# Patient Record
Sex: FEMALE | Race: WHITE | Employment: OTHER | URBAN - METROPOLITAN AREA
[De-identification: names, ages, dates, MRNs, and addresses within clinical notes are randomized per-mention and may not be internally consistent; named-entity substitution may affect disease eponyms.]

---

## 2017-05-29 PROBLEM — L97.509 DIABETIC ULCER OF TOE (HCC): Status: ACTIVE | Noted: 2017-05-29

## 2017-05-29 PROBLEM — L03.116 CELLULITIS OF LEFT LOWER EXTREMITY: Status: ACTIVE | Noted: 2017-05-29

## 2017-05-29 PROBLEM — L03.032 CELLULITIS OF TOE OF LEFT FOOT: Status: ACTIVE | Noted: 2017-05-29

## 2017-05-29 PROBLEM — E11.621 TYPE 2 DIABETES MELLITUS WITH FOOT ULCER, WITHOUT LONG-TERM CURRENT USE OF INSULIN (HCC): Status: ACTIVE | Noted: 2017-05-29

## 2017-05-29 PROBLEM — E11.621 DIABETIC ULCER OF TOE (HCC): Status: ACTIVE | Noted: 2017-05-29

## 2017-05-29 PROBLEM — L97.509 TYPE 2 DIABETES MELLITUS WITH FOOT ULCER, WITHOUT LONG-TERM CURRENT USE OF INSULIN (HCC): Status: ACTIVE | Noted: 2017-05-29

## 2017-05-29 PROBLEM — N17.9 AKI (ACUTE KIDNEY INJURY) (HCC): Status: ACTIVE | Noted: 2017-05-29

## 2017-06-05 PROBLEM — D62 ACUTE BLOOD LOSS ANEMIA: Status: ACTIVE | Noted: 2017-06-05

## 2017-06-14 ENCOUNTER — HOSPITAL ENCOUNTER (OUTPATIENT)
Dept: OTHER | Age: 82
Discharge: HOME | End: 2017-06-14
Attending: FAMILY MEDICINE | Admitting: FAMILY MEDICINE

## 2017-06-14 LAB
A/G RATIO: 1.5 (ref 1.1–2.2)
ALBUMIN SERPL-MCNC: 3.7 G/DL (ref 3.4–5)
ALP BLD-CCNC: 44 U/L (ref 40–129)
ALT SERPL-CCNC: 12 U/L (ref 10–40)
ANION GAP SERPL CALCULATED.3IONS-SCNC: 12 MMOL/L (ref 3–16)
AST SERPL-CCNC: 18 U/L (ref 15–37)
BASOPHILS ABSOLUTE: 0 K/UL (ref 0–0.2)
BASOPHILS RELATIVE PERCENT: 0.6 %
BILIRUB SERPL-MCNC: 0.4 MG/DL (ref 0–1)
BUN BLDV-MCNC: 18 MG/DL (ref 7–20)
CALCIUM SERPL-MCNC: 9.2 MG/DL (ref 8.3–10.6)
CHLORIDE BLD-SCNC: 102 MMOL/L (ref 99–110)
CO2: 27 MMOL/L (ref 21–32)
CREAT SERPL-MCNC: 1.3 MG/DL (ref 0.6–1.2)
EOSINOPHILS ABSOLUTE: 0.1 K/UL (ref 0–0.6)
EOSINOPHILS RELATIVE PERCENT: 2.2 %
GFR AFRICAN AMERICAN: 47
GFR NON-AFRICAN AMERICAN: 39
GLOBULIN: 2.4 G/DL
GLUCOSE BLD-MCNC: 128 MG/DL (ref 70–99)
HCT VFR BLD CALC: 27.3 % (ref 36–48)
HEMOGLOBIN: 8.8 G/DL (ref 12–16)
IRON: 49 UG/DL (ref 37–145)
LYMPHOCYTES ABSOLUTE: 0.9 K/UL (ref 1–5.1)
LYMPHOCYTES RELATIVE PERCENT: 13.6 %
MCH RBC QN AUTO: 30.3 PG (ref 26–34)
MCHC RBC AUTO-ENTMCNC: 32.2 G/DL (ref 31–36)
MCV RBC AUTO: 94 FL (ref 80–100)
MONOCYTES ABSOLUTE: 0.5 K/UL (ref 0–1.3)
MONOCYTES RELATIVE PERCENT: 8.6 %
NEUTROPHILS ABSOLUTE: 4.8 K/UL (ref 1.7–7.7)
NEUTROPHILS RELATIVE PERCENT: 75 %
PDW BLD-RTO: 16.6 % (ref 12.4–15.4)
PLATELET # BLD: 261 K/UL (ref 135–450)
PMV BLD AUTO: 9.6 FL (ref 5–10.5)
POTASSIUM SERPL-SCNC: 4.8 MMOL/L (ref 3.5–5.1)
RBC # BLD: 2.9 M/UL (ref 4–5.2)
SODIUM BLD-SCNC: 141 MMOL/L (ref 136–145)
TOTAL PROTEIN: 6.1 G/DL (ref 6.4–8.2)
WBC # BLD: 6.4 K/UL (ref 4–11)

## 2017-06-15 LAB
ESTIMATED AVERAGE GLUCOSE: 128.4 MG/DL
HBA1C MFR BLD: 6.1 %

## 2017-09-19 ENCOUNTER — HOSPITAL ENCOUNTER (OUTPATIENT)
Dept: MAMMOGRAPHY | Age: 82
Discharge: HOME | End: 2017-09-19
Attending: FAMILY MEDICINE | Admitting: FAMILY MEDICINE

## 2017-09-19 DIAGNOSIS — Z12.31 ENCOUNTER FOR SCREENING MAMMOGRAM FOR BREAST CANCER: ICD-10-CM

## 2017-09-19 LAB
A/G RATIO: 1.4 (ref 1.1–2.2)
ALBUMIN SERPL-MCNC: 4.2 G/DL (ref 3.4–5)
ALP BLD-CCNC: 66 U/L (ref 40–129)
ALT SERPL-CCNC: 11 U/L (ref 10–40)
ANION GAP SERPL CALCULATED.3IONS-SCNC: 18 MMOL/L (ref 3–16)
AST SERPL-CCNC: 19 U/L (ref 15–37)
BASOPHILS ABSOLUTE: 0 K/UL (ref 0–0.2)
BASOPHILS RELATIVE PERCENT: 0.4 %
BILIRUB SERPL-MCNC: 0.4 MG/DL (ref 0–1)
BUN BLDV-MCNC: 27 MG/DL (ref 7–20)
CALCIUM SERPL-MCNC: 10.1 MG/DL (ref 8.3–10.6)
CHLORIDE BLD-SCNC: 93 MMOL/L (ref 99–110)
CO2: 27 MMOL/L (ref 21–32)
CREAT SERPL-MCNC: 1.3 MG/DL (ref 0.6–1.2)
EOSINOPHILS ABSOLUTE: 0.2 K/UL (ref 0–0.6)
EOSINOPHILS RELATIVE PERCENT: 1.8 %
GFR AFRICAN AMERICAN: 47
GFR NON-AFRICAN AMERICAN: 39
GLOBULIN: 3.1 G/DL
GLUCOSE BLD-MCNC: 131 MG/DL (ref 70–99)
HCT VFR BLD CALC: 32.6 % (ref 36–48)
HEMOGLOBIN: 10.9 G/DL (ref 12–16)
IRON SATURATION: 21 % (ref 15–50)
IRON: 61 UG/DL (ref 37–145)
LYMPHOCYTES ABSOLUTE: 1.3 K/UL (ref 1–5.1)
LYMPHOCYTES RELATIVE PERCENT: 13.8 %
MCH RBC QN AUTO: 31.2 PG (ref 26–34)
MCHC RBC AUTO-ENTMCNC: 33.4 G/DL (ref 31–36)
MCV RBC AUTO: 93.2 FL (ref 80–100)
MONOCYTES ABSOLUTE: 0.6 K/UL (ref 0–1.3)
MONOCYTES RELATIVE PERCENT: 6.4 %
NEUTROPHILS ABSOLUTE: 7.4 K/UL (ref 1.7–7.7)
NEUTROPHILS RELATIVE PERCENT: 77.6 %
PDW BLD-RTO: 14.1 % (ref 12.4–15.4)
PLATELET # BLD: 182 K/UL (ref 135–450)
PMV BLD AUTO: 10.2 FL (ref 5–10.5)
POTASSIUM SERPL-SCNC: 4.6 MMOL/L (ref 3.5–5.1)
RBC # BLD: 3.5 M/UL (ref 4–5.2)
SODIUM BLD-SCNC: 138 MMOL/L (ref 136–145)
TOTAL IRON BINDING CAPACITY: 292 UG/DL (ref 260–445)
TOTAL PROTEIN: 7.3 G/DL (ref 6.4–8.2)
WBC # BLD: 9.5 K/UL (ref 4–11)

## 2017-09-20 LAB
ESTIMATED AVERAGE GLUCOSE: 159.9 MG/DL
HBA1C MFR BLD: 7.2 %

## 2017-10-10 ENCOUNTER — HOSPITAL ENCOUNTER (OUTPATIENT)
Dept: OTHER | Age: 82
Discharge: HOME | End: 2017-10-10
Attending: PODIATRIST | Admitting: PODIATRIST

## 2017-10-10 DIAGNOSIS — E11.621 DIABETIC FOOT ULCER WITH OSTEOMYELITIS (HCC): ICD-10-CM

## 2017-10-10 DIAGNOSIS — E11.69 DIABETIC FOOT ULCER WITH OSTEOMYELITIS (HCC): ICD-10-CM

## 2017-10-10 DIAGNOSIS — M86.9 DIABETIC FOOT ULCER WITH OSTEOMYELITIS (HCC): ICD-10-CM

## 2017-10-10 DIAGNOSIS — L97.509 DIABETIC FOOT ULCER WITH OSTEOMYELITIS (HCC): ICD-10-CM

## 2017-10-10 DIAGNOSIS — L97.522 ULCER OF LEFT FOOT, WITH FAT LAYER EXPOSED (HCC): ICD-10-CM

## 2017-12-05 ENCOUNTER — HOSPITAL ENCOUNTER (OUTPATIENT)
Dept: OTHER | Age: 82
Discharge: HOME | End: 2017-12-05
Attending: PODIATRIST | Admitting: PODIATRIST

## 2017-12-05 DIAGNOSIS — L97.509 DIABETIC FOOT ULCER ASSOCIATED WITH OTHER SPECIFIED DIABETES MELLITUS, UNSPECIFIED LATERALITY, UNSPECIFIED PART OF FOOT, UNSPECIFIED ULCER STAGE (HCC): ICD-10-CM

## 2017-12-05 DIAGNOSIS — E13.621 DIABETIC FOOT ULCER ASSOCIATED WITH OTHER SPECIFIED DIABETES MELLITUS, UNSPECIFIED LATERALITY, UNSPECIFIED PART OF FOOT, UNSPECIFIED ULCER STAGE (HCC): ICD-10-CM

## 2017-12-06 PROBLEM — A41.9 SEPSIS (HCC): Status: ACTIVE | Noted: 2017-12-06

## 2017-12-06 PROBLEM — L03.90 CELLULITIS: Status: ACTIVE | Noted: 2017-12-06

## 2017-12-07 PROBLEM — L03.115 CELLULITIS OF RIGHT FOOT: Status: ACTIVE | Noted: 2017-12-07

## 2017-12-20 PROBLEM — E44.0 MODERATE PROTEIN-CALORIE MALNUTRITION (HCC): Status: ACTIVE | Noted: 2017-12-20

## 2017-12-26 PROBLEM — A41.9 SEPSIS (HCC): Status: RESOLVED | Noted: 2017-12-06 | Resolved: 2017-12-26

## 2017-12-26 PROBLEM — L03.115 CELLULITIS OF RIGHT FOOT: Status: RESOLVED | Noted: 2017-12-07 | Resolved: 2017-12-26

## 2017-12-26 PROBLEM — L03.90 CELLULITIS: Status: RESOLVED | Noted: 2017-12-06 | Resolved: 2017-12-26

## 2018-02-12 PROBLEM — L03.115 BILATERAL LOWER LEG CELLULITIS: Status: ACTIVE | Noted: 2018-02-12

## 2018-02-12 PROBLEM — L03.116 BILATERAL LOWER LEG CELLULITIS: Status: ACTIVE | Noted: 2018-02-12

## 2018-02-13 PROBLEM — E43 SEVERE PROTEIN-CALORIE MALNUTRITION (HCC): Chronic | Status: ACTIVE | Noted: 2017-12-20

## 2018-03-11 PROBLEM — G93.40 ENCEPHALOPATHY: Status: ACTIVE | Noted: 2018-03-11

## 2018-08-07 ENCOUNTER — HOSPITAL ENCOUNTER (OUTPATIENT)
Age: 83
Discharge: HOME | End: 2018-08-07
Payer: MEDICARE

## 2018-08-07 LAB
A/G RATIO: 1.4 (ref 1.1–2.2)
ALBUMIN SERPL-MCNC: 4 G/DL (ref 3.4–5)
ALP BLD-CCNC: 85 U/L (ref 40–129)
ALT SERPL-CCNC: 12 U/L (ref 10–40)
ANION GAP SERPL CALCULATED.3IONS-SCNC: 9 MMOL/L (ref 3–16)
AST SERPL-CCNC: 18 U/L (ref 15–37)
BASOPHILS ABSOLUTE: 0 K/UL (ref 0–0.2)
BASOPHILS RELATIVE PERCENT: 0.2 %
BILIRUB SERPL-MCNC: 0.4 MG/DL (ref 0–1)
BUN BLDV-MCNC: 24 MG/DL (ref 7–20)
CALCIUM SERPL-MCNC: 9.7 MG/DL (ref 8.3–10.6)
CHLORIDE BLD-SCNC: 106 MMOL/L (ref 99–110)
CO2: 27 MMOL/L (ref 21–32)
CREAT SERPL-MCNC: 1.1 MG/DL (ref 0.6–1.2)
EOSINOPHILS ABSOLUTE: 0.3 K/UL (ref 0–0.6)
EOSINOPHILS RELATIVE PERCENT: 3.9 %
GFR AFRICAN AMERICAN: 57
GFR NON-AFRICAN AMERICAN: 47
GLOBULIN: 2.9 G/DL
GLUCOSE BLD-MCNC: 159 MG/DL (ref 70–99)
HCT VFR BLD CALC: 29.8 % (ref 36–48)
HEMOGLOBIN: 9.8 G/DL (ref 12–16)
IRON: 56 UG/DL (ref 37–145)
LYMPHOCYTES ABSOLUTE: 1 K/UL (ref 1–5.1)
LYMPHOCYTES RELATIVE PERCENT: 12.1 %
MCH RBC QN AUTO: 32.1 PG (ref 26–34)
MCHC RBC AUTO-ENTMCNC: 33 G/DL (ref 31–36)
MCV RBC AUTO: 97 FL (ref 80–100)
MONOCYTES ABSOLUTE: 0.7 K/UL (ref 0–1.3)
MONOCYTES RELATIVE PERCENT: 7.8 %
NEUTROPHILS ABSOLUTE: 6.4 K/UL (ref 1.7–7.7)
NEUTROPHILS RELATIVE PERCENT: 76 %
PDW BLD-RTO: 14.3 % (ref 12.4–15.4)
PLATELET # BLD: 208 K/UL (ref 135–450)
PMV BLD AUTO: 9.2 FL (ref 5–10.5)
POTASSIUM SERPL-SCNC: 4.4 MMOL/L (ref 3.5–5.1)
RBC # BLD: 3.07 M/UL (ref 4–5.2)
SODIUM BLD-SCNC: 142 MMOL/L (ref 136–145)
TOTAL PROTEIN: 6.9 G/DL (ref 6.4–8.2)
VITAMIN D 25-HYDROXY: 44.4 NG/ML
WBC # BLD: 8.4 K/UL (ref 4–11)

## 2018-08-07 PROCEDURE — 36415 COLL VENOUS BLD VENIPUNCTURE: CPT

## 2018-08-07 PROCEDURE — 82306 VITAMIN D 25 HYDROXY: CPT

## 2018-08-07 PROCEDURE — 83036 HEMOGLOBIN GLYCOSYLATED A1C: CPT

## 2018-08-07 PROCEDURE — 85025 COMPLETE CBC W/AUTO DIFF WBC: CPT

## 2018-08-07 PROCEDURE — 80053 COMPREHEN METABOLIC PANEL: CPT

## 2018-08-07 PROCEDURE — 83540 ASSAY OF IRON: CPT

## 2018-08-08 LAB
ESTIMATED AVERAGE GLUCOSE: 162.8 MG/DL
HBA1C MFR BLD: 7.3 %

## 2018-10-29 ENCOUNTER — HOSPITAL ENCOUNTER (EMERGENCY)
Age: 83
Discharge: HOME | End: 2018-10-29
Payer: MEDICARE

## 2018-10-29 VITALS
RESPIRATION RATE: 14 BRPM | BODY MASS INDEX: 19.9 KG/M2 | WEIGHT: 139 LBS | HEART RATE: 70 BPM | OXYGEN SATURATION: 98 % | SYSTOLIC BLOOD PRESSURE: 127 MMHG | TEMPERATURE: 98.1 F | HEIGHT: 70 IN | DIASTOLIC BLOOD PRESSURE: 65 MMHG

## 2018-10-29 DIAGNOSIS — B02.8 HERPES ZOSTER DERMATITIS: Primary | ICD-10-CM

## 2018-10-29 DIAGNOSIS — N30.00 ACUTE CYSTITIS WITHOUT HEMATURIA: ICD-10-CM

## 2018-10-29 DIAGNOSIS — L30.8 HERPES ZOSTER DERMATITIS: Primary | ICD-10-CM

## 2018-10-29 LAB
BACTERIA: ABNORMAL /HPF
BILIRUBIN URINE: NEGATIVE
BLOOD, URINE: NEGATIVE
CLARITY: CLEAR
COLOR: YELLOW
EPITHELIAL CELLS, UA: ABNORMAL /HPF
GLUCOSE URINE: NEGATIVE MG/DL
KETONES, URINE: NEGATIVE MG/DL
LEUKOCYTE ESTERASE, URINE: ABNORMAL
MICROSCOPIC EXAMINATION: YES
NITRITE, URINE: NEGATIVE
PH UA: 5.5
PROTEIN UA: NEGATIVE MG/DL
RBC UA: ABNORMAL /HPF (ref 0–2)
SPECIFIC GRAVITY UA: 1.02
URINE REFLEX TO CULTURE: YES
URINE TYPE: ABNORMAL
UROBILINOGEN, URINE: 0.2 E.U./DL
WBC UA: ABNORMAL /HPF (ref 0–5)

## 2018-10-29 PROCEDURE — 99283 EMERGENCY DEPT VISIT LOW MDM: CPT

## 2018-10-29 PROCEDURE — 6370000000 HC RX 637 (ALT 250 FOR IP): Performed by: PHYSICIAN ASSISTANT

## 2018-10-29 PROCEDURE — 87086 URINE CULTURE/COLONY COUNT: CPT

## 2018-10-29 PROCEDURE — 81001 URINALYSIS AUTO W/SCOPE: CPT

## 2018-10-29 RX ORDER — HYDROCODONE BITARTRATE AND ACETAMINOPHEN 5; 325 MG/1; MG/1
1 TABLET ORAL ONCE
Status: COMPLETED | OUTPATIENT
Start: 2018-10-29 | End: 2018-10-29

## 2018-10-29 RX ORDER — CEPHALEXIN 250 MG/1
250 CAPSULE ORAL 2 TIMES DAILY
Qty: 14 CAPSULE | Refills: 0 | Status: SHIPPED | OUTPATIENT
Start: 2018-10-29 | End: 2018-11-05

## 2018-10-29 RX ORDER — VALACYCLOVIR HYDROCHLORIDE 1 G/1
1000 TABLET, FILM COATED ORAL 2 TIMES DAILY
Qty: 14 TABLET | Refills: 0 | Status: SHIPPED | OUTPATIENT
Start: 2018-10-29 | End: 2018-11-05

## 2018-10-29 RX ORDER — HYDROCODONE BITARTRATE AND ACETAMINOPHEN 5; 325 MG/1; MG/1
1 TABLET ORAL EVERY 6 HOURS PRN
Qty: 10 TABLET | Refills: 0 | Status: SHIPPED | OUTPATIENT
Start: 2018-10-29 | End: 2018-11-01

## 2018-10-29 RX ORDER — ACYCLOVIR 200 MG/1
800 CAPSULE ORAL ONCE
Status: COMPLETED | OUTPATIENT
Start: 2018-10-29 | End: 2018-10-29

## 2018-10-29 RX ORDER — CEPHALEXIN 500 MG/1
500 CAPSULE ORAL ONCE
Status: COMPLETED | OUTPATIENT
Start: 2018-10-29 | End: 2018-10-29

## 2018-10-29 RX ADMIN — CEPHALEXIN 500 MG: 500 CAPSULE ORAL at 20:49

## 2018-10-29 RX ADMIN — ACYCLOVIR 800 MG: 200 CAPSULE ORAL at 20:49

## 2018-10-29 RX ADMIN — HYDROCODONE BITARTRATE AND ACETAMINOPHEN 1 TABLET: 5; 325 TABLET ORAL at 20:49

## 2018-10-29 ASSESSMENT — PAIN DESCRIPTION - LOCATION: LOCATION: LEG

## 2018-10-29 ASSESSMENT — PAIN DESCRIPTION - ONSET: ONSET: GRADUAL

## 2018-10-29 ASSESSMENT — PAIN DESCRIPTION - ORIENTATION: ORIENTATION: LEFT;UPPER

## 2018-10-29 ASSESSMENT — ENCOUNTER SYMPTOMS
ABDOMINAL PAIN: 0
NAUSEA: 1
SHORTNESS OF BREATH: 0
VOMITING: 0

## 2018-10-29 ASSESSMENT — PAIN DESCRIPTION - PAIN TYPE: TYPE: ACUTE PAIN

## 2018-10-29 ASSESSMENT — PAIN DESCRIPTION - PROGRESSION: CLINICAL_PROGRESSION: GRADUALLY WORSENING

## 2018-10-29 ASSESSMENT — PAIN SCALES - GENERAL
PAINLEVEL_OUTOF10: 5
PAINLEVEL_OUTOF10: 5

## 2018-10-29 ASSESSMENT — PAIN DESCRIPTION - DESCRIPTORS: DESCRIPTORS: DULL

## 2018-10-30 NOTE — ED PROVIDER NOTES
appearance. She does not have a sickly appearance. She does not appear ill. HENT:   Head: Normocephalic and atraumatic. Mouth/Throat: Oropharynx is clear and moist.   Eyes: Conjunctivae are normal.   Cardiovascular: Normal rate, regular rhythm, normal heart sounds and intact distal pulses. Pulses:       Radial pulses are 2+ on the right side, and 2+ on the left side. Posterior tibial pulses are 2+ on the right side, and 2+ on the left side. Pulmonary/Chest: Effort normal and breath sounds normal. No respiratory distress. She has no wheezes. She has no rales. Abdominal: Soft. Bowel sounds are normal. She exhibits no distension. There is no tenderness. There is no rebound and no guarding. Musculoskeletal:        Left hip: She exhibits no tenderness, no crepitus and no deformity. Neurological: She is alert and oriented to person, place, and time. No sensory deficit. She exhibits normal muscle tone. Skin: Skin is warm and dry. Rash noted. No petechiae and no purpura noted. Rash is vesicular. No signs of bacterial skin infection at this time, no spreading redness, no red streaks, no drainage. Psychiatric: She has a normal mood and affect. Her behavior is normal.   Vitals reviewed.       Procedures    MDM  Number of Diagnoses or Management Options  Acute cystitis without hematuria:   Herpes zoster dermatitis:      Amount and/or Complexity of Data Reviewed  Clinical lab tests: ordered and reviewed  Review and summarize past medical records: yes    Patient Progress  Patient progress: stable          Results for orders placed or performed during the hospital encounter of 10/29/18   Urinalysis Reflex to Culture   Result Value Ref Range    Color, UA Yellow Straw/Yellow    Clarity, UA Clear Clear    Glucose, Ur Negative Negative mg/dL    Bilirubin Urine Negative Negative    Ketones, Urine Negative Negative mg/dL    Specific Gravity, UA 1.025 1.005 - 1.030    Blood, Urine Negative Negative    pH, UA 5.5 5.0 - 8.0    Protein, UA Negative Negative mg/dL    Urobilinogen, Urine 0.2 <2.0 E.U./dL    Nitrite, Urine Negative Negative    Leukocyte Esterase, Urine MODERATE (A) Negative    Microscopic Examination YES     Urine Reflex to Culture Yes     Urine Type Voided    Microscopic Urinalysis   Result Value Ref Range    WBC, UA 10-20 (A) 0 - 5 /HPF    RBC, UA 0-2 0 - 2 /HPF    Epi Cells 3-5 /HPF    Bacteria, UA 3+ (A) /HPF         8:32 PM  Impression herpes zoster, treatment with valtrex and pain medication. Also found to have UTI placed on keflex and culture sent. Advised To follow-up with her doctor within 1 week for recheck. Advised returning to the ER for any worsening symptoms. Patient and daughter understand and agree. Please note that this chart was generated using Dragon dictation software.  Although every effort was made to ensure the accuracy of this automated transcription, some errors in transcription may have occurred       Sachin Mcdonald PA-C  10/29/18 4751

## 2018-10-31 LAB — URINE CULTURE, ROUTINE: NORMAL

## 2018-12-28 ENCOUNTER — HOSPITAL ENCOUNTER (OUTPATIENT)
Age: 83
Setting detail: SPECIMEN
Discharge: HOME | End: 2018-12-28
Payer: MEDICARE

## 2018-12-28 LAB
A/G RATIO: 1.4 (ref 1.1–2.2)
ALBUMIN SERPL-MCNC: 4.2 G/DL (ref 3.4–5)
ALP BLD-CCNC: 61 U/L (ref 40–129)
ALT SERPL-CCNC: 13 U/L (ref 10–40)
ANION GAP SERPL CALCULATED.3IONS-SCNC: 10 MMOL/L (ref 3–16)
AST SERPL-CCNC: 24 U/L (ref 15–37)
BASOPHILS ABSOLUTE: 0 K/UL (ref 0–0.2)
BASOPHILS RELATIVE PERCENT: 0.2 %
BILIRUB SERPL-MCNC: 0.8 MG/DL (ref 0–1)
BUN BLDV-MCNC: 21 MG/DL (ref 7–20)
CALCIUM SERPL-MCNC: 10.2 MG/DL (ref 8.3–10.6)
CHLORIDE BLD-SCNC: 101 MMOL/L (ref 99–110)
CO2: 30 MMOL/L (ref 21–32)
CREAT SERPL-MCNC: 1.1 MG/DL (ref 0.6–1.2)
EOSINOPHILS ABSOLUTE: 0.1 K/UL (ref 0–0.6)
EOSINOPHILS RELATIVE PERCENT: 2.2 %
GFR AFRICAN AMERICAN: 57
GFR NON-AFRICAN AMERICAN: 47
GLOBULIN: 2.9 G/DL
GLUCOSE BLD-MCNC: 140 MG/DL (ref 70–99)
HCT VFR BLD CALC: 34.3 % (ref 36–48)
HEMOGLOBIN: 11.4 G/DL (ref 12–16)
IRON: 129 UG/DL (ref 37–145)
LYMPHOCYTES ABSOLUTE: 0.9 K/UL (ref 1–5.1)
LYMPHOCYTES RELATIVE PERCENT: 14.6 %
MCH RBC QN AUTO: 32.5 PG (ref 26–34)
MCHC RBC AUTO-ENTMCNC: 33.2 G/DL (ref 31–36)
MCV RBC AUTO: 97.8 FL (ref 80–100)
MONOCYTES ABSOLUTE: 0.4 K/UL (ref 0–1.3)
MONOCYTES RELATIVE PERCENT: 6.6 %
NEUTROPHILS ABSOLUTE: 4.9 K/UL (ref 1.7–7.7)
NEUTROPHILS RELATIVE PERCENT: 76.4 %
PDW BLD-RTO: 14.7 % (ref 12.4–15.4)
PLATELET # BLD: 203 K/UL (ref 135–450)
PMV BLD AUTO: 9.5 FL (ref 5–10.5)
POTASSIUM SERPL-SCNC: 4.8 MMOL/L (ref 3.5–5.1)
RBC # BLD: 3.51 M/UL (ref 4–5.2)
SODIUM BLD-SCNC: 141 MMOL/L (ref 136–145)
T4 FREE: 1.4 NG/DL (ref 0.9–1.8)
TOTAL PROTEIN: 7.1 G/DL (ref 6.4–8.2)
TSH SERPL DL<=0.05 MIU/L-ACNC: 2.44 UIU/ML (ref 0.27–4.2)
VITAMIN B-12: 769 PG/ML (ref 211–911)
VITAMIN D 25-HYDROXY: 57.8 NG/ML
WBC # BLD: 6.4 K/UL (ref 4–11)

## 2018-12-28 PROCEDURE — 84439 ASSAY OF FREE THYROXINE: CPT

## 2018-12-28 PROCEDURE — 36415 COLL VENOUS BLD VENIPUNCTURE: CPT

## 2018-12-28 PROCEDURE — 82607 VITAMIN B-12: CPT

## 2018-12-28 PROCEDURE — 85025 COMPLETE CBC W/AUTO DIFF WBC: CPT

## 2018-12-28 PROCEDURE — 82306 VITAMIN D 25 HYDROXY: CPT

## 2018-12-28 PROCEDURE — 84443 ASSAY THYROID STIM HORMONE: CPT

## 2018-12-28 PROCEDURE — 83036 HEMOGLOBIN GLYCOSYLATED A1C: CPT

## 2018-12-28 PROCEDURE — 80053 COMPREHEN METABOLIC PANEL: CPT

## 2018-12-28 PROCEDURE — 83540 ASSAY OF IRON: CPT

## 2018-12-29 LAB
ESTIMATED AVERAGE GLUCOSE: 171.4 MG/DL
HBA1C MFR BLD: 7.6 %

## 2019-01-09 ENCOUNTER — HOSPITAL ENCOUNTER (OUTPATIENT)
Dept: OPERATING ROOM | Age: 84
Setting detail: OUTPATIENT SURGERY
Discharge: HOME | End: 2019-01-09
Admitting: OPHTHALMOLOGY
Payer: MEDICARE

## 2019-01-09 VITALS — SYSTOLIC BLOOD PRESSURE: 177 MMHG | DIASTOLIC BLOOD PRESSURE: 72 MMHG | HEART RATE: 67 BPM

## 2019-01-09 PROCEDURE — 6370000000 HC RX 637 (ALT 250 FOR IP): Performed by: OPHTHALMOLOGY

## 2019-01-09 PROCEDURE — 66821 AFTER CATARACT LASER SURGERY: CPT

## 2019-01-09 RX ORDER — TROPICAMIDE 10 MG/ML
1 SOLUTION/ DROPS OPHTHALMIC EVERY 5 MIN PRN
Status: DISCONTINUED | OUTPATIENT
Start: 2019-01-09 | End: 2019-01-09

## 2019-01-09 RX ORDER — TROPICAMIDE 10 MG/ML
1 SOLUTION/ DROPS OPHTHALMIC EVERY 5 MIN PRN
Status: DISCONTINUED | OUTPATIENT
Start: 2019-01-09 | End: 2019-01-10 | Stop reason: HOSPADM

## 2019-01-09 RX ORDER — PREDNISOLONE ACETATE 10 MG/ML
1 SUSPENSION/ DROPS OPHTHALMIC
Status: COMPLETED | OUTPATIENT
Start: 2019-01-09 | End: 2019-01-09

## 2019-01-09 RX ORDER — PROPARACAINE HYDROCHLORIDE 5 MG/ML
1 SOLUTION/ DROPS OPHTHALMIC
Status: COMPLETED | OUTPATIENT
Start: 2019-01-09 | End: 2019-01-09

## 2019-01-09 RX ORDER — PHENYLEPHRINE HCL 2.5 %
1 DROPS OPHTHALMIC (EYE) EVERY 5 MIN PRN
Status: DISCONTINUED | OUTPATIENT
Start: 2019-01-09 | End: 2019-01-09

## 2019-01-09 RX ORDER — PHENYLEPHRINE HCL 2.5 %
1 DROPS OPHTHALMIC (EYE) EVERY 5 MIN PRN
Status: COMPLETED | OUTPATIENT
Start: 2019-01-09 | End: 2019-01-09

## 2019-01-09 RX ADMIN — PHENYLEPHRINE HYDROCHLORIDE 1 DROP: 25 SOLUTION/ DROPS OPHTHALMIC at 14:41

## 2019-01-09 RX ADMIN — TROPICAMIDE 1 DROP: 10 SOLUTION/ DROPS OPHTHALMIC at 14:40

## 2019-01-09 RX ADMIN — APRACLONIDINE HYDROCHLORIDE 1 DROP: 10 SOLUTION/ DROPS OPHTHALMIC at 14:32

## 2019-01-09 RX ADMIN — PROPARACAINE HYDROCHLORIDE 1 DROP: 5 SOLUTION/ DROPS OPHTHALMIC at 15:17

## 2019-01-09 RX ADMIN — TROPICAMIDE 1 DROP: 10 SOLUTION/ DROPS OPHTHALMIC at 14:35

## 2019-01-09 RX ADMIN — PREDNISOLONE ACETATE 1 DROP: 10 SUSPENSION/ DROPS OPHTHALMIC at 15:18

## 2019-01-09 RX ADMIN — PHENYLEPHRINE HYDROCHLORIDE 1 DROP: 25 SOLUTION/ DROPS OPHTHALMIC at 14:36

## 2019-01-23 ENCOUNTER — HOSPITAL ENCOUNTER (OUTPATIENT)
Dept: OPERATING ROOM | Age: 84
Setting detail: OUTPATIENT SURGERY
Discharge: HOME | End: 2019-01-23
Payer: MEDICARE

## 2019-01-23 VITALS
HEART RATE: 61 BPM | BODY MASS INDEX: 19.66 KG/M2 | HEIGHT: 71 IN | RESPIRATION RATE: 16 BRPM | DIASTOLIC BLOOD PRESSURE: 57 MMHG | OXYGEN SATURATION: 99 % | SYSTOLIC BLOOD PRESSURE: 146 MMHG

## 2019-01-23 PROCEDURE — 66821 AFTER CATARACT LASER SURGERY: CPT

## 2019-01-23 PROCEDURE — 6370000000 HC RX 637 (ALT 250 FOR IP): Performed by: OPHTHALMOLOGY

## 2019-01-23 RX ORDER — PROPARACAINE HYDROCHLORIDE 5 MG/ML
1 SOLUTION/ DROPS OPHTHALMIC ONCE
Status: COMPLETED | OUTPATIENT
Start: 2019-01-23 | End: 2019-01-23

## 2019-01-23 RX ORDER — TROPICAMIDE 10 MG/ML
1 SOLUTION/ DROPS OPHTHALMIC
Status: COMPLETED | OUTPATIENT
Start: 2019-01-23 | End: 2019-01-23

## 2019-01-23 RX ORDER — APRACLONIDINE HYDROCHLORIDE 5 MG/ML
1 SOLUTION/ DROPS OPHTHALMIC ONCE
Status: COMPLETED | OUTPATIENT
Start: 2019-01-23 | End: 2019-01-23

## 2019-01-23 RX ORDER — CIPROFLOXACIN 500 MG/1
TABLET, FILM COATED ORAL
Refills: 0 | COMMUNITY
Start: 2019-01-14 | End: 2019-03-03

## 2019-01-23 RX ORDER — PREDNISOLONE ACETATE 10 MG/ML
1 SUSPENSION/ DROPS OPHTHALMIC ONCE
Status: COMPLETED | OUTPATIENT
Start: 2019-01-23 | End: 2019-01-23

## 2019-01-23 RX ORDER — DOXYCYCLINE HYCLATE 100 MG
TABLET ORAL
Refills: 0 | COMMUNITY
Start: 2019-01-14 | End: 2019-03-03

## 2019-01-23 RX ORDER — PHENYLEPHRINE HCL 2.5 %
1 DROPS OPHTHALMIC (EYE)
Status: COMPLETED | OUTPATIENT
Start: 2019-01-23 | End: 2019-01-23

## 2019-01-23 RX ADMIN — PHENYLEPHRINE HYDROCHLORIDE 1 DROP: 25 SOLUTION/ DROPS OPHTHALMIC at 13:57

## 2019-01-23 RX ADMIN — TROPICAMIDE 1 DROP: 10 SOLUTION/ DROPS OPHTHALMIC at 13:51

## 2019-01-23 RX ADMIN — TROPICAMIDE 1 DROP: 10 SOLUTION/ DROPS OPHTHALMIC at 13:57

## 2019-01-23 RX ADMIN — APRACLONIDINE 1 DROP: 5.75 SOLUTION OPHTHALMIC at 15:10

## 2019-01-23 RX ADMIN — APRACLONIDINE HYDROCHLORIDE 1 DROP: 10 SOLUTION/ DROPS OPHTHALMIC at 13:51

## 2019-01-23 RX ADMIN — PROPARACAINE HYDROCHLORIDE 1 DROP: 5 SOLUTION/ DROPS OPHTHALMIC at 15:07

## 2019-01-23 RX ADMIN — PREDNISOLONE ACETATE 1 DROP: 10 SUSPENSION/ DROPS OPHTHALMIC at 15:10

## 2019-01-23 RX ADMIN — PHENYLEPHRINE HYDROCHLORIDE 1 DROP: 25 SOLUTION/ DROPS OPHTHALMIC at 13:50

## 2019-03-03 ENCOUNTER — APPOINTMENT (OUTPATIENT)
Dept: CT IMAGING | Age: 84
DRG: 683 | End: 2019-03-03
Payer: MEDICARE

## 2019-03-03 ENCOUNTER — HOSPITAL ENCOUNTER (INPATIENT)
Age: 84
LOS: 3 days | Discharge: NURSING, REHAB OR LONG-TERM CARE | DRG: 683 | End: 2019-03-06
Attending: EMERGENCY MEDICINE | Admitting: INTERNAL MEDICINE
Payer: MEDICARE

## 2019-03-03 ENCOUNTER — APPOINTMENT (OUTPATIENT)
Dept: GENERAL RADIOLOGY | Age: 84
DRG: 683 | End: 2019-03-03
Payer: MEDICARE

## 2019-03-03 DIAGNOSIS — N17.9 ACUTE RENAL FAILURE, UNSPECIFIED ACUTE RENAL FAILURE TYPE (HCC): ICD-10-CM

## 2019-03-03 DIAGNOSIS — R77.8 ELEVATED TROPONIN: ICD-10-CM

## 2019-03-03 DIAGNOSIS — R53.1 GENERAL WEAKNESS: ICD-10-CM

## 2019-03-03 DIAGNOSIS — R41.0 CONFUSION: Primary | ICD-10-CM

## 2019-03-03 DIAGNOSIS — N39.0 URINARY TRACT INFECTION WITHOUT HEMATURIA, SITE UNSPECIFIED: ICD-10-CM

## 2019-03-03 DIAGNOSIS — S70.02XA CONTUSION OF LEFT HIP, INITIAL ENCOUNTER: ICD-10-CM
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
## 2019-03-03 LAB
A/G RATIO: 1.4 (ref 1.1–2.2)
ALBUMIN SERPL-MCNC: 4.2 G/DL (ref 3.4–5)
ALP BLD-CCNC: 81 U/L (ref 40–129)
ALT SERPL-CCNC: 20 U/L (ref 10–40)
ANION GAP SERPL CALCULATED.3IONS-SCNC: 14 MMOL/L (ref 3–16)
AST SERPL-CCNC: 31 U/L (ref 15–37)
BACTERIA: ABNORMAL /HPF
BASOPHILS ABSOLUTE: 0 K/UL (ref 0–0.2)
BASOPHILS RELATIVE PERCENT: 0.3 %
BILIRUB SERPL-MCNC: 0.3 MG/DL (ref 0–1)
BILIRUBIN URINE: NEGATIVE
BLOOD, URINE: ABNORMAL
BUN BLDV-MCNC: 32 MG/DL (ref 7–20)
CALCIUM SERPL-MCNC: 9.8 MG/DL (ref 8.3–10.6)
CHLORIDE BLD-SCNC: 98 MMOL/L (ref 99–110)
CLARITY: ABNORMAL
CO2: 32 MMOL/L (ref 21–32)
COLOR: YELLOW
CREAT SERPL-MCNC: 1.5 MG/DL (ref 0.6–1.2)
EKG ATRIAL RATE: 64 BPM
EKG DIAGNOSIS: NORMAL
EKG P AXIS: 59 DEGREES
EKG P-R INTERVAL: 206 MS
EKG Q-T INTERVAL: 400 MS
EKG QRS DURATION: 78 MS
EKG QTC CALCULATION (BAZETT): 412 MS
EKG R AXIS: 76 DEGREES
EKG T AXIS: 72 DEGREES
EKG VENTRICULAR RATE: 64 BPM
EOSINOPHILS ABSOLUTE: 0.2 K/UL (ref 0–0.6)
EOSINOPHILS RELATIVE PERCENT: 2.6 %
EPITHELIAL CELLS, UA: ABNORMAL /HPF
GFR AFRICAN AMERICAN: 40
GFR NON-AFRICAN AMERICAN: 33
GLOBULIN: 2.9 G/DL
GLUCOSE BLD-MCNC: 148 MG/DL (ref 70–99)
GLUCOSE BLD-MCNC: 94 MG/DL (ref 70–99)
GLUCOSE URINE: NEGATIVE MG/DL
HCT VFR BLD CALC: 34 % (ref 36–48)
HEMOGLOBIN: 11.3 G/DL (ref 12–16)
KETONES, URINE: NEGATIVE MG/DL
LEUKOCYTE ESTERASE, URINE: ABNORMAL
LYMPHOCYTES ABSOLUTE: 1 K/UL (ref 1–5.1)
LYMPHOCYTES RELATIVE PERCENT: 11.6 %
MCH RBC QN AUTO: 32 PG (ref 26–34)
MCHC RBC AUTO-ENTMCNC: 33.3 G/DL (ref 31–36)
MCV RBC AUTO: 96 FL (ref 80–100)
MICROSCOPIC EXAMINATION: YES
MONOCYTES ABSOLUTE: 0.5 K/UL (ref 0–1.3)
MONOCYTES RELATIVE PERCENT: 6.5 %
NEUTROPHILS ABSOLUTE: 6.5 K/UL (ref 1.7–7.7)
NEUTROPHILS RELATIVE PERCENT: 79 %
NITRITE, URINE: POSITIVE
PDW BLD-RTO: 12.7 % (ref 12.4–15.4)
PERFORMED ON: NORMAL
PH UA: 6 (ref 5–8)
PLATELET # BLD: 172 K/UL (ref 135–450)
PMV BLD AUTO: 10 FL (ref 5–10.5)
POTASSIUM REFLEX MAGNESIUM: 4.2 MMOL/L (ref 3.5–5.1)
PROTEIN UA: NEGATIVE MG/DL
RBC # BLD: 3.54 M/UL (ref 4–5.2)
RBC UA: ABNORMAL /HPF (ref 0–2)
SODIUM BLD-SCNC: 144 MMOL/L (ref 136–145)
SPECIFIC GRAVITY UA: 1.02 (ref 1–1.03)
TOTAL CK: 290 U/L (ref 26–192)
TOTAL PROTEIN: 7.1 G/DL (ref 6.4–8.2)
TROPONIN: 0.09 NG/ML
TROPONIN: 0.1 NG/ML
URINE REFLEX TO CULTURE: YES
URINE TYPE: ABNORMAL
UROBILINOGEN, URINE: 0.2 E.U./DL
WBC # BLD: 8.2 K/UL (ref 4–11)
WBC UA: >100 /HPF (ref 0–5)

## 2019-03-03 PROCEDURE — 96361 HYDRATE IV INFUSION ADD-ON: CPT

## 2019-03-03 PROCEDURE — 82550 ASSAY OF CK (CPK): CPT

## 2019-03-03 PROCEDURE — 84484 ASSAY OF TROPONIN QUANT: CPT

## 2019-03-03 PROCEDURE — 71045 X-RAY EXAM CHEST 1 VIEW: CPT

## 2019-03-03 PROCEDURE — 99285 EMERGENCY DEPT VISIT HI MDM: CPT

## 2019-03-03 PROCEDURE — 80053 COMPREHEN METABOLIC PANEL: CPT

## 2019-03-03 PROCEDURE — 85025 COMPLETE CBC W/AUTO DIFF WBC: CPT

## 2019-03-03 PROCEDURE — 1200000000 HC SEMI PRIVATE

## 2019-03-03 PROCEDURE — 6360000002 HC RX W HCPCS: Performed by: PHYSICIAN ASSISTANT

## 2019-03-03 PROCEDURE — 96374 THER/PROPH/DIAG INJ IV PUSH: CPT

## 2019-03-03 PROCEDURE — 73700 CT LOWER EXTREMITY W/O DYE: CPT

## 2019-03-03 PROCEDURE — 6370000000 HC RX 637 (ALT 250 FOR IP): Performed by: INTERNAL MEDICINE

## 2019-03-03 PROCEDURE — 2580000003 HC RX 258: Performed by: INTERNAL MEDICINE

## 2019-03-03 PROCEDURE — 2580000003 HC RX 258: Performed by: PHYSICIAN ASSISTANT

## 2019-03-03 PROCEDURE — 36415 COLL VENOUS BLD VENIPUNCTURE: CPT

## 2019-03-03 PROCEDURE — 2580000003 HC RX 258: Performed by: EMERGENCY MEDICINE

## 2019-03-03 PROCEDURE — 70450 CT HEAD/BRAIN W/O DYE: CPT

## 2019-03-03 PROCEDURE — 93005 ELECTROCARDIOGRAM TRACING: CPT | Performed by: PHYSICIAN ASSISTANT

## 2019-03-03 PROCEDURE — 81001 URINALYSIS AUTO W/SCOPE: CPT

## 2019-03-03 PROCEDURE — 93010 ELECTROCARDIOGRAM REPORT: CPT | Performed by: INTERNAL MEDICINE

## 2019-03-03 PROCEDURE — 87086 URINE CULTURE/COLONY COUNT: CPT

## 2019-03-03 PROCEDURE — 6360000002 HC RX W HCPCS: Performed by: INTERNAL MEDICINE

## 2019-03-03 RX ORDER — LACTOBACILLUS RHAMNOSUS GG 10B CELL
1 CAPSULE ORAL DAILY
Status: DISCONTINUED | OUTPATIENT
Start: 2019-03-03 | End: 2019-03-06 | Stop reason: HOSPADM

## 2019-03-03 RX ORDER — DEXTROSE MONOHYDRATE 50 MG/ML
100 INJECTION, SOLUTION INTRAVENOUS PRN
Status: DISCONTINUED | OUTPATIENT
Start: 2019-03-03 | End: 2019-03-06 | Stop reason: HOSPADM

## 2019-03-03 RX ORDER — SODIUM CHLORIDE 0.9 % (FLUSH) 0.9 %
10 SYRINGE (ML) INJECTION PRN
Status: DISCONTINUED | OUTPATIENT
Start: 2019-03-03 | End: 2019-03-06 | Stop reason: HOSPADM

## 2019-03-03 RX ORDER — ASPIRIN 81 MG/1
81 TABLET, CHEWABLE ORAL DAILY
Status: DISCONTINUED | OUTPATIENT
Start: 2019-03-03 | End: 2019-03-06 | Stop reason: HOSPADM

## 2019-03-03 RX ORDER — PRAMIPEXOLE DIHYDROCHLORIDE 1 MG/1
TABLET ORAL
Refills: 4 | COMMUNITY
Start: 2019-01-11

## 2019-03-03 RX ORDER — 0.9 % SODIUM CHLORIDE 0.9 %
1000 INTRAVENOUS SOLUTION INTRAVENOUS ONCE
Status: COMPLETED | OUTPATIENT
Start: 2019-03-03 | End: 2019-03-03

## 2019-03-03 RX ORDER — ONDANSETRON 2 MG/ML
4 INJECTION INTRAMUSCULAR; INTRAVENOUS EVERY 6 HOURS PRN
Status: DISCONTINUED | OUTPATIENT
Start: 2019-03-03 | End: 2019-03-06 | Stop reason: HOSPADM

## 2019-03-03 RX ORDER — SODIUM CHLORIDE 9 MG/ML
INJECTION, SOLUTION INTRAVENOUS CONTINUOUS
Status: DISCONTINUED | OUTPATIENT
Start: 2019-03-03 | End: 2019-03-05

## 2019-03-03 RX ORDER — SODIUM CHLORIDE 0.9 % (FLUSH) 0.9 %
10 SYRINGE (ML) INJECTION EVERY 12 HOURS SCHEDULED
Status: DISCONTINUED | OUTPATIENT
Start: 2019-03-03 | End: 2019-03-06 | Stop reason: HOSPADM

## 2019-03-03 RX ORDER — PANTOPRAZOLE SODIUM 40 MG/1
40 TABLET, DELAYED RELEASE ORAL DAILY
Status: DISCONTINUED | OUTPATIENT
Start: 2019-03-03 | End: 2019-03-06 | Stop reason: HOSPADM

## 2019-03-03 RX ORDER — NICOTINE POLACRILEX 4 MG
15 LOZENGE BUCCAL PRN
Status: DISCONTINUED | OUTPATIENT
Start: 2019-03-03 | End: 2019-03-06 | Stop reason: HOSPADM

## 2019-03-03 RX ORDER — DEXTROSE MONOHYDRATE 25 G/50ML
12.5 INJECTION, SOLUTION INTRAVENOUS PRN
Status: DISCONTINUED | OUTPATIENT
Start: 2019-03-03 | End: 2019-03-06 | Stop reason: HOSPADM

## 2019-03-03 RX ADMIN — ASPIRIN 81 MG 81 MG: 81 TABLET ORAL at 20:36

## 2019-03-03 RX ADMIN — PANTOPRAZOLE SODIUM 40 MG: 40 TABLET, DELAYED RELEASE ORAL at 20:36

## 2019-03-03 RX ADMIN — SODIUM CHLORIDE 1000 ML: 9 INJECTION, SOLUTION INTRAVENOUS at 15:02

## 2019-03-03 RX ADMIN — CEFTRIAXONE SODIUM 1 G: 1 INJECTION, POWDER, FOR SOLUTION INTRAMUSCULAR; INTRAVENOUS at 15:07

## 2019-03-03 RX ADMIN — METOPROLOL TARTRATE 25 MG: 25 TABLET ORAL at 20:36

## 2019-03-03 RX ADMIN — Medication 1 CAPSULE: at 20:36

## 2019-03-03 RX ADMIN — ENOXAPARIN SODIUM 30 MG: 30 INJECTION SUBCUTANEOUS at 20:37

## 2019-03-03 RX ADMIN — Medication 10 ML: at 20:36

## 2019-03-03 RX ADMIN — SODIUM CHLORIDE: 900 INJECTION, SOLUTION INTRAVENOUS at 20:40

## 2019-03-03 ASSESSMENT — ENCOUNTER SYMPTOMS
SORE THROAT: 0
ABDOMINAL PAIN: 0
CHEST TIGHTNESS: 0
COUGH: 1
VOMITING: 0
SHORTNESS OF BREATH: 0

## 2019-03-03 ASSESSMENT — PAIN DESCRIPTION - LOCATION: LOCATION: BUTTOCKS

## 2019-03-03 ASSESSMENT — PAIN DESCRIPTION - ORIENTATION: ORIENTATION: LEFT

## 2019-03-03 ASSESSMENT — PAIN SCALES - GENERAL
PAINLEVEL_OUTOF10: 0
PAINLEVEL_OUTOF10: 3

## 2019-03-03 ASSESSMENT — PAIN DESCRIPTION - PAIN TYPE: TYPE: ACUTE PAIN

## 2019-03-04 LAB
ANION GAP SERPL CALCULATED.3IONS-SCNC: 7 MMOL/L (ref 3–16)
BASOPHILS ABSOLUTE: 0 K/UL (ref 0–0.2)
BASOPHILS RELATIVE PERCENT: 0.3 %
BUN BLDV-MCNC: 25 MG/DL (ref 7–20)
CALCIUM SERPL-MCNC: 9 MG/DL (ref 8.3–10.6)
CHLORIDE BLD-SCNC: 106 MMOL/L (ref 99–110)
CO2: 29 MMOL/L (ref 21–32)
CREAT SERPL-MCNC: 1.3 MG/DL (ref 0.6–1.2)
EOSINOPHILS ABSOLUTE: 0.2 K/UL (ref 0–0.6)
EOSINOPHILS RELATIVE PERCENT: 3.8 %
GFR AFRICAN AMERICAN: 47
GFR NON-AFRICAN AMERICAN: 39
GLUCOSE BLD-MCNC: 127 MG/DL (ref 70–99)
GLUCOSE BLD-MCNC: 162 MG/DL (ref 70–99)
GLUCOSE BLD-MCNC: 183 MG/DL (ref 70–99)
GLUCOSE BLD-MCNC: 75 MG/DL (ref 70–99)
GLUCOSE BLD-MCNC: 82 MG/DL (ref 70–99)
HCT VFR BLD CALC: 31.1 % (ref 36–48)
HEMOGLOBIN: 10.5 G/DL (ref 12–16)
LYMPHOCYTES ABSOLUTE: 1.3 K/UL (ref 1–5.1)
LYMPHOCYTES RELATIVE PERCENT: 23.4 %
MCH RBC QN AUTO: 32.7 PG (ref 26–34)
MCHC RBC AUTO-ENTMCNC: 33.9 G/DL (ref 31–36)
MCV RBC AUTO: 96.4 FL (ref 80–100)
MONOCYTES ABSOLUTE: 0.5 K/UL (ref 0–1.3)
MONOCYTES RELATIVE PERCENT: 8.1 %
NEUTROPHILS ABSOLUTE: 3.6 K/UL (ref 1.7–7.7)
NEUTROPHILS RELATIVE PERCENT: 64.4 %
PDW BLD-RTO: 12.7 % (ref 12.4–15.4)
PERFORMED ON: ABNORMAL
PERFORMED ON: NORMAL
PLATELET # BLD: 144 K/UL (ref 135–450)
PMV BLD AUTO: 9.2 FL (ref 5–10.5)
POTASSIUM REFLEX MAGNESIUM: 4.1 MMOL/L (ref 3.5–5.1)
RBC # BLD: 3.22 M/UL (ref 4–5.2)
SODIUM BLD-SCNC: 142 MMOL/L (ref 136–145)
WBC # BLD: 5.6 K/UL (ref 4–11)

## 2019-03-04 PROCEDURE — 97166 OT EVAL MOD COMPLEX 45 MIN: CPT

## 2019-03-04 PROCEDURE — 6370000000 HC RX 637 (ALT 250 FOR IP): Performed by: INTERNAL MEDICINE

## 2019-03-04 PROCEDURE — 97161 PT EVAL LOW COMPLEX 20 MIN: CPT

## 2019-03-04 PROCEDURE — 80048 BASIC METABOLIC PNL TOTAL CA: CPT

## 2019-03-04 PROCEDURE — 36415 COLL VENOUS BLD VENIPUNCTURE: CPT

## 2019-03-04 PROCEDURE — 6360000002 HC RX W HCPCS: Performed by: INTERNAL MEDICINE

## 2019-03-04 PROCEDURE — 97535 SELF CARE MNGMENT TRAINING: CPT

## 2019-03-04 PROCEDURE — 1200000000 HC SEMI PRIVATE

## 2019-03-04 PROCEDURE — 97530 THERAPEUTIC ACTIVITIES: CPT

## 2019-03-04 PROCEDURE — 97116 GAIT TRAINING THERAPY: CPT

## 2019-03-04 PROCEDURE — 2580000003 HC RX 258: Performed by: INTERNAL MEDICINE

## 2019-03-04 PROCEDURE — 99222 1ST HOSP IP/OBS MODERATE 55: CPT | Performed by: PHYSICIAN ASSISTANT

## 2019-03-04 PROCEDURE — 85025 COMPLETE CBC W/AUTO DIFF WBC: CPT

## 2019-03-04 RX ADMIN — PANTOPRAZOLE SODIUM 40 MG: 40 TABLET, DELAYED RELEASE ORAL at 10:57

## 2019-03-04 RX ADMIN — METOPROLOL TARTRATE 25 MG: 25 TABLET ORAL at 10:57

## 2019-03-04 RX ADMIN — METOPROLOL TARTRATE 25 MG: 25 TABLET ORAL at 20:45

## 2019-03-04 RX ADMIN — ASPIRIN 81 MG 81 MG: 81 TABLET ORAL at 10:57

## 2019-03-04 RX ADMIN — INSULIN LISPRO 1 UNITS: 100 INJECTION, SOLUTION INTRAVENOUS; SUBCUTANEOUS at 17:39

## 2019-03-04 RX ADMIN — SODIUM CHLORIDE: 900 INJECTION, SOLUTION INTRAVENOUS at 07:24

## 2019-03-04 RX ADMIN — SODIUM CHLORIDE: 900 INJECTION, SOLUTION INTRAVENOUS at 17:43

## 2019-03-04 RX ADMIN — LINAGLIPTIN 5 MG: 5 TABLET, FILM COATED ORAL at 10:57

## 2019-03-04 RX ADMIN — ENOXAPARIN SODIUM 30 MG: 30 INJECTION SUBCUTANEOUS at 10:57

## 2019-03-04 RX ADMIN — CEFTRIAXONE SODIUM 1 G: 1 INJECTION, POWDER, FOR SOLUTION INTRAMUSCULAR; INTRAVENOUS at 16:35

## 2019-03-04 RX ADMIN — Medication 1 CAPSULE: at 10:57

## 2019-03-04 ASSESSMENT — PAIN SCALES - GENERAL: PAINLEVEL_OUTOF10: 0

## 2019-03-05 PROBLEM — L03.115 BILATERAL LOWER LEG CELLULITIS: Status: RESOLVED | Noted: 2018-02-12 | Resolved: 2019-03-05

## 2019-03-05 PROBLEM — L03.032 CELLULITIS OF TOE OF LEFT FOOT: Status: RESOLVED | Noted: 2017-05-29 | Resolved: 2019-03-05

## 2019-03-05 PROBLEM — L97.509 DIABETIC ULCER OF TOE (HCC): Status: RESOLVED | Noted: 2017-05-29 | Resolved: 2019-03-05

## 2019-03-05 PROBLEM — E11.621 DIABETIC ULCER OF TOE (HCC): Status: RESOLVED | Noted: 2017-05-29 | Resolved: 2019-03-05

## 2019-03-05 PROBLEM — L03.116 CELLULITIS OF LEFT LOWER EXTREMITY: Status: RESOLVED | Noted: 2017-05-29 | Resolved: 2019-03-05

## 2019-03-05 PROBLEM — L03.116 BILATERAL LOWER LEG CELLULITIS: Status: RESOLVED | Noted: 2018-02-12 | Resolved: 2019-03-05

## 2019-03-05 LAB
ANION GAP SERPL CALCULATED.3IONS-SCNC: 8 MMOL/L (ref 3–16)
BUN BLDV-MCNC: 17 MG/DL (ref 7–20)
CALCIUM SERPL-MCNC: 8.7 MG/DL (ref 8.3–10.6)
CHLORIDE BLD-SCNC: 107 MMOL/L (ref 99–110)
CO2: 28 MMOL/L (ref 21–32)
CREAT SERPL-MCNC: 1.1 MG/DL (ref 0.6–1.2)
GFR AFRICAN AMERICAN: 57
GFR NON-AFRICAN AMERICAN: 47
GLUCOSE BLD-MCNC: 116 MG/DL (ref 70–99)
GLUCOSE BLD-MCNC: 143 MG/DL (ref 70–99)
GLUCOSE BLD-MCNC: 192 MG/DL (ref 70–99)
GLUCOSE BLD-MCNC: 84 MG/DL (ref 70–99)
GLUCOSE BLD-MCNC: 94 MG/DL (ref 70–99)
PERFORMED ON: ABNORMAL
PERFORMED ON: NORMAL
POTASSIUM REFLEX MAGNESIUM: 4 MMOL/L (ref 3.5–5.1)
SODIUM BLD-SCNC: 143 MMOL/L (ref 136–145)
URINE CULTURE, ROUTINE: NORMAL

## 2019-03-05 PROCEDURE — 97535 SELF CARE MNGMENT TRAINING: CPT

## 2019-03-05 PROCEDURE — 97110 THERAPEUTIC EXERCISES: CPT

## 2019-03-05 PROCEDURE — 1200000000 HC SEMI PRIVATE

## 2019-03-05 PROCEDURE — 80048 BASIC METABOLIC PNL TOTAL CA: CPT

## 2019-03-05 PROCEDURE — 36415 COLL VENOUS BLD VENIPUNCTURE: CPT

## 2019-03-05 PROCEDURE — 2580000003 HC RX 258: Performed by: INTERNAL MEDICINE

## 2019-03-05 PROCEDURE — 99232 SBSQ HOSP IP/OBS MODERATE 35: CPT | Performed by: INTERNAL MEDICINE

## 2019-03-05 PROCEDURE — 0HBRXZZ EXCISION OF TOE NAIL, EXTERNAL APPROACH: ICD-10-PCS | Performed by: PODIATRIST

## 2019-03-05 PROCEDURE — 6360000002 HC RX W HCPCS: Performed by: INTERNAL MEDICINE

## 2019-03-05 PROCEDURE — 6370000000 HC RX 637 (ALT 250 FOR IP): Performed by: INTERNAL MEDICINE

## 2019-03-05 PROCEDURE — 97116 GAIT TRAINING THERAPY: CPT

## 2019-03-05 RX ADMIN — INSULIN LISPRO 1 UNITS: 100 INJECTION, SOLUTION INTRAVENOUS; SUBCUTANEOUS at 16:51

## 2019-03-05 RX ADMIN — METOPROLOL TARTRATE 25 MG: 25 TABLET ORAL at 21:31

## 2019-03-05 RX ADMIN — PANTOPRAZOLE SODIUM 40 MG: 40 TABLET, DELAYED RELEASE ORAL at 10:50

## 2019-03-05 RX ADMIN — SODIUM CHLORIDE: 900 INJECTION, SOLUTION INTRAVENOUS at 02:49

## 2019-03-05 RX ADMIN — METOPROLOL TARTRATE 25 MG: 25 TABLET ORAL at 10:50

## 2019-03-05 RX ADMIN — ASPIRIN 81 MG 81 MG: 81 TABLET ORAL at 10:50

## 2019-03-05 RX ADMIN — LINAGLIPTIN 5 MG: 5 TABLET, FILM COATED ORAL at 10:50

## 2019-03-05 RX ADMIN — Medication 10 ML: at 21:31

## 2019-03-05 RX ADMIN — INSULIN LISPRO 1 UNITS: 100 INJECTION, SOLUTION INTRAVENOUS; SUBCUTANEOUS at 21:32

## 2019-03-05 RX ADMIN — CEFTRIAXONE SODIUM 1 G: 1 INJECTION, POWDER, FOR SOLUTION INTRAMUSCULAR; INTRAVENOUS at 15:52

## 2019-03-05 RX ADMIN — SODIUM CHLORIDE: 900 INJECTION, SOLUTION INTRAVENOUS at 11:56

## 2019-03-05 RX ADMIN — Medication 1 CAPSULE: at 10:51

## 2019-03-05 RX ADMIN — ENOXAPARIN SODIUM 30 MG: 30 INJECTION SUBCUTANEOUS at 10:51

## 2019-03-06 VITALS
HEIGHT: 71 IN | OXYGEN SATURATION: 97 % | DIASTOLIC BLOOD PRESSURE: 51 MMHG | SYSTOLIC BLOOD PRESSURE: 151 MMHG | BODY MASS INDEX: 19.04 KG/M2 | HEART RATE: 64 BPM | WEIGHT: 136 LBS | TEMPERATURE: 97.8 F | RESPIRATION RATE: 16 BRPM

## 2019-03-06 LAB
ANION GAP SERPL CALCULATED.3IONS-SCNC: 8 MMOL/L (ref 3–16)
BUN BLDV-MCNC: 17 MG/DL (ref 7–20)
CALCIUM SERPL-MCNC: 8.8 MG/DL (ref 8.3–10.6)
CHLORIDE BLD-SCNC: 106 MMOL/L (ref 99–110)
CO2: 27 MMOL/L (ref 21–32)
CREAT SERPL-MCNC: 1 MG/DL (ref 0.6–1.2)
GFR AFRICAN AMERICAN: >60
GFR NON-AFRICAN AMERICAN: 53
GLUCOSE BLD-MCNC: 106 MG/DL (ref 70–99)
GLUCOSE BLD-MCNC: 240 MG/DL (ref 70–99)
GLUCOSE BLD-MCNC: 91 MG/DL (ref 70–99)
GLUCOSE BLD-MCNC: 95 MG/DL (ref 70–99)
PERFORMED ON: ABNORMAL
PERFORMED ON: ABNORMAL
PERFORMED ON: NORMAL
POTASSIUM REFLEX MAGNESIUM: 4.1 MMOL/L (ref 3.5–5.1)
SODIUM BLD-SCNC: 141 MMOL/L (ref 136–145)

## 2019-03-06 PROCEDURE — 99238 HOSP IP/OBS DSCHRG MGMT 30/<: CPT | Performed by: INTERNAL MEDICINE

## 2019-03-06 PROCEDURE — 6370000000 HC RX 637 (ALT 250 FOR IP): Performed by: INTERNAL MEDICINE

## 2019-03-06 PROCEDURE — 2580000003 HC RX 258: Performed by: INTERNAL MEDICINE

## 2019-03-06 PROCEDURE — 6360000002 HC RX W HCPCS: Performed by: INTERNAL MEDICINE

## 2019-03-06 PROCEDURE — 80048 BASIC METABOLIC PNL TOTAL CA: CPT

## 2019-03-06 PROCEDURE — 36415 COLL VENOUS BLD VENIPUNCTURE: CPT

## 2019-03-06 RX ORDER — MIRTAZAPINE 15 MG/1
15 TABLET, FILM COATED ORAL DAILY
DISCHARGE
Start: 2019-03-06 | End: 2021-07-09

## 2019-03-06 RX ORDER — ASPIRIN 81 MG/1
81 TABLET, CHEWABLE ORAL DAILY
COMMUNITY
Start: 2019-03-07 | End: 2021-03-09

## 2019-03-06 RX ADMIN — PANTOPRAZOLE SODIUM 40 MG: 40 TABLET, DELAYED RELEASE ORAL at 08:45

## 2019-03-06 RX ADMIN — ASPIRIN 81 MG 81 MG: 81 TABLET ORAL at 08:45

## 2019-03-06 RX ADMIN — CEFTRIAXONE SODIUM 1 G: 1 INJECTION, POWDER, FOR SOLUTION INTRAMUSCULAR; INTRAVENOUS at 15:43

## 2019-03-06 RX ADMIN — Medication 10 ML: at 08:45

## 2019-03-06 RX ADMIN — LINAGLIPTIN 5 MG: 5 TABLET, FILM COATED ORAL at 08:45

## 2019-03-06 RX ADMIN — METOPROLOL TARTRATE 25 MG: 25 TABLET ORAL at 08:45

## 2019-03-06 RX ADMIN — Medication 1 CAPSULE: at 08:45

## 2019-03-06 RX ADMIN — INSULIN LISPRO 2 UNITS: 100 INJECTION, SOLUTION INTRAVENOUS; SUBCUTANEOUS at 17:43

## 2019-03-06 RX ADMIN — ENOXAPARIN SODIUM 30 MG: 30 INJECTION SUBCUTANEOUS at 08:45

## 2019-04-05 PROBLEM — N39.0 UTI (URINARY TRACT INFECTION): Status: RESOLVED | Noted: 2019-03-03 | Resolved: 2019-04-05

## 2019-04-10 ENCOUNTER — APPOINTMENT (OUTPATIENT)
Dept: GENERAL RADIOLOGY | Age: 84
DRG: 689 | End: 2019-04-10
Payer: MEDICARE

## 2019-04-10 ENCOUNTER — HOSPITAL ENCOUNTER (INPATIENT)
Age: 84
LOS: 2 days | Discharge: HOME HEALTH | DRG: 689 | End: 2019-04-12
Attending: EMERGENCY MEDICINE | Admitting: HOSPITALIST
Payer: MEDICARE

## 2019-04-10 DIAGNOSIS — N39.0 ACUTE UTI: ICD-10-CM

## 2019-04-10 DIAGNOSIS — R32 URINARY INCONTINENCE, UNSPECIFIED TYPE: ICD-10-CM

## 2019-04-10 DIAGNOSIS — R41.82 ALTERED MENTAL STATUS, UNSPECIFIED ALTERED MENTAL STATUS TYPE: Primary | ICD-10-CM

## 2019-04-10 DIAGNOSIS — E86.0 MILD DEHYDRATION: ICD-10-CM

## 2019-04-10 LAB
A/G RATIO: 1.4 (ref 1.1–2.2)
ALBUMIN SERPL-MCNC: 4.1 G/DL (ref 3.4–5)
ALP BLD-CCNC: 71 U/L (ref 40–129)
ALT SERPL-CCNC: 14 U/L (ref 10–40)
ANION GAP SERPL CALCULATED.3IONS-SCNC: 12 MMOL/L (ref 3–16)
AST SERPL-CCNC: 25 U/L (ref 15–37)
BACTERIA: ABNORMAL /HPF
BASOPHILS ABSOLUTE: 0 K/UL (ref 0–0.2)
BASOPHILS RELATIVE PERCENT: 0.1 %
BILIRUB SERPL-MCNC: 0.4 MG/DL (ref 0–1)
BILIRUBIN URINE: NEGATIVE
BLOOD, URINE: ABNORMAL
BUN BLDV-MCNC: 39 MG/DL (ref 7–20)
CALCIUM SERPL-MCNC: 9.7 MG/DL (ref 8.3–10.6)
CHLORIDE BLD-SCNC: 96 MMOL/L (ref 99–110)
CLARITY: CLEAR
CO2: 27 MMOL/L (ref 21–32)
COLOR: YELLOW
CREAT SERPL-MCNC: 1.4 MG/DL (ref 0.6–1.2)
EOSINOPHILS ABSOLUTE: 0 K/UL (ref 0–0.6)
EOSINOPHILS RELATIVE PERCENT: 0.2 %
EPITHELIAL CELLS, UA: ABNORMAL /HPF
GFR AFRICAN AMERICAN: 43
GFR NON-AFRICAN AMERICAN: 36
GLOBULIN: 3 G/DL
GLUCOSE BLD-MCNC: 139 MG/DL (ref 70–99)
GLUCOSE BLD-MCNC: 187 MG/DL (ref 70–99)
GLUCOSE URINE: NEGATIVE MG/DL
HCT VFR BLD CALC: 34.2 % (ref 36–48)
HEMOGLOBIN: 11.6 G/DL (ref 12–16)
KETONES, URINE: ABNORMAL MG/DL
LACTIC ACID, SEPSIS: 1.2 MMOL/L (ref 0.4–1.9)
LEUKOCYTE ESTERASE, URINE: ABNORMAL
LYMPHOCYTES ABSOLUTE: 0.3 K/UL (ref 1–5.1)
LYMPHOCYTES RELATIVE PERCENT: 4.1 %
MCH RBC QN AUTO: 31.9 PG (ref 26–34)
MCHC RBC AUTO-ENTMCNC: 33.8 G/DL (ref 31–36)
MCV RBC AUTO: 94.3 FL (ref 80–100)
MICROSCOPIC EXAMINATION: YES
MONOCYTES ABSOLUTE: 0.2 K/UL (ref 0–1.3)
MONOCYTES RELATIVE PERCENT: 3.3 %
NEUTROPHILS ABSOLUTE: 6.7 K/UL (ref 1.7–7.7)
NEUTROPHILS RELATIVE PERCENT: 92.3 %
NITRITE, URINE: NEGATIVE
PDW BLD-RTO: 12.7 % (ref 12.4–15.4)
PERFORMED ON: ABNORMAL
PH UA: 5.5 (ref 5–8)
PLATELET # BLD: 165 K/UL (ref 135–450)
PMV BLD AUTO: 8.7 FL (ref 5–10.5)
POTASSIUM REFLEX MAGNESIUM: 4.3 MMOL/L (ref 3.5–5.1)
PROTEIN UA: NEGATIVE MG/DL
RBC # BLD: 3.63 M/UL (ref 4–5.2)
RBC UA: ABNORMAL /HPF (ref 0–2)
SODIUM BLD-SCNC: 135 MMOL/L (ref 136–145)
SPECIFIC GRAVITY UA: 1.01 (ref 1–1.03)
TOTAL PROTEIN: 7.1 G/DL (ref 6.4–8.2)
URINE TYPE: ABNORMAL
UROBILINOGEN, URINE: 0.2 E.U./DL
WBC # BLD: 7.2 K/UL (ref 4–11)
WBC UA: ABNORMAL /HPF (ref 0–5)

## 2019-04-10 PROCEDURE — 2580000003 HC RX 258: Performed by: EMERGENCY MEDICINE

## 2019-04-10 PROCEDURE — 99284 EMERGENCY DEPT VISIT MOD MDM: CPT

## 2019-04-10 PROCEDURE — 71046 X-RAY EXAM CHEST 2 VIEWS: CPT

## 2019-04-10 PROCEDURE — 87077 CULTURE AEROBIC IDENTIFY: CPT

## 2019-04-10 PROCEDURE — 6360000002 HC RX W HCPCS: Performed by: EMERGENCY MEDICINE

## 2019-04-10 PROCEDURE — 87086 URINE CULTURE/COLONY COUNT: CPT

## 2019-04-10 PROCEDURE — 36415 COLL VENOUS BLD VENIPUNCTURE: CPT

## 2019-04-10 PROCEDURE — 96374 THER/PROPH/DIAG INJ IV PUSH: CPT

## 2019-04-10 PROCEDURE — 81001 URINALYSIS AUTO W/SCOPE: CPT

## 2019-04-10 PROCEDURE — 96361 HYDRATE IV INFUSION ADD-ON: CPT

## 2019-04-10 PROCEDURE — 1200000000 HC SEMI PRIVATE

## 2019-04-10 PROCEDURE — 80053 COMPREHEN METABOLIC PANEL: CPT

## 2019-04-10 PROCEDURE — 83605 ASSAY OF LACTIC ACID: CPT

## 2019-04-10 PROCEDURE — 93005 ELECTROCARDIOGRAM TRACING: CPT | Performed by: EMERGENCY MEDICINE

## 2019-04-10 PROCEDURE — 85025 COMPLETE CBC W/AUTO DIFF WBC: CPT

## 2019-04-10 PROCEDURE — 87040 BLOOD CULTURE FOR BACTERIA: CPT

## 2019-04-10 PROCEDURE — 87186 SC STD MICRODIL/AGAR DIL: CPT

## 2019-04-10 RX ORDER — 0.9 % SODIUM CHLORIDE 0.9 %
30 INTRAVENOUS SOLUTION INTRAVENOUS ONCE
Status: COMPLETED | OUTPATIENT
Start: 2019-04-10 | End: 2019-04-10

## 2019-04-10 RX ADMIN — SODIUM CHLORIDE 1770 ML: 9 INJECTION, SOLUTION INTRAVENOUS at 21:08

## 2019-04-10 RX ADMIN — CEFTRIAXONE SODIUM 1 G: 1 INJECTION, POWDER, FOR SOLUTION INTRAMUSCULAR; INTRAVENOUS at 21:50

## 2019-04-10 NOTE — LETTER
Beneficiary Notification Letter     This [facility] Provider is Participating in an Innovative Payment and [address] [place] [address] [place] from [employer]:   Jigar is participating in a Medicare initiative called the [facility] for [address] [facility]. You are receiving this letter because your health care provider has identified you as a patient who is receiving care through this initiative. Health care providers participating in the [facility], including Jigar, will work with Medicare to improve care for patients. Your Medicare rights have not been changed. You still have all the same Medicare rights and protections, including the right to choose which hospital, doctor, or other health care provider you see. However, because Jigar chose to participate in the [facility], all Medicare beneficiaries who meet the eligibility criteria of this initiative will receive care under the initiative. If you do not wish to receive care under the Bundled Payments [facility], you must choose a health care provider that does not participate in this initiative for your care. Regardless of which health care provider you see, Medicare will continue to cover all of your medically necessary services. Bundled Payments for Care Improvement Advanced aims to help improve your care     The Bundled Payments [facility] is an innovative Medicare initiative that encourages your doctors, hospitals, and other health care providers to work more closely together so you get better care during and following certain hospital stays.  In this initiative, doctors and hospitals may work closely with certain health care providers and suppliers that help patients recover after discharge from the hospital, including skilled nursing facilities, home health agencies, inpatient rehabilitation facilities, and long term care hospitals. [facility] is working closely with the doctors and other health care providers that care for you during and following your hospital stay and for a period of time after you leave the hospital. By working together, the health care providers are trying to more efficiently provide well-managed, high quality, patient-centered care as you undergo treatment. Hospitals, doctors, and other health care providers that care for you following a hospital stay may receive an additional payment for providing better, more coordinated health care. Medicare will monitor your care to make sure you and others get high quality care. Your feedback is important     Medicare may also ask you to answer a survey about the services and care you received from [address] will be mailed to you. Your feedback will improve care for all people with Medicare who receive care from [facility]. Completion of this survey is optional.     Get more information     For more information about the Bundled Payments for [address] [place] [address], you can:    · Visit the CMS BPCI Advanced Website at [email or web address]-[email or web address]/ initiatives/bpci-advanced   · Call the [facility] team at (807) [phone]. · Call 1-800-MEDICARE (0-981.124.5166). TTY users can call 0-561.165.6217     If you have concerns or complaints about your care, talk to your health care provider, or contact your Beneficiary and Family Centered Quality Improvement Organization [facility]). To get your State mental health facility-[facility]'s phone number, visit Medicare.gov/contacts or call 1-800-MEDICARE. · To find a different hospital, visit www. hospitalcompare.[facility].gov or call 1-800[facility] [employer] MEDICARE (1-574.793.6597). TTY users should call 6-403.769.9342. · To find a different doctor, visit Medicare's Physician Compare website, HDTapes.co.nz, or call 1-800-MEDICARE (790 4574). TTY users should call 2-640.194.4144. · To find a different skilled nursing facility, visit [facility] Medico website, [email or web address]://[email or web address].[facility]/, or call 1-800-MEDICARE (1- 182.680.9290). TTY users should call 1-934.415.4359. · To find a different long term care hospital, visit [facility] Compare website, Smellology.be, or call 1-800- MEDICARE (112 9166). TTY users should call 1-472.671.3548. · To find a different inpatient rehabilitation facility, visit [facility] E Compare website, www.medicare.gov/ inpatientrehabilitation facilitycompare, or call 1-800-MEDICARE (7-655.729.9529). TTY users should call 4- 609.120.2368. · To find a different home health agency, visit [address] website, www.medicare.gov/homehealthcompare, or call 1-800-MEDICARE (3-084- 890-6423). TTY users should call 8-199.117.7872.

## 2019-04-11 LAB
A/G RATIO: 1.2 (ref 1.1–2.2)
ALBUMIN SERPL-MCNC: 3 G/DL (ref 3.4–5)
ALP BLD-CCNC: 57 U/L (ref 40–129)
ALT SERPL-CCNC: 11 U/L (ref 10–40)
ANION GAP SERPL CALCULATED.3IONS-SCNC: 10 MMOL/L (ref 3–16)
AST SERPL-CCNC: 21 U/L (ref 15–37)
BASOPHILS ABSOLUTE: 0 K/UL (ref 0–0.2)
BASOPHILS RELATIVE PERCENT: 0.1 %
BILIRUB SERPL-MCNC: 0.3 MG/DL (ref 0–1)
BUN BLDV-MCNC: 30 MG/DL (ref 7–20)
CALCIUM SERPL-MCNC: 8.6 MG/DL (ref 8.3–10.6)
CHLORIDE BLD-SCNC: 103 MMOL/L (ref 99–110)
CO2: 24 MMOL/L (ref 21–32)
CREAT SERPL-MCNC: 1.2 MG/DL (ref 0.6–1.2)
EKG ATRIAL RATE: 90 BPM
EKG DIAGNOSIS: NORMAL
EKG P AXIS: 54 DEGREES
EKG P-R INTERVAL: 194 MS
EKG Q-T INTERVAL: 338 MS
EKG QRS DURATION: 72 MS
EKG QTC CALCULATION (BAZETT): 413 MS
EKG R AXIS: 60 DEGREES
EKG T AXIS: 80 DEGREES
EKG VENTRICULAR RATE: 90 BPM
EOSINOPHILS ABSOLUTE: 0.1 K/UL (ref 0–0.6)
EOSINOPHILS RELATIVE PERCENT: 2 %
GFR AFRICAN AMERICAN: 52
GFR NON-AFRICAN AMERICAN: 43
GLOBULIN: 2.6 G/DL
GLUCOSE BLD-MCNC: 100 MG/DL (ref 70–99)
GLUCOSE BLD-MCNC: 106 MG/DL (ref 70–99)
GLUCOSE BLD-MCNC: 113 MG/DL (ref 70–99)
GLUCOSE BLD-MCNC: 119 MG/DL (ref 70–99)
GLUCOSE BLD-MCNC: 162 MG/DL (ref 70–99)
HCT VFR BLD CALC: 29.9 % (ref 36–48)
HEMOGLOBIN: 9.9 G/DL (ref 12–16)
LACTIC ACID, SEPSIS: 0.7 MMOL/L (ref 0.4–1.9)
LYMPHOCYTES ABSOLUTE: 0.5 K/UL (ref 1–5.1)
LYMPHOCYTES RELATIVE PERCENT: 15.3 %
MCH RBC QN AUTO: 32.1 PG (ref 26–34)
MCHC RBC AUTO-ENTMCNC: 33.1 G/DL (ref 31–36)
MCV RBC AUTO: 97.1 FL (ref 80–100)
MONOCYTES ABSOLUTE: 0.3 K/UL (ref 0–1.3)
MONOCYTES RELATIVE PERCENT: 9.7 %
NEUTROPHILS ABSOLUTE: 2.4 K/UL (ref 1.7–7.7)
NEUTROPHILS RELATIVE PERCENT: 72.9 %
PDW BLD-RTO: 13.3 % (ref 12.4–15.4)
PERFORMED ON: ABNORMAL
PLATELET # BLD: 131 K/UL (ref 135–450)
PMV BLD AUTO: 8.5 FL (ref 5–10.5)
POTASSIUM REFLEX MAGNESIUM: 4.1 MMOL/L (ref 3.5–5.1)
RBC # BLD: 3.08 M/UL (ref 4–5.2)
SODIUM BLD-SCNC: 137 MMOL/L (ref 136–145)
TOTAL PROTEIN: 5.6 G/DL (ref 6.4–8.2)
WBC # BLD: 3.3 K/UL (ref 4–11)

## 2019-04-11 PROCEDURE — 97165 OT EVAL LOW COMPLEX 30 MIN: CPT

## 2019-04-11 PROCEDURE — 2580000003 HC RX 258: Performed by: HOSPITALIST

## 2019-04-11 PROCEDURE — 97116 GAIT TRAINING THERAPY: CPT

## 2019-04-11 PROCEDURE — 85025 COMPLETE CBC W/AUTO DIFF WBC: CPT

## 2019-04-11 PROCEDURE — 6370000000 HC RX 637 (ALT 250 FOR IP): Performed by: HOSPITALIST

## 2019-04-11 PROCEDURE — 93010 ELECTROCARDIOGRAM REPORT: CPT | Performed by: INTERNAL MEDICINE

## 2019-04-11 PROCEDURE — 1200000000 HC SEMI PRIVATE

## 2019-04-11 PROCEDURE — 80053 COMPREHEN METABOLIC PANEL: CPT

## 2019-04-11 PROCEDURE — 97161 PT EVAL LOW COMPLEX 20 MIN: CPT

## 2019-04-11 PROCEDURE — 83605 ASSAY OF LACTIC ACID: CPT

## 2019-04-11 PROCEDURE — 97530 THERAPEUTIC ACTIVITIES: CPT

## 2019-04-11 PROCEDURE — 6360000002 HC RX W HCPCS: Performed by: HOSPITALIST

## 2019-04-11 PROCEDURE — 99232 SBSQ HOSP IP/OBS MODERATE 35: CPT | Performed by: INTERNAL MEDICINE

## 2019-04-11 PROCEDURE — 97535 SELF CARE MNGMENT TRAINING: CPT

## 2019-04-11 PROCEDURE — 36415 COLL VENOUS BLD VENIPUNCTURE: CPT

## 2019-04-11 RX ORDER — ASPIRIN 81 MG/1
81 TABLET, CHEWABLE ORAL DAILY
Status: DISCONTINUED | OUTPATIENT
Start: 2019-04-11 | End: 2019-04-12 | Stop reason: HOSPADM

## 2019-04-11 RX ORDER — DEXTROSE MONOHYDRATE 25 G/50ML
12.5 INJECTION, SOLUTION INTRAVENOUS PRN
Status: DISCONTINUED | OUTPATIENT
Start: 2019-04-11 | End: 2019-04-12 | Stop reason: HOSPADM

## 2019-04-11 RX ORDER — PANTOPRAZOLE SODIUM 40 MG/1
40 TABLET, DELAYED RELEASE ORAL 2 TIMES DAILY
Status: CANCELLED | OUTPATIENT
Start: 2019-04-11

## 2019-04-11 RX ORDER — DEXTROSE MONOHYDRATE 50 MG/ML
100 INJECTION, SOLUTION INTRAVENOUS PRN
Status: DISCONTINUED | OUTPATIENT
Start: 2019-04-11 | End: 2019-04-12 | Stop reason: HOSPADM

## 2019-04-11 RX ORDER — PRAVASTATIN SODIUM 20 MG
20 TABLET ORAL DAILY
Status: DISCONTINUED | OUTPATIENT
Start: 2019-04-11 | End: 2019-04-12 | Stop reason: HOSPADM

## 2019-04-11 RX ORDER — SODIUM CHLORIDE 0.9 % (FLUSH) 0.9 %
10 SYRINGE (ML) INJECTION EVERY 12 HOURS SCHEDULED
Status: DISCONTINUED | OUTPATIENT
Start: 2019-04-11 | End: 2019-04-12 | Stop reason: HOSPADM

## 2019-04-11 RX ORDER — FERROUS SULFATE 325(65) MG
325 TABLET ORAL
Status: DISCONTINUED | OUTPATIENT
Start: 2019-04-11 | End: 2019-04-12 | Stop reason: HOSPADM

## 2019-04-11 RX ORDER — SODIUM CHLORIDE 9 MG/ML
INJECTION, SOLUTION INTRAVENOUS CONTINUOUS
Status: DISCONTINUED | OUTPATIENT
Start: 2019-04-11 | End: 2019-04-12

## 2019-04-11 RX ORDER — PANTOPRAZOLE SODIUM 40 MG/1
40 TABLET, DELAYED RELEASE ORAL 2 TIMES DAILY
Status: DISCONTINUED | OUTPATIENT
Start: 2019-04-11 | End: 2019-04-12 | Stop reason: HOSPADM

## 2019-04-11 RX ORDER — NICOTINE POLACRILEX 4 MG
15 LOZENGE BUCCAL PRN
Status: DISCONTINUED | OUTPATIENT
Start: 2019-04-11 | End: 2019-04-12 | Stop reason: HOSPADM

## 2019-04-11 RX ORDER — ONDANSETRON 2 MG/ML
4 INJECTION INTRAMUSCULAR; INTRAVENOUS EVERY 6 HOURS PRN
Status: DISCONTINUED | OUTPATIENT
Start: 2019-04-11 | End: 2019-04-12 | Stop reason: HOSPADM

## 2019-04-11 RX ORDER — CHOLECALCIFEROL (VITAMIN D3) 125 MCG
1000 CAPSULE ORAL DAILY
Status: DISCONTINUED | OUTPATIENT
Start: 2019-04-11 | End: 2019-04-12 | Stop reason: HOSPADM

## 2019-04-11 RX ORDER — ACETAMINOPHEN 325 MG/1
650 TABLET ORAL EVERY 4 HOURS PRN
Status: DISCONTINUED | OUTPATIENT
Start: 2019-04-11 | End: 2019-04-12 | Stop reason: HOSPADM

## 2019-04-11 RX ORDER — LACTOBACILLUS RHAMNOSUS GG 10B CELL
1 CAPSULE ORAL DAILY
Status: DISCONTINUED | OUTPATIENT
Start: 2019-04-11 | End: 2019-04-12 | Stop reason: HOSPADM

## 2019-04-11 RX ORDER — SODIUM CHLORIDE 0.9 % (FLUSH) 0.9 %
10 SYRINGE (ML) INJECTION PRN
Status: DISCONTINUED | OUTPATIENT
Start: 2019-04-11 | End: 2019-04-12 | Stop reason: HOSPADM

## 2019-04-11 RX ORDER — MIRTAZAPINE 15 MG/1
15 TABLET, FILM COATED ORAL DAILY
Status: DISCONTINUED | OUTPATIENT
Start: 2019-04-11 | End: 2019-04-11

## 2019-04-11 RX ADMIN — CEFTRIAXONE SODIUM 1 G: 1 INJECTION, POWDER, FOR SOLUTION INTRAMUSCULAR; INTRAVENOUS at 23:05

## 2019-04-11 RX ADMIN — CYANOCOBALAMIN TAB 500 MCG 1000 MCG: 500 TAB at 08:15

## 2019-04-11 RX ADMIN — PANTOPRAZOLE SODIUM 40 MG: 40 TABLET, DELAYED RELEASE ORAL at 23:04

## 2019-04-11 RX ADMIN — FERROUS SULFATE TAB 325 MG (65 MG ELEMENTAL FE) 325 MG: 325 (65 FE) TAB at 12:18

## 2019-04-11 RX ADMIN — Medication 1 CAPSULE: at 08:14

## 2019-04-11 RX ADMIN — SODIUM CHLORIDE: 9 INJECTION, SOLUTION INTRAVENOUS at 00:23

## 2019-04-11 RX ADMIN — FERROUS SULFATE TAB 325 MG (65 MG ELEMENTAL FE) 325 MG: 325 (65 FE) TAB at 17:28

## 2019-04-11 RX ADMIN — MIRTAZAPINE 15 MG: 15 TABLET, FILM COATED ORAL at 08:15

## 2019-04-11 RX ADMIN — METOPROLOL TARTRATE 25 MG: 25 TABLET ORAL at 23:04

## 2019-04-11 RX ADMIN — PRAVASTATIN SODIUM 20 MG: 20 TABLET ORAL at 08:14

## 2019-04-11 RX ADMIN — INSULIN LISPRO 1 UNITS: 100 INJECTION, SOLUTION INTRAVENOUS; SUBCUTANEOUS at 23:05

## 2019-04-11 RX ADMIN — ASPIRIN 81 MG 81 MG: 81 TABLET ORAL at 08:15

## 2019-04-11 RX ADMIN — PANTOPRAZOLE SODIUM 40 MG: 40 TABLET, DELAYED RELEASE ORAL at 08:14

## 2019-04-11 RX ADMIN — FERROUS SULFATE TAB 325 MG (65 MG ELEMENTAL FE) 325 MG: 325 (65 FE) TAB at 08:14

## 2019-04-11 RX ADMIN — ENOXAPARIN SODIUM 40 MG: 40 INJECTION SUBCUTANEOUS at 08:15

## 2019-04-11 RX ADMIN — METOPROLOL TARTRATE 25 MG: 25 TABLET ORAL at 08:14

## 2019-04-11 ASSESSMENT — PAIN SCALES - GENERAL: PAINLEVEL_OUTOF10: 0

## 2019-04-11 NOTE — ED NOTES
Report called to Liana Mas at [facility].      Itzel Hanson RN  04/10/19 0908

## 2019-04-11 NOTE — H&P
Hospital Medicine History & Physical      PCP: Zonia Carrillo MD    Date of Admission: 4/10/2019    Date of Service: Pt seen/examined on 4/10/2019 and Admitted to Inpatient with expected LOS greater than two midnights due to medical therapy. Chief Complaint:  AMS      History Of Present Illness:       80 y.o. female who presents with ams per daughter. Patient is seen and examined on room air, disoriented, pleasant, awake and alert. She is disoriented to place, date, but knows she is in a hospital. States she is here because of her daughter. Denies fevers, chills, abdominal pain, chest pain, sob, painful urination     Past Medical History:          Diagnosis Date    Clostridium difficile infection 06/06/2017    antigen +    Dementia     Diabetes [facility]     History of blood transfusion     Hyperlipidemia     Hypertension     Infection     foot    MRSA infection 12/07/2017    foot    Osteomyelitis of spine ([facility] 2010    thoraculumbar spine    Restless leg syndrome        Past Surgical History:          Procedure Laterality Date    APPENDECTOMY      COLONOSCOPY  1997    polyps    COLONOSCOPY  7/15/15    normal colon    EYE SURGERY      right cataract    FOOT SURGERY Left 05/30/2017    PARTIAL LEFT FOOT AMPUTATION              FOOT SURGERY Right 12/11/2017    HYSTERECTOMY      OTHER SURGICAL HISTORY Left     Macular Grid Left eye argon    TOE AMPUTATION  2003    2nd toe 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    TOE AMPUTATION  2011    5th toe right foot    TONSILLECTOMY      UPPER GASTROINTESTINAL ENDOSCOPY  06/05/2017    Dieulafoy lesion in duodenum       Medications Prior to Admission:      Prior to Admission medications    Medication Sig Start Date End Date Taking?  Authorizing Provider   aspirin 81 MG chewable tablet Take 1 tablet by mouth daily 3/7/19  Yes Seth Mejia MD   mirtazapine (REMERON) 15 MG tablet Take 1 tablet by mouth daily 3/6/19  Yes Seth Mejia MD pramipexole (MIRAPEX) 1 MG tablet TAKE 1 TABLET BY ORAL ROUTE ONCE A DAY (IN THE EVENING) AROUND 8-9 PM FOR RLS 1/11/19  Yes Historical Provider, MD   metoprolol tartrate (LOPRESSOR) 25 MG tablet TAKE 1 TABLET BY ORAL ROUTE 2 TIMES A DAY FOR BP 1/11/19  Yes Historical Provider, MD   saxagliptin (ONGLYZA) 2.5 MG TABS tablet Take 5 mg by mouth daily    Yes Historical Provider, MD   lactobacillus (CULTURELLE) CAPS capsule Take 1 capsule by mouth daily   Yes Historical Provider, MD   ferrous sulfate 325 (65 Fe) MG tablet Take 325 mg by mouth 3 times daily (with meals)    Yes Historical Provider, MD   collagenase 250 UNIT/GM ointment Apply topically daily Apply topically daily. Yes Historical Provider, MD   pantoprazole (PROTONIX) 40 MG tablet Take 1 tablet by mouth 2 times daily  Patient taking differently: Take 40 mg by mouth daily  6/9/17  Yes Adolfo Cordoba MD   Multiple Vitamins-Minerals (MULTIVITAMIN PO) Take by mouth daily   Yes Historical Provider, MD   Cyanocobalamin (VITAMIN B-12) 5000 MCG SUBL Place 1,000 mcg under the tongue daily    Yes Historical Provider, MD   pravastatin (PRAVACHOL) 10 MG tablet Take 20 mg by mouth daily    Yes Historical Provider, MD       Allergies:  Patient has no known allergies. Social History:           TOBACCO:   reports that she has never smoked. She has never used smokeless tobacco.  ETOH:   reports that she does not drink alcohol. Family History:               Problem Relation Age of Onset    Diabetes Mother     High Blood Pressure Mother     Cancer Father     Heart Disease Maternal Uncle        REVIEW OF SYSTEMS:   Pertinent positives as noted in the HPI. All other systems reviewed and negative.     PHYSICAL EXAM PERFORMED:    BP (!) 141/62   Pulse 85   Temp 97.7 °F (36.5 °C) (Oral)   Resp 16   Ht 5' 10\" (1.778 m)   Wt 156 lb 8 oz (71 kg)   SpO2 96%   BMI 22.46 kg/m²     General appearance:  No apparent distress, appears stated age and cooperative. HEENT:  Normal cephalic, atraumatic without obvious deformity. Pupils equal, round, and reactive to light. Extra ocular muscles intact. Conjunctivae/corneas clear. Neck: Supple, with full range of motion. No jugular venous distention. Trachea midline. Respiratory:  Normal respiratory effort. Clear to auscultation, bilaterally without Rales/Wheezes/Rhonchi. Cardiovascular:  Regular rate and rhythm with normal S1/S2 without murmurs, rubs or gallops. Abdomen: Soft, non-tender, non-distended with normal bowel sounds. Musculoskeletal:  No clubbing, cyanosis or edema bilaterally. Full range of motion without deformity. Skin: Skin color, texture, turgor normal.  No rashes or lesions. Neurologic:  Neurovascularly intact without any focal sensory/motor deficits. Cranial nerves: II-XII intact, grossly non-focal.  Psychiatric:  Alert   Capillary Refill: Brisk,< 3 seconds   Peripheral Pulses: +2 palpable, equal bilaterally       Labs:     Recent Labs     04/10/19  2100   WBC 7.2   HGB 11.6*   HCT 34.2*        Recent Labs     04/10/19  2100   *   K 4.3   CL 96*   CO2 27   BUN 39*   CREATININE 1.4*   CALCIUM 9.7     Recent Labs     04/10/19  2100   AST 25   ALT 14   BILITOT 0.4   ALKPHOS 71     No results for input(s): INR in the last 72 hours. No results for input(s): Lynnette Halon in the last 72 hours. Urinalysis:      Lab Results   Component Value Date    NITRU Negative 04/10/2019    WBCUA 6-10 04/10/2019    BACTERIA 1+ 04/10/2019    RBCUA 5-10 04/10/2019    BLOODU MODERATE 04/10/2019    SPECGRAV 1.015 04/10/2019    GLUCOSEU Negative 04/10/2019       Radiology:          XR CHEST STANDARD (2 VW)   Final Result   No evidence of acute cardiopulmonary disease.              ASSESSMENT:    Active Hospital Problems    Diagnosis Date Noted    UTI (urinary tract infection) [N39.0] 04/10/2019         PLAN:    1) UTI   - continue rocephin    2) Encephalopathy  - suspect toxic secondary to UTI    3) DM  - corrective ISS, stable, no hypoglycemia    4) GERD  - PPI, stable  DVT Prophylaxis: lovenox  Diet: DIET CARB CONTROL;  Code Status: Full Code         Sage Santiago MD    Thank you Ramses Hendrix MD for the opportunity to be involved in this patient's care. If you have any questions or concerns please feel free to contact me at [phone].

## 2019-04-11 NOTE — PLAN OF CARE
Problem: Falls - Risk of:  Goal: Will remain free from falls  Description  Will remain free from falls  4/11/2019 0732 by Jose Alfredo Jaffe RN  Outcome: Ongoing  4/11/2019 0114 by Lucrecia Vicente RN  Outcome: Ongoing  Goal: Absence of physical injury  Description  Absence of physical injury  Outcome: Ongoing     Problem: Risk for Impaired Skin Integrity  Goal: Tissue integrity - skin and mucous membranes  Description  Structural intactness and normal physiological function of skin and  mucous membranes.   4/11/2019 0732 by Jose Alfredo Jaffe RN  Outcome: Ongoing  4/11/2019 0114 by Lucrecia Vicente RN  Outcome: Ongoing     Problem: Sensory:  Goal: General experience of comfort will improve  Description  General experience of comfort will improve  Outcome: Ongoing     Problem: Urinary Elimination:  Goal: Signs and symptoms of infection will decrease  Description  Signs and symptoms of infection will decrease  4/11/2019 0732 by Jose Alfredo Jaffe RN  Outcome: Ongoing  4/11/2019 0114 by Lucrecia Vicente RN  Outcome: Ongoing  Goal: Ability to reestablish a normal urinary elimination pattern will improve - after catheter removal  Description  Ability to reestablish a normal urinary elimination pattern will improve  Outcome: Ongoing  Goal: Complications related to the disease process, condition or treatment will be avoided or minimized  Description  Complications related to the disease process, condition or treatment will be avoided or minimized  Outcome: Ongoing

## 2019-04-11 NOTE — PROGRESS NOTES
4 Eyes Skin Assessment     The patient is being assess for   Admission    I agree that 2 RN's have performed a thorough Head to Toe Skin Assessment on the patient. ALL assessment sites listed below have been assessed. Areas assessed by both nurses:   [x]   Head, Face, and Ears   [x]   Shoulders, Back, and Chest, Abdomen:  Patchy, discolored scars from shingles to lower back and buttock area  [x]   Arms, Elbows, and Hands   [x]   Coccyx, Sacrum, and Ischium:  Scarring from previous pressure ulcers to buttock area  [x]   Legs, Feet, and Heels:  Callous to right heel area; amputated toes 2nd and 3rd left foot and amputated 5th toe right foot          **SHARE this note so that the co-signing nurse is able to place an eSignature**    Co-signer eSignature: Electronically signed by Alea Bose RN on 4/11/19 at 3:55 AM    Does the Patient have Skin Breakdown?   No          Shukri Prevention initiated:  Yes   Wound Care Orders initiated:  NA      [facility] nurse consulted for Pressure Injury (Stage 3,4, Unstageable, DTI, NWPT, Complex wounds)and New or Established Ostomies:  No      Primary Nurse eSignature: Electronically signed by Nikki Almanza RN on 4/11/19 at 3:53 AM

## 2019-04-11 NOTE — ED PROVIDER NOTES
CHIEF COMPLAINT  Urinary Tract Infection (Per daughter (caretaker) reports that Mother is more confused and tired that her usual. Increase in incontinence. In March had similar symptoms and dx with UTI. )      HISTORY OF PRESENT ILLNESS  Fernanda Irizarry is a 80 y.o. female presents to the ED with mental status change, a little more confused and forgetful than normal, for the past couple days, tired as well, she typically has a little incontinence, wears depends but is having more, wetting the bed at night, she had similar symptoms in March and was diagnosed with UTI, improved with fluids and antibiotics, she has had a history of C. diff but is not having any diarrhea, no vomiting, patient denies any pain, daughter here with her who is also caregiver states she's had a little congestion, but no known fever, no cough, no neck stiffness. She just got out of an extended care/rehab facility about a week and a half ago, she had a normal CT scan of her head at the beginning of March when they worked her up for the same thing, No other complaints, modifying factors or associated symptoms. I have reviewed the following from the nursing documentation.     Past Medical History:   Diagnosis Date    Clostridium difficile infection 06/06/2017    antigen +    Dementia     Diabetes mellitus ([address]     History of blood transfusion     Hyperlipidemia     Hypertension     Infection     foot    MRSA infection 12/07/2017    foot    Osteomyelitis of spine ([address] 2010    thoraculumbar spine    Restless leg syndrome      Past Surgical History:   Procedure Laterality Date    APPENDECTOMY      COLONOSCOPY  1997    polyps    COLONOSCOPY  7/15/15    normal colon    EYE SURGERY      right cataract    FOOT SURGERY Left 05/30/2017    PARTIAL LEFT FOOT AMPUTATION              FOOT SURGERY Right 12/11/2017    HYSTERECTOMY      OTHER SURGICAL HISTORY Left     Macular Grid Left eye argon    TOE AMPUTATION  2003    2nd toe left Current Outpatient Medications   Medication Sig Dispense Refill    aspirin 81 MG chewable tablet Take 1 tablet by mouth daily      mirtazapine (REMERON) 15 MG tablet Take 1 tablet by mouth daily      pramipexole (MIRAPEX) 1 MG tablet TAKE 1 TABLET BY ORAL ROUTE ONCE A DAY (IN THE EVENING) AROUND 8-9 PM FOR RLS  4    metoprolol tartrate (LOPRESSOR) 25 MG tablet TAKE 1 TABLET BY ORAL ROUTE 2 TIMES A DAY FOR BP  4    saxagliptin (ONGLYZA) 2.5 MG TABS tablet Take 5 mg by mouth daily       lactobacillus (CULTURELLE) CAPS capsule Take 1 capsule by mouth daily      ferrous sulfate 325 (65 Fe) MG tablet Take 325 mg by mouth 3 times daily (with meals)       collagenase 250 UNIT/GM ointment Apply topically daily Apply topically daily.  pantoprazole (PROTONIX) 40 MG tablet Take 1 tablet by mouth 2 times daily (Patient taking differently: Take 40 mg by mouth daily ) 60 tablet 1    Multiple Vitamins-Minerals (MULTIVITAMIN PO) Take by mouth daily      Cyanocobalamin (VITAMIN B-12) 5000 MCG SUBL Place 1,000 mcg under the tongue daily       pravastatin (PRAVACHOL) 10 MG tablet Take 20 mg by mouth daily        No Known Allergies    REVIEW OF SYSTEMS  10 systems reviewed, pertinent positives per HPI otherwise noted to be negative. PHYSICAL EXAM  BP (!) 126/55   Pulse 84   Temp 99.8 °F (37.7 °C) (Oral)   Resp 16   Ht 5' 10\" (1.778 m)   Wt 130 lb (59 kg)   SpO2 96%   BMI 18.65 kg/m²   GENERAL APPEARANCE: Awake and alert. Cooperative. No acute distress  HEAD: Normocephalic. Atraumatic. EYES: PERRL. EOM's grossly intact. ENT: Mucous membranes are dry/tacky, midline uvula, airway patent  NECK: Supple. No rigidity, normal range of motion  HEART: RRR. No murmurs  LUNGS: Respirations nonlabored. Lungs are clear to auscultation bilaterally. ABDOMEN: Soft. Non-distended. Non-tender. No guarding or rebound. Normal bowel sounds. EXTREMITIES: No peripheral edema. Moves all extremities equally.  All extremities neurovascularly intact. Multiple toe amputations, dry scaly skin of lower extremities  SKIN: Warm and dry. No acute rashes. NEUROLOGICAL: Alert and oriented to name, date of birth, place. No gross facial drooping. Strength 5/5, sensation intact. Normal speech  PSYCHIATRIC: Normal mood and affect. Pleasant    LABS  I have reviewed all labs for this visit.    Results for orders placed or performed during the hospital encounter of 04/10/19   CBC Auto Differential   Result Value Ref Range    WBC 7.2 4.0 - 11.0 K/uL    RBC 3.63 (L) 4.00 - 5.20 M/uL    Hemoglobin 11.6 (L) 12.0 - 16.0 g/dL    Hematocrit 34.2 (L) 36.0 - 48.0 %    MCV 94.3 80.0 - 100.0 fL    MCH 31.9 26.0 - 34.0 pg    MCHC 33.8 31.0 - 36.0 g/dL    RDW 12.7 12.4 - 15.4 %    Platelets 947 095 - 183 K/uL    MPV 8.7 5.0 - 10.5 fL    Neutrophils % 92.3 %    Lymphocytes % 4.1 %    Monocytes % 3.3 %    Eosinophils % 0.2 %    Basophils % 0.1 %    Neutrophils # 6.7 1.7 - 7.7 K/uL    Lymphocytes # 0.3 (L) 1.0 - 5.1 K/uL    Monocytes # 0.2 0.0 - 1.3 K/uL    Eosinophils # 0.0 0.0 - 0.6 K/uL    Basophils # 0.0 0.0 - 0.2 K/uL   Comprehensive Metabolic Panel w/ Reflex to MG   Result Value Ref Range    Sodium 135 (L) 136 - 145 mmol/L    Potassium reflex Magnesium 4.3 3.5 - 5.1 mmol/L    Chloride 96 (L) 99 - 110 mmol/L    CO2 27 21 - 32 mmol/L    Anion Gap 12 3 - 16    Glucose 187 (H) 70 - 99 mg/dL    BUN 39 (H) 7 - 20 mg/dL    CREATININE 1.4 (H) 0.6 - 1.2 mg/dL    GFR Non- 36 (A) >60    GFR  43 (A) >60    Calcium 9.7 8.3 - 10.6 mg/dL    Total Protein 7.1 6.4 - 8.2 g/dL    Alb 4.1 3.4 - 5.0 g/dL    Albumin/Globulin Ratio 1.4 1.1 - 2.2    Total Bilirubin 0.4 0.0 - 1.0 mg/dL    Alkaline Phosphatase 71 40 - 129 U/L    ALT 14 10 - 40 U/L    AST 25 15 - 37 U/L    Globulin 3.0 g/dL   Urinalysis, reflex to microscopic   Result Value Ref Range    Color, UA Yellow Straw/Yellow    Clarity, UA Clear Clear    Glucose, Ur Negative Negative mg/dL CHEST STANDARD (2 VW)    CBC Auto Differential    Comprehensive Metabolic Panel w/ Reflex to MG    Urinalysis, reflex to microscopic    Lactate, Sepsis    Microscopic Urinalysis    Inpatient consult to Hospitalist    EKG 12 Lead     Orders Placed This Encounter   Medications    0.9 % sodium chloride bolus    cefTRIAXone (ROCEPHIN) 1 g in sterile water 10 mL IV syringe    dextrose 5 % infusion     NIK GOMES: cabinet override     ED Course as of Apr 10 2206   Wed Apr 10, 2019   2205 EKG interpretation by me: Normal sinus rhythm, 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 XR CHEST STANDARD (2 VW) [SY]      ED Course User Index  [SY] Emily Good DO       CLINICAL IMPRESSION  1. Altered mental status, unspecified altered mental status type    2. Mild dehydration    3. Urinary incontinence, unspecified type    4. Acute UTI        Blood pressure (!) 126/55, pulse 84, temperature 99.8 °F (37.7 °C), temperature source Oral, resp. rate 16, height 5' 10\" (1.778 m), weight 130 lb (59 kg), SpO2 96 %, not currently breastfeeding. DISPOSITION  Madina Burnett was admitted/transferred to [facility] in stable condition.                    Emily Good DO  04/10/19 2207

## 2019-04-11 NOTE — ED NOTES
Report to [facility] transport. Patient transported to [facility] in stable condition.      Alyssia Ramirez RN  04/10/19 1826

## 2019-04-11 NOTE — PROGRESS NOTES
Admitted to [place] [id number].  Patient is alert with mild confusion; pleasant and cooperative. Assisted to [facility] with no problem. Requesting food and drink which was provided. Call light in reach. Will closely monitor.

## 2019-04-11 NOTE — DISCHARGE INSTR - COC
Continuity of Care Form    Patient Name: Donte Bales   :  1935  MRN:  [id number]    Admit date:  4/10/2019  Discharge date:  19    Code Status Order: Full Code   Advance Directives:   Advance Care Flowsheet Documentation     Date/Time Healthcare Directive Type of Healthcare Directive Copy in [address] Agent's Name Healthcare Agent's Phone Number    19 0045  No, patient does not have an advance directive for healthcare treatment -- -- -- -- --          Admitting Physician:  Eben Osborn MD  PCP: Jam Hutchison MD    Discharging Nurse: [facility] Unit/Room#: 0209/0209-02  Discharging Unit Phone Number: [phone]    Emergency Contact:   Extended Emergency Contact Information  Primary Emergency Contact: Dagmar Chow  Address: [address] [place] [address] Phone: [phone]  Relation: Child  Secondary Emergency Contact: Ariana Basilio  Address: [facility] ([address] [place] [address])           [phone]  Home Phone: [phone]  Relation: Grandchild    Past Surgical History:  Past Surgical History:   Procedure Laterality Date    APPENDECTOMY      COLONOSCOPY      polyps    COLONOSCOPY  7/15/15    normal colon    EYE SURGERY      right cataract    FOOT SURGERY Left 2017    PARTIAL LEFT FOOT AMPUTATION              FOOT SURGERY Right 2017    HYSTERECTOMY      OTHER SURGICAL HISTORY Left     Macular Grid Left eye argon    TOE AMPUTATION      2nd toe 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    TOE AMPUTATION      5th toe right foot    TONSILLECTOMY      UPPER GASTROINTESTINAL ENDOSCOPY  2017    Dieulafoy lesion in duodenum       Immunization History:   Immunization History   Administered Date(s) Administered    Influenza Vaccine, unspecified formulation 2017    Influenza Virus Vaccine 10/03/2017, 10/18/2018    Pneumococcal Polysaccharide ([id number]) 2017 Active Problems:  Patient Active Problem List   Diagnosis Code    RUBEN (acute kidney injury) ([facility] N17.9    Type 2 diabetes mellitus with foot ulcer, without long-term current use of insulin ([facility] E11.621, L97.509    Acute blood loss anemia D62    Severe protein-calorie malnutrition (HCC) E43    Encephalopathy G93.40    Essential hypertension I10    Altered mental status R41.82    Acute renal failure (HCC) N17.9    Contusion of left hip S70. 02XA    Acute UTI N39.0    Mild dehydration E86.0    Urinary incontinence R32       Isolation/Infection:   Isolation          No Isolation        Patient Infection Status     Infection Encounter Level? Onset Date Added Added By Resolved Resolved By Review Date    MRSA No  12/11/17 Orrin Riedel, RN 03/04/19 Orrin Riedel, RN           Nurse Assessment:  Last Vital Signs: /68   Pulse 77   Temp 97.2 °F (36.2 °C) (Oral)   Resp 16   Ht 5' 10\" (1.778 m)   Wt 156 lb 8 oz (71 kg)   SpO2 94%   BMI 22.46 kg/m²     Last documented pain score (0-10 scale): Pain Level: 0  Last Weight:   Wt Readings from Last 1 Encounters:   04/10/19 156 lb 8 oz (71 kg)     Mental Status:  oriented and alert    IV Access:  - None    Nursing Mobility/ADLs:  Walking   Assisted  Transfer  Assisted  Bathing  Assisted  Dressing  Assisted  Toileting  Assisted  Feeding  Independent  Med Admin  Independent  Med Delivery   whole    Wound Care Documentation and Therapy:  Wound 03/11/18 Other (Comment) Foot Plantar (Active)   Wound Other 4/11/2019 12:39 AM   Dressing Status Other (Comment) 4/11/2019 12:39 AM   Dressing Changed Other (Comment) 4/11/2019 12:39 AM   Dressing/Treatment Open to air; Other (comment) 4/11/2019 12:39 AM   Number of days: 395       Wound 03/03/19 Buttocks Left stage 2 (Active)   Number of days: 38       Wound 04/11/19 Heel Right not open; no drainage; wears specialty boot  (Active)   Wound Other 4/11/2019  8:10 AM   Dressing Status Other (Comment) 4/11/2019  8:10 AM Number of days: 0        Elimination:  Continence:   · Bowel: Yes  · Bladder: Yes  Urinary Catheter: None   Colostomy/Ileostomy/Ileal Conduit: No       Date of Last BM: 4/12/2019    Intake/Output Summary (Last 24 hours) at 4/11/2019 1146  Last data filed at 4/11/2019 0605  Gross per 24 hour   Intake 2159 ml   Output --   Net 2159 ml     I/O last 3 completed shifts: In: 2159 [I.V.:389; IV Piggyback:1770]  Out: -     Safety Concerns: At Risk for Falls    Impairments/Disabilities:      None    Nutrition Therapy:  Current Nutrition Therapy:   - Oral Diet:  Carb Control 4 carbs/meal (1800kcals/day)    Routes of Feeding: Oral  Liquids: No Restrictions  Daily Fluid Restriction: no  Last Modified Barium Swallow with Video (Video Swallowing Test): not done    Treatments at the Time of Hospital Discharge:   Respiratory Treatments: ***  Oxygen Therapy:  is not on home oxygen therapy.   Ventilator:    - No ventilator support    Rehab Therapies: Physical Therapy, Occupational Therapy and skilled nursing, HHA  Weight Bearing Status/Restrictions: No weight bearing restirctions  Other Medical Equipment (for information only, NOT a DME order):  {EQUIPMENT:[id number]}  Other Treatments: ***    Patient's personal belongings (please select all that are sent with patient):  Dentures upper    RN SIGNATURE:  Electronically signed by Derek Sterling RN on 4/12/19 at 1:39 PM    CASE MANAGEMENT/SOCIAL WORK SECTION    Inpatient Status Date: 4/10/2019    Readmission Risk Assessment Score:  Readmission Risk              Risk of Unplanned Readmission:        18           Discharging to Facility/ Agency   Name: [facility]  Address: [address] [place] [address] [address] [place] [address] Dr Ousmane montana [address]   Phone: [phone]  Fax: [phone]        / signature: Electronically signed by Ada Shelton RN on 4/12/19 at 12:35 PM    PHYSICIAN SECTION    Prognosis: Good    Condition at Discharge: Stable    Rehab Potential (if transferring to Rehab): Good    Recommended Labs or Other Treatments After Discharge:     Physician Certification: I certify the above information and transfer of Diane Kirkpatrick  is necessary for the continuing treatment of the diagnosis listed and that she requires [address] for less 30 days. Update Admission H&P: No change in H&P    PHYSICIAN SIGNATURE:  Electronically signed by KINGSLEY Malave MD/ Mike Hughes RN on 4/12/19 at 12:35 PM

## 2019-04-11 NOTE — PROGRESS NOTES
AM assessment completed, see flowsheet. Alert and oriented with forgetfulness. Patient resting in bed at this time. All needs met. Respirations easy and even at rest.No c/o pain at this time. Bed in lowest position and locked, SR up x2. Call light and bedside table within reach.

## 2019-04-11 NOTE — CARE COORDINATION
Case Management Assessment  Initial Evaluation    Date/Time of Evaluation: 4/11/2019 11:39 AM  Assessment Completed by: Dewayne Palmer    Patient Name: Kassi Contreras  YOB: 1935  Diagnosis: UTI (urinary tract infection) [N39.0]  Date / Time: 4/10/2019  8:03 PM  Admission status/Date: Inpatient 4/10/2019  Chart Reviewed: Yes      Patient Interviewed: Yes   Family Interviewed:  No      Hospitalization in the last 30 days:  No    Contacts  :   Belen Garces  Relationship to Patient:  daughter  Phone Number:   [phone]  Alternate Contact:   Brodie Ivy  Relationship to Patient:   grand daughter  Phone Number:  [phone]    Met with: patient    Current PCP; Michael Marquez MD    Financial  Medicare  Precert required for SNF :  N        3 night stay required - Y  ADLS  Support Systems: Children, Family Members, [employer]  Transportation: daughter    Meal Preparation: daughter    Housing  Home Environment: lives in a ranch home w/daughter and DAYNE  Steps: 2  Plans to Return to Present Housing: Yes  Other Identified Issues: [address]  Currently active with [employer] : Yes - [facility]-  Type of Home Care Services: None  Passport/Waiver : No  :                      Phone Number:    Passport/Waiver Services: NA          [facility] Provider: CAMELIA  Equipment: Walker_X_Cane__RTS__ BSC__Shower Chair__  02__ HHN__ CPAP__  BiPap__  Hospital Bed__ W/C___ Other__________      Has Home O2 in place on admit:  No  Informed of need to bring portable home O2 tank on day of discharge for nursing to connect prior to leaving:   No  Verbalized agreement/Understanding:   No    Community Service Affiliation  Dialysis:  No    · Name:  · Location  · Dialysis Schedule:  · Phone:   · Fax:     Outpatient PT/OT: No    Cancer Center: No     CHF Clinic: No     Pulmonary Rehab: No  Pain Clinic: No  Community Mental Health: No    Wound Clinic: No Other: NA    DISCHARGE PLAN: Chart reviewed. Met with pt at bedside and explained the role of the CM. Pl;an: DC home w DONNIE w/ [facility] (SN/PT/OT). Denies any other needs at this time. +ANTHONY. +CM following    Explained Case Management role/services.

## 2019-04-11 NOTE — PROGRESS NOTES
Dr Mariola Mcmillan in to see patient at this time. Admission information completed. Patient is eating a snack. Call light in reach.

## 2019-04-11 NOTE — PROGRESS NOTES
LE exercises 3 sets of 10-15 reps  6). Ascend/descend 2 platform steps with Min A with use of No Rail and RW. Rehabilitation Potential    Good  Strengths for achieving goals include:   PLOF and Pt cooperative  Barriers to achieving goals include:    No barriers     Plan    To be seen 3-5 x / week  while in acute care setting for therapeutic exercises, bed mobility, transfers, progressive gait training, balance training, and family/patient education. Daniela Aaron PT, DPT #[id number]     If patient discharges from this facility prior to next visit, this note will serve as the Discharge Summary.

## 2019-04-11 NOTE — CARE COORDINATION
[facility]  Received referral regarding [facility] services from [address]. Pt is known to [facility], however, is not active. Discharged from [facility] services in Jan 2019. Ran [facility] MVP report and pt is not currently active with a HC agency. Liaison to continue to follow pt for [facility] needs until discharge.     Electronically signed by Pj Stern RN on 4/11/2019 at 12:40 PM

## 2019-04-11 NOTE — PROGRESS NOTES
Inpatient Occupational Therapy  Evaluation and Treatment    Unit: 2w  Date:  4/11/2019  Patient Name:    Kassi Contreras  Admitting diagnosis:  UTI (urinary tract infection) [N39.0]  Admit Date:  4/10/2019  Precautions/Restrictions/WB Status/ Lines/ Wounds/ Oxygen: Fall Risk, IV    Treatment Time: 653-6880  Treatment Number: 1   Billable Treatment Time: 35 minutes   Total Treatment Time:  35 minutes    Patient Goals for Therapy:  \" go home \"    Discharge Recommendations: Home with 24/7 assist and Home Therapy  DME needs for discharge: Needs Met    Home Health S4 Level Recommendation:  Level 2 Social  AM-PAC Score: 19    Preadmission Environment    Pt. Lives With Family and 24/7 Assist Available  (daughter works from home, has a farm)   Home environment:    two story home (pt stays on main level)  Steps to enter first floor: 2 separate Steps to enter and No Rail  Steps to second floor: N/A  Bathroom: Bath Tub Shower, Grab bars, Shower Chair  and [facility] over toilet   Equipment owned: [employer], W/sabio labs, Shower Chair, BSC, quad cane and hospital bed     Preadmission Status:  Pt. Able to drive: No  Pt Fully independent with ADLs: Yes  Pt. Required assistance from family for: Cleaning, Cooking and Laundry   Pt. Fully independent for transfers and gait and walked with Chong Hyrum outside the home, sometimes inside, sometimes furniture walks   History of falls No     Pain   No  Rating: NA /10  Location:   Pain Medicine Status: Denies need        Cognition      Pt.  Lives With Family and 24/7 Assist Available  (daughter works from home, has a farm)   Home environment:    two story home (pt stays on main level)  Steps to enter first floor: 2 separate Steps to enter and No Rail  Steps to second floor: N/A  Bathroom: Bath Tub Shower, Grab bars, Shower Chair  and [facility] over toilet   Equipment owned: [employer], W/C, Shower Chair, BSC, quad cane and hospital bed     Preadmission Status:  Pt. Able to drive: No  Pt Fully independent with ADLs: Yes  Pt. Required assistance from family for: Cleaning, Cooking and Laundry   Pt. Fully independent for transfers and gait and walked with Chilango Marcos outside the home, sometimes inside, sometimes furniture walks   History of falls No     Pain   No  Rating: NA /10  Location:   Pain Medicine Status: Denies need      Subjective  Patient lying supine in bed with no family present  Pt agreeable to this OT eval & tx. Upper Extremity ROM:    WFL, pt able to perform all bed mobility, transfers, and gait without ROM limitation. Upper Extremity Strength:    WFL, pt able to perform all bed mobility, transfers, and gait without ROM limitation. Upper Extremity Sensation    WNL    Upper Extremity Proprioception:   WNL    Coordination and Tone  WNL    Balance  Static Sitting:  Good   Dynamic Sitting:  Good   Static Standing: Fair   Dynamic Standing: Fair     Bed mobility:    Supine to sit:   SBA  Sit to supine:   Not Tested  Scooting to head of bed:   Not Tested  Scooting in sitting:  Supervision  Rolling:   Not Tested  Bridging:   Not Tested    Transfers:    Sit to stand:  CGA  Stand to sit:  CGA  Bed to Chair:  CGA and with use of RW  Bed to commode  : CGA and with use of RW    Activity Tolerance   Pt completed therapy session with  no nausea, dizziness, pain or SOB noted w/activity. Dressing:      UE: Min A  LE:   Min A  Bathing:    UE: Not Tested  LE: Not Tested  Eating:   Not Tested    Positioning Needs: Remained up in chair, call light and needs in reach. Exercise / Activities Initiated:   N/A    Patient/Family Education: Role of OT ; Recommendations for DC     Assessment of Deficits: Pt seen for Occupational therapy evaluation in acute care setting. Pt demonstrated decreased Activity Tolerance, ADL's, Balance, Bathing, Dressing, Safety Awareness and Transfers    Goal(s) : To be met in 3 Visits:  1). Indep with UE ex x 10 reps    To be met in 5 Visits:  1).  Supine to Sit: Modified Independent  2). Bed to Chair/BSC: SBA and with use of RW  3). Upper Body Bathing:  Modified Independent  4). Lower Body Bathing:  CGA  5). Upper Body Dressing: Modified Independent  6). Lower Body Dressing: CGA  7). Pt to scotty UE exs [id number]    Rehabilitation Potential:  Good for goals listed above. Strengths for achieving goals include: Pt cooperative  Barriers to achieving goals include:  Complexity of condition     Plan: To be seen 3-5 x / week  while in acute care setting for therapeutic exercises, bed mobility, transfers, dressing, bathing,family/patient education with adaptive equipment, breathing technique instruction.      Dinh Monet OTR/L [id number]          If patient discharges from this facility prior to next visit, this note will serve as the Discharge Summary

## 2019-04-12 VITALS
WEIGHT: 156.5 LBS | OXYGEN SATURATION: 95 % | BODY MASS INDEX: 22.41 KG/M2 | RESPIRATION RATE: 16 BRPM | SYSTOLIC BLOOD PRESSURE: 157 MMHG | DIASTOLIC BLOOD PRESSURE: 51 MMHG | HEART RATE: 61 BPM | HEIGHT: 70 IN | TEMPERATURE: 97.3 F

## 2019-04-12 LAB
ANION GAP SERPL CALCULATED.3IONS-SCNC: 10 MMOL/L (ref 3–16)
BASOPHILS ABSOLUTE: 0 K/UL (ref 0–0.2)
BASOPHILS RELATIVE PERCENT: 0.3 %
BUN BLDV-MCNC: 22 MG/DL (ref 7–20)
CALCIUM SERPL-MCNC: 8.8 MG/DL (ref 8.3–10.6)
CHLORIDE BLD-SCNC: 105 MMOL/L (ref 99–110)
CO2: 26 MMOL/L (ref 21–32)
CREAT SERPL-MCNC: 1.1 MG/DL (ref 0.6–1.2)
EOSINOPHILS ABSOLUTE: 0.3 K/UL (ref 0–0.6)
EOSINOPHILS RELATIVE PERCENT: 8.6 %
GFR AFRICAN AMERICAN: 57
GFR NON-AFRICAN AMERICAN: 47
GLUCOSE BLD-MCNC: 104 MG/DL (ref 70–99)
GLUCOSE BLD-MCNC: 123 MG/DL (ref 70–99)
GLUCOSE BLD-MCNC: 130 MG/DL (ref 70–99)
GLUCOSE BLD-MCNC: 95 MG/DL (ref 70–99)
HCT VFR BLD CALC: 29.7 % (ref 36–48)
HEMOGLOBIN: 9.9 G/DL (ref 12–16)
LYMPHOCYTES ABSOLUTE: 0.6 K/UL (ref 1–5.1)
LYMPHOCYTES RELATIVE PERCENT: 16.8 %
MCH RBC QN AUTO: 31.4 PG (ref 26–34)
MCHC RBC AUTO-ENTMCNC: 33.4 G/DL (ref 31–36)
MCV RBC AUTO: 94 FL (ref 80–100)
MONOCYTES ABSOLUTE: 0.4 K/UL (ref 0–1.3)
MONOCYTES RELATIVE PERCENT: 10.6 %
NEUTROPHILS ABSOLUTE: 2.4 K/UL (ref 1.7–7.7)
NEUTROPHILS RELATIVE PERCENT: 63.7 %
PDW BLD-RTO: 13.1 % (ref 12.4–15.4)
PERFORMED ON: ABNORMAL
PERFORMED ON: ABNORMAL
PERFORMED ON: NORMAL
PLATELET # BLD: 122 K/UL (ref 135–450)
PMV BLD AUTO: 8.2 FL (ref 5–10.5)
POTASSIUM SERPL-SCNC: 3.8 MMOL/L (ref 3.5–5.1)
RBC # BLD: 3.16 M/UL (ref 4–5.2)
SODIUM BLD-SCNC: 141 MMOL/L (ref 136–145)
WBC # BLD: 3.8 K/UL (ref 4–11)

## 2019-04-12 PROCEDURE — 2580000003 HC RX 258: Performed by: HOSPITALIST

## 2019-04-12 PROCEDURE — 85025 COMPLETE CBC W/AUTO DIFF WBC: CPT

## 2019-04-12 PROCEDURE — 80048 BASIC METABOLIC PNL TOTAL CA: CPT

## 2019-04-12 PROCEDURE — 99238 HOSP IP/OBS DSCHRG MGMT 30/<: CPT | Performed by: INTERNAL MEDICINE

## 2019-04-12 PROCEDURE — 6370000000 HC RX 637 (ALT 250 FOR IP): Performed by: HOSPITALIST

## 2019-04-12 PROCEDURE — 97116 GAIT TRAINING THERAPY: CPT

## 2019-04-12 PROCEDURE — 36415 COLL VENOUS BLD VENIPUNCTURE: CPT

## 2019-04-12 PROCEDURE — 6360000002 HC RX W HCPCS: Performed by: HOSPITALIST

## 2019-04-12 RX ORDER — CEPHALEXIN 250 MG/1
250 CAPSULE ORAL 3 TIMES DAILY
Qty: 15 CAPSULE | Refills: 0 | Status: SHIPPED | OUTPATIENT
Start: 2019-04-12 | End: 2019-04-17

## 2019-04-12 RX ADMIN — FERROUS SULFATE TAB 325 MG (65 MG ELEMENTAL FE) 325 MG: 325 (65 FE) TAB at 17:19

## 2019-04-12 RX ADMIN — Medication 1 CAPSULE: at 08:47

## 2019-04-12 RX ADMIN — ASPIRIN 81 MG 81 MG: 81 TABLET ORAL at 08:46

## 2019-04-12 RX ADMIN — PRAVASTATIN SODIUM 20 MG: 20 TABLET ORAL at 08:47

## 2019-04-12 RX ADMIN — FERROUS SULFATE TAB 325 MG (65 MG ELEMENTAL FE) 325 MG: 325 (65 FE) TAB at 08:46

## 2019-04-12 RX ADMIN — METOPROLOL TARTRATE 25 MG: 25 TABLET ORAL at 08:46

## 2019-04-12 RX ADMIN — ENOXAPARIN SODIUM 40 MG: 40 INJECTION SUBCUTANEOUS at 08:47

## 2019-04-12 RX ADMIN — PANTOPRAZOLE SODIUM 40 MG: 40 TABLET, DELAYED RELEASE ORAL at 08:47

## 2019-04-12 RX ADMIN — CYANOCOBALAMIN TAB 500 MCG 1000 MCG: 500 TAB at 08:46

## 2019-04-12 RX ADMIN — FERROUS SULFATE TAB 325 MG (65 MG ELEMENTAL FE) 325 MG: 325 (65 FE) TAB at 11:56

## 2019-04-12 RX ADMIN — Medication 10 ML: at 08:47

## 2019-04-12 ASSESSMENT — PAIN SCALES - GENERAL: PAINLEVEL_OUTOF10: 0

## 2019-04-12 NOTE — CARE COORDINATION
[address] Transitions Interview     2019    Patient: Chelsy Velasquez Patient : 1935   MRN: [id number]  Reason for Admission: UTI  RARS: Readmission Risk Score: 25         Spoke with: Chelsy Velasquez (patient)      Readmission Risk  Patient Active Problem List   Diagnosis    [facility]UBEN [facility] injury) [facility]    Type 2 diabetes mellitus with foot ulcer, without long-term current use of insulin ([facility]    Acute blood loss anemia    Severe protein-calorie malnutrition (HCC)    Encephalopathy    Essential hypertension    Altered mental status    Acute renal failure (HCC)    Contusion of left hip    Acute UTI    Mild dehydration    Urinary incontinence       Inpatient Assessment  Care Transitions Summary    Care Transitions Inpatient Review  Medication Review  Are you able to afford your medications?:  Yes  How often do you have difficulty taking your medications?:  I always take them as prescribed. Housing Review  Who do you live with?:  Child  Are you an active caregiver in your home?:  No  Social Support  Do you have a ?:  No  Do you have a [facility]?:  No  Durable Medical Equipment  Patient DME:  James Contreras, [address] [facility] chair, Hospital bed, Quad cane  Functional Review  Ability to seek help/take action for Emergent/Urgent situations i.e. fire, crime, inclement weather or health crisis. :  Independent  Ability handle personal hygiene needs (bathing/dressing/grooming):  Needs Assistance  Ability to manage medications:  Dependent  Ability to prepare food:  Needs Assistance  Ability to maintain home (clean home, laundry):  Dependent  Ability to drive and/or has transportation:  Dependent  Ability to do shopping:  Dependent  Ability to manage finances:  Dependent  Hearing and Vision  Visual Impairment:  None  Hearing Impairment:  None  Care Transitions Interventions       Patient populated to BPCI-A list this morning 2019.      Met with patient alone in room, she is AxOx3. States she moved in with her daughter and son-in-law about a year ago. Previously lived in [place] for 40+ years. Reports she is feeling much better and discharging home today. Her daughter manages her medications, provides transportation and meals and assists as needed with other tasks. She has 24/7 support. She uses a walker or cane and denies falls or injuries. Will be set back up with [facility] at discharge for SN PT OT. Her PCP is Dawayne Meigs, but patient states she doesn't know her well because she is newer to the practice. Instructed her that she will need a follow up appointment within 7 days of discharge. Presented her with BPCI-A Notification Letter and CTC contact. Notified hospitalist that patient is BPCI-A. He confirms she will DC home today. Notified Caterina Guerra RN liaison with [facility] that patient will DC home today. Patient is known to [facility], but not active upon admission. Her most recent episode with [facility] resolved in Jan 2019. UPDATE: Notified by Caterina Guerra RN liaison with [facility] that patient is active with [facility] prior to admission but they have not seen her yet. Referral at discharge to [facility], not [facility].     Follow Up  Future Appointments   Date Time Provider Arnold Kumar   4/22/2019 12:20 PM [address]  Health Maintenance Due   Topic Date Due    DEXA (modify frequency per FRAX score)  10/20/2000       Sue Fontaine, CASIMIRO

## 2019-04-12 NOTE — PROGRESS NOTES
Bedside report given and pt care transferred to [facility]. Pt denies needs at this time. Call light within reach.

## 2019-04-12 NOTE — PROGRESS NOTES
Inpatient Physical Therapy Daily Treatment Note    Unit: 2w  Date:  4/12/2019  Patient Name:    Gino Ulloa  Admitting diagnosis:  UTI (urinary tract infection) [N39.0]  Admit Date:  4/10/2019  Precautions/Restrictions:  Fall Risk, IV    Discharge Recommendations: Home with PRN assist and Home Therapy   DME needs for discharge:   Needs Met    Home Health S4 Level Recommendation: Level 1 Standard  AM-PAC Mobility Score   AM-PAC Inpatient Mobility Raw Score : 20       Treatment Time:  9550 - 1409  Treatment number: 2  Timed Code Treatment Minutes: 28 minutes  Total Treatment Minutes: 28  minutes    Cognition    A&O x4   Able to follow 2 step commands    Subjective  Patient lying supine in bed with no family present  Pt agreeable to this PT tx. Pain   No  Rating:  NA/10  Location:   Pain Medicine Status: Denies need     Bed Mobility   Supine to Sit:   Modified Independent  Sit to Supine:  Not Tested  Rolling:   Not Tested  Scooting:   Independent    Transfer Training     Sit to stand:   Supervision  Stand to sit:   Supervision  Bed to Chair:  Not Tested with use of N/A    Gait Training gait completed as indicated below  Distance:  276 ft  Deviations (firm surface/linoleum): decreased daniel, Decreased step length, Decreased step height and other: forward flexed posture, B flexed knees    Assistive Device Used:  RW  Level of Assist: SBA  Comment:     Stair Training stairs completed as indicated below  Pt ascended/descended 2 stairs with Min A with No Rail, and use of RW. Pattern: non-reciprocal pattern     Described to pt safety on stairs at home, including how to use walker on stairs and to have help from her family. Recommended pt practice stairs with home PT as well. Therapeutic Exercise Analy deferred by patient (disregard section)  Ankle pumps:  Quad sets:   Glut sets:   Heel slides:   SLR:   Hip abd/add:   LAQ:   Marching:     Balance  Static Sitting:  Good    Tolerance:  WNL  Dynamic Sitting:  Good Static Standing: Good    Tolerance: WNL  Dynamic Standing: Good -     Patient Education      Role of PT, POC, Discharge recommendations, stair training     Positioning Needs       Pt sitting up in chair, call light and needs in reach and alarm set. ROM Measurements N/A  Knee Flexion:  Knee Extension:     Activity Tolerance   Pt completed therapy session with No nausea, dizziness, pain or SOB noted w/activity  SpO2: 98% post ambulation   HR: 78bpm post ambulation   BP:     Other  None. Assessment :  Patient participating very well this AM, ambulating around the unit and completing stair training with RW and min A. Pt is IND with bed mobility and requires just supervision for transfers. Continue to recommend DC to home with home PT when DC from hospital.     Goals (all goals ongoing unless otherwise indicated)  To be met in 3 visits:  1). Independent with LE Ex x 10 reps     To be met in 6 visits:  1). Sit to/from stand: Independent  2). Bed to chair: SBA and with use of RW - MET 4/12  4). Gait: Ambulate 150 ft   with  CGA  and use of RW- MET 4/12  5). Tolerate B LE exercises 3 sets of 10-15 reps  6). Ascend/descend 2 platform steps with Min A with use of No Rail and RW.- MET 4/12    Plan   Continue with plan of care. Samara Guerrero, PT, DPT #[id number]     If patient discharges from this facility prior to next visit, this note will serve as the Discharge Summary.

## 2019-04-12 NOTE — CARE COORDINATION
[facility]  Received call from [address] Pt is active with ALESSIO Hubbard. Liaison will no longer be following pt for [facility]. needs.     Electronically signed by Sara Gupta RN on 4/12/2019 at 1:01 PM

## 2019-04-12 NOTE — PROGRESS NOTES
Shift report received from Ja, [facility]. Assisted patient to the bathroom.   Call light in bathroom

## 2019-04-12 NOTE — CARE COORDINATION
DISCHARGE ORDER  Date/Time 2019 12:24 PM  Completed by: Tavo Peterson, Case Management    Patient Name: Fernanda Irizarry    : 1935  Admitting Diagnosis: UTI (urinary tract infection) [N39.0]  Admit Date/Time: 4/10/2019  8:03 PM    Noted discharge order. Confirmed discharge plan with patient / family (pt and daughter, Sarah Reed): Yes   Discharge Plan: Reviewed chart. Role of discharge planner explained and patient and pt's daughter, Sarah Reed verbalized understanding. Discharge order is noted. Pt is being d/c'd to home today. Pt recommending home with 24/7 assistance. Pt lives with her daughter and daughter, Sarah Reed states that pt has 24/7 assistance at home. YUMIKO spoke with Dagmar at length. and explained that pt is answering and aksing appropriate questions without difficulty. Sarah Reed stated that pt has some dementia, but she brought her in because she was confused, stooled everywhere, and has very dark concentrated urine. Yumiko explained that pt was discharging on oral anbx per Dr. Pasquale Kline and that pt was oriented at this time. Dagmar verbalized understanding. Dagmar and pt agreed to resumption of care for [facility] for PT/OT SN and HHA. YUMIKO called Sarita with [facility], and left voice to resume this. Orders placed. ANTHONY/AVS complete. Pt's O2 sats are 97% on RA. Pt's daughter states that she will be here to pick pt up soon. Discharge timeout Jm Red RN. All discharge needs and concerns addressed. No further discharge needs needed or noted. Addendum 19 1300: Received call from Sarah Reed again and she stated that pt was recently discharged from [address] and has [address] and would like resumption of care. YUMIKO spoke with [facility] ([phone]) and spoke with Zarina ovalle able to to accept pt back, but also sent me to intake to leave a voicemail message for Narda. CM faxed orders and ANTHONY/AVS to [phone]. YUMIKO did call Lyle Sotelo with [facility] and cancelled the previous call regarding resumption of care.   No further needs needed.

## 2019-04-12 NOTE — PROGRESS NOTES
IM Progress Note    Admit Date:  4/10/2019  2    Interval history:  Admitted for confusion and UTi   No fevers    Subjective:  Ms. Grace Brood seen up in bed, watching TV. Awake alert but not oriented  Unsure why she is here  Denies any issues    Objective:   BP (!) 168/61   Pulse 56   Temp 97.7 °F (36.5 °C) (Oral)   Resp 16   Ht 5' 10\" (1.778 m)   Wt 156 lb 8 oz (71 kg)   SpO2 99%   BMI 22.46 kg/m²       Intake/Output Summary (Last 24 hours) at 4/12/2019 0737  Last data filed at 4/12/2019 0556  Gross per 24 hour   Intake 1736 ml   Output --   Net 1736 ml       Physical Exam:        General:  Elderly female baseline dementia Awake, alert and disoriented.  Appears to be not in any distress, pleasant  Mucous Membranes:  Pink , anicteric  Neck: No JVD, no carotid bruit, no thyromegaly  Chest:  Clear to auscultation bilaterally, no added sounds  Cardiovascular:  RRR S1S2 heard, no murmurs or gallops  Abdomen:  Soft, undistended, non tender, no organomegaly, BS present  Extremities: right foot with lateral 5toe amputation, feeble pulses  No wounds  Neurological : grossly normal with baseline mentation        Medications:   Scheduled Medications:    aspirin  81 mg Oral Daily    vitamin B-12  1,000 mcg Oral Daily    ferrous sulfate  325 mg Oral TID WC    lactobacillus  1 capsule Oral Daily    metoprolol tartrate  25 mg Oral BID    pantoprazole  40 mg Oral BID    pravastatin  20 mg Oral Daily    sodium chloride flush  10 mL Intravenous 2 times per day    enoxaparin  40 mg Subcutaneous Daily    insulin lispro  0-6 Units Subcutaneous TID WC    insulin lispro  0-3 Units Subcutaneous Nightly    cefTRIAXone (ROCEPHIN) IV  1 g Intravenous Q24H     I   sodium chloride 75 mL/hr at 04/11/19 0023    dextrose       sodium chloride flush, magnesium hydroxide, ondansetron, acetaminophen, glucose, dextrose, glucagon (rDNA), dextrose    Lab Data:  Recent Labs     04/10/19  2100 04/11/19  0535   WBC 7.2 3.3*   HGB 11.6* 9.9*   HCT 34.2* 29.9*   MCV 94.3 97.1    131*     Recent Labs     04/10/19  2100 04/11/19  0535   * 137   K 4.3 4.1   CL 96* 103   CO2 27 24   BUN 39* 30*   CREATININE 1.4* 1.2     No results for input(s): CKTOTAL, CKMB, CKMBINDEX, TROPONINI in the last 72 hours. Coagulation:   Lab Results   Component Value Date    INR 1.38 12/15/2017     Cardiac markers:   Lab Results   Component Value Date    CKTOTAL 290 03/03/2019    TROPONINI 0.09 03/03/2019         Lab Results   Component Value Date    ALT 11 04/11/2019    AST 21 04/11/2019    ALKPHOS 57 04/11/2019    BILITOT 0.3 04/11/2019       Lab Results   Component Value Date    INR 1.38 (H) 12/15/2017    PROTIME 15.6 (H) 12/15/2017       Radiology    Chest xray     No acute disease   Cultures:   Cultures  Blood- NGTD   Urine pending        Assessment & Plan:      1) UTI- with acute encephalopathy  - admitted for confusion   - UA positive. - continue rocephin  -improving mentation, avoid sedatives  - continue supportive care- has baseline dementia  - can dc home as pt remains at baseline     2) ARF - sec to dehydration - resolved with IVF     3) DM 2 with complications  - corrective ISS, stable, no hypoglycemia     4) GERD  - PPI, stable    5.  HTN - stable with no acute events             Hermes Holt MD 4/12/2019 7:37 AM

## 2019-04-12 NOTE — PROGRESS NOTES
Shift assessment completed at this time. Sitting on side of bed listening to music. Pleasant and alert with slight confusion or forgetfulness. States that she is hoping to go home tomorrow. Call light in reach.

## 2019-04-12 NOTE — PROGRESS NOTES
Prescriptions and discharge instructions given. Verbal and written education provided on: new medications Keflex, follow-up appointments, s/s to report to physician, diet, and activity. Pt verbalized understanding denies any questions/ needs at this time. Immunizations are current    Pt is stable for discharge at this time. All belongings are with patient at discharge. Transport called to transport pt to vehicle with wheelchair, for discharge home.

## 2019-04-13 ENCOUNTER — CARE COORDINATION (OUTPATIENT)
Dept: CASE MANAGEMENT | Age: 84
End: 2019-04-13

## 2019-04-13 LAB
ORGANISM: ABNORMAL
URINE CULTURE, ROUTINE: ABNORMAL
URINE CULTURE, ROUTINE: ABNORMAL

## 2019-04-13 NOTE — CARE COORDINATION
[facility] Transitions Initial Follow Up Call-BPCI    Call within 2 business days of discharge: Yes    Patient: Faizan Romeo Patient : 1935   MRN: [id number]  Reason for Admission: UTI  Discharge Date: 19 RARS: Readmission Risk Score: 18         Facility: [facility]    Attempted to reach patient via phone for initial post hospital transition call, no answer. VM left stating purpose of call along with my contact information requesting a return call. Spoke with Andrea Duverney at [facility] who stated that they spoke with daughter who wanted to wait until Monday to resume services.        Follow Up  Future Appointments   Date Time Provider Arnold Kumar   2019 12:20 PM [facility] [facility]       Charisse Nuno RN

## 2019-04-14 NOTE — CARE COORDINATION
[facility] Transitions Initial Follow Up Call-[facility]    Call within 2 business days of discharge: Yes    Patient: Gino Ulloa Patient : 1935   MRN: [id number]  Reason for Admission: UTI  Discharge Date: 19 RARS: Readmission Risk Score: 25    Spoke with: patient's daughter Bianca Hurd: [facility] Transitions 24 Hour Call    Do you have any ongoing symptoms?:  Yes  Patient-reported symptoms:  Fatigue, Weakness  Do you have a copy of your discharge instructions?:  Yes  Do you have all of your prescriptions and are they filled?:  Yes  Have you been contacted by a [facility] Pharmacist?:  No  Have you scheduled your follow up appointment?:  Yes  Were you discharged with any Home Care or Post Acute Services:  Yes  Post Acute Services:  Home Health (Comment: [address])  Patient DME:  Reina Melendez, [address] [facility] chair, Hospital bed, Quad cane  Do you have support at home?:  Child  Do you feel like you have everything you need to keep you well at home?:  Yes  Are you an active caregiver in your home?:  No  Care Transitions Interventions     Patient's daughter Jeri Bro reports patient was taking a nap at time of call, states she is doing better but is still weak. Says she has not had any urinary sx-no burning, pain, freq, odor and states she didn't have any sx prior to coming to hosp. Stated mental status back to baseline. Told her that in elderly a lot of times the first sx and only sx you see with UTI is change in mental status. Has ATB and is taking it. Reviewed all meds-no questions/concerns. She confirmed that she had spoken with Lazara Benitez at [facility] and they are to be out tomorrow. Has PCP appt tomorrow. Denies any needs at present time. Agreeable to f/u calls. Gave reminder that if they have any questions/concerns at anytime, they can always call PCP/specialist as they always have an MD on call .   Also advised that they can always contact their home care provider to

## 2019-04-15 LAB
BLOOD CULTURE, ROUTINE: NORMAL
CULTURE, BLOOD 2: NORMAL

## 2019-04-25 ENCOUNTER — HOSPITAL ENCOUNTER (OUTPATIENT)
Age: 84
Setting detail: SPECIMEN
Discharge: HOME | End: 2019-04-25
Payer: MEDICARE

## 2019-04-25 LAB
A/G RATIO: 1.7 (ref 1.1–2.2)
ALBUMIN SERPL-MCNC: 4.5 G/DL (ref 3.4–5)
ALP BLD-CCNC: 103 U/L (ref 40–129)
ALT SERPL-CCNC: 15 U/L (ref 10–40)
ANION GAP SERPL CALCULATED.3IONS-SCNC: 10 MMOL/L (ref 3–16)
AST SERPL-CCNC: 22 U/L (ref 15–37)
BACTERIA: ABNORMAL /HPF
BASOPHILS ABSOLUTE: 0 K/UL (ref 0–0.2)
BASOPHILS RELATIVE PERCENT: 0.4 %
BILIRUB SERPL-MCNC: 0.4 MG/DL (ref 0–1)
BILIRUBIN URINE: NEGATIVE
BLOOD, URINE: NEGATIVE
BUN BLDV-MCNC: 36 MG/DL (ref 7–20)
CALCIUM SERPL-MCNC: 10.6 MG/DL (ref 8.3–10.6)
CHLORIDE BLD-SCNC: 101 MMOL/L (ref 99–110)
CHOLESTEROL, TOTAL: 148 MG/DL (ref 0–199)
CLARITY: CLEAR
CO2: 32 MMOL/L (ref 21–32)
COLOR: YELLOW
CREAT SERPL-MCNC: 1.2 MG/DL (ref 0.6–1.2)
EOSINOPHILS ABSOLUTE: 0.2 K/UL (ref 0–0.6)
EOSINOPHILS RELATIVE PERCENT: 3.3 %
EPITHELIAL CELLS, UA: ABNORMAL /HPF
GFR AFRICAN AMERICAN: 52
GFR NON-AFRICAN AMERICAN: 43
GLOBULIN: 2.7 G/DL
GLUCOSE BLD-MCNC: 205 MG/DL (ref 70–99)
GLUCOSE URINE: NEGATIVE MG/DL
HCT VFR BLD CALC: 35 % (ref 36–48)
HDLC SERPL-MCNC: 70 MG/DL (ref 40–60)
HEMOGLOBIN: 11.5 G/DL (ref 12–16)
IRON SATURATION: 36 % (ref 15–50)
IRON: 101 UG/DL (ref 37–145)
KETONES, URINE: NEGATIVE MG/DL
LDL CHOLESTEROL CALCULATED: 60 MG/DL
LEUKOCYTE ESTERASE, URINE: NEGATIVE
LYMPHOCYTES ABSOLUTE: 1.1 K/UL (ref 1–5.1)
LYMPHOCYTES RELATIVE PERCENT: 15.3 %
MCH RBC QN AUTO: 31.8 PG (ref 26–34)
MCHC RBC AUTO-ENTMCNC: 33 G/DL (ref 31–36)
MCV RBC AUTO: 96.4 FL (ref 80–100)
MICROSCOPIC EXAMINATION: ABNORMAL
MONOCYTES ABSOLUTE: 0.5 K/UL (ref 0–1.3)
MONOCYTES RELATIVE PERCENT: 7.4 %
NEUTROPHILS ABSOLUTE: 5.3 K/UL (ref 1.7–7.7)
NEUTROPHILS RELATIVE PERCENT: 73.6 %
NITRITE, URINE: NEGATIVE
PDW BLD-RTO: 13.6 % (ref 12.4–15.4)
PH UA: 5 (ref 5–8)
PLATELET # BLD: 229 K/UL (ref 135–450)
PMV BLD AUTO: 8.9 FL (ref 5–10.5)
POTASSIUM SERPL-SCNC: 5.2 MMOL/L (ref 3.5–5.1)
PROTEIN UA: NEGATIVE MG/DL
RBC # BLD: 3.63 M/UL (ref 4–5.2)
RBC UA: ABNORMAL /HPF (ref 0–2)
SODIUM BLD-SCNC: 143 MMOL/L (ref 136–145)
SPECIFIC GRAVITY UA: 1.01 (ref 1–1.03)
TOTAL IRON BINDING CAPACITY: 278 UG/DL (ref 260–445)
TOTAL PROTEIN: 7.2 G/DL (ref 6.4–8.2)
TRIGL SERPL-MCNC: 91 MG/DL (ref 0–150)
UROBILINOGEN, URINE: 0.2 E.U./DL
VLDLC SERPL CALC-MCNC: 18 MG/DL
WBC # BLD: 7.2 K/UL (ref 4–11)
WBC UA: ABNORMAL /HPF (ref 0–5)

## 2019-04-25 PROCEDURE — 82306 VITAMIN D 25 HYDROXY: CPT

## 2019-04-25 PROCEDURE — 80061 LIPID PANEL: CPT

## 2019-04-25 PROCEDURE — 83550 IRON BINDING TEST: CPT

## 2019-04-25 PROCEDURE — 85025 COMPLETE CBC W/AUTO DIFF WBC: CPT

## 2019-04-25 PROCEDURE — 82607 VITAMIN B-12: CPT

## 2019-04-25 PROCEDURE — 81001 URINALYSIS AUTO W/SCOPE: CPT

## 2019-04-25 PROCEDURE — 83540 ASSAY OF IRON: CPT

## 2019-04-25 PROCEDURE — 80053 COMPREHEN METABOLIC PANEL: CPT

## 2019-04-25 PROCEDURE — 82746 ASSAY OF FOLIC ACID SERUM: CPT

## 2019-04-25 PROCEDURE — 36415 COLL VENOUS BLD VENIPUNCTURE: CPT

## 2019-04-25 PROCEDURE — 83036 HEMOGLOBIN GLYCOSYLATED A1C: CPT

## 2019-04-26 ENCOUNTER — CARE COORDINATION (OUTPATIENT)
Dept: CASE MANAGEMENT | Age: 84
End: 2019-04-26

## 2019-04-26 LAB
ESTIMATED AVERAGE GLUCOSE: 171.4 MG/DL
FOLATE: >20 NG/ML (ref 4.78–24.2)
HBA1C MFR BLD: 7.6 %
VITAMIN B-12: 857 PG/ML (ref 211–911)
VITAMIN D 25-HYDROXY: 59.3 NG/ML

## 2019-05-04 ENCOUNTER — CARE COORDINATION (OUTPATIENT)
Dept: CASE MANAGEMENT | Age: 84
End: 2019-05-04

## 2019-05-04 NOTE — CARE COORDINATION
BPCI F/U call. [facility] left patrice and number on  for return contact. Good PHAM RN.

## 2019-05-21 ENCOUNTER — CARE COORDINATION (OUTPATIENT)
Dept: CASE MANAGEMENT | Age: 84
End: 2019-05-21

## 2019-05-23 NOTE — DISCHARGE SUMMARY
Name:  Светлана Cooper  Room:  0209/0209-02  MRN:    [id number]    IM Discharge Summary    Discharging Physician:  Dana Curtis MD    Admit: 4/10/2019    Discharge:  4/12/2019    PCP      Too Proctor MD    Diagnoses and hospital course  this Admission          1) UTI- with acute encephalopathy  - admitted for confusion   - UA positive. - continue rocephin  -improving mentation, avoid sedatives  - continue supportive care- has baseline dementia  - can dc home as pt remains at baseline     2) ARF - sec to dehydration - resolved with IVF     3) DM 2 with complications  - corrective ISS, stable, no hypoglycemia     4) GERD  - PPI, stable    5. HTN - stable with no acute events             HPI     80 y.o. female who presents with ams per daughter. Patient is seen and examined on room air, disoriented, pleasant, awake and alert. She is disoriented to place, date, but knows she is in a hospital. States she is here because of her daughter. Denies fevers, chills, abdominal pain, chest pain, sob, painful urination         Physical Exam at Discharge:      General:  Elderly female baseline dementia Awake, alert and disoriented. Appears to be not in any distress, pleasant  Mucous Membranes:  Pink , anicteric  Neck: No JVD, no carotid bruit, no thyromegaly  Chest:  Clear to auscultation bilaterally, no added sounds  Cardiovascular:  RRR S1S2 heard, no murmurs or gallops  Abdomen:  Soft, undistended, non tender, no organomegaly, BS present  Extremities: right foot with lateral 5toe amputation, feeble pulses  No wounds  Neurological : grossly normal with baseline mentation           Medications:   Scheduled Medications:               Radiology (Please Review Full Report for Details)         Chest xray      No acute disease   Cultures:   Cultures  Blood- NGTD   Urine pending                   Consults:    1. PT      No results for input(s): WBC, HGB, HCT, MCV, PLT in the last 72 hours.     No results for input(s): NA, K, CL, CO2, PHOS, BUN, CREATININE in the last 72 hours. Invalid input(s): CA    CBC:  Lab Results   Component Value Date    WBC 7.2 04/25/2019    HGB 11.5 04/25/2019    HCT 35.0 04/25/2019    MCV 96.4 04/25/2019     04/25/2019    NEUTOPHILPCT 73.6 04/25/2019    LYMPHOPCT 15.3 04/25/2019    MONOPCT 7.4 04/25/2019    BASOPCT 0.4 04/25/2019    NEUTROABS 5.3 04/25/2019    LYMPHSABS 1.1 04/25/2019    MONOSABS 0.5 04/25/2019    EOSABS 0.2 04/25/2019    BASOSABS 0.0 04/25/2019     BNP:   Lab Results   Component Value Date     04/25/2019    K 5.2 04/25/2019    K 4.1 04/11/2019    CO2 32 04/25/2019    BUN 36 04/25/2019    CREATININE 1.2 04/25/2019    CALCIUM 10.6 04/25/2019                Medication List      CHANGE how you take these medications    pantoprazole 40 MG tablet  Commonly known as:  PROTONIX  Take 1 tablet by mouth 2 times daily  What changed:  when to take this        CONTINUE taking these medications    aspirin 81 MG chewable tablet     collagenase 250 UNIT/GM ointment     ferrous sulfate 325 (65 Fe) MG tablet     lactobacillus Caps capsule     metoprolol tartrate 25 MG tablet  Commonly known as:  LOPRESSOR     mirtazapine 15 MG tablet  Commonly known as:  REMERON  Take 1 tablet by mouth daily     MULTIVITAMIN PO     ONGLYZA 2.5 MG Tabs tablet  Generic drug:  saxagliptin     pramipexole 1 MG tablet  Commonly known as:  MIRAPEX     PRAVACHOL 10 MG tablet  Generic drug:  pravastatin     Vitamin B-12 5000 MCG Subl        ASK your doctor about these medications    cephALEXin 250 MG capsule  Commonly known as:  KEFLEX  Take 1 capsule by mouth 3 times daily for 5 days  Ask about: Should I take this medication?            Where to Get Your Medications      These medications were sent to [facility] [address] [place] [id number]    Phone:  [phone]   · cephALEXin 250 MG capsule           Discharge Condition/Location: stable/home     Follow Up:    PCP in 1 weeks      Time Spent on discharge is more than 28 minutes in the examination, evaluation, counseling and review of medications and discharge plan.       Adolfo Cordoba MD 5/22/2019 9:35 PM

## 2019-05-24 ENCOUNTER — APPOINTMENT (OUTPATIENT)
Dept: GENERAL RADIOLOGY | Age: 84
DRG: 617 | End: 2019-05-24
Payer: MEDICARE

## 2019-05-24 ENCOUNTER — HOSPITAL ENCOUNTER (INPATIENT)
Age: 84
LOS: 4 days | Discharge: HOME HEALTH | DRG: 617 | End: 2019-05-28
Attending: EMERGENCY MEDICINE | Admitting: INTERNAL MEDICINE
Payer: MEDICARE

## 2019-05-24 DIAGNOSIS — M86.9 OSTEOMYELITIS OF RIGHT FOOT, UNSPECIFIED TYPE (HCC): Primary | ICD-10-CM

## 2019-05-24 DIAGNOSIS — L03.115 CELLULITIS OF RIGHT FOOT: ICD-10-CM

## 2019-05-24 PROBLEM — L97.509 ULCER OF FOOT DUE TO SECONDARY DIABETES MELLITUS (HCC): Status: ACTIVE | Noted: 2019-05-24

## 2019-05-24 PROBLEM — E13.621 ULCER OF FOOT DUE TO SECONDARY DIABETES MELLITUS (HCC): Status: ACTIVE | Noted: 2019-05-24

## 2019-05-24 LAB
A/G RATIO: 1.2 (ref 1.1–2.2)
ALBUMIN SERPL-MCNC: 4.1 G/DL (ref 3.4–5)
ALP BLD-CCNC: 78 U/L (ref 40–129)
ALT SERPL-CCNC: 10 U/L (ref 10–40)
ANION GAP SERPL CALCULATED.3IONS-SCNC: 11 MMOL/L (ref 3–16)
AST SERPL-CCNC: 18 U/L (ref 15–37)
BASOPHILS ABSOLUTE: 0 K/UL (ref 0–0.2)
BASOPHILS RELATIVE PERCENT: 0.3 %
BILIRUB SERPL-MCNC: 0.5 MG/DL (ref 0–1)
BUN BLDV-MCNC: 42 MG/DL (ref 7–20)
CALCIUM SERPL-MCNC: 10 MG/DL (ref 8.3–10.6)
CHLORIDE BLD-SCNC: 102 MMOL/L (ref 99–110)
CO2: 29 MMOL/L (ref 21–32)
CREAT SERPL-MCNC: 1.5 MG/DL (ref 0.6–1.2)
EOSINOPHILS ABSOLUTE: 0.2 K/UL (ref 0–0.6)
EOSINOPHILS RELATIVE PERCENT: 1.8 %
GFR AFRICAN AMERICAN: 40
GFR NON-AFRICAN AMERICAN: 33
GLOBULIN: 3.3 G/DL
GLUCOSE BLD-MCNC: 150 MG/DL (ref 70–99)
HCT VFR BLD CALC: 30.6 % (ref 36–48)
HEMOGLOBIN: 10.2 G/DL (ref 12–16)
LACTIC ACID: 0.8 MMOL/L (ref 0.4–2)
LYMPHOCYTES ABSOLUTE: 1.1 K/UL (ref 1–5.1)
LYMPHOCYTES RELATIVE PERCENT: 10.9 %
MCH RBC QN AUTO: 31.4 PG (ref 26–34)
MCHC RBC AUTO-ENTMCNC: 33.2 G/DL (ref 31–36)
MCV RBC AUTO: 94.8 FL (ref 80–100)
MONOCYTES ABSOLUTE: 0.8 K/UL (ref 0–1.3)
MONOCYTES RELATIVE PERCENT: 7.8 %
NEUTROPHILS ABSOLUTE: 7.8 K/UL (ref 1.7–7.7)
NEUTROPHILS RELATIVE PERCENT: 79.2 %
PDW BLD-RTO: 13.4 % (ref 12.4–15.4)
PLATELET # BLD: 200 K/UL (ref 135–450)
PMV BLD AUTO: 9.2 FL (ref 5–10.5)
POTASSIUM REFLEX MAGNESIUM: 4.3 MMOL/L (ref 3.5–5.1)
RBC # BLD: 3.23 M/UL (ref 4–5.2)
SODIUM BLD-SCNC: 142 MMOL/L (ref 136–145)
TOTAL PROTEIN: 7.4 G/DL (ref 6.4–8.2)
WBC # BLD: 9.9 K/UL (ref 4–11)

## 2019-05-24 PROCEDURE — 6360000002 HC RX W HCPCS: Performed by: EMERGENCY MEDICINE

## 2019-05-24 PROCEDURE — 6360000002 HC RX W HCPCS: Performed by: INTERNAL MEDICINE

## 2019-05-24 PROCEDURE — 85025 COMPLETE CBC W/AUTO DIFF WBC: CPT

## 2019-05-24 PROCEDURE — 2580000003 HC RX 258: Performed by: INTERNAL MEDICINE

## 2019-05-24 PROCEDURE — 6370000000 HC RX 637 (ALT 250 FOR IP): Performed by: INTERNAL MEDICINE

## 2019-05-24 PROCEDURE — 6360000002 HC RX W HCPCS: Performed by: PHYSICIAN ASSISTANT

## 2019-05-24 PROCEDURE — 99284 EMERGENCY DEPT VISIT MOD MDM: CPT

## 2019-05-24 PROCEDURE — 2580000003 HC RX 258: Performed by: EMERGENCY MEDICINE

## 2019-05-24 PROCEDURE — 1200000000 HC SEMI PRIVATE

## 2019-05-24 PROCEDURE — 80053 COMPREHEN METABOLIC PANEL: CPT

## 2019-05-24 PROCEDURE — 2580000003 HC RX 258: Performed by: PHYSICIAN ASSISTANT

## 2019-05-24 PROCEDURE — 36415 COLL VENOUS BLD VENIPUNCTURE: CPT

## 2019-05-24 PROCEDURE — 83605 ASSAY OF LACTIC ACID: CPT

## 2019-05-24 PROCEDURE — 73630 X-RAY EXAM OF FOOT: CPT

## 2019-05-24 PROCEDURE — 96374 THER/PROPH/DIAG INJ IV PUSH: CPT

## 2019-05-24 RX ORDER — M-VIT,TX,IRON,MINS/CALC/FOLIC 27MG-0.4MG
1 TABLET ORAL DAILY
Status: DISCONTINUED | OUTPATIENT
Start: 2019-05-25 | End: 2019-05-28 | Stop reason: HOSPADM

## 2019-05-24 RX ORDER — PRAVASTATIN SODIUM 20 MG
20 TABLET ORAL DAILY
Status: DISCONTINUED | OUTPATIENT
Start: 2019-05-25 | End: 2019-05-28 | Stop reason: HOSPADM

## 2019-05-24 RX ORDER — SODIUM CHLORIDE 0.9 % (FLUSH) 0.9 %
10 SYRINGE (ML) INJECTION PRN
Status: DISCONTINUED | OUTPATIENT
Start: 2019-05-24 | End: 2019-05-28 | Stop reason: HOSPADM

## 2019-05-24 RX ORDER — SODIUM CHLORIDE 9 MG/ML
INJECTION, SOLUTION INTRAVENOUS CONTINUOUS
Status: DISCONTINUED | OUTPATIENT
Start: 2019-05-24 | End: 2019-05-24

## 2019-05-24 RX ORDER — CHOLECALCIFEROL (VITAMIN D3) 125 MCG
1000 CAPSULE ORAL DAILY
Status: DISCONTINUED | OUTPATIENT
Start: 2019-05-25 | End: 2019-05-28 | Stop reason: HOSPADM

## 2019-05-24 RX ORDER — POTASSIUM CHLORIDE 20 MEQ/1
40 TABLET, EXTENDED RELEASE ORAL PRN
Status: DISCONTINUED | OUTPATIENT
Start: 2019-05-24 | End: 2019-05-28 | Stop reason: HOSPADM

## 2019-05-24 RX ORDER — 0.9 % SODIUM CHLORIDE 0.9 %
500 INTRAVENOUS SOLUTION INTRAVENOUS ONCE
Status: COMPLETED | OUTPATIENT
Start: 2019-05-24 | End: 2019-05-24

## 2019-05-24 RX ORDER — LACTOBACILLUS RHAMNOSUS GG 10B CELL
1 CAPSULE ORAL DAILY
Status: DISCONTINUED | OUTPATIENT
Start: 2019-05-25 | End: 2019-05-28 | Stop reason: HOSPADM

## 2019-05-24 RX ORDER — MIRTAZAPINE 15 MG/1
15 TABLET, FILM COATED ORAL DAILY
Status: DISCONTINUED | OUTPATIENT
Start: 2019-05-25 | End: 2019-05-28 | Stop reason: HOSPADM

## 2019-05-24 RX ORDER — DEXTROSE MONOHYDRATE 50 MG/ML
100 INJECTION, SOLUTION INTRAVENOUS PRN
Status: DISCONTINUED | OUTPATIENT
Start: 2019-05-24 | End: 2019-05-28 | Stop reason: HOSPADM

## 2019-05-24 RX ORDER — PANTOPRAZOLE SODIUM 40 MG/1
40 TABLET, DELAYED RELEASE ORAL DAILY
Status: DISCONTINUED | OUTPATIENT
Start: 2019-05-25 | End: 2019-05-28 | Stop reason: HOSPADM

## 2019-05-24 RX ORDER — DEXTROSE MONOHYDRATE 25 G/50ML
12.5 INJECTION, SOLUTION INTRAVENOUS PRN
Status: DISCONTINUED | OUTPATIENT
Start: 2019-05-24 | End: 2019-05-28 | Stop reason: HOSPADM

## 2019-05-24 RX ORDER — ACETAMINOPHEN 325 MG/1
650 TABLET ORAL EVERY 4 HOURS PRN
Status: DISCONTINUED | OUTPATIENT
Start: 2019-05-24 | End: 2019-05-28 | Stop reason: HOSPADM

## 2019-05-24 RX ORDER — PRAMIPEXOLE DIHYDROCHLORIDE 0.25 MG/1
0.25 TABLET ORAL NIGHTLY
Status: DISCONTINUED | OUTPATIENT
Start: 2019-05-24 | End: 2019-05-28 | Stop reason: HOSPADM

## 2019-05-24 RX ORDER — ONDANSETRON 2 MG/ML
4 INJECTION INTRAMUSCULAR; INTRAVENOUS EVERY 6 HOURS PRN
Status: DISCONTINUED | OUTPATIENT
Start: 2019-05-24 | End: 2019-05-28 | Stop reason: HOSPADM

## 2019-05-24 RX ORDER — SODIUM CHLORIDE 9 MG/ML
INJECTION, SOLUTION INTRAVENOUS CONTINUOUS
Status: DISCONTINUED | OUTPATIENT
Start: 2019-05-24 | End: 2019-05-28 | Stop reason: HOSPADM

## 2019-05-24 RX ORDER — POTASSIUM CHLORIDE 7.45 MG/ML
10 INJECTION INTRAVENOUS PRN
Status: DISCONTINUED | OUTPATIENT
Start: 2019-05-24 | End: 2019-05-28 | Stop reason: HOSPADM

## 2019-05-24 RX ORDER — ASPIRIN 81 MG/1
81 TABLET, CHEWABLE ORAL DAILY
Status: DISCONTINUED | OUTPATIENT
Start: 2019-05-25 | End: 2019-05-28 | Stop reason: HOSPADM

## 2019-05-24 RX ORDER — FERROUS SULFATE 325(65) MG
325 TABLET ORAL
Status: DISCONTINUED | OUTPATIENT
Start: 2019-05-25 | End: 2019-05-28 | Stop reason: HOSPADM

## 2019-05-24 RX ORDER — MAGNESIUM SULFATE 1 G/100ML
1 INJECTION INTRAVENOUS PRN
Status: DISCONTINUED | OUTPATIENT
Start: 2019-05-24 | End: 2019-05-28 | Stop reason: HOSPADM

## 2019-05-24 RX ORDER — NICOTINE POLACRILEX 4 MG
15 LOZENGE BUCCAL PRN
Status: DISCONTINUED | OUTPATIENT
Start: 2019-05-24 | End: 2019-05-28 | Stop reason: HOSPADM

## 2019-05-24 RX ORDER — SODIUM CHLORIDE 0.9 % (FLUSH) 0.9 %
10 SYRINGE (ML) INJECTION EVERY 12 HOURS SCHEDULED
Status: DISCONTINUED | OUTPATIENT
Start: 2019-05-24 | End: 2019-05-28 | Stop reason: HOSPADM

## 2019-05-24 RX ADMIN — SODIUM CHLORIDE: 9 INJECTION, SOLUTION INTRAVENOUS at 22:54

## 2019-05-24 RX ADMIN — METOPROLOL TARTRATE 25 MG: 25 TABLET ORAL at 22:55

## 2019-05-24 RX ADMIN — PRAMIPEXOLE DIHYDROCHLORIDE 0.25 MG: 0.25 TABLET ORAL at 22:55

## 2019-05-24 RX ADMIN — SODIUM CHLORIDE 500 ML: 9 INJECTION, SOLUTION INTRAVENOUS at 16:58

## 2019-05-24 RX ADMIN — CEFTRIAXONE SODIUM 1 G: 1 INJECTION, POWDER, FOR SOLUTION INTRAMUSCULAR; INTRAVENOUS at 16:58

## 2019-05-24 RX ADMIN — Medication 10 ML: at 22:56

## 2019-05-24 RX ADMIN — PIPERACILLIN AND TAZOBACTAM 3.38 G: 3; .375 INJECTION, POWDER, FOR SOLUTION INTRAVENOUS at 22:55

## 2019-05-24 RX ADMIN — VANCOMYCIN HYDROCHLORIDE 1000 MG: 1 INJECTION, POWDER, LYOPHILIZED, FOR SOLUTION INTRAVENOUS at 19:33

## 2019-05-24 ASSESSMENT — ENCOUNTER SYMPTOMS
COLOR CHANGE: 0
BACK PAIN: 0
RESPIRATORY NEGATIVE: 1
GASTROINTESTINAL NEGATIVE: 1

## 2019-05-24 ASSESSMENT — PAIN SCALES - GENERAL: PAINLEVEL_OUTOF10: 0

## 2019-05-24 NOTE — ED PROVIDER NOTES
CHIEFCOMPLAINT   Foot Pain (States her right foot in infected. )      PATIENT INFORMATION  Isaura Andrade is a 80 y.o. female who presents to the ED for evaluation by private vehicle for evaluation of a five-day history of right foot infection. Patient has had past amputation she is diabetic. Has no sensation. She is seen regularly by home health care who advised the last week patient did not have any infection in her right foot. I have reviewed the following from the nursing documentation, and I have confirmed the past medical history, medications, allergies, social history and family history with the patient.     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    Restless leg syndrome      Past Surgical History:   Procedure Laterality Date    APPENDECTOMY      COLONOSCOPY  1997    polyps    COLONOSCOPY  7/15/15    normal colon    EYE SURGERY      right cataract    FOOT SURGERY Left 05/30/2017    PARTIAL LEFT FOOT AMPUTATION              FOOT SURGERY Right 12/11/2017    HYSTERECTOMY      OTHER SURGICAL HISTORY Left     Macular Grid Left eye argon    TOE AMPUTATION  2003    2nd toe 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    TOE AMPUTATION  2011    5th toe right foot    TONSILLECTOMY      UPPER GASTROINTESTINAL ENDOSCOPY  06/05/2017    Dieulafoy lesion in duodenum     Family History   Problem Relation Age of Onset    Diabetes Mother     High Blood Pressure Mother     Cancer Father     Heart Disease Maternal Uncle      Social History     Socioeconomic History    Marital status:      Spouse name: Not on file    Number of children: Not on file    Years of education: Not on file    Highest education level: Not on file   Occupational History    Not on file   Social Needs    Financial resource strain: Not on file    Food insecurity:     Worry: Not on file     Inability: Not on file    Transportation needs:     Medical: Not on file     Non-medical: Not on file   Tobacco Use    Smoking status: Never Smoker    Smokeless tobacco: Never Used   Substance and Sexual Activity    Alcohol use: No    Drug use: No    Sexual activity: Never   Lifestyle    Physical activity:     Days per week: Not on file     Minutes per session: Not on file    Stress: Not on file   Relationships    Social connections:     Talks on phone: Not on file     Gets together: Not on file     Attends Yazidism service: Not on file     Active member of club or organization: Not on file     Attends meetings of clubs or organizations: Not on file     Relationship status: Not on file    Intimate partner violence:     Fear of current or ex partner: Not on file     Emotionally abused: Not on file     Physically abused: Not on file     Forced sexual activity: Not on file   Other Topics Concern    Not on file   Social History Narrative    Not on file     No current facility-administered medications for this encounter.       Current Outpatient Medications   Medication Sig Dispense Refill    aspirin 81 MG chewable tablet Take 1 tablet by mouth daily      mirtazapine (REMERON) 15 MG tablet Take 1 tablet by mouth daily      pramipexole (MIRAPEX) 1 MG tablet TAKE 1 TABLET BY ORAL ROUTE ONCE A DAY (IN THE EVENING) AROUND 8-9 PM FOR RLS  4    metoprolol tartrate (LOPRESSOR) 25 MG tablet TAKE 1 TABLET BY ORAL ROUTE 2 TIMES A DAY FOR BP  4    saxagliptin (ONGLYZA) 2.5 MG TABS tablet Take 5 mg by mouth daily       lactobacillus (CULTURELLE) CAPS capsule Take 1 capsule by mouth daily      ferrous sulfate 325 (65 Fe) MG tablet Take 325 mg by mouth 3 times daily (with meals)       collagenase 250 UNIT/GM ointment Apply topically daily Apply topically daily.  pantoprazole (PROTONIX) 40 MG tablet Take 1 tablet by mouth 2 times daily (Patient taking differently: Take 40 mg by mouth daily ) 60 tablet 1    Multiple Vitamins-Minerals (MULTIVITAMIN PO) Take by mouth daily      Cyanocobalamin (VITAMIN B-12) 5000 MCG SUBL Place 1,000 mcg under the tongue daily       pravastatin (PRAVACHOL) 10 MG tablet Take 20 mg by mouth daily         No Known Allergies    REVIEW OF SYSTEMS  Review of Systems   Constitutional: Negative. Negative for appetite change, fatigue, fever and unexpected weight change. HENT: Negative. Respiratory: Negative. Cardiovascular: Negative. Gastrointestinal: Negative. Genitourinary: Negative. Musculoskeletal: Positive for gait problem and joint swelling. Negative for arthralgias, back pain, myalgias, neck pain and neck stiffness. Skin: Positive for wound. Negative for color change, pallor and rash. Psychiatric/Behavioral: Negative. All other systems reviewed and are negative. 10 systems reviewed, pertinent positives per HPI otherwise noted to be negative. PHYSICAL EXAM  BP (!) 139/47   Pulse 71   Temp 99.2 °F (37.3 °C) (Temporal)   Resp 14   Ht 5' 10\" (1.778 m)   Wt 150 lb (68 kg)   SpO2 98%   BMI 21.52 kg/m²  on room air  GENERALAPPEARANCE: Awake and alert. Cooperative. No acute distress. HEAD: Normocephalic. Atraumatic. EYES: PERRL. EOM's grossly intact. No scleral injection or icterus. ENT: Mucous membranes are moist.   NECK: Supple. No tracheal deviation. HEART: RRR. LUNGS: Respirations unlabored. CTAB. Good air exchange. Speaking comfortably in full sentences. ABDOMEN: Soft. Non-distended. Non-tender. No guarding or rebound. Normal bowel sounds. EXTREMITIES: No peripheral edema. Moves all extremities equally. All extremities neurovascularly intact. See below Right foot 3rd digit, erythema, swelling, purulence. +circumferential induration to distal tibia/fibula. Left LE, s/p amputation. No signs of infection  SKIN: Warm and dry. No acute rashes. NEUROLOGICAL: Alert and oriented. No gross facial drooping. Strength 5/5, sensation intact. Normal coordination. Gait is steady. PSYCHIATRIC: Normal mood and affect. LABS  I have reviewed all labs for this visit.    Results for orders placed or performed during the hospital encounter of 05/24/19   CBC Auto Differential   Result Value Ref Range    WBC 9.9 4.0 - 11.0 K/uL    RBC 3.23 (L) 4.00 - 5.20 M/uL    Hemoglobin 10.2 (L) 12.0 - 16.0 g/dL    Hematocrit 30.6 (L) 36.0 - 48.0 %    MCV 94.8 80.0 - 100.0 fL    MCH 31.4 26.0 - 34.0 pg    MCHC 33.2 31.0 - 36.0 g/dL    RDW 13.4 12.4 - 15.4 %    Platelets 748 740 - 688 K/uL    MPV 9.2 5.0 - 10.5 fL    Neutrophils % 79.2 %    Lymphocytes % 10.9 %    Monocytes % 7.8 %    Eosinophils % 1.8 %    Basophils % 0.3 %    Neutrophils # 7.8 (H) 1.7 - 7.7 K/uL    Lymphocytes # 1.1 1.0 - 5.1 K/uL    Monocytes # 0.8 0.0 - 1.3 K/uL    Eosinophils # 0.2 0.0 - 0.6 K/uL    Basophils # 0.0 0.0 - 0.2 K/uL   Comprehensive Metabolic Panel w/ Reflex to MG   Result Value Ref Range    Sodium 142 136 - 145 mmol/L    Potassium reflex Magnesium 4.3 3.5 - 5.1 mmol/L    Chloride 102 99 - 110 mmol/L    CO2 29 21 - 32 mmol/L    Anion Gap 11 3 - 16    Glucose 150 (H) 70 - 99 mg/dL    BUN 42 (H) 7 - 20 mg/dL    CREATININE 1.5 (H) 0.6 - 1.2 mg/dL    GFR Non- 33 (A) >60    GFR  40 (A) >60    Calcium 10.0 8.3 - 10.6 mg/dL    Total Protein 7.4 6.4 - 8.2 g/dL    Alb 4.1 3.4 - 5.0 g/dL    Albumin/Globulin Ratio 1.2 1.1 - 2.2    Total Bilirubin 0.5 0.0 - 1.0 mg/dL    Alkaline Phosphatase 78 40 - 129 U/L    ALT 10 10 - 40 U/L    AST 18 15 - 37 U/L    Globulin 3.3 g/dL   Lactic Acid, Plasma   Result Value Ref Range    Lactic Acid 0.8 0.4 - 2.0 mmol/L           RADIOLOGY    Xr Foot Right (min 3 Views)    Result Date: 5/24/2019  EXAMINATION: 3 XRAY VIEWS OF THE RIGHT FOOT 5/24/2019 4:51 pm COMPARISON: 02/12/2018 radiograph HISTORY: ORDERING SYSTEM PROVIDED HISTORY: infection TECHNOLOGIST PROVIDED HISTORY: Reason for exam:->infection Ordering Physician Provided Reason for Exam: foot infection Acuity: Acute Type of Exam: Initial FINDINGS: Site of infection is not definitively observed on plain film imaging. There is evidence of a prior amputation of the 1st metatarsal with no erosions of the remaining bones at the 1st digit. Additional amputation at the proximal shaft of the 5th metatarsal, also with no erosions. There are degenerative changes and osteopenia throughout the remainder of the right foot. Orthopedic screws are seen through the distal tibia. No obvious soft tissue abnormalities. Postsurgical and degenerative changes in the right foot are stable. No acute abnormality detected. CONSULTATIONS  Dr Cristiano Mathew, advised admit for surgery. Hospitalist, dr Yin Anderson, accepts the patient    ED COURSE/MDM  Afebrile stable patient presents to the ED for evaluation of quickly spreading infection to her right lower extremity. Concern for possibly osteomyelitis based on clinical exam.  Labs are ordered in addition to very small amount of IV fluids and Rocephin. Labs returned revealing chronic anemia and chronic kidney disease consistent with patient's baseline. Otherwise no acute abnormalities x-ray imaging shows no acute changes however significant concern based on physical examination consult to Dr. Devon Cano in wound care who advised patient should be admitted for evaluation of osteomyelitis. He advised to provide the patient with vancomycin this order is placed, consult to the hospitalist for admission and patient will be transferred to [facility] and admitted. Patient seen and evaluated. Discussed H&P with supervising physician, aware of results and agrees w plan/disposition.    I have seen and evaluated this patient

## 2019-05-24 NOTE — ED NOTES
I have personally performed and/or participated in the history, exam and medical decision making and spent time face to face with the patient and agree with all pertinent clinical information. My history and physical revealed: This patient presented with redness and swelling to her right foot. Patient takes about once a week she states last week which took a bath she didn't notice any redness or swelling. She switched off today she noticed redness and swelling. Patient is a diabetic and has decreased sensation in her feet. Exam- awake alert smiling female pupils round reactive TMs are clear mouth extremities pink moist erythema exudates lungs are clear with equal bilateral breath sounds no rales wheezes rhonchi retractions or stridor heart is regular rate and rhythm normal S1-S2 without gallop or murmur abdomen is soft and nontender examination right lower extremity she has pretibial erythema and warmth on the right with edema and erythema to the foot especially the great and 2 toes  She is oriented partial amputation. She has brisk capillary refill. Alert, nontoxic in appearance   Vitals viewed. I was present during the key portions of the examination and treatment of the patient. Case discussed with MLP. Concur with treatment and disposition. BP (!) 139/47   Pulse 71   Temp 99.2 °F (37.3 °C) (Temporal)   Resp 14   Ht 5' 10\" (1.778 m)   Wt 150 lb (68 kg)   SpO2 98%   BMI 21.52 kg/m²     Patient is agreeable to admission. Dr. Evaristo Cm is been consulted requests admission to hospitalist Dr. Finesse Sparks has subsequently been consulted      All entries by Domonique Chun are made while acting as a scribe for La Vera MD.    This dictation was generated by voice recognition computer software. Although all attempts are made to edit the dictation for accuracy, there may be errors in the transcription that are not intended.          La Vera MD  05/24/19 [id number]

## 2019-05-24 NOTE — ED NOTES
Pt up to bedside commode and back into bed, currently resting at this time. Call light within reach, will cont to monitor.       Onel Breaux RN  05/24/19 4729

## 2019-05-25 LAB
ANION GAP SERPL CALCULATED.3IONS-SCNC: 9 MMOL/L (ref 3–16)
BACTERIA: ABNORMAL /HPF
BASOPHILS ABSOLUTE: 0 K/UL (ref 0–0.2)
BASOPHILS RELATIVE PERCENT: 0.5 %
BILIRUBIN URINE: NEGATIVE
BLOOD, URINE: ABNORMAL
BUN BLDV-MCNC: 33 MG/DL (ref 7–20)
CALCIUM SERPL-MCNC: 9.1 MG/DL (ref 8.3–10.6)
CHLORIDE BLD-SCNC: 105 MMOL/L (ref 99–110)
CLARITY: CLEAR
CO2: 26 MMOL/L (ref 21–32)
COLOR: YELLOW
CREAT SERPL-MCNC: 1.4 MG/DL (ref 0.6–1.2)
EOSINOPHILS ABSOLUTE: 0.2 K/UL (ref 0–0.6)
EOSINOPHILS RELATIVE PERCENT: 4 %
EPITHELIAL CELLS, UA: ABNORMAL /HPF
GFR AFRICAN AMERICAN: 43
GFR NON-AFRICAN AMERICAN: 36
GLUCOSE BLD-MCNC: 103 MG/DL (ref 70–99)
GLUCOSE BLD-MCNC: 128 MG/DL (ref 70–99)
GLUCOSE BLD-MCNC: 142 MG/DL (ref 70–99)
GLUCOSE BLD-MCNC: 143 MG/DL (ref 70–99)
GLUCOSE BLD-MCNC: 202 MG/DL (ref 70–99)
GLUCOSE URINE: NEGATIVE MG/DL
HCT VFR BLD CALC: 28.6 % (ref 36–48)
HEMOGLOBIN: 9.8 G/DL (ref 12–16)
KETONES, URINE: NEGATIVE MG/DL
LEUKOCYTE ESTERASE, URINE: ABNORMAL
LYMPHOCYTES ABSOLUTE: 0.9 K/UL (ref 1–5.1)
LYMPHOCYTES RELATIVE PERCENT: 14.1 %
MCH RBC QN AUTO: 32.4 PG (ref 26–34)
MCHC RBC AUTO-ENTMCNC: 34.2 G/DL (ref 31–36)
MCV RBC AUTO: 94.8 FL (ref 80–100)
MICROSCOPIC EXAMINATION: YES
MONOCYTES ABSOLUTE: 0.7 K/UL (ref 0–1.3)
MONOCYTES RELATIVE PERCENT: 10.5 %
NEUTROPHILS ABSOLUTE: 4.4 K/UL (ref 1.7–7.7)
NEUTROPHILS RELATIVE PERCENT: 70.9 %
NITRITE, URINE: NEGATIVE
PDW BLD-RTO: 13 % (ref 12.4–15.4)
PERFORMED ON: ABNORMAL
PH UA: 5.5 (ref 5–8)
PLATELET # BLD: 170 K/UL (ref 135–450)
PMV BLD AUTO: 9 FL (ref 5–10.5)
POTASSIUM REFLEX MAGNESIUM: 4.2 MMOL/L (ref 3.5–5.1)
PROTEIN UA: NEGATIVE MG/DL
RBC # BLD: 3.01 M/UL (ref 4–5.2)
RBC UA: ABNORMAL /HPF (ref 0–2)
SODIUM BLD-SCNC: 140 MMOL/L (ref 136–145)
SPECIFIC GRAVITY UA: <=1.005 (ref 1–1.03)
URINE REFLEX TO CULTURE: YES
URINE TYPE: ABNORMAL
UROBILINOGEN, URINE: 0.2 E.U./DL
WBC # BLD: 6.2 K/UL (ref 4–11)
WBC UA: ABNORMAL /HPF (ref 0–5)

## 2019-05-25 PROCEDURE — 2580000003 HC RX 258: Performed by: INTERNAL MEDICINE

## 2019-05-25 PROCEDURE — 6370000000 HC RX 637 (ALT 250 FOR IP): Performed by: INTERNAL MEDICINE

## 2019-05-25 PROCEDURE — 1200000000 HC SEMI PRIVATE

## 2019-05-25 PROCEDURE — 81001 URINALYSIS AUTO W/SCOPE: CPT

## 2019-05-25 PROCEDURE — 99232 SBSQ HOSP IP/OBS MODERATE 35: CPT | Performed by: INTERNAL MEDICINE

## 2019-05-25 PROCEDURE — 80048 BASIC METABOLIC PNL TOTAL CA: CPT

## 2019-05-25 PROCEDURE — 83036 HEMOGLOBIN GLYCOSYLATED A1C: CPT

## 2019-05-25 PROCEDURE — 36415 COLL VENOUS BLD VENIPUNCTURE: CPT

## 2019-05-25 PROCEDURE — 6360000002 HC RX W HCPCS: Performed by: INTERNAL MEDICINE

## 2019-05-25 PROCEDURE — 87086 URINE CULTURE/COLONY COUNT: CPT

## 2019-05-25 PROCEDURE — 85025 COMPLETE CBC W/AUTO DIFF WBC: CPT

## 2019-05-25 RX ADMIN — LINAGLIPTIN 5 MG: 5 TABLET, FILM COATED ORAL at 09:12

## 2019-05-25 RX ADMIN — ENOXAPARIN SODIUM 30 MG: 100 INJECTION SUBCUTANEOUS at 11:24

## 2019-05-25 RX ADMIN — PIPERACILLIN AND TAZOBACTAM 3.38 G: 3; .375 INJECTION, POWDER, FOR SOLUTION INTRAVENOUS at 05:29

## 2019-05-25 RX ADMIN — Medication 10 ML: at 09:13

## 2019-05-25 RX ADMIN — MULTIPLE VITAMINS W/ MINERALS TAB 1 TABLET: TAB at 09:13

## 2019-05-25 RX ADMIN — FERROUS SULFATE TAB 325 MG (65 MG ELEMENTAL FE) 325 MG: 325 (65 FE) TAB at 16:24

## 2019-05-25 RX ADMIN — PIPERACILLIN AND TAZOBACTAM 3.38 G: 3; .375 INJECTION, POWDER, FOR SOLUTION INTRAVENOUS at 22:20

## 2019-05-25 RX ADMIN — INSULIN LISPRO 2 UNITS: 100 INJECTION, SOLUTION INTRAVENOUS; SUBCUTANEOUS at 16:25

## 2019-05-25 RX ADMIN — Medication 1 CAPSULE: at 09:13

## 2019-05-25 RX ADMIN — SODIUM CHLORIDE: 9 INJECTION, SOLUTION INTRAVENOUS at 19:49

## 2019-05-25 RX ADMIN — MIRTAZAPINE 15 MG: 15 TABLET, FILM COATED ORAL at 09:13

## 2019-05-25 RX ADMIN — FERROUS SULFATE TAB 325 MG (65 MG ELEMENTAL FE) 325 MG: 325 (65 FE) TAB at 09:13

## 2019-05-25 RX ADMIN — FERROUS SULFATE TAB 325 MG (65 MG ELEMENTAL FE) 325 MG: 325 (65 FE) TAB at 11:28

## 2019-05-25 RX ADMIN — PIPERACILLIN AND TAZOBACTAM 3.38 G: 3; .375 INJECTION, POWDER, FOR SOLUTION INTRAVENOUS at 14:41

## 2019-05-25 RX ADMIN — ASPIRIN 81 MG 81 MG: 81 TABLET ORAL at 09:13

## 2019-05-25 RX ADMIN — INSULIN LISPRO 1 UNITS: 100 INJECTION, SOLUTION INTRAVENOUS; SUBCUTANEOUS at 21:07

## 2019-05-25 RX ADMIN — PANTOPRAZOLE SODIUM 40 MG: 40 TABLET, DELAYED RELEASE ORAL at 09:13

## 2019-05-25 RX ADMIN — PRAMIPEXOLE DIHYDROCHLORIDE 0.25 MG: 0.25 TABLET ORAL at 19:49

## 2019-05-25 RX ADMIN — Medication 1000 MCG: at 09:12

## 2019-05-25 RX ADMIN — PRAVASTATIN SODIUM 20 MG: 20 TABLET ORAL at 09:12

## 2019-05-25 RX ADMIN — Medication 10 ML: at 19:49

## 2019-05-25 ASSESSMENT — PAIN DESCRIPTION - ORIENTATION: ORIENTATION: RIGHT

## 2019-05-25 ASSESSMENT — PAIN DESCRIPTION - DESCRIPTORS: DESCRIPTORS: DISCOMFORT

## 2019-05-25 ASSESSMENT — PAIN DESCRIPTION - LOCATION: LOCATION: HEAD

## 2019-05-25 ASSESSMENT — PAIN DESCRIPTION - PROGRESSION: CLINICAL_PROGRESSION: GRADUALLY WORSENING

## 2019-05-25 ASSESSMENT — PAIN DESCRIPTION - PAIN TYPE: TYPE: ACUTE PAIN

## 2019-05-25 ASSESSMENT — PAIN DESCRIPTION - FREQUENCY: FREQUENCY: INTERMITTENT

## 2019-05-25 ASSESSMENT — PAIN SCALES - GENERAL
PAINLEVEL_OUTOF10: 0
PAINLEVEL_OUTOF10: 3

## 2019-05-25 ASSESSMENT — PAIN DESCRIPTION - ONSET: ONSET: GRADUAL

## 2019-05-25 NOTE — PROGRESS NOTES
05/24/19 2254       Data:  CBC:   Recent Labs     05/24/19  1612 05/25/19  0614   WBC 9.9 6.2   RBC 3.23* 3.01*   HGB 10.2* 9.8*   HCT 30.6* 28.6*   MCV 94.8 94.8   RDW 13.4 13.0    170     BMP:   Recent Labs     05/24/19  1612 05/25/19  0614    140   K 4.3 4.2    105   CO2 29 26   BUN 42* 33*   CREATININE 1.5* 1.4*     BNP: No results for input(s): BNP in the last 72 hours. PT/INR: No results for input(s): PROTIME, INR in the last 72 hours. APTT: No results for input(s): APTT in the last 72 hours. CARDIAC ENZYMES: No results for input(s): CKMB, CKMBINDEX, TROPONINI in the last 72 hours. Invalid input(s): CKTOTAL;3  FASTING LIPID PANEL:  Lab Results   Component Value Date    CHOL 148 04/25/2019    HDL 70 (H) 04/25/2019    TRIG 91 04/25/2019     LIVER PROFILE:   Recent Labs     05/24/19  1612   AST 18   ALT 10   BILITOT 0.5   ALKPHOS 78         radiology   XR FOOT RIGHT (MIN 3 VIEWS)   Final Result   Postsurgical and degenerative changes in the right foot are stable. No acute   abnormality detected. Assessment:  Active Problems:    Ulcer of foot due to secondary diabetes mellitus ([facility] [address]        Plan:    Diabetic foot ulcer,  right foot,  third digit , with clinical signs of osteomyelitis  - Seen by podiatrist  - Continue IV antibiotics vancomycin and Zosyn  -Follow up on wound cultures and blood cultures  - plan to OR in a.m.   For third toe  amputation/partial foot amputation  - Patient has had previous 2 amputations for osteomyelitis      Diabetes type 2, uncontrolled   With diabetic neuropathy  - Continue sliding scale insulin  -Continue home meds      Hypertension  -Blood pressure stable, monitor  -On metoprolol    CKD stage III  -Follow BMP      History of discitis          DVT Prophylaxis: Lovenox  Diet: DIET CARB CONTROL; NPO after midnight  Code Status: Full Code           Kavita Ward MD 5/25/2019 5:02 PM

## 2019-05-25 NOTE — CARE COORDINATION
Case Management Assessment  Initial Evaluation    Date/Time of Evaluation: 5/25/2019 11:00 AM  Assessment Completed by: Christiano Guzmán    Patient Name: Martínez Spear  YOB: 1935  Diagnosis: Ulcer of foot due to secondary diabetes mellitus ([address] [E19.017, L97.509]  Date / Time: 5/24/2019  2:12 PM  Admission status/Date: Inpatient 5/24/2019  Chart Reviewed: Yes      Patient Interviewed: Yes   Family Interviewed:  No      Hospitalization in the last 30 days:  No    Contacts  :   Carlos Crow  Relationship to Patient: daughter  Phone Number:   [phone]  Alternate Contact:   Marcus Palomo  Relationship to Patient:   adrianna  Phone Number:   [phone]    Met with: patient    Current PCP: BRUNA Mantilla MD    Financial  Medicare  Precert required for SNF :  N        3 night stay required - Y    ADLS  Support Systems: Children, Family Members  Transportation: family    Meal Preparation: family    Housing  Home Environment: lives in [facility] w/daughter  Steps: 2  Plans to Return to Present Housing: yes  Other Identified Issues: [address]  Currently active with [address] : No  Type of Home Care Services: Nursing Services, Aide Services  Passport/Waiver : No  :                      Phone Number:    Passport/Waiver Services: [address]   DME Provider:   Equipment: Walker_X_Cane_quad cane_RTS__ BSC_X_Shower Chair__  02__ HHN__ CPAP__  BiPap__  Hospital Bed_X_ W/C___ Other__________      Has Home O2 in place on admit:  NA  Informed of need to bring portable home O2 tank on day of discharge for nursing to connect prior to leaving:   No  Verbalized agreement/Understanding:   No    Community Service Affiliation  Dialysis:  No    · Name:  · Location  · Dialysis Schedule:  · Phone:   · Fax:     Outpatient PT/OT: No    Cancer Center: No     CHF Clinic: No     Pulmonary Rehab: No  Pain Clinic: No  Community Mental Health: No Wound Clinic: No     Other: NA    DISCHARGE PLAN: Chart reviewed. Met with pt at bedside and explained the role of the CM. Plans to return home and resume HHA/bath aid every Weds. No other HC or DME needs. +CM following for possible Home IVABTX. ([facility] -HC in past). Explained Case Management role/services.

## 2019-05-25 NOTE — H&P
Hospital Medicine History & Physical      PCP: Delfin Alexandra MD    Date of Admission: 5/24/2019    Date of Service: Pt seen/examined on 5/24/2019    Chief Complaint:  Right foot infection    History Of Present Illness:    80 y.o. female who presented to the hospital with a chief complaint of a right toe infection. The patient has a history of osteomyelitis and diabetes. She was seen by her home health nurse who noted that she had a wound on her third toe on the right foot. Per the patient the wound was not there last week. She has severe neuropathy and has no sensation of her feet. She denies any fevers or chills. She was started on IV antibiotics and will be transferred for podiatry consultation. Past Medical History:        Diagnosis Date    Clostridium difficile infection 06/06/2017    antigen +    Dementia     Diabetes mellitus ([address]     History of blood transfusion     Hyperlipidemia     Hypertension     Infection     foot    MDRO (multiple drug resistant organisms) resistance 04/09/2019    urine    MRSA infection 12/07/2017    foot    Osteomyelitis of spine ([facility] [address] 2010    thoraculumbar spine    Restless leg syndrome        Past Surgical History:          Procedure Laterality Date    APPENDECTOMY      COLONOSCOPY  1997    polyps    COLONOSCOPY  7/15/15    normal colon    EYE SURGERY      right cataract    FOOT SURGERY Left 05/30/2017    PARTIAL LEFT FOOT AMPUTATION              FOOT SURGERY Right 12/11/2017    HYSTERECTOMY      OTHER SURGICAL HISTORY Left     Macular Grid Left eye argon    TOE AMPUTATION  2003    2nd toe 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    TOE AMPUTATION  2011    5th toe right foot    TONSILLECTOMY      UPPER GASTROINTESTINAL ENDOSCOPY  06/05/2017    Dieulafoy lesion in duodenum       Medications Prior to Admission:      Prior to Admission medications    Medication Sig Start Date End Date Taking?  Authorizing Provider   aspirin 81 MG chewable tablet Take 1 tablet by mouth daily 3/7/19   Julien Palacios MD   mirtazapine (REMERON) 15 MG tablet Take 1 tablet by mouth daily 3/6/19   Julien Palacios MD   pramipexole (MIRAPEX) 1 MG tablet TAKE 1 TABLET BY ORAL ROUTE ONCE A DAY (IN THE EVENING) AROUND 8-9 PM FOR RLS 1/11/19   Historical Provider, MD   metoprolol tartrate (LOPRESSOR) 25 MG tablet TAKE 1 TABLET BY ORAL ROUTE 2 TIMES A DAY FOR BP 1/11/19   Historical Provider, MD   saxagliptin (ONGLYZA) 2.5 MG TABS tablet Take 5 mg by mouth daily     Historical Provider, MD   lactobacillus (CULTURELLE) CAPS capsule Take 1 capsule by mouth daily    Historical Provider, MD   ferrous sulfate 325 (65 Fe) MG tablet Take 325 mg by mouth 3 times daily (with meals)     Historical Provider, MD   collagenase 250 UNIT/GM ointment Apply topically daily Apply topically daily. Historical Provider, MD   pantoprazole (PROTONIX) 40 MG tablet Take 1 tablet by mouth 2 times daily  Patient taking differently: Take 40 mg by mouth daily  6/9/17   Crys Garcia MD   Multiple Vitamins-Minerals (MULTIVITAMIN PO) Take by mouth daily    Historical Provider, MD   Cyanocobalamin (VITAMIN B-12) 5000 MCG SUBL Place 1,000 mcg under the tongue daily     Historical Provider, MD   pravastatin (PRAVACHOL) 10 MG tablet Take 20 mg by mouth daily     Historical Provider, MD       Allergies:  Patient has no known allergies. Social History:      TOBACCO:   reports that she has never smoked. She has never used smokeless tobacco.  ETOH:   reports that she does not drink alcohol. Family History:          Problem Relation Age of Onset    Diabetes Mother     High Blood Pressure Mother     Cancer Father     Heart Disease Maternal Uncle        REVIEW OF SYSTEMS:   Constitutional:  Negative for fever,chills or night sweats  ENT:  Negative for rhinorrhea, epistaxis, hoarseness, sore throat.   Respiratory: Negative for SOB or wheezing   Cardiovascular:   Negative for  chest pain, palpitations Gastrointestinal:  Negative for nausea, vomiting, diarrhea  Genitourinary:  Negative for polyuria, dysuria   Hematologic/Lymphatic:  Negative for  bleeding tendency, easy bruising  Musculoskeletal:   right toe infection  Neurologic:  Negative for  confusion,dysarthria. Skin:  Negative for itching,rash  Psychiatric:  Negative for depression,anxiety, agitation. Endocrine:  Negative for polydipsia,polyuria,heat /cold intolerance. PHYSICAL EXAM:    BP (!) 144/56   Pulse 78   Temp 98.1 °F (36.7 °C) (Oral)   Resp 17   Ht 5' 10.5\" (1.791 m)   Wt 148 lb 8 oz (67.4 kg)   SpO2 99%   BMI 21.01 kg/m²     General appearance:  No apparent distress, appears stated age and cooperative. HEENT:  Normal cephalic, atraumatic without obvious deformity. Pupils equal, round, and reactive to light. Extra ocular muscles intact. Conjunctivae/corneas clear. Neck: Supple, with full range of motion. No jugular venous distention. Trachea midline. Respiratory:  Normal respiratory effort. Clear to auscultation, bilaterally without Rales/Wheezes/Rhonchi. Cardiovascular:  Regular rate and rhythm with normal S1/S2 without murmurs, rubs or gallops. Abdomen: Soft, non-tender, non-distended with normal bowel sounds. Musculoskeletal:  No clubbing, cyanosis or edema bilaterally. Full range of motion without deformity. Skin: 2 x 3 cm nonpurulent diabetic foot ulcer noted on the dorsal aspect of the third toe on the right foot. Neurologic:  Neurovascularly intact without any focal sensory/motor deficits.  Cranial nerves: II-XII intact, grossly non-focal.  Psychiatric:  Alert and oriented, thought content appropriate, normal insight  Capillary Refill: Brisk,< 3 seconds   Peripheral Pulses: +2 palpable, equal bilaterally       Labs:   Recent Labs     05/24/19  1612   WBC 9.9   HGB 10.2*   HCT 30.6*        Recent Labs     05/24/19  1612      K 4.3      CO2 29   BUN 42*   CREATININE 1.5*   CALCIUM 10.0     Recent Labs 05/24/19  1612   AST 18   ALT 10   BILITOT 0.5   ALKPHOS 78     No results for input(s): INR in the last 72 hours. No results for input(s): [place] in the last 72 hours. Urinalysis:      Lab Results   Component Value Date    NITRU Negative 04/25/2019    WBCUA 3-5 04/25/2019    BACTERIA Rare 04/25/2019    RBCUA 0-2 04/25/2019    BLOODU Negative 04/25/2019    SPECGRAV 1.015 04/25/2019    GLUCOSEU Negative 04/25/2019       Radiology:     XR FOOT RIGHT (MIN 3 VIEWS)   Final Result   Postsurgical and degenerative changes in the right foot are stable. No acute   abnormality detected. ASSESSMENT:  Diabetic foot infection rule out osteomyelitis  Diabetes type 2, uncontrolled  Severe neuropathy  History of osteomyelitis status post partial amputation of right foot  Hypertension  CKD stage III  History of discitis    PLAN:  Admit to the [facility] floor  Blood cultures obtained, wound cultures obtained  Start IV vancomycin and Zosyn  Podiatry consultation  Resume home insulin therapy plus sliding scale insulin  Keep n.p.o. at midnight in case surgical intervention is needed  Pain control as needed  Resume rest of home medications    DVT Prophylaxis: Lovenox  Diet: DIET CARB CONTROL;  Code Status: Full Code    Dispo - admit      Thank you for the opportunity to be involved in this patient's care.       (Please note that portions of this note were completed with a voice recognition program. Efforts were made to edit the dictations but occasionally words are mis-transcribed.)

## 2019-05-25 NOTE — PROGRESS NOTES
Pt a/o x4. Acknowledgement of consent obtained for right partial foot amputation. Pt denied any questions. Called daughter Sirisha Choudhury per pt request and updated on care and surgery tomorrow with Dr. Rockford Bence. FSBS was 202, 2 units SSI given at this time. Denies need for PRN pain control. Call light within reach. Bed alarm is on. Will monitor.

## 2019-05-25 NOTE — CONSULTS
Progress Note    Admit Date:  5/24/2019    Subjective:  80 y.o. female who is seen for evaluation of cellulitis and ulcer right foot. Patient well known to me. Patient unsure how long the wound has been present. Is seen regularly by Nitin Dukes and last week did not have an infection in the foot. States feeling OK overall.        Past Medical History:        Diagnosis Date    Clostridium difficile infection 06/06/2017    antigen +    Dementia     Diabetes mellitus ([facility] [address]     History of blood transfusion     Hyperlipidemia     Hypertension     Infection     foot    MDRO (multiple drug resistant organisms) resistance 04/09/2019    urine    MRSA infection 12/07/2017    foot    Osteomyelitis of spine ([facility] [address] 2010    thoraculumbar spine    Restless leg syndrome        Past Surgical History:        Procedure Laterality Date    APPENDECTOMY      COLONOSCOPY  1997    polyps    COLONOSCOPY  7/15/15    normal colon    EYE SURGERY      right cataract    FOOT SURGERY Left 05/30/2017    PARTIAL LEFT FOOT AMPUTATION              FOOT SURGERY Right 12/11/2017    HYSTERECTOMY      OTHER SURGICAL HISTORY Left     Macular Grid Left eye argon    TOE AMPUTATION  2003    2nd toe 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    TOE AMPUTATION  2011    5th toe right foot    TONSILLECTOMY      UPPER GASTROINTESTINAL ENDOSCOPY  06/05/2017    Dieulafoy lesion in duodenum       Current Medications:    Current Facility-Administered Medications: sodium chloride flush 0.9 % injection 10 mL, 10 mL, Intravenous, 2 times per day  sodium chloride flush 0.9 % injection 10 mL, 10 mL, Intravenous, PRN  potassium chloride (KLOR-CON M) extended release tablet 40 mEq, 40 mEq, Oral, PRN **OR** potassium bicarb-citric acid (EFFER-K) effervescent tablet 40 mEq, 40 mEq, Oral, PRN **OR** potassium chloride 10 mEq/100 mL IVPB (Peripheral Line), 10 mEq, Intravenous, PRN  magnesium sulfate 1 g in dextrose 5% 100 mL IVPB, 1 g, Intravenous, PRN  magnesium hydroxide (MILK OF MAGNESIA) 400 MG/5ML suspension 30 mL, 30 mL, Oral, Daily PRN  ondansetron (ZOFRAN) injection 4 mg, 4 mg, Intravenous, Q6H PRN  enoxaparin (LOVENOX) injection 30 mg, 30 mg, Subcutaneous, Daily  insulin lispro (HUMALOG) injection vial 0-6 Units, 0-6 Units, Subcutaneous, TID WC  insulin lispro (HUMALOG) injection vial 0-3 Units, 0-3 Units, Subcutaneous, Nightly  glucose (GLUTOSE) 40 % oral gel 15 g, 15 g, Oral, PRN  dextrose 50 % solution 12.5 g, 12.5 g, Intravenous, PRN  glucagon (rDNA) injection 1 mg, 1 mg, Intramuscular, PRN  dextrose 5 % solution, 100 mL/hr, Intravenous, PRN  pravastatin (PRAVACHOL) tablet 20 mg, 20 mg, Oral, Daily  vitamin B-12 (CYANOCOBALAMIN) tablet 1,000 mcg, 1,000 mcg, Oral, Daily  therapeutic multivitamin-minerals 1 tablet, 1 tablet, Oral, Daily  pantoprazole (PROTONIX) tablet 40 mg, 40 mg, Oral, Daily  ferrous sulfate tablet 325 mg, 325 mg, Oral, TID WC  lactobacillus (CULTURELLE) capsule 1 capsule, 1 capsule, Oral, Daily  linagliptin (TRADJENTA) tablet 5 mg, 5 mg, Oral, Daily  pramipexole (MIRAPEX) tablet 0.25 mg, 0.25 mg, Oral, Nightly  metoprolol tartrate (LOPRESSOR) tablet 25 mg, 25 mg, Oral, BID  aspirin chewable tablet 81 mg, 81 mg, Oral, Daily  mirtazapine (REMERON) tablet 15 mg, 15 mg, Oral, Daily  acetaminophen (TYLENOL) tablet 650 mg, 650 mg, Oral, Q4H PRN  piperacillin-tazobactam (ZOSYN) 3.375 g in sodium chloride 0.9 % 100 mL IVPB extended infusion (mini-bag), 3.375 g, Intravenous, Q8H  pneumococcal 13-valent conjugate (PREVNAR) injection 0.5 mL, 0.5 mL, Intramuscular, Once  0.9 % sodium chloride infusion, , Intravenous, Continuous    Allergies:  Patient has no known allergies.     Social History:    Social History     Tobacco Use    Smoking status: Never Smoker    Smokeless tobacco: Never Used   Substance Use Topics    Alcohol use: No    Drug use: No       Family History:       Problem Relation Age of Onset    Diabetes Mother     High Blood Pressure Mother     Cancer Father     Heart Disease Maternal Uncle        Review of Systems    CONSTITUTIONAL:  negative  EYES:  negative  HEENT:  negative  RESPIRATORY:  negative  CARDIOVASCULAR:  negative  GASTROINTESTINAL:  negative  GENITOURINARY:  negative  INTEGUMENT/BREAST:  positive for ulcer  MUSCULOSKELETAL:  positive for  pain  NEUROLOGICAL:  positive for numbness      Objective:   BP (!) 124/58   Pulse 66   Temp 97.8 °F (36.6 °C) (Oral)   Resp 16   Ht 5' 10.5\" (1.791 m)   Wt 151 lb 3.2 oz (68.6 kg)   SpO2 95%   BMI 21.39 kg/m²     Data:  CBC:   Recent Labs     05/24/19  1612 05/25/19  0614   WBC 9.9 6.2   HGB 10.2* 9.8*   HCT 30.6* 28.6*   MCV 94.8 94.8    170     BMP:   Recent Labs     05/24/19  1612 05/25/19  0614    140   K 4.3 4.2    105   CO2 29 26   BUN 42* 33*   CREATININE 1.5* 1.4*     LIVER PROFILE:   Recent Labs     05/24/19  1612   AST 18   ALT 10   BILITOT 0.5   ALKPHOS 78     PT/INR:   Recent Labs     05/24/19  1612   PROT 7.4     HgBA1c:  Lab Results   Component Value Date    LABA1C 7.6 04/25/2019       Cultures:     Imaging: xray right foot -   Site of infection is not definitively observed on plain film imaging. There   is evidence of a prior amputation of the 1st metatarsal with no erosions of   the remaining bones at the 1st digit. Additional amputation at the proximal   shaft of the 5th metatarsal, also with no erosions. There are degenerative   changes and osteopenia throughout the remainder of the right foot. Orthopedic screws are seen through the distal tibia. No obvious soft tissue   abnormalities. Impression   Postsurgical and degenerative changes in the right foot are stable. No acute   abnormality detected.          Physical Exam:    General Appearance: alert and oriented to person, place and time, well developed and well- nourished, in no acute distress  Skin: warm and dry, no rash or erythema  Head: normocephalic and atraumatic  Eyes: pupils equal, round, and reactive to light, extraocular eye movements intact, conjunctivae normal  ENT: tympanic membrane, external ear and ear canal normal bilaterally, nose without deformity, nasal mucosa and turbinates normal without polyps  Neck: supple and non-tender without mass, no thyromegaly or thyroid nodules, no cervical lymphadenopathy  Pulmonary/Chest: clear to auscultation bilaterally- no wheezes, rales or rhonchi, normal air movement, no respiratory distress  Cardiovascular: normal rate, regular rhythm, normal S1 and S2, no murmurs, rubs, clicks, or gallops, distal pulses intact, no carotid bruits  Abdomen: soft, non-tender, non-distended, normal bowel sounds, no masses or organomegaly    DP/PT palpable right foot. Ulcer on the distal aspect right 3rd digit with exposed necrotic appearing bone at the DIPJ region. Moderate erythema and edema of the foot noted. Mild increase in skin temperature noted. No malodor noted. No purulence noted. No abscess palpated. Prior partial right 5th ray amputation noted. Right 3rd digit abducted at the DIPJ region. Assessment:  Patient Active Problem List   Diagnosis Code    RUBEN (acute kidney injury) ([facility] [address] N17.9    Type 2 diabetes mellitus with foot ulcer, without long-term current use of insulin ([facility] [address] E11.621, L97.509    Acute blood loss anemia D62    Severe protein-calorie malnutrition (HCC) E43    Encephalopathy G93.40    Essential hypertension I10    Altered mental status R41.82    Acute renal failure (HCC) N17.9    Contusion of left hip S70. 02XA    Acute UTI N39.0    Mild dehydration E86.0    Urinary incontinence R32    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) E13.621, L97.509     diabetic foot ulcer right 3rd digit secondary to peripheral neuropathy   Osteomyelitis right 3rd digit secondary to diabetes mellitus   diabetes mellitus   Cellulitis right foot    Plan  Patient examined. Reviewed labs and imaging. Continue IV antibiotics. Discussed with patient severity of the infection. Given exposed bone and cellulitis I recommended proceeding with at a minimum amputation of the 3rd digit. I did discuss amputation of remainder of the digits as well. Discussed pros and cons with patient. Discussed risks, complications, alternatives and benefits with patient. Understands chance of nonhealing wound, infection, need for further surgery, loss of limb or life. She wants to attempt only amputation of the 3rd digit if possible but if infection is more severe then OK to proceed with higher level amputation.    NPO after midnight for surgery in AM.           Electronically signed by Cristofer Narvaez DPM on 5/25/2019 at 3:28 PM.

## 2019-05-25 NOTE — ED NOTES
Report given to [facility], EMS. Care transferred at this time.       Yolie Erickson RN  05/24/19 4914

## 2019-05-25 NOTE — PROGRESS NOTES
Shift assessment completed:    Alert and Oriented x4. Vitals are BP low at 119/58, held lopressor due to bottom number. Respiratory status is regular, unlabored, CTA, and SpO2 is 94% on RA at rest.     Right foot redness present, slightly swollen. 3 digit diabetic ulcer present with redness, swelling, dark discoloration. Decreased feeling. No current dressing, night MD removed, awaiting for Dr. Obdulia Severino to assess area. Pt requested to leave sock on for now. Pain is 3/10, located in right head where lump is present that she stated has been present for a few months. SR up 2/4. Bed in lowest position. Call light within reach. Bed alarm is on. Will monitor.       05/25/19 0908   Vital Signs   Temp 97.5 °F (36.4 °C)   Temp Source Oral   Pulse 63   Heart Rate Source Monitor   Resp 16   BP (!) 119/58   BP Location Right upper arm   BP Upper/Lower Upper   Patient Position Semi fowlers   Level of Consciousness 0   MEWS Score 1   Patient Currently in Pain Yes   Pain Assessment   Pain Assessment 0-10   Pain Level 3   Patient's Stated Pain Goal 2   Pain Type Acute pain   Pain Location Head   Pain Orientation Right  (lump)   Pain Descriptors Discomfort   Pain Frequency Intermittent   Pain Onset Gradual   Clinical Progression Gradually worsening   Oxygen Therapy   SpO2 94 %   O2 Device None (Room air)

## 2019-05-26 ENCOUNTER — APPOINTMENT (OUTPATIENT)
Dept: GENERAL RADIOLOGY | Age: 84
DRG: 617 | End: 2019-05-26
Payer: MEDICARE

## 2019-05-26 ENCOUNTER — ANESTHESIA EVENT (OUTPATIENT)
Dept: OPERATING ROOM | Age: 84
DRG: 617 | End: 2019-05-26
Payer: MEDICARE

## 2019-05-26 ENCOUNTER — ANESTHESIA (OUTPATIENT)
Dept: OPERATING ROOM | Age: 84
DRG: 617 | End: 2019-05-26
Payer: MEDICARE

## 2019-05-26 VITALS
OXYGEN SATURATION: 97 % | RESPIRATION RATE: 12 BRPM | DIASTOLIC BLOOD PRESSURE: 38 MMHG | SYSTOLIC BLOOD PRESSURE: 111 MMHG

## 2019-05-26 LAB
ANION GAP SERPL CALCULATED.3IONS-SCNC: 10 MMOL/L (ref 3–16)
BUN BLDV-MCNC: 24 MG/DL (ref 7–20)
CALCIUM SERPL-MCNC: 8.7 MG/DL (ref 8.3–10.6)
CHLORIDE BLD-SCNC: 106 MMOL/L (ref 99–110)
CO2: 23 MMOL/L (ref 21–32)
CREAT SERPL-MCNC: 1.2 MG/DL (ref 0.6–1.2)
GFR AFRICAN AMERICAN: 52
GFR NON-AFRICAN AMERICAN: 43
GLUCOSE BLD-MCNC: 101 MG/DL (ref 70–99)
GLUCOSE BLD-MCNC: 135 MG/DL (ref 70–99)
GLUCOSE BLD-MCNC: 136 MG/DL (ref 70–99)
GLUCOSE BLD-MCNC: 143 MG/DL (ref 70–99)
GLUCOSE BLD-MCNC: 89 MG/DL (ref 70–99)
GLUCOSE BLD-MCNC: 99 MG/DL (ref 70–99)
HCT VFR BLD CALC: 27.1 % (ref 36–48)
HEMOGLOBIN: 9.2 G/DL (ref 12–16)
MAGNESIUM: 1.7 MG/DL (ref 1.8–2.4)
MCH RBC QN AUTO: 31.7 PG (ref 26–34)
MCHC RBC AUTO-ENTMCNC: 33.9 G/DL (ref 31–36)
MCV RBC AUTO: 93.6 FL (ref 80–100)
PDW BLD-RTO: 13.2 % (ref 12.4–15.4)
PERFORMED ON: ABNORMAL
PERFORMED ON: NORMAL
PERFORMED ON: NORMAL
PHOSPHORUS: 2.9 MG/DL (ref 2.5–4.9)
PLATELET # BLD: 160 K/UL (ref 135–450)
PMV BLD AUTO: 8.9 FL (ref 5–10.5)
POTASSIUM SERPL-SCNC: 3.8 MMOL/L (ref 3.5–5.1)
RBC # BLD: 2.9 M/UL (ref 4–5.2)
SODIUM BLD-SCNC: 139 MMOL/L (ref 136–145)
URINE CULTURE, ROUTINE: NORMAL
WBC # BLD: 5.3 K/UL (ref 4–11)

## 2019-05-26 PROCEDURE — 88311 DECALCIFY TISSUE: CPT

## 2019-05-26 PROCEDURE — 84100 ASSAY OF PHOSPHORUS: CPT

## 2019-05-26 PROCEDURE — 2580000003 HC RX 258

## 2019-05-26 PROCEDURE — 6360000002 HC RX W HCPCS: Performed by: PODIATRIST

## 2019-05-26 PROCEDURE — 3600000002 HC SURGERY LEVEL 2 BASE: Performed by: PODIATRIST

## 2019-05-26 PROCEDURE — 6370000000 HC RX 637 (ALT 250 FOR IP): Performed by: PODIATRIST

## 2019-05-26 PROCEDURE — 2580000003 HC RX 258: Performed by: INTERNAL MEDICINE

## 2019-05-26 PROCEDURE — 7100000010 HC PHASE II RECOVERY - FIRST 15 MIN: Performed by: PODIATRIST

## 2019-05-26 PROCEDURE — 3600000012 HC SURGERY LEVEL 2 ADDTL 15MIN: Performed by: PODIATRIST

## 2019-05-26 PROCEDURE — 0Y6M0ZC DETACHMENT AT RIGHT FOOT, PARTIAL 3RD RAY, OPEN APPROACH: ICD-10-PCS | Performed by: PODIATRIST

## 2019-05-26 PROCEDURE — 80048 BASIC METABOLIC PNL TOTAL CA: CPT

## 2019-05-26 PROCEDURE — 83735 ASSAY OF MAGNESIUM: CPT

## 2019-05-26 PROCEDURE — 99232 SBSQ HOSP IP/OBS MODERATE 35: CPT | Performed by: INTERNAL MEDICINE

## 2019-05-26 PROCEDURE — 36415 COLL VENOUS BLD VENIPUNCTURE: CPT

## 2019-05-26 PROCEDURE — 2709999900 HC NON-CHARGEABLE SUPPLY: Performed by: PODIATRIST

## 2019-05-26 PROCEDURE — 2500000003 HC RX 250 WO HCPCS: Performed by: PODIATRIST

## 2019-05-26 PROCEDURE — 73620 X-RAY EXAM OF FOOT: CPT

## 2019-05-26 PROCEDURE — 3700000001 HC ADD 15 MINUTES (ANESTHESIA): Performed by: PODIATRIST

## 2019-05-26 PROCEDURE — 85027 COMPLETE CBC AUTOMATED: CPT

## 2019-05-26 PROCEDURE — 6360000002 HC RX W HCPCS: Performed by: INTERNAL MEDICINE

## 2019-05-26 PROCEDURE — 6370000000 HC RX 637 (ALT 250 FOR IP): Performed by: INTERNAL MEDICINE

## 2019-05-26 PROCEDURE — 1200000000 HC SEMI PRIVATE

## 2019-05-26 PROCEDURE — 3700000000 HC ANESTHESIA ATTENDED CARE: Performed by: PODIATRIST

## 2019-05-26 PROCEDURE — 7100000011 HC PHASE II RECOVERY - ADDTL 15 MIN: Performed by: PODIATRIST

## 2019-05-26 PROCEDURE — 2580000003 HC RX 258: Performed by: PODIATRIST

## 2019-05-26 PROCEDURE — 88305 TISSUE EXAM BY PATHOLOGIST: CPT

## 2019-05-26 PROCEDURE — 6360000002 HC RX W HCPCS: Performed by: ANESTHESIOLOGY

## 2019-05-26 DEVICE — PATCH AMNION 2 LAYR PROTCT 4 X 6CM STERISHIELD II: Type: IMPLANTABLE DEVICE | Site: FOOT | Status: FUNCTIONAL

## 2019-05-26 RX ORDER — FENTANYL CITRATE 50 UG/ML
INJECTION, SOLUTION INTRAMUSCULAR; INTRAVENOUS PRN
Status: DISCONTINUED | OUTPATIENT
Start: 2019-05-26 | End: 2019-05-26 | Stop reason: SDUPTHER

## 2019-05-26 RX ORDER — OXYCODONE HYDROCHLORIDE AND ACETAMINOPHEN 5; 325 MG/1; MG/1
2 TABLET ORAL PRN
Status: DISCONTINUED | OUTPATIENT
Start: 2019-05-26 | End: 2019-05-26 | Stop reason: HOSPADM

## 2019-05-26 RX ORDER — ONDANSETRON 2 MG/ML
4 INJECTION INTRAMUSCULAR; INTRAVENOUS
Status: DISCONTINUED | OUTPATIENT
Start: 2019-05-26 | End: 2019-05-26 | Stop reason: HOSPADM

## 2019-05-26 RX ORDER — HYDROMORPHONE HCL 110MG/55ML
0.5 PATIENT CONTROLLED ANALGESIA SYRINGE INTRAVENOUS EVERY 5 MIN PRN
Status: DISCONTINUED | OUTPATIENT
Start: 2019-05-26 | End: 2019-05-26 | Stop reason: HOSPADM

## 2019-05-26 RX ORDER — OXYCODONE HYDROCHLORIDE AND ACETAMINOPHEN 5; 325 MG/1; MG/1
1 TABLET ORAL PRN
Status: DISCONTINUED | OUTPATIENT
Start: 2019-05-26 | End: 2019-05-26 | Stop reason: HOSPADM

## 2019-05-26 RX ORDER — ONDANSETRON 2 MG/ML
INJECTION INTRAMUSCULAR; INTRAVENOUS PRN
Status: DISCONTINUED | OUTPATIENT
Start: 2019-05-26 | End: 2019-05-26 | Stop reason: SDUPTHER

## 2019-05-26 RX ORDER — PROPOFOL 10 MG/ML
INJECTION, EMULSION INTRAVENOUS PRN
Status: DISCONTINUED | OUTPATIENT
Start: 2019-05-26 | End: 2019-05-26 | Stop reason: SDUPTHER

## 2019-05-26 RX ORDER — MAGNESIUM HYDROXIDE 1200 MG/15ML
LIQUID ORAL CONTINUOUS PRN
Status: COMPLETED | OUTPATIENT
Start: 2019-05-26 | End: 2019-05-26

## 2019-05-26 RX ORDER — PROMETHAZINE HYDROCHLORIDE 25 MG/ML
6.25 INJECTION, SOLUTION INTRAMUSCULAR; INTRAVENOUS
Status: DISCONTINUED | OUTPATIENT
Start: 2019-05-26 | End: 2019-05-26 | Stop reason: HOSPADM

## 2019-05-26 RX ORDER — HYDRALAZINE HYDROCHLORIDE 20 MG/ML
5 INJECTION INTRAMUSCULAR; INTRAVENOUS EVERY 10 MIN PRN
Status: DISCONTINUED | OUTPATIENT
Start: 2019-05-26 | End: 2019-05-26 | Stop reason: HOSPADM

## 2019-05-26 RX ORDER — SODIUM CHLORIDE 9 MG/ML
INJECTION, SOLUTION INTRAVENOUS
Status: COMPLETED
Start: 2019-05-26 | End: 2019-05-26

## 2019-05-26 RX ORDER — HYDROMORPHONE HCL 110MG/55ML
0.25 PATIENT CONTROLLED ANALGESIA SYRINGE INTRAVENOUS EVERY 5 MIN PRN
Status: DISCONTINUED | OUTPATIENT
Start: 2019-05-26 | End: 2019-05-26 | Stop reason: HOSPADM

## 2019-05-26 RX ORDER — FENTANYL CITRATE 50 UG/ML
25 INJECTION, SOLUTION INTRAMUSCULAR; INTRAVENOUS EVERY 5 MIN PRN
Status: DISCONTINUED | OUTPATIENT
Start: 2019-05-26 | End: 2019-05-26 | Stop reason: HOSPADM

## 2019-05-26 RX ORDER — MEPERIDINE HYDROCHLORIDE 50 MG/ML
12.5 INJECTION INTRAMUSCULAR; INTRAVENOUS; SUBCUTANEOUS EVERY 5 MIN PRN
Status: DISCONTINUED | OUTPATIENT
Start: 2019-05-26 | End: 2019-05-26 | Stop reason: HOSPADM

## 2019-05-26 RX ADMIN — METOPROLOL TARTRATE 25 MG: 25 TABLET ORAL at 20:26

## 2019-05-26 RX ADMIN — PRAVASTATIN SODIUM 20 MG: 20 TABLET ORAL at 09:47

## 2019-05-26 RX ADMIN — MULTIPLE VITAMINS W/ MINERALS TAB 1 TABLET: TAB at 09:47

## 2019-05-26 RX ADMIN — FERROUS SULFATE TAB 325 MG (65 MG ELEMENTAL FE) 325 MG: 325 (65 FE) TAB at 09:47

## 2019-05-26 RX ADMIN — VANCOMYCIN HYDROCHLORIDE 1000 MG: 1 INJECTION, POWDER, LYOPHILIZED, FOR SOLUTION INTRAVENOUS at 17:26

## 2019-05-26 RX ADMIN — PIPERACILLIN AND TAZOBACTAM 3.38 G: 3; .375 INJECTION, POWDER, FOR SOLUTION INTRAVENOUS at 20:26

## 2019-05-26 RX ADMIN — PANTOPRAZOLE SODIUM 40 MG: 40 TABLET, DELAYED RELEASE ORAL at 09:47

## 2019-05-26 RX ADMIN — PIPERACILLIN AND TAZOBACTAM 3.38 G: 3; .375 INJECTION, POWDER, FOR SOLUTION INTRAVENOUS at 13:35

## 2019-05-26 RX ADMIN — FERROUS SULFATE TAB 325 MG (65 MG ELEMENTAL FE) 325 MG: 325 (65 FE) TAB at 16:49

## 2019-05-26 RX ADMIN — SODIUM CHLORIDE: 9 INJECTION, SOLUTION INTRAVENOUS at 09:05

## 2019-05-26 RX ADMIN — METOPROLOL TARTRATE 25 MG: 25 TABLET ORAL at 07:24

## 2019-05-26 RX ADMIN — PROPOFOL 30 MG: 10 INJECTION, EMULSION INTRAVENOUS at 08:18

## 2019-05-26 RX ADMIN — PIPERACILLIN AND TAZOBACTAM 3.38 G: 3; .375 INJECTION, POWDER, FOR SOLUTION INTRAVENOUS at 05:17

## 2019-05-26 RX ADMIN — PRAMIPEXOLE DIHYDROCHLORIDE 0.25 MG: 0.25 TABLET ORAL at 20:26

## 2019-05-26 RX ADMIN — FENTANYL CITRATE 100 MCG: 50 INJECTION INTRAMUSCULAR; INTRAVENOUS at 08:06

## 2019-05-26 RX ADMIN — Medication 1 CAPSULE: at 09:47

## 2019-05-26 RX ADMIN — INSULIN LISPRO 1 UNITS: 100 INJECTION, SOLUTION INTRAVENOUS; SUBCUTANEOUS at 16:50

## 2019-05-26 RX ADMIN — Medication 1000 MCG: at 09:47

## 2019-05-26 RX ADMIN — Medication 10 ML: at 20:26

## 2019-05-26 RX ADMIN — FERROUS SULFATE TAB 325 MG (65 MG ELEMENTAL FE) 325 MG: 325 (65 FE) TAB at 11:30

## 2019-05-26 RX ADMIN — PROPOFOL 70 MG: 10 INJECTION, EMULSION INTRAVENOUS at 08:07

## 2019-05-26 RX ADMIN — ONDANSETRON 4 MG: 2 INJECTION, SOLUTION INTRAMUSCULAR; INTRAVENOUS at 08:06

## 2019-05-26 RX ADMIN — ASPIRIN 81 MG 81 MG: 81 TABLET ORAL at 09:47

## 2019-05-26 RX ADMIN — SODIUM CHLORIDE 250 ML: 9 INJECTION, SOLUTION INTRAVENOUS at 20:43

## 2019-05-26 RX ADMIN — MIRTAZAPINE 15 MG: 15 TABLET, FILM COATED ORAL at 09:47

## 2019-05-26 ASSESSMENT — PULMONARY FUNCTION TESTS
PIF_VALUE: 0
PIF_VALUE: 2
PIF_VALUE: 0
PIF_VALUE: 1
PIF_VALUE: 0

## 2019-05-26 ASSESSMENT — PAIN SCALES - GENERAL
PAINLEVEL_OUTOF10: 0

## 2019-05-26 ASSESSMENT — LIFESTYLE VARIABLES: SMOKING_STATUS: 0

## 2019-05-26 NOTE — PROGRESS NOTES
Pt a/o x4. Returned from surgery at this time. Pt has amputation of right foot 3 digit with post-op dressing in place. Dressing is Xeroform, dry dressing, Kurlex, and Coban dressing with small area of sanguinous drainage around area of digit removed. Pt denies any pain at this time. Stated numbness present to Right foot. Held Lovenox due to increase bleeding after surgery. Call light within reach. Bed alarm is on. Will monitor.       05/26/19 0933   Vital Signs   Temp 96.7 °F (35.9 °C)   Temp Source Axillary   Pulse 83   Heart Rate Source Other (Comment)  (diamap)   Resp 15   BP (!) 150/50   BP Location Left upper arm   BP Upper/Lower Upper   MAP (mmHg) 73   Level of Consciousness 0   MEWS Score 1   Patient Currently in Pain Denies   Pain Assessment   Pain Assessment 0-10   Pain Level 0   Oxygen Therapy   SpO2 95 %   O2 Device None (Room air)

## 2019-05-26 NOTE — PROGRESS NOTES
Bedside report given to Mahi Deras. RN patient awake talking with daughter, right foot dressing C/D/I ice pack in place. Fidel Hermosillo

## 2019-05-26 NOTE — ANESTHESIA POSTPROCEDURE EVALUATION
Department of Anesthesiology  Postprocedure Note    Patient: Gracie Barton  MRN: [id number]  YOB: 1935  Date of evaluation: 5/26/2019    Procedure Summary     Date:  05/26/19 Room / Location:  [place] [address] / [facility]    Anesthesia Start:  0803 Anesthesia Stop:  0830    Procedure:  PARTIAL RIGHT FOOT AMPUTATION (Right Foot) Diagnosis:  (RIGHT FOOT PAIN)    Surgeon:  Oscar Lloyd DPM Responsible Provider:  Umang Arevalo MD    Anesthesia Type:  general ASA Status:  3        Anesthesia Type: No value filed.     Yoshi Phase I:      Yoshi Phase II: Yoshi Score: 10    Anesthesia Post Evaluation   Anesthetic Problems: no   Last Vitals:     BP: 150/50 Pulse: 83   Resp: 15 SpO2: 95   Temp: 96.7 °F (35.9 °C)     Cardiovascular System Stable: yes  Respiratory Function: Airway Patent yes  ETT no  Ventilator no  Level of consciousness: awake, alert and oriented  Post-op pain: adequate analgesia  Hydration Adequate: yes  Nausea/Vomiting:no  Other Issues:     Uma Howell MD

## 2019-05-26 NOTE — PROGRESS NOTES
Spoke to daughter via telephone. Daughter was asking about blood cultures. Called [facility] where they were drawled who was not able to tell me any information. Sent Dr. Lou Biswas a perfect serve message to notify of no blood cultures even ordered on admit.

## 2019-05-26 NOTE — PROGRESS NOTES
.Patient left the floor in stable condition to room with transport and daughter at bedside. Duncan Quintanilla

## 2019-05-26 NOTE — PROGRESS NOTES
Patient sitting up in bed sipping on diet pepsi, denies any pain or other needs. Christine Rodriguez

## 2019-05-26 NOTE — PLAN OF CARE
Problem: Falls - Risk of:  Goal: Will remain free from falls  Description  Will remain free from falls  Outcome: Ongoing  Note:   Pt is alert and oriented x4 and has remained free from falls today at this point. Pt is a high fall risk. Arm band in place and bed check on. Call light is within reach. Goal: Absence of physical injury  Description  Absence of physical injury  Outcome: Ongoing     Problem: Risk for Impaired Skin Integrity  Goal: Tissue integrity - skin and mucous membranes  Description  Structural intactness and normal physiological function of skin and  mucous membranes. Outcome: Ongoing  Note:   Post-op dressing in place with small area of sanguinous drainage where 3 digit removed. Problem: Pain:  Description  Pain management should include both nonpharmacologic and pharmacologic interventions.   Goal: Pain level will decrease  Description  Pain level will decrease  Outcome: Ongoing     Problem: Infection:  Goal: Will remain free from infection  Description  Will remain free from infection  Outcome: Ongoing

## 2019-05-26 NOTE — PROGRESS NOTES
Pt a/o x4. FSBS was 143, 1 unit SSI given at this time. Pt denies any pain to right foot. Stated numbness present. Small area of sanguinous drainage to post-op dressing around where 3 digit was removed. Reinforced with Kurlex wrap per Dr. Blaine Brice at this time. Call light within reach. Bed alarm is on. Will monitor.

## 2019-05-26 NOTE — PROGRESS NOTES
Bedside report given and pt care transferred to [facility], [place]. Pt denies needs at this time. Call light within reach.

## 2019-05-26 NOTE — PROGRESS NOTES
Progress Note    Admit Date:  5/24/2019    Admitted with right foot ulcer, third digit- > osteomyelitis. Seen by podiatry- status post right partial foot amputation today . 5/26/19 . Subjective:  Ms. Neha Irizarry is stable. Back from OR  Doing well. Right foot in dressing. Pain controlled. No fevers or chills  No nausea or vomiting    Objective:   BP (!) 150/50   Pulse 83   Temp 96.7 °F (35.9 °C) (Axillary)   Resp 15   Ht 5' 10.5\" (1.791 m)   Wt 151 lb 3.2 oz (68.6 kg)   SpO2 95%   BMI 21.39 kg/m²       Intake/Output Summary (Last 24 hours) at 5/26/2019 1045  Last data filed at 5/26/2019 0948  Gross per 24 hour   Intake 1364 ml   Output 250 ml   Net 1114 ml         Physical Exam:  General:  Awake, alert, NAD  Skin:  Warm and dry  Neck:  JVD absent. Neck supple  Chest:  Clear to auscultation, respiration easy. No wheezes, rales or rhonchi. Cardiovascular:  RRR ,S1S2 normal  Abdomen:  Soft, non tender, non distended, BS +  Extremities:  Right foot in dressing . Intact peripheral pulses.  Brisk cap refill, < 2 secs  Neuro: non focal      Medications:   Scheduled Meds:   sodium chloride flush  10 mL Intravenous 2 times per day    enoxaparin  30 mg Subcutaneous Daily    insulin lispro  0-6 Units Subcutaneous TID WC    insulin lispro  0-3 Units Subcutaneous Nightly    pravastatin  20 mg Oral Daily    vitamin B-12  1,000 mcg Oral Daily    therapeutic multivitamin-minerals  1 tablet Oral Daily    pantoprazole  40 mg Oral Daily    ferrous sulfate  325 mg Oral TID WC    lactobacillus  1 capsule Oral Daily    linagliptin  5 mg Oral Daily    pramipexole  0.25 mg Oral Nightly    metoprolol tartrate  25 mg Oral BID    aspirin  81 mg Oral Daily    mirtazapine  15 mg Oral Daily    piperacillin-tazobactam  3.375 g Intravenous Q8H    pneumococcal 13-valent conjugate  0.5 mL Intramuscular Once       Continuous Infusions:   dextrose      sodium chloride 50 mL/hr at 05/26/19 0905       Data:  CBC: Recent Labs     05/24/19  1612 05/25/19  0614 05/26/19  0553   WBC 9.9 6.2 5.3   RBC 3.23* 3.01* 2.90*   HGB 10.2* 9.8* 9.2*   HCT 30.6* 28.6* 27.1*   MCV 94.8 94.8 93.6   RDW 13.4 13.0 13.2    170 160     BMP:   Recent Labs     05/24/19  1612 05/25/19  0614 05/26/19  0553    140 139   K 4.3 4.2 3.8    105 106   CO2 29 26 23   PHOS  --   --  2.9   BUN 42* 33* 24*   CREATININE 1.5* 1.4* 1.2     BNP: No results for input(s): BNP in the last 72 hours. PT/INR: No results for input(s): PROTIME, INR in the last 72 hours. APTT: No results for input(s): APTT in the last 72 hours. CARDIAC ENZYMES: No results for input(s): CKMB, CKMBINDEX, TROPONINI in the last 72 hours. Invalid input(s): CKTOTAL;3  FASTING LIPID PANEL:  Lab Results   Component Value Date    CHOL 148 04/25/2019    HDL 70 (H) 04/25/2019    TRIG 91 04/25/2019     LIVER PROFILE:   Recent Labs     05/24/19  1612   AST 18   ALT 10   BILITOT 0.5   ALKPHOS 78        Radiology   XR FOOT RIGHT (2 VIEWS)   Final Result   1. Findings consistent with surgical resection of the phalanges of the 3rd   digit with residual soft tissue wound and postoperative soft tissue gas. 2. Soft tissue swelling and edema of the right foot. 3. Diffuse osteopenia. Evidence of prior amputation of the 5th digit and   distal half of the 1st metatarsal as detailed above. 4. Mild plantar calcaneal spur. 5. Hardware overlying the medial malleolus and distal tibia as detailed   above, similar to the prior study. XR FOOT RIGHT (MIN 3 VIEWS)   Final Result   Postsurgical and degenerative changes in the right foot are stable. No acute   abnormality detected.                Assessment:  Active Problems:    Ulcer of foot due to secondary diabetes mellitus ([facility] [address]        Plan:    Diabetic foot ulcer,  right foot,  third digit , with clinical signs of osteomyelitis  - Seen by podiatrist. (Patient has had previous 2 amputations for osteomyelitis)  -  status post right partial foot amputation today . 5/26/19 .  - Continue IV antibiotics vancomycin and Zosyn  - Follow up on wound cultures       Diabetes type 2, uncontrolled   With diabetic neuropathy  - Continue sliding scale insulin  - Continue home meds      Hypertension  -Blood pressure stable, monitor  -On metoprolol    CKD stage III  -Follow BMP  - Creatinine stable and improved.   1.5-1.4-1.2 today    History of discitis        DVT Prophylaxis: Lovenox  Diet: DIET CARB CONTROL;  Code Status: Full Code           Odin Atwood MD 5/26/2019 10:45 AM

## 2019-05-26 NOTE — OP NOTE
necessary. The surgical site was then copiously irrigated  with sterile saline via the pulse lavage. The surgical site was then  reinspected. No further signs of necrotic or infected tissue was noted. No further bleeding was noted. At this time, a SteriShield 4 x 6 graft  was then trimmed and inserted in the wound bed in toto. None was  wasted. This was done in order to stimulate wound healing in a  high-risk diabetic patient. Surgical site was then reapproximated with  3-0 nylon in a simple and horizontal mattress suture technique. Surgical site was then covered with Xeroform gauze, fluffs, Kerlix, ABD,  and Coban. Tourniquet was then deflated. The patient tolerated the procedure and anesthesia well, transferred to  the recovery room with vital signs stable and vascular status intact as  evidenced by a prompt hyperemic response to the remaining digits of the  right foot. Following a brief period of postoperative monitoring, the  patient will be transferred back to the floor under the care of medical  service.         NIKKI MO DPM    D: 05/26/2019 9:56:02       T: 05/26/2019 13:23:31     [id number]  Job#: [id number]     Doc#: [id number]    CC:

## 2019-05-26 NOTE — PROGRESS NOTES
Patient denies needs at this time, no s/s of distress, call light at bedside. Will continue to monitor.

## 2019-05-26 NOTE — PROGRESS NOTES
Bedside report received from  and Mir OR RN. Patient awake, vitals WNL, right foot dressing C/D/I ice pack applied, xray called. Domingo Hilliard

## 2019-05-26 NOTE — BRIEF OP NOTE
Brief Postoperative Note  ______________________________________________________________    Patient: Juan Oliver  YOB: 1935  MRN: [id number]  Date of Procedure: 5/26/2019    Pre-Op Diagnosis: RIGHT FOOT PAIN    Post-Op Diagnosis: Same       Procedure(s):  PARTIAL RIGHT FOOT AMPUTATION    Anesthesia: Anesthesia type not filed in the log. Surgeon(s):  Gordon Ramos DPM    Assistant:     Estimated Blood Loss (mL): less than 50     Complications: None    Specimens:   ID Type Source Tests Collected by Time Destination   A :  Specimen Foot SURGICAL PATHOLOGY Gordon Ramos DPM 5/26/2019 0816        Implants:  Implant Name Type Inv.  Item Serial No.  Lot No. LRB No. Used   PATCH STERI SHIELD II DU LAY AMN 4X6CM Bone/Graft/Tissue/Human/Synth PATCH STERI SHIELD II DU LAY AMN 4X6CM  BONE BANK ALLOGRAFTS  Right 1         Drains: * No LDAs found *    Findings: ulcer right 3rd digit    Gordon Ramos DPM  Date: 5/26/2019  Time: 8:31 AM

## 2019-05-26 NOTE — PROGRESS NOTES
4 Eyes Skin Assessment     The patient is being assess for   Post-Op Surgical    I agree that 2 RN's have performed a thorough Head to Toe Skin Assessment on the patient. ALL assessment sites listed below have been assessed. Areas assessed by both nurses:  Scott Luna RN's  [x]   Head, Face, and Ears   [x]   Shoulders, Back, and Chest, Abdomen  [x]   Arms, Elbows, and Hands   [x]   Coccyx, Sacrum, and Ischium  [x]   Legs, Feet, and Heels        Scattered bruising to BUE from PIV sticks and blood draws. Pt has surgical dressing to right foot that has sanguinous new drainage to area of where 3 digit was. Left foot is dry/flaky with previous healed amputations of 2 & 3 digits. Coccyx is pink/intact. **SHARE this note so that the co-signing nurse is able to place an eSignature**    Co-signer eSignature: {Esignature:[id number]}    Does the Patient have Skin Breakdown?   Yes LDA WOUND CARE was Initiated documentation include the Alexa-wound, Wound Assessment, Measurements, Dressing Treatment, Drainage, and Color\",          Shukri Prevention initiated:  Yes   Wound Care Orders initiated:  Yes      [id number]  nurse consulted for Pressure Injury (Stage 3,4, Unstageable, DTI, NWPT, Complex wounds)and New or Established Ostomies:  No      Primary Nurse eSignature: Electronically signed by Gilberto Sebastian RN on 5/26/19 at 10:13 AM

## 2019-05-27 LAB
ANION GAP SERPL CALCULATED.3IONS-SCNC: 7 MMOL/L (ref 3–16)
BUN BLDV-MCNC: 15 MG/DL (ref 7–20)
CALCIUM SERPL-MCNC: 8.7 MG/DL (ref 8.3–10.6)
CHLORIDE BLD-SCNC: 104 MMOL/L (ref 99–110)
CO2: 26 MMOL/L (ref 21–32)
CREAT SERPL-MCNC: 1.2 MG/DL (ref 0.6–1.2)
ESTIMATED AVERAGE GLUCOSE: 171.4 MG/DL
GFR AFRICAN AMERICAN: 52
GFR NON-AFRICAN AMERICAN: 43
GLUCOSE BLD-MCNC: 116 MG/DL (ref 70–99)
GLUCOSE BLD-MCNC: 117 MG/DL (ref 70–99)
GLUCOSE BLD-MCNC: 129 MG/DL (ref 70–99)
GLUCOSE BLD-MCNC: 140 MG/DL (ref 70–99)
GLUCOSE BLD-MCNC: 159 MG/DL (ref 70–99)
HBA1C MFR BLD: 7.6 %
HCT VFR BLD CALC: 27.7 % (ref 36–48)
HEMOGLOBIN: 9.5 G/DL (ref 12–16)
MAGNESIUM: 1.6 MG/DL (ref 1.8–2.4)
MCH RBC QN AUTO: 32.3 PG (ref 26–34)
MCHC RBC AUTO-ENTMCNC: 34.2 G/DL (ref 31–36)
MCV RBC AUTO: 94.3 FL (ref 80–100)
PDW BLD-RTO: 13 % (ref 12.4–15.4)
PERFORMED ON: ABNORMAL
PHOSPHORUS: 2.7 MG/DL (ref 2.5–4.9)
PLATELET # BLD: 153 K/UL (ref 135–450)
PMV BLD AUTO: 8.9 FL (ref 5–10.5)
POTASSIUM SERPL-SCNC: 3.8 MMOL/L (ref 3.5–5.1)
RBC # BLD: 2.94 M/UL (ref 4–5.2)
SODIUM BLD-SCNC: 137 MMOL/L (ref 136–145)
WBC # BLD: 6.2 K/UL (ref 4–11)

## 2019-05-27 PROCEDURE — 36415 COLL VENOUS BLD VENIPUNCTURE: CPT

## 2019-05-27 PROCEDURE — 83735 ASSAY OF MAGNESIUM: CPT

## 2019-05-27 PROCEDURE — 6360000002 HC RX W HCPCS: Performed by: PODIATRIST

## 2019-05-27 PROCEDURE — 80048 BASIC METABOLIC PNL TOTAL CA: CPT

## 2019-05-27 PROCEDURE — 2580000003 HC RX 258: Performed by: PODIATRIST

## 2019-05-27 PROCEDURE — 85027 COMPLETE CBC AUTOMATED: CPT

## 2019-05-27 PROCEDURE — 1200000000 HC SEMI PRIVATE

## 2019-05-27 PROCEDURE — 84100 ASSAY OF PHOSPHORUS: CPT

## 2019-05-27 PROCEDURE — 99232 SBSQ HOSP IP/OBS MODERATE 35: CPT | Performed by: INTERNAL MEDICINE

## 2019-05-27 PROCEDURE — 2580000003 HC RX 258: Performed by: INTERNAL MEDICINE

## 2019-05-27 PROCEDURE — 6360000002 HC RX W HCPCS: Performed by: INTERNAL MEDICINE

## 2019-05-27 PROCEDURE — 6370000000 HC RX 637 (ALT 250 FOR IP): Performed by: PODIATRIST

## 2019-05-27 RX ADMIN — Medication 1 CAPSULE: at 09:13

## 2019-05-27 RX ADMIN — ASPIRIN 81 MG 81 MG: 81 TABLET ORAL at 09:14

## 2019-05-27 RX ADMIN — SODIUM CHLORIDE: 9 INJECTION, SOLUTION INTRAVENOUS at 06:43

## 2019-05-27 RX ADMIN — PIPERACILLIN AND TAZOBACTAM 3.38 G: 3; .375 INJECTION, POWDER, FOR SOLUTION INTRAVENOUS at 06:42

## 2019-05-27 RX ADMIN — FERROUS SULFATE TAB 325 MG (65 MG ELEMENTAL FE) 325 MG: 325 (65 FE) TAB at 09:13

## 2019-05-27 RX ADMIN — METOPROLOL TARTRATE 25 MG: 25 TABLET ORAL at 21:35

## 2019-05-27 RX ADMIN — PRAVASTATIN SODIUM 20 MG: 20 TABLET ORAL at 09:14

## 2019-05-27 RX ADMIN — PIPERACILLIN AND TAZOBACTAM 3.38 G: 3; .375 INJECTION, POWDER, FOR SOLUTION INTRAVENOUS at 14:00

## 2019-05-27 RX ADMIN — ENOXAPARIN SODIUM 30 MG: 100 INJECTION SUBCUTANEOUS at 09:15

## 2019-05-27 RX ADMIN — PIPERACILLIN AND TAZOBACTAM 3.38 G: 3; .375 INJECTION, POWDER, FOR SOLUTION INTRAVENOUS at 21:35

## 2019-05-27 RX ADMIN — MIRTAZAPINE 15 MG: 15 TABLET, FILM COATED ORAL at 09:14

## 2019-05-27 RX ADMIN — INSULIN LISPRO 1 UNITS: 100 INJECTION, SOLUTION INTRAVENOUS; SUBCUTANEOUS at 12:19

## 2019-05-27 RX ADMIN — PANTOPRAZOLE SODIUM 40 MG: 40 TABLET, DELAYED RELEASE ORAL at 09:13

## 2019-05-27 RX ADMIN — Medication 1000 MCG: at 09:13

## 2019-05-27 RX ADMIN — FERROUS SULFATE TAB 325 MG (65 MG ELEMENTAL FE) 325 MG: 325 (65 FE) TAB at 17:19

## 2019-05-27 RX ADMIN — FERROUS SULFATE TAB 325 MG (65 MG ELEMENTAL FE) 325 MG: 325 (65 FE) TAB at 12:18

## 2019-05-27 RX ADMIN — Medication 10 ML: at 21:35

## 2019-05-27 RX ADMIN — LINAGLIPTIN 5 MG: 5 TABLET, FILM COATED ORAL at 09:13

## 2019-05-27 RX ADMIN — PRAMIPEXOLE DIHYDROCHLORIDE 0.25 MG: 0.25 TABLET ORAL at 21:35

## 2019-05-27 RX ADMIN — MULTIPLE VITAMINS W/ MINERALS TAB 1 TABLET: TAB at 09:14

## 2019-05-27 RX ADMIN — VANCOMYCIN HYDROCHLORIDE 1000 MG: 1 INJECTION, POWDER, LYOPHILIZED, FOR SOLUTION INTRAVENOUS at 17:20

## 2019-05-27 NOTE — PROGRESS NOTES
Progress Note    Admit Date:  5/24/2019    Admitted with right foot ulcer, third digit- > osteomyelitis. Seen by podiatry- status post right partial foot amputation. 5/26/19 . POD #1      Subjective:  Ms. Soledad Manriquez is stable. Doing well. Right foot in dressing. Pain controlled. No fevers or chills  No nausea or vomiting    Objective:   BP (!) 152/58   Pulse 60   Temp 98.9 °F (37.2 °C) (Oral)   Resp 16   Ht 5' 10.5\" (1.791 m)   Wt 151 lb 3.2 oz (68.6 kg)   SpO2 92%   BMI 21.39 kg/m²       Intake/Output Summary (Last 24 hours) at 5/27/2019 1250  Last data filed at 5/27/2019 1113  Gross per 24 hour   Intake 1096 ml   Output 1625 ml   Net -529 ml         Physical Exam:  General:  Awake, alert, NAD  Skin:  Warm and dry  Neck:  JVD absent. Neck supple  Chest:  Clear to auscultation, respiration easy. No wheezes, rales or rhonchi. Cardiovascular:  RRR ,S1S2 normal  Abdomen:  Soft, non tender, non distended, BS +  Extremities:  Right foot in dressing . Intact peripheral pulses.  Brisk cap refill, < 2 secs  Neuro: non focal      Medications:   Scheduled Meds:   vancomycin  1,000 mg Intravenous Q24H    sodium chloride flush  10 mL Intravenous 2 times per day    enoxaparin  30 mg Subcutaneous Daily    insulin lispro  0-6 Units Subcutaneous TID WC    insulin lispro  0-3 Units Subcutaneous Nightly    pravastatin  20 mg Oral Daily    vitamin B-12  1,000 mcg Oral Daily    therapeutic multivitamin-minerals  1 tablet Oral Daily    pantoprazole  40 mg Oral Daily    ferrous sulfate  325 mg Oral TID WC    lactobacillus  1 capsule Oral Daily    linagliptin  5 mg Oral Daily    pramipexole  0.25 mg Oral Nightly    metoprolol tartrate  25 mg Oral BID    aspirin  81 mg Oral Daily    mirtazapine  15 mg Oral Daily    piperacillin-tazobactam  3.375 g Intravenous Q8H    pneumococcal 13-valent conjugate  0.5 mL Intramuscular Once       Continuous Infusions:   dextrose      sodium chloride 50 mL/hr at 05/27/19 0643       Data:  CBC:   Recent Labs     05/25/19  0614 05/26/19  0553 05/27/19  0637   WBC 6.2 5.3 6.2   RBC 3.01* 2.90* 2.94*   HGB 9.8* 9.2* 9.5*   HCT 28.6* 27.1* 27.7*   MCV 94.8 93.6 94.3   RDW 13.0 13.2 13.0    160 153     BMP:   Recent Labs     05/25/19  0614 05/26/19  0553 05/27/19  0638    139 137   K 4.2 3.8 3.8    106 104   CO2 26 23 26   PHOS  --  2.9 2.7   BUN 33* 24* 15   CREATININE 1.4* 1.2 1.2     BNP: No results for input(s): BNP in the last 72 hours. PT/INR: No results for input(s): PROTIME, INR in the last 72 hours. APTT: No results for input(s): APTT in the last 72 hours. CARDIAC ENZYMES: No results for input(s): CKMB, CKMBINDEX, TROPONINI in the last 72 hours. Invalid input(s): CKTOTAL;3  FASTING LIPID PANEL:  Lab Results   Component Value Date    CHOL 148 04/25/2019    HDL 70 (H) 04/25/2019    TRIG 91 04/25/2019     LIVER PROFILE:   Recent Labs     05/24/19  1612   AST 18   ALT 10   BILITOT 0.5   ALKPHOS 78        Radiology   XR FOOT RIGHT (2 VIEWS)   Final Result   1. Findings consistent with surgical resection of the phalanges of the 3rd   digit with residual soft tissue wound and postoperative soft tissue gas. 2. Soft tissue swelling and edema of the right foot. 3. Diffuse osteopenia. Evidence of prior amputation of the 5th digit and   distal half of the 1st metatarsal as detailed above. 4. Mild plantar calcaneal spur. 5. Hardware overlying the medial malleolus and distal tibia as detailed   above, similar to the prior study. XR FOOT RIGHT (MIN 3 VIEWS)   Final Result   Postsurgical and degenerative changes in the right foot are stable. No acute   abnormality detected.                Assessment:  Active Problems:    Ulcer of foot due to secondary diabetes mellitus ([facility]        Plan:    Diabetic foot ulcer,  right foot,  third digit , with clinical signs of osteomyelitis  - Seen by podiatrist. (Patient has had previous 2 amputations for osteomyelitis)  -  status post right partial foot amputation . 5/26/19 . POD #1  - Continue IV antibiotics vancomycin and Zosyn  - Follow up on wound cultures   - Podiatrist to change dressing tomorrow and evaluate wound    Diabetes type 2, uncontrolled   With diabetic neuropathy  - Continue sliding scale insulin  - Continue home meds      Hypertension  -Blood pressure stable, monitor  -On metoprolol    CKD stage III  -Follow BMP  - Creatinine stable and improved.   1.5-1.4-1.2 today    History of discitis        DVT Prophylaxis: Lovenox  Diet: DIET CARB CONTROL;  Code Status: Full Code           Naveen Kline MD 5/27/2019 12:50 PM

## 2019-05-27 NOTE — PROGRESS NOTES
Shift assessment completed:    Alert and Oriented x4. Vitals are BP diastolic low. Respiratory status is regular, easy, unlabored, and SpO2 is 93% on RA. Pain is 0/10. Pt denies any other complications at this time. SR up 2/4. Bed in lowest position. Call light within reach. Bed alarm is on. Will monitor.

## 2019-05-27 NOTE — PROGRESS NOTES
Progress Note    Admit Date:  5/24/2019    Subjective:  80 y.o. female who is seen for evaluation of cellulitis and ulcer right foot. S/P amputation right 3rd digit 5/26/19. States feeling well today. No pain in the foot at this time.          Past Medical History:        Diagnosis Date    Clostridium difficile infection 06/06/2017    antigen +    Dementia     Diabetes mellitus ([facility]     History of blood transfusion     Hyperlipidemia     Hypertension     Infection     foot    MDRO (multiple drug resistant organisms) resistance 04/09/2019    urine    MRSA infection 12/07/2017    foot    Osteomyelitis of spine ([facility] [address] [facility] 2010    thoraculumbar spine    Restless leg syndrome        Past Surgical History:        Procedure Laterality Date    APPENDECTOMY      COLONOSCOPY  1997    polyps    COLONOSCOPY  7/15/15    normal colon    EYE SURGERY      right cataract    FOOT AMPUTATION Right 05/26/2019    PARTIAL RIGHT FOOT AMPUTATION    FOOT SURGERY Left 05/30/2017    PARTIAL LEFT FOOT AMPUTATION              FOOT SURGERY Right 12/11/2017    HYSTERECTOMY      OTHER SURGICAL HISTORY Left     Macular Grid Left eye argon    TOE AMPUTATION  2003    2nd toe 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    TOE AMPUTATION  2011    5th toe right foot    TONSILLECTOMY      UPPER GASTROINTESTINAL ENDOSCOPY  06/05/2017    Dieulafoy lesion in duodenum       Current Medications:    Current Facility-Administered Medications: vancomycin 1000 mg IVPB in 250 mL D5W addavial, 1,000 mg, Intravenous, Q24H  sodium chloride flush 0.9 % injection 10 mL, 10 mL, Intravenous, 2 times per day  sodium chloride flush 0.9 % injection 10 mL, 10 mL, Intravenous, PRN  potassium chloride (KLOR-CON M) extended release tablet 40 mEq, 40 mEq, Oral, PRN **OR** potassium bicarb-citric acid (EFFER-K) effervescent tablet 40 mEq, 40 mEq, Oral, PRN **OR** potassium chloride 10 mEq/100 mL IVPB (Peripheral Line), 10 mEq, Intravenous, PRN  magnesium sulfate 1 g in respiratory distress  Cardiovascular: normal rate, regular rhythm, normal S1 and S2, no murmurs, rubs, clicks, or gallops, distal pulses intact, no carotid bruits  Abdomen: soft, non-tender, non-distended, normal bowel sounds, no masses or organomegaly    Dressing right foot - outer layer clean, dry and intact. No proximal erythema or edema noted. No increase in skin temperature noted. No malodor noted. Assessment:  Patient Active Problem List   Diagnosis Code    RUBEN (acute kidney injury) ([facility] [facility] N17.9    Type 2 diabetes mellitus with foot ulcer, without long-term current use of insulin ([facility] [facility] E11.621, L97.509    Acute blood loss anemia D62    Severe protein-calorie malnutrition (HCC) E43    Encephalopathy G93.40    Essential hypertension I10    Altered mental status R41.82    Acute renal failure (HCC) N17.9    Contusion of left hip S70. 02XA    Acute UTI N39.0    Mild dehydration E86.0    Urinary incontinence R32    Ulcer of foot due to secondary diabetes mellitus ([facility]) E13.621, L97.509    Osteomyelitis of right foot ([facility]) M86.9    Chronic kidney disease N18.9     diabetic foot ulcer right 3rd digit secondary to peripheral neuropathy   Osteomyelitis right 3rd digit secondary to diabetes mellitus - S/P amputation 5/26/19  diabetes mellitus   Cellulitis right foot - improving    Plan  Patient examined. Reviewed labs and imaging. Continue IV antibiotics. Will change dressing tomorrow. Continue elevation of leg to decrease edema. Hopefully OK to D/C tomorrow. Expect will be OK for oral antibiotics at that time. Will follow.            Electronically signed by Cristofer Narvaez DPM on 5/27/2019 at 11:55 AM.

## 2019-05-28 VITALS
OXYGEN SATURATION: 95 % | BODY MASS INDEX: 21.18 KG/M2 | RESPIRATION RATE: 16 BRPM | DIASTOLIC BLOOD PRESSURE: 70 MMHG | WEIGHT: 151.3 LBS | HEIGHT: 71 IN | SYSTOLIC BLOOD PRESSURE: 139 MMHG | TEMPERATURE: 98.8 F | HEART RATE: 65 BPM

## 2019-05-28 LAB
ANION GAP SERPL CALCULATED.3IONS-SCNC: 10 MMOL/L (ref 3–16)
BUN BLDV-MCNC: 12 MG/DL (ref 7–20)
CALCIUM SERPL-MCNC: 8.8 MG/DL (ref 8.3–10.6)
CHLORIDE BLD-SCNC: 105 MMOL/L (ref 99–110)
CO2: 24 MMOL/L (ref 21–32)
CREAT SERPL-MCNC: 1.2 MG/DL (ref 0.6–1.2)
GFR AFRICAN AMERICAN: 52
GFR NON-AFRICAN AMERICAN: 43
GLUCOSE BLD-MCNC: 132 MG/DL (ref 70–99)
GLUCOSE BLD-MCNC: 134 MG/DL (ref 70–99)
GLUCOSE BLD-MCNC: 158 MG/DL (ref 70–99)
HCT VFR BLD CALC: 26.6 % (ref 36–48)
HEMOGLOBIN: 9.2 G/DL (ref 12–16)
MAGNESIUM: 1.7 MG/DL (ref 1.8–2.4)
MCH RBC QN AUTO: 32.4 PG (ref 26–34)
MCHC RBC AUTO-ENTMCNC: 34.6 G/DL (ref 31–36)
MCV RBC AUTO: 93.5 FL (ref 80–100)
PDW BLD-RTO: 13.2 % (ref 12.4–15.4)
PERFORMED ON: ABNORMAL
PERFORMED ON: ABNORMAL
PHOSPHORUS: 2.6 MG/DL (ref 2.5–4.9)
PLATELET # BLD: 146 K/UL (ref 135–450)
PMV BLD AUTO: 9 FL (ref 5–10.5)
POTASSIUM SERPL-SCNC: 3.7 MMOL/L (ref 3.5–5.1)
RBC # BLD: 2.85 M/UL (ref 4–5.2)
SODIUM BLD-SCNC: 139 MMOL/L (ref 136–145)
WBC # BLD: 7.4 K/UL (ref 4–11)

## 2019-05-28 PROCEDURE — 6370000000 HC RX 637 (ALT 250 FOR IP): Performed by: PODIATRIST

## 2019-05-28 PROCEDURE — 99238 HOSP IP/OBS DSCHRG MGMT 30/<: CPT | Performed by: INTERNAL MEDICINE

## 2019-05-28 PROCEDURE — 80048 BASIC METABOLIC PNL TOTAL CA: CPT

## 2019-05-28 PROCEDURE — 36415 COLL VENOUS BLD VENIPUNCTURE: CPT

## 2019-05-28 PROCEDURE — 6360000002 HC RX W HCPCS: Performed by: PODIATRIST

## 2019-05-28 PROCEDURE — 6370000000 HC RX 637 (ALT 250 FOR IP): Performed by: INTERNAL MEDICINE

## 2019-05-28 PROCEDURE — 84100 ASSAY OF PHOSPHORUS: CPT

## 2019-05-28 PROCEDURE — 83735 ASSAY OF MAGNESIUM: CPT

## 2019-05-28 PROCEDURE — 85027 COMPLETE CBC AUTOMATED: CPT

## 2019-05-28 PROCEDURE — 2580000003 HC RX 258: Performed by: PODIATRIST

## 2019-05-28 RX ORDER — DOXYCYCLINE HYCLATE 100 MG
100 TABLET ORAL 2 TIMES DAILY
Qty: 20 TABLET | Refills: 0 | Status: SHIPPED | OUTPATIENT
Start: 2019-05-28 | End: 2019-06-07

## 2019-05-28 RX ADMIN — FERROUS SULFATE TAB 325 MG (65 MG ELEMENTAL FE) 325 MG: 325 (65 FE) TAB at 12:16

## 2019-05-28 RX ADMIN — PRAVASTATIN SODIUM 20 MG: 20 TABLET ORAL at 08:50

## 2019-05-28 RX ADMIN — Medication 1 CAPSULE: at 08:51

## 2019-05-28 RX ADMIN — MULTIPLE VITAMINS W/ MINERALS TAB 1 TABLET: TAB at 08:51

## 2019-05-28 RX ADMIN — ENOXAPARIN SODIUM 30 MG: 100 INJECTION SUBCUTANEOUS at 08:50

## 2019-05-28 RX ADMIN — MIRTAZAPINE 15 MG: 15 TABLET, FILM COATED ORAL at 08:50

## 2019-05-28 RX ADMIN — MAGNESIUM OXIDE TAB 400 MG (241.3 MG ELEMENTAL MG) 400 MG: 400 (241.3 MG) TAB at 12:16

## 2019-05-28 RX ADMIN — PANTOPRAZOLE SODIUM 40 MG: 40 TABLET, DELAYED RELEASE ORAL at 08:51

## 2019-05-28 RX ADMIN — ASPIRIN 81 MG 81 MG: 81 TABLET ORAL at 08:51

## 2019-05-28 RX ADMIN — Medication 10 ML: at 08:52

## 2019-05-28 RX ADMIN — Medication 1000 MCG: at 08:50

## 2019-05-28 RX ADMIN — LINAGLIPTIN 5 MG: 5 TABLET, FILM COATED ORAL at 08:51

## 2019-05-28 RX ADMIN — PIPERACILLIN AND TAZOBACTAM 3.38 G: 3; .375 INJECTION, POWDER, FOR SOLUTION INTRAVENOUS at 06:36

## 2019-05-28 RX ADMIN — FERROUS SULFATE TAB 325 MG (65 MG ELEMENTAL FE) 325 MG: 325 (65 FE) TAB at 08:51

## 2019-05-28 RX ADMIN — METOPROLOL TARTRATE 25 MG: 25 TABLET ORAL at 08:51

## 2019-05-28 ASSESSMENT — PAIN SCALES - GENERAL: PAINLEVEL_OUTOF10: 0

## 2019-05-28 NOTE — PROGRESS NOTES
Progress Note    Admit Date:  5/24/2019    Subjective:  80 y.o. female who is seen for evaluation of cellulitis and ulcer right foot. S/P amputation right 3rd digit 5/26/19. States feeling well today. No pain in the foot at this time.        Past Medical History:        Diagnosis Date    Clostridium difficile infection 06/06/2017    antigen +    Dementia     Diabetes mellitus ([facility] [address]     History of blood transfusion     Hyperlipidemia     Hypertension     Infection     foot    MDRO (multiple drug resistant organisms) resistance 04/09/2019    urine    MRSA infection 12/07/2017    foot    Osteomyelitis of spine ([facility] [address] 2010    thoraculumbar spine    Restless leg syndrome        Past Surgical History:        Procedure Laterality Date    APPENDECTOMY      COLONOSCOPY  1997    polyps    COLONOSCOPY  7/15/15    normal colon    EYE SURGERY      right cataract    FOOT AMPUTATION Right 05/26/2019    PARTIAL RIGHT FOOT AMPUTATION    FOOT AMPUTATION Right 5/26/2019    PARTIAL RIGHT FOOT AMPUTATION performed by Chica Maguire DPM at [address] Left 05/30/2017    PARTIAL LEFT FOOT AMPUTATION              FOOT SURGERY Right 12/11/2017    HYSTERECTOMY      OTHER SURGICAL HISTORY Left     Macular Grid Left eye argon    TOE AMPUTATION  2003    2nd toe 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    TOE AMPUTATION  2011    5th toe right foot    TONSILLECTOMY      UPPER GASTROINTESTINAL ENDOSCOPY  06/05/2017    Dieulafoy lesion in duodenum       Current Medications:    Current Facility-Administered Medications: vancomycin 1000 mg IVPB in 250 mL D5W addavial, 1,000 mg, Intravenous, Q24H  sodium chloride flush 0.9 % injection 10 mL, 10 mL, Intravenous, 2 times per day  sodium chloride flush 0.9 % injection 10 mL, 10 mL, Intravenous, PRN  potassium chloride (KLOR-CON M) extended release tablet 40 mEq, 40 mEq, Oral, PRN **OR** potassium bicarb-citric acid (EFFER-K) effervescent tablet 40 mEq, 40 mEq, Oral, PRN **OR** potassium chloride 10 mEq/100 mL IVPB (Peripheral Line), 10 mEq, Intravenous, PRN  magnesium sulfate 1 g in dextrose 5% 100 mL IVPB, 1 g, Intravenous, PRN  magnesium hydroxide (MILK OF MAGNESIA) 400 MG/5ML suspension 30 mL, 30 mL, Oral, Daily PRN  ondansetron (ZOFRAN) injection 4 mg, 4 mg, Intravenous, Q6H PRN  enoxaparin (LOVENOX) injection 30 mg, 30 mg, Subcutaneous, Daily  insulin lispro (HUMALOG) injection vial 0-6 Units, 0-6 Units, Subcutaneous, TID WC  insulin lispro (HUMALOG) injection vial 0-3 Units, 0-3 Units, Subcutaneous, Nightly  glucose (GLUTOSE) 40 % oral gel 15 g, 15 g, Oral, PRN  dextrose 50 % solution 12.5 g, 12.5 g, Intravenous, PRN  glucagon (rDNA) injection 1 mg, 1 mg, Intramuscular, PRN  dextrose 5 % solution, 100 mL/hr, Intravenous, PRN  pravastatin (PRAVACHOL) tablet 20 mg, 20 mg, Oral, Daily  vitamin B-12 (CYANOCOBALAMIN) tablet 1,000 mcg, 1,000 mcg, Oral, Daily  therapeutic multivitamin-minerals 1 tablet, 1 tablet, Oral, Daily  pantoprazole (PROTONIX) tablet 40 mg, 40 mg, Oral, Daily  ferrous sulfate tablet 325 mg, 325 mg, Oral, TID WC  lactobacillus (CULTURELLE) capsule 1 capsule, 1 capsule, Oral, Daily  linagliptin (TRADJENTA) tablet 5 mg, 5 mg, Oral, Daily  pramipexole (MIRAPEX) tablet 0.25 mg, 0.25 mg, Oral, Nightly  metoprolol tartrate (LOPRESSOR) tablet 25 mg, 25 mg, Oral, BID  aspirin chewable tablet 81 mg, 81 mg, Oral, Daily  mirtazapine (REMERON) tablet 15 mg, 15 mg, Oral, Daily  acetaminophen (TYLENOL) tablet 650 mg, 650 mg, Oral, Q4H PRN  piperacillin-tazobactam (ZOSYN) 3.375 g in sodium chloride 0.9 % 100 mL IVPB extended infusion (mini-bag), 3.375 g, Intravenous, Q8H  pneumococcal 13-valent conjugate (PREVNAR) injection 0.5 mL, 0.5 mL, Intramuscular, Once  0.9 % sodium chloride infusion, , Intravenous, Continuous    Allergies:  Patient has no known allergies.     Social History:    Social History     Tobacco Use    Smoking status: Never Smoker    Smokeless tobacco: Never Used Substance Use Topics    Alcohol use: No    Drug use: No       Family History:       Problem Relation Age of Onset    Diabetes Mother     High Blood Pressure Mother     Cancer Father     Heart Disease Maternal Uncle        Review of Systems    CONSTITUTIONAL:  negative  EYES:  negative  HEENT:  negative  RESPIRATORY:  negative  CARDIOVASCULAR:  negative  GASTROINTESTINAL:  negative  GENITOURINARY:  negative  INTEGUMENT/BREAST:  negative  MUSCULOSKELETAL:  positive for  pain  NEUROLOGICAL:  positive for numbness      Objective:   BP (!) 162/63   Pulse 58   Temp 98 °F (36.7 °C) (Oral)   Resp 16   Ht 5' 10.5\" (1.791 m)   Wt 151 lb 4.8 oz (68.6 kg)   SpO2 94%   BMI 21.40 kg/m²     Data:  CBC:   Recent Labs     05/26/19  0553 05/27/19  0637 05/28/19  0531   WBC 5.3 6.2 7.4   HGB 9.2* 9.5* 9.2*   HCT 27.1* 27.7* 26.6*   MCV 93.6 94.3 93.5    153 146     BMP:   Recent Labs     05/26/19  0553 05/27/19  0638 05/28/19  0531    137 139   K 3.8 3.8 3.7    104 105   CO2 23 26 24   PHOS 2.9 2.7 2.6   BUN 24* 15 12   CREATININE 1.2 1.2 1.2     LIVER PROFILE:   No results for input(s): AST, ALT, LIPASE, BILIDIR, BILITOT, ALKPHOS in the last 72 hours. Invalid input(s): AMYLASE,  ALB  PT/INR:   No results for input(s): PROT, INR in the last 72 hours.   HgBA1c:  Lab Results   Component Value Date    LABA1C 7.6 05/25/2019       Cultures:     Imaging: xray right foot - postop changes noted    Physical Exam:    General Appearance: alert and oriented to person, place and time, well developed and well- nourished, in no acute distress  Skin: warm and dry, no rash or erythema  Head: normocephalic and atraumatic  Eyes: pupils equal, round, and reactive to light, extraocular eye movements intact, conjunctivae normal  ENT: tympanic membrane, external ear and ear canal normal bilaterally, nose without deformity, nasal mucosa and turbinates normal without polyps  Neck: supple and non-tender without mass, no thyromegaly or thyroid nodules, no cervical lymphadenopathy  Pulmonary/Chest: clear to auscultation bilaterally- no wheezes, rales or rhonchi, normal air movement, no respiratory distress  Cardiovascular: normal rate, regular rhythm, normal S1 and S2, no murmurs, rubs, clicks, or gallops, distal pulses intact, no carotid bruits  Abdomen: soft, non-tender, non-distended, normal bowel sounds, no masses or organomegaly    Dressing right foot - dried drainage noted. No proximal erythema or edema noted. No increase in skin temperature noted. No malodor noted. Sutures intact right 3rd digit amputation site. No active drainage noted. Dried heme noted at surgical site. Minimal erythema and edema noted. No increase in skin temperature noted. Amputation of 3rd digit and partial 5th ray noted. Assessment:  Patient Active Problem List   Diagnosis Code    RUBEN (acute kidney injury) ([facility] [address] N17.9    Type 2 diabetes mellitus with foot ulcer, without long-term current use of insulin ([facility] [address] E11.621, L97.509    Acute blood loss anemia D62    Severe protein-calorie malnutrition (HCC) E43    Encephalopathy G93.40    Essential hypertension I10    Altered mental status R41.82    Acute renal failure (HCC) N17.9    Contusion of left hip S70. 02XA    Acute UTI N39.0    Mild dehydration E86.0    Urinary incontinence R32    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) E13.621, L97.509    Osteomyelitis of right foot [facility] M86.9    Chronic kidney disease N18.9     diabetes mellitus   Osteomyelitis right 3rd digit secondary to diabetes mellitus - S/P amputation 5/26/19  Cellulitis right foot - improving    Plan  Patient examined. Reviewed labs. Dressing removed. Applied well padded dry dressing to the right foot. Will place orders for [address] to change once patient returns home. OK to D/C home from Podiatry standpoint on oral antibiotics. I would recommend doxycycline based on previous culture results. Minimal ambulation. Keep foot elevated to decrease edema and bleeding. Patient to follow up with me in 1-2 weeks.             Electronically signed by Wolfgang Champion DPM on 5/28/2019 at 7:33 AM.

## 2019-05-28 NOTE — DISCHARGE INSTR - COC
Continuity of Care Form    Patient Name: Lyly Bee   :  1935  MRN:  [id number]    Admit date:  2019  Discharge date:  19    Code Status Order: Full Code   Advance Directives:   [facility] Documentation     Date/Time Healthcare Directive Type of Healthcare Directive Copy in [address] Agent's Name Healthcare Agent's Phone Number    [phone]  No, patient does not have an advance directive for healthcare treatment -- -- -- -- --          Admitting Physician:  Odin Atwood MD  PCP: Royer Rhodes MD    Discharging Nurse: [facility] Unit/Room#: 0215/0215-02  Discharging Unit Phone Number: ***    Emergency Contact:   Extended Emergency Contact Information  Primary Emergency Contact: Dagmar Chow  Address: [address] [place] [address] Phone: [phone]  Relation: Child  Secondary Emergency Contact: Tiny Mccrary  Address: Mirta Bach ([address] [place] [address])           [phone]  Home Phone: [phone]  Relation: Grandchild    Past Surgical History:  Past Surgical History:   Procedure Laterality Date    APPENDECTOMY      COLONOSCOPY      polyps    COLONOSCOPY  7/15/15    normal colon    EYE SURGERY      right cataract    FOOT AMPUTATION Right 2019    PARTIAL RIGHT FOOT AMPUTATION    FOOT AMPUTATION Right 2019    PARTIAL RIGHT FOOT AMPUTATION performed by Ashleigh Tolliver DPM [address] Left 2017    PARTIAL LEFT FOOT AMPUTATION              FOOT SURGERY Right 2017    HYSTERECTOMY      OTHER SURGICAL HISTORY Left     Macular Grid Left eye argon    TOE AMPUTATION      2nd toe 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    TOE AMPUTATION      5th toe right foot    TONSILLECTOMY      UPPER GASTROINTESTINAL ENDOSCOPY  2017    Dieulafoy lesion in duodenum       Immunization History:   Immunization History   Administered Date(s) Administered Therapies: Physical Therapy, Occupational Therapy and skilled nursing  Weight Bearing Status/Restrictions: [address] [employer]}  Other Medical Equipment (for information only, NOT a DME order):  {EQUIPMENT:[id number]}  Other Treatments: ***    Patient's personal belongings (please select all that are sent with patient):  {CHP DME Belongings:[id number]}    RN SIGNATURE:  {Esignature:[id number]}    CASE MANAGEMENT/SOCIAL WORK SECTION    Inpatient Status Date: 19    Readmission Risk Assessment Score:  Readmission Risk              Risk of Unplanned Readmission:        24           Discharging to Facility/ Agency   Name: [facility] [place]  Address: [address] [place] [address] [address] [place] [address] Dr Ousmane [place] [facility], [address] [place] [address]   Phone: [phone]  Fax: [phone]        / signature: Electronically signed by Tavo Peterson RN on 19 at 2:09 PM    PHYSICIAN SECTION    Prognosis: Good    Condition at Discharge: Stable    Rehab Potential (if transferring to Rehab): Good    Recommended Labs or Other Treatments After Discharge:     Physician Certification: I certify the above information and transfer of Fernanda Irizarry  is necessary for the continuing treatment of the diagnosis listed and that she requires Home Care for less 30 days.      Update Admission H&P: No change in H&P    PHYSICIAN SIGNATURE:  Electronically signed by Judge Bear MD on 19 at 12:37 PM

## 2019-05-28 NOTE — CARE COORDINATION
DISCHARGE ORDER  Date/Time 2019 3:07 PM  Completed by: Priya Shrestha, Case Management    Patient Name: Kyaw Arteaga    : 1935  Admitting Diagnosis: Ulcer of foot due to secondary diabetes mellitus ([facility] [U11.365, L97.509]  Admit Date/Time: 2019  2:12 PM    Noted discharge order. Confirmed discharge plan with patient / family (pt and left message for pt's daughter, Dru Smyth ([phone]): Yes   Discharge Plan: Reviewed chart. Role of discharge planner explained and patient verbalized understanding. CM left a voicemail for Dru Smyth (pt's daughter) at [phone]. Discharge order is noted. Pt is being d/c'd to home today. Pt declines HHC. Pt states that she does have a bath aid that comes to the house. Pt is being discharged on oral abx, per Dr. Sola Tate note. Discharge timeout done with Dean Lopez RN. All discharge needs and concerns addressed. Pt's O2 sats are 95% on RA. No further discharge needs needed or noted.

## 2019-05-28 NOTE — DISCHARGE SUMMARY
LOPRESSOR     mirtazapine 15 MG tablet  Commonly known as:  REMERON  Take 1 tablet by mouth daily     MULTIVITAMIN PO     ONGLYZA 2.5 MG Tabs tablet  Generic drug:  saxagliptin     pramipexole 1 MG tablet  Commonly known as:  MIRAPEX     PRAVACHOL 10 MG tablet  Generic drug:  pravastatin     Vitamin B-12 [address]           Where to Get Your Medications      You can get these medications from any pharmacy    Bring a paper prescription for each of these medications  · doxycycline hyclate 100 MG tablet       Discharged in stable condition to home     Follow Up:   Follow up with PCP, and podiatry in wound clinic  in 1 week       Arely Hernandez MD   5/28/19

## 2019-05-28 NOTE — PROGRESS NOTES
Patient has no needs at this time. No distress noted. Call light within reach. Will continue to monitor.

## 2019-05-28 NOTE — PROGRESS NOTES
Shift assessment completed:    Alert and Oriented x4. Vitals are WNL. Respiratory status is regular, easy, unlabored, and SpO2 is 93% on RA. Pain is 0/10. Pt denies any other complications at this time. SR up 2/4. Bed in lowest position. Call light within reach. Bed alarm is on. Will monitor.

## 2019-05-28 NOTE — PROGRESS NOTES
Prescriptions and discharge instructions given. Verbal and written education provided on: new medications (Vibra tabs), follow-up appointments, s/s to report to physician, diet, and activity. Written & verbal education provided on medications and wounds. Pt verbalized understanding denies any questions/ needs at this time. PIV removed from right and left arms, no complications, catheter intact, pressure held for 2 mins, and dressing applied that is CDI. Immunizations are UTD. Pt is stable for discharge at this time. All belongings are with patient at discharge. Patient wheeled down in a  to [facility], for discharge home.

## 2019-05-29 NOTE — CARE COORDINATION
INTERDISCIPLINARY PLAN OF CARE CONFERENCE    Date/Time: 5/29/2019 2:06 PM  Completed by: Jamel Schultz, Case Management      Patient Name:  Donte Bales  YOB: 1935  Admitting Diagnosis: Ulcer of foot due to secondary diabetes mellitus ([facility] [address] [S94.662, T67.613]     Admit Date/Time:  5/24/2019  2:12 PM    Chart reviewed. Interdisciplinary team met to discuss patient progress and discharge plans. Disciplines included Case Management, Nursing, and Dietitian. Current Status:discharged      Discharge Plan Comments: Received call from [facility] and they state that they are active with pt for [facility]. Cm explained that pt stated that she did nto want HHC at discharge. YUMIKO spoke with pt and she states that she now wants to resume her [facility].  YUMIKO faxed [facility] orders for SN/OT/PT to [phone] and AVS/ANTHONY

## 2019-06-20 ENCOUNTER — CARE COORDINATION (OUTPATIENT)
Dept: CASE MANAGEMENT | Age: 84
End: 2019-06-20

## 2019-07-11 ENCOUNTER — CARE COORDINATION (OUTPATIENT)
Dept: CASE MANAGEMENT | Age: 84
End: 2019-07-11

## 2019-07-11 NOTE — CARE COORDINATION
Attempted to reach pt for [facility] follow up call. Left message requesting call back.     Sofi Bauer RN  Care Transition Coordinator  [phone]

## 2019-10-22 ENCOUNTER — HOSPITAL ENCOUNTER (EMERGENCY)
Age: 84
Discharge: HOME | End: 2019-10-22
Attending: EMERGENCY MEDICINE
Payer: MEDICARE

## 2019-10-22 ENCOUNTER — APPOINTMENT (OUTPATIENT)
Dept: GENERAL RADIOLOGY | Age: 84
End: 2019-10-22
Payer: MEDICARE

## 2019-10-22 VITALS
HEIGHT: 71 IN | RESPIRATION RATE: 16 BRPM | TEMPERATURE: 97.9 F | HEART RATE: 62 BPM | OXYGEN SATURATION: 97 % | BODY MASS INDEX: 20.3 KG/M2 | WEIGHT: 145 LBS | DIASTOLIC BLOOD PRESSURE: 52 MMHG | SYSTOLIC BLOOD PRESSURE: 165 MMHG

## 2019-10-22 DIAGNOSIS — L97.519 DIABETIC ULCER OF TOE OF RIGHT FOOT ASSOCIATED WITH DIABETES MELLITUS OF OTHER TYPE, UNSPECIFIED ULCER STAGE (HCC): Primary | ICD-10-CM

## 2019-10-22 DIAGNOSIS — E13.621 DIABETIC ULCER OF TOE OF RIGHT FOOT ASSOCIATED WITH DIABETES MELLITUS OF OTHER TYPE, UNSPECIFIED ULCER STAGE (HCC): Primary | ICD-10-CM

## 2019-10-22 LAB
A/G RATIO: 1.3 (ref 1.1–2.2)
ALBUMIN SERPL-MCNC: 4.4 G/DL (ref 3.4–5)
ALP BLD-CCNC: 96 U/L (ref 40–129)
ALT SERPL-CCNC: 14 U/L (ref 10–40)
ANION GAP SERPL CALCULATED.3IONS-SCNC: 11 MMOL/L (ref 3–16)
AST SERPL-CCNC: 23 U/L (ref 15–37)
BASOPHILS ABSOLUTE: 0 K/UL (ref 0–0.2)
BASOPHILS RELATIVE PERCENT: 0.4 %
BILIRUB SERPL-MCNC: 0.4 MG/DL (ref 0–1)
BUN BLDV-MCNC: 35 MG/DL (ref 7–20)
CALCIUM SERPL-MCNC: 10.4 MG/DL (ref 8.3–10.6)
CHLORIDE BLD-SCNC: 100 MMOL/L (ref 99–110)
CO2: 29 MMOL/L (ref 21–32)
CREAT SERPL-MCNC: 1.3 MG/DL (ref 0.6–1.2)
EOSINOPHILS ABSOLUTE: 0.4 K/UL (ref 0–0.6)
EOSINOPHILS RELATIVE PERCENT: 4.4 %
GFR AFRICAN AMERICAN: 47
GFR NON-AFRICAN AMERICAN: 39
GLOBULIN: 3.5 G/DL
GLUCOSE BLD-MCNC: 172 MG/DL (ref 70–99)
HCT VFR BLD CALC: 34.1 % (ref 36–48)
HEMOGLOBIN: 11.3 G/DL (ref 12–16)
LACTIC ACID: 0.8 MMOL/L (ref 0.4–2)
LYMPHOCYTES ABSOLUTE: 1.2 K/UL (ref 1–5.1)
LYMPHOCYTES RELATIVE PERCENT: 13.6 %
MCH RBC QN AUTO: 31.2 PG (ref 26–34)
MCHC RBC AUTO-ENTMCNC: 33.2 G/DL (ref 31–36)
MCV RBC AUTO: 94.2 FL (ref 80–100)
MONOCYTES ABSOLUTE: 0.6 K/UL (ref 0–1.3)
MONOCYTES RELATIVE PERCENT: 7.1 %
NEUTROPHILS ABSOLUTE: 6.4 K/UL (ref 1.7–7.7)
NEUTROPHILS RELATIVE PERCENT: 74.5 %
PDW BLD-RTO: 13.1 % (ref 12.4–15.4)
PLATELET # BLD: 229 K/UL (ref 135–450)
PMV BLD AUTO: 8.5 FL (ref 5–10.5)
POTASSIUM SERPL-SCNC: 4.4 MMOL/L (ref 3.5–5.1)
RBC # BLD: 3.62 M/UL (ref 4–5.2)
SODIUM BLD-SCNC: 140 MMOL/L (ref 136–145)
TOTAL PROTEIN: 7.9 G/DL (ref 6.4–8.2)
WBC # BLD: 8.6 K/UL (ref 4–11)

## 2019-10-22 PROCEDURE — 83605 ASSAY OF LACTIC ACID: CPT

## 2019-10-22 PROCEDURE — 80053 COMPREHEN METABOLIC PANEL: CPT

## 2019-10-22 PROCEDURE — 36415 COLL VENOUS BLD VENIPUNCTURE: CPT

## 2019-10-22 PROCEDURE — 85025 COMPLETE CBC W/AUTO DIFF WBC: CPT

## 2019-10-22 PROCEDURE — 87040 BLOOD CULTURE FOR BACTERIA: CPT

## 2019-10-22 PROCEDURE — 73630 X-RAY EXAM OF FOOT: CPT

## 2019-10-22 PROCEDURE — 99283 EMERGENCY DEPT VISIT LOW MDM: CPT

## 2019-10-22 RX ORDER — CEPHALEXIN 500 MG/1
500 CAPSULE ORAL 2 TIMES DAILY
Qty: 20 CAPSULE | Refills: 0 | Status: SHIPPED | OUTPATIENT
Start: 2019-10-22 | End: 2019-11-01

## 2019-10-22 ASSESSMENT — ENCOUNTER SYMPTOMS
VOMITING: 0
SHORTNESS OF BREATH: 0
DIARRHEA: 0
NAUSEA: 0

## 2019-10-27 LAB
BLOOD CULTURE, ROUTINE: NORMAL
CULTURE, BLOOD 2: NORMAL

## 2019-12-30 ENCOUNTER — HOSPITAL ENCOUNTER (OUTPATIENT)
Dept: WOUND CARE | Age: 84
Discharge: HOME | End: 2019-12-30
Payer: MEDICARE

## 2020-02-20 ENCOUNTER — HOSPITAL ENCOUNTER (OUTPATIENT)
Age: 85
Setting detail: SPECIMEN
Discharge: HOME | End: 2020-02-20
Payer: MEDICARE

## 2020-02-20 LAB
A/G RATIO: -1 (ref 1.1–2.2)
ALBUMIN SERPL-MCNC: 4.2 G/DL (ref 3.4–5)
ALP BLD-CCNC: <5 U/L (ref 40–129)
ALT SERPL-CCNC: 19 U/L (ref 10–40)
ANION GAP SERPL CALCULATED.3IONS-SCNC: 14 MMOL/L (ref 3–16)
AST SERPL-CCNC: 23 U/L (ref 15–37)
BASOPHILS ABSOLUTE: 0 K/UL (ref 0–0.2)
BASOPHILS RELATIVE PERCENT: 0.4 %
BILIRUB SERPL-MCNC: 0.3 MG/DL (ref 0–1)
BUN BLDV-MCNC: 42 MG/DL (ref 7–20)
CALCIUM SERPL-MCNC: 9.9 MG/DL (ref 8.3–10.6)
CHLORIDE BLD-SCNC: 101 MMOL/L (ref 99–110)
CO2: 27 MMOL/L (ref 21–32)
CREAT SERPL-MCNC: 1.5 MG/DL (ref 0.6–1.2)
EOSINOPHILS ABSOLUTE: 0.3 K/UL (ref 0–0.6)
EOSINOPHILS RELATIVE PERCENT: 3.9 %
GFR AFRICAN AMERICAN: 40
GFR NON-AFRICAN AMERICAN: 33
GLOBULIN: -4 G/DL
GLUCOSE BLD-MCNC: 223 MG/DL (ref 70–99)
HCT VFR BLD CALC: 32.7 % (ref 36–48)
HEMOGLOBIN: 11 G/DL (ref 12–16)
IRON: 68 UG/DL (ref 37–145)
LYMPHOCYTES ABSOLUTE: 0.9 K/UL (ref 1–5.1)
LYMPHOCYTES RELATIVE PERCENT: 13.3 %
MCH RBC QN AUTO: 31.4 PG (ref 26–34)
MCHC RBC AUTO-ENTMCNC: 33.7 G/DL (ref 31–36)
MCV RBC AUTO: 93.3 FL (ref 80–100)
MONOCYTES ABSOLUTE: 0.6 K/UL (ref 0–1.3)
MONOCYTES RELATIVE PERCENT: 7.9 %
NEUTROPHILS ABSOLUTE: 5.3 K/UL (ref 1.7–7.7)
NEUTROPHILS RELATIVE PERCENT: 74.5 %
PDW BLD-RTO: 13.2 % (ref 12.4–15.4)
PLATELET # BLD: 219 K/UL (ref 135–450)
PMV BLD AUTO: 9.2 FL (ref 5–10.5)
POTASSIUM SERPL-SCNC: 4.8 MMOL/L (ref 3.5–5.1)
RBC # BLD: 3.51 M/UL (ref 4–5.2)
SODIUM BLD-SCNC: 142 MMOL/L (ref 136–145)
TOTAL IRON BINDING CAPACITY: 301 UG/DL (ref 260–445)
TOTAL PROTEIN: <0.2 G/DL (ref 6.4–8.2)
WBC # BLD: 7 K/UL (ref 4–11)

## 2020-02-20 PROCEDURE — 83550 IRON BINDING TEST: CPT

## 2020-02-20 PROCEDURE — 80053 COMPREHEN METABOLIC PANEL: CPT

## 2020-02-20 PROCEDURE — 83036 HEMOGLOBIN GLYCOSYLATED A1C: CPT

## 2020-02-20 PROCEDURE — 83540 ASSAY OF IRON: CPT

## 2020-02-20 PROCEDURE — 36415 COLL VENOUS BLD VENIPUNCTURE: CPT

## 2020-02-20 PROCEDURE — 85025 COMPLETE CBC W/AUTO DIFF WBC: CPT

## 2020-02-21 LAB
ESTIMATED AVERAGE GLUCOSE: 211.6 MG/DL
HBA1C MFR BLD: 9 %

## 2020-04-20 ENCOUNTER — HOSPITAL ENCOUNTER (OUTPATIENT)
Age: 85
Setting detail: SPECIMEN
Discharge: HOME | End: 2020-04-20
Payer: MEDICARE

## 2020-04-20 LAB
BACTERIA: ABNORMAL /HPF
BILIRUBIN URINE: NEGATIVE
BLOOD, URINE: ABNORMAL
CLARITY: ABNORMAL
COLOR: YELLOW
EPITHELIAL CELLS, UA: ABNORMAL /HPF (ref 0–5)
GLUCOSE URINE: NEGATIVE MG/DL
KETONES, URINE: NEGATIVE MG/DL
LEUKOCYTE ESTERASE, URINE: ABNORMAL
MICROSCOPIC EXAMINATION: YES
NITRITE, URINE: POSITIVE
PH UA: 5 (ref 5–8)
PROTEIN UA: NEGATIVE MG/DL
RBC UA: ABNORMAL /HPF (ref 0–4)
SPECIFIC GRAVITY UA: 1.01 (ref 1–1.03)
URINE TYPE: ABNORMAL
UROBILINOGEN, URINE: 0.2 E.U./DL
WBC UA: >100 /HPF (ref 0–5)

## 2020-04-20 PROCEDURE — 87186 SC STD MICRODIL/AGAR DIL: CPT

## 2020-04-20 PROCEDURE — 87184 SC STD DISK METHOD PER PLATE: CPT

## 2020-04-20 PROCEDURE — 87077 CULTURE AEROBIC IDENTIFY: CPT

## 2020-04-20 PROCEDURE — 81001 URINALYSIS AUTO W/SCOPE: CPT

## 2020-04-20 PROCEDURE — 87086 URINE CULTURE/COLONY COUNT: CPT

## 2020-04-23 LAB
ORGANISM: ABNORMAL
URINE CULTURE, ROUTINE: ABNORMAL

## 2020-05-21 ENCOUNTER — HOSPITAL ENCOUNTER (OUTPATIENT)
Age: 85
Setting detail: SPECIMEN
Discharge: HOME | End: 2020-05-21
Payer: MEDICARE

## 2020-05-21 LAB
ANION GAP SERPL CALCULATED.3IONS-SCNC: 16 MMOL/L (ref 3–16)
BACTERIA: ABNORMAL /HPF
BASOPHILS ABSOLUTE: 0 K/UL (ref 0–0.2)
BASOPHILS RELATIVE PERCENT: 0.2 %
BILIRUBIN URINE: NEGATIVE
BLOOD, URINE: ABNORMAL
BUN BLDV-MCNC: 43 MG/DL (ref 7–20)
CALCIUM SERPL-MCNC: 10 MG/DL (ref 8.3–10.6)
CHLORIDE BLD-SCNC: 100 MMOL/L (ref 99–110)
CLARITY: ABNORMAL
CO2: 25 MMOL/L (ref 21–32)
COLOR: YELLOW
CREAT SERPL-MCNC: 1.6 MG/DL (ref 0.6–1.2)
EOSINOPHILS ABSOLUTE: 0.2 K/UL (ref 0–0.6)
EOSINOPHILS RELATIVE PERCENT: 2.6 %
EPITHELIAL CELLS, UA: ABNORMAL /HPF (ref 0–5)
GFR AFRICAN AMERICAN: 37
GFR NON-AFRICAN AMERICAN: 31
GLUCOSE BLD-MCNC: 217 MG/DL (ref 70–99)
GLUCOSE URINE: NEGATIVE MG/DL
HCT VFR BLD CALC: 33.2 % (ref 36–48)
HEMOGLOBIN: 10.9 G/DL (ref 12–16)
IRON: 93 UG/DL (ref 37–145)
KETONES, URINE: NEGATIVE MG/DL
LEUKOCYTE ESTERASE, URINE: ABNORMAL
LYMPHOCYTES ABSOLUTE: 0.9 K/UL (ref 1–5.1)
LYMPHOCYTES RELATIVE PERCENT: 12.5 %
MCH RBC QN AUTO: 31 PG (ref 26–34)
MCHC RBC AUTO-ENTMCNC: 32.9 G/DL (ref 31–36)
MCV RBC AUTO: 94.4 FL (ref 80–100)
MICROSCOPIC EXAMINATION: YES
MONOCYTES ABSOLUTE: 0.6 K/UL (ref 0–1.3)
MONOCYTES RELATIVE PERCENT: 8 %
NEUTROPHILS ABSOLUTE: 5.3 K/UL (ref 1.7–7.7)
NEUTROPHILS RELATIVE PERCENT: 76.7 %
NITRITE, URINE: NEGATIVE
PDW BLD-RTO: 13 % (ref 12.4–15.4)
PH UA: 5.5 (ref 5–8)
PLATELET # BLD: 212 K/UL (ref 135–450)
PMV BLD AUTO: 9.1 FL (ref 5–10.5)
POTASSIUM SERPL-SCNC: 4.4 MMOL/L (ref 3.5–5.1)
PROTEIN UA: ABNORMAL MG/DL
RBC # BLD: 3.52 M/UL (ref 4–5.2)
RBC UA: ABNORMAL /HPF (ref 0–4)
SODIUM BLD-SCNC: 141 MMOL/L (ref 136–145)
SPECIFIC GRAVITY UA: 1.01 (ref 1–1.03)
TOTAL IRON BINDING CAPACITY: 284 UG/DL (ref 260–445)
URINE TYPE: ABNORMAL
UROBILINOGEN, URINE: 0.2 E.U./DL
VITAMIN D 25-HYDROXY: 57.5 NG/ML
WBC # BLD: 6.9 K/UL (ref 4–11)
WBC UA: ABNORMAL /HPF (ref 0–5)

## 2020-05-21 PROCEDURE — 85025 COMPLETE CBC W/AUTO DIFF WBC: CPT

## 2020-05-21 PROCEDURE — 82306 VITAMIN D 25 HYDROXY: CPT

## 2020-05-21 PROCEDURE — 81001 URINALYSIS AUTO W/SCOPE: CPT

## 2020-05-21 PROCEDURE — 83036 HEMOGLOBIN GLYCOSYLATED A1C: CPT

## 2020-05-21 PROCEDURE — 83540 ASSAY OF IRON: CPT

## 2020-05-21 PROCEDURE — 83550 IRON BINDING TEST: CPT

## 2020-05-21 PROCEDURE — 36415 COLL VENOUS BLD VENIPUNCTURE: CPT

## 2020-05-21 PROCEDURE — 80048 BASIC METABOLIC PNL TOTAL CA: CPT

## 2020-05-22 LAB
ESTIMATED AVERAGE GLUCOSE: 205.9 MG/DL
HBA1C MFR BLD: 8.8 %

## 2020-05-28 ENCOUNTER — HOSPITAL ENCOUNTER (OUTPATIENT)
Age: 85
Setting detail: SPECIMEN
Discharge: HOME | End: 2020-05-28
Payer: MEDICARE

## 2020-05-28 PROCEDURE — 87086 URINE CULTURE/COLONY COUNT: CPT

## 2020-05-28 PROCEDURE — 87186 SC STD MICRODIL/AGAR DIL: CPT

## 2020-05-28 PROCEDURE — 87077 CULTURE AEROBIC IDENTIFY: CPT

## 2020-05-31 LAB
ORGANISM: ABNORMAL
URINE CULTURE, ROUTINE: ABNORMAL

## 2020-07-05 ENCOUNTER — APPOINTMENT (OUTPATIENT)
Dept: GENERAL RADIOLOGY | Age: 85
End: 2020-07-05
Payer: MEDICARE

## 2020-07-05 ENCOUNTER — HOSPITAL ENCOUNTER (EMERGENCY)
Age: 85
Discharge: HOME | End: 2020-07-05
Attending: EMERGENCY MEDICINE
Payer: MEDICARE

## 2020-07-05 VITALS
RESPIRATION RATE: 16 BRPM | BODY MASS INDEX: 22.9 KG/M2 | WEIGHT: 160 LBS | HEART RATE: 61 BPM | DIASTOLIC BLOOD PRESSURE: 63 MMHG | HEIGHT: 70 IN | SYSTOLIC BLOOD PRESSURE: 181 MMHG | OXYGEN SATURATION: 97 % | TEMPERATURE: 98.2 F

## 2020-07-05 LAB
BACTERIA: ABNORMAL /HPF
BILIRUBIN URINE: NEGATIVE
BLOOD, URINE: ABNORMAL
CLARITY: CLEAR
COLOR: YELLOW
EPITHELIAL CELLS, UA: ABNORMAL /HPF (ref 0–5)
GLUCOSE URINE: NEGATIVE MG/DL
KETONES, URINE: NEGATIVE MG/DL
LEUKOCYTE ESTERASE, URINE: ABNORMAL
MICROSCOPIC EXAMINATION: YES
NITRITE, URINE: POSITIVE
PH UA: 6 (ref 5–8)
PROTEIN UA: NEGATIVE MG/DL
RBC UA: ABNORMAL /HPF (ref 0–4)
SPECIFIC GRAVITY UA: 1.01 (ref 1–1.03)
URINE TYPE: ABNORMAL
UROBILINOGEN, URINE: 0.2 E.U./DL
WBC UA: ABNORMAL /HPF (ref 0–5)

## 2020-07-05 PROCEDURE — 73610 X-RAY EXAM OF ANKLE: CPT

## 2020-07-05 PROCEDURE — 73630 X-RAY EXAM OF FOOT: CPT

## 2020-07-05 PROCEDURE — 87186 SC STD MICRODIL/AGAR DIL: CPT

## 2020-07-05 PROCEDURE — 87205 SMEAR GRAM STAIN: CPT

## 2020-07-05 PROCEDURE — 87077 CULTURE AEROBIC IDENTIFY: CPT

## 2020-07-05 PROCEDURE — 87070 CULTURE OTHR SPECIMN AEROBIC: CPT

## 2020-07-05 PROCEDURE — 99284 EMERGENCY DEPT VISIT MOD MDM: CPT

## 2020-07-05 PROCEDURE — 81001 URINALYSIS AUTO W/SCOPE: CPT

## 2020-07-05 PROCEDURE — 6370000000 HC RX 637 (ALT 250 FOR IP): Performed by: EMERGENCY MEDICINE

## 2020-07-05 PROCEDURE — 73560 X-RAY EXAM OF KNEE 1 OR 2: CPT

## 2020-07-05 RX ORDER — GABAPENTIN 100 MG/1
100 CAPSULE ORAL 2 TIMES DAILY
COMMUNITY
End: 2021-03-09

## 2020-07-05 RX ORDER — CEPHALEXIN 500 MG/1
500 CAPSULE ORAL 4 TIMES DAILY
Qty: 14 CAPSULE | Refills: 0 | Status: SHIPPED | OUTPATIENT
Start: 2020-07-05 | End: 2020-07-12

## 2020-07-05 RX ORDER — ACETAMINOPHEN 325 MG/1
650 TABLET ORAL ONCE
Status: COMPLETED | OUTPATIENT
Start: 2020-07-05 | End: 2020-07-05

## 2020-07-05 RX ORDER — LISINOPRIL 10 MG/1
10 TABLET ORAL DAILY
Status: ON HOLD | COMMUNITY
End: 2022-05-29 | Stop reason: HOSPADM

## 2020-07-05 RX ORDER — SAXAGLIPTIN AND METFORMIN HYDROCHLORIDE 5; 500 MG/1; MG/1
1 TABLET, FILM COATED, EXTENDED RELEASE ORAL DAILY
COMMUNITY
End: 2021-03-09

## 2020-07-05 RX ORDER — CEPHALEXIN 500 MG/1
500 CAPSULE ORAL ONCE
Status: COMPLETED | OUTPATIENT
Start: 2020-07-05 | End: 2020-07-05

## 2020-07-05 RX ORDER — ACETAMINOPHEN 160 MG
2000 TABLET,DISINTEGRATING ORAL DAILY
COMMUNITY

## 2020-07-05 RX ADMIN — CEPHALEXIN 500 MG: 500 CAPSULE ORAL at 22:07

## 2020-07-05 RX ADMIN — ACETAMINOPHEN 650 MG: 325 TABLET ORAL at 23:05

## 2020-07-05 ASSESSMENT — PAIN DESCRIPTION - LOCATION: LOCATION: KNEE;FOOT

## 2020-07-05 ASSESSMENT — PAIN SCALES - GENERAL: PAINLEVEL_OUTOF10: 4

## 2020-07-05 ASSESSMENT — PAIN SCALES - WONG BAKER: WONGBAKER_NUMERICALRESPONSE: 6

## 2020-07-05 ASSESSMENT — PAIN DESCRIPTION - ORIENTATION: ORIENTATION: RIGHT;LEFT

## 2020-07-05 ASSESSMENT — PAIN DESCRIPTION - PAIN TYPE: TYPE: ACUTE PAIN

## 2020-07-06 NOTE — ED PROVIDER NOTES
CHIEF COMPLAINT  Fall (pt fell last week, complaining of left knee pain and bilateral foot pain. Diabetic foot ulcer x3 to right foot. 3rd toe, Metatarsal and heel.)      HISTORY OF PRESENT ILLNESS  Carloz Hopkins is a 80 y.o. female presents to the ED with fall, about a week ago, having L knee pain, and pain R foot and L ankle, she has hx of DM and has a right diabetic foot wound that looks worse, and 2 other areas the daughter had not noticed before, goes to wound care, has had partial foot amputation, daughter here w/ her, pt has hx of dementia, patient is a poor historian, had no LOC and no head injury w/ fall, no neck pain or HA currently, no fever fever, no cough/covid exposure she is aware of. Daughter reports she is a little off lately and wonders if she might have another UTI, patient is usually asymptomatic w/ UTIs and has not current dysuria/frequency. Dr. Lala Fernando is her podiatrist. ASA is only blood thinner, No other complaints, modifying factors or associated symptoms. I have reviewed the following from the nursing documentation.     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 spine    Restless leg syndrome      Past Surgical History:   Procedure Laterality Date    APPENDECTOMY      COLONOSCOPY  1997    polyps    COLONOSCOPY  7/15/15    normal colon    EYE SURGERY      right cataract    FOOT AMPUTATION Right 05/26/2019    PARTIAL RIGHT FOOT AMPUTATION    FOOT AMPUTATION Right 5/26/2019    PARTIAL RIGHT FOOT AMPUTATION performed by Randy Bhakta DPM at [address] Left 05/30/2017    PARTIAL LEFT FOOT AMPUTATION  FOOT SURGERY Right 12/11/2017    HYSTERECTOMY      OTHER SURGICAL HISTORY Left     Macular Grid Left eye argon    TOE AMPUTATION  2003    2nd toe 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    TOE AMPUTATION  2011    5th toe right foot    TONSILLECTOMY      UPPER GASTROINTESTINAL ENDOSCOPY  06/05/2017    Dieulafoy lesion in duodenum     Family History   Problem Relation Age of Onset    Diabetes Mother     High Blood Pressure Mother     Cancer Father     Heart Disease Maternal Uncle      Social History     Socioeconomic History    Marital status:      Spouse name: Not on file    Number of children: Not on file    Years of education: Not on file    Highest education level: Not on file   Occupational History    Not on file   Social Needs    Financial resource strain: Not on file    Food insecurity     Worry: Not on file     Inability: Not on file    Transportation needs     Medical: Not on file     Non-medical: Not on file   Tobacco Use    Smoking status: Never Smoker    Smokeless tobacco: Never Used   Substance and Sexual Activity    Alcohol use: No    Drug use: No    Sexual activity: Never   Lifestyle    Physical activity     Days per week: Not on file     Minutes per session: Not on file    Stress: Not on file   Relationships    Social connections     Talks on phone: Not on file     Gets together: Not on file     Attends Baptism service: Not on file     Active member of club or organization: Not on file     Attends meetings of clubs or organizations: Not on file     Relationship status: Not on file    Intimate partner violence     Fear of current or ex partner: Not on file     Emotionally abused: Not on file     Physically abused: Not on file     Forced sexual activity: Not on file   Other Topics Concern    Not on file   Social History Narrative    Not on file     No current facility-administered medications for this encounter.       Current Outpatient Medications   Medication Sig Dispense Refill    lisinopril (PRINIVIL;ZESTRIL) 10 MG tablet Take 10 mg by mouth daily      Cholecalciferol (VITAMIN D3) 50 MCG (2000 UT) CAPS Take 2,000 Units by mouth daily      sAXagliptin-metFORMIN ER (KOMBIGLYZE XR) 5-500 MG TB24 Take 1 tablet by mouth daily      gabapentin (NEURONTIN) 100 MG capsule Take 100 mg by mouth 2 times daily.  cephALEXin (KEFLEX) 500 MG capsule Take 1 capsule by mouth 4 times daily for 7 days 14 capsule 0    mirtazapine (REMERON) 15 MG tablet Take 1 tablet by mouth daily      pramipexole (MIRAPEX) 1 MG tablet TAKE 1 TABLET BY ORAL ROUTE ONCE A DAY (IN THE EVENING) AROUND 8-9 PM FOR RLS  4    metoprolol tartrate (LOPRESSOR) 25 MG tablet TAKE 1 TABLET BY ORAL ROUTE 2 TIMES A DAY FOR BP  4    ferrous sulfate 325 (65 Fe) MG tablet Take 325 mg by mouth 3 times daily (with meals)       Multiple Vitamins-Minerals (MULTIVITAMIN PO) Take by mouth daily      Cyanocobalamin (VITAMIN B-12) 5000 MCG SUBL Place 500 mcg under the tongue daily       pravastatin (PRAVACHOL) 10 MG tablet Take 10 mg by mouth daily       aspirin 81 MG chewable tablet Take 1 tablet by mouth daily      collagenase 250 UNIT/GM ointment Apply topically daily Apply topically daily. No Known Allergies    REVIEW OF SYSTEMS  10 systems reviewed, pertinent positives per HPI otherwise noted to be negative. PHYSICAL EXAM  BP (!) 181/63   Pulse 61   Temp 98.2 °F (36.8 °C) (Oral)   Resp 16   Ht 5' 10\" (1.778 m)   Wt 160 lb (72.6 kg)   SpO2 97%   BMI 22.96 kg/m²   GENERAL APPEARANCE: Awake and alert. Cooperative. No acute distress  HEAD: Normocephalic. Atraumatic. EYES: PERRL. EOM's grossly intact. ENT: Mucous membranes are moist.   NECK: Supple. No rigidity, nml ROM  HEART: RRR. No murmurs  LUNGS: Respirations nonlabored. Lungs are CTAB. ABDOMEN: Soft. Non-distended. Non-tender. No guarding or rebound. Normal bowel sounds. EXTREMITIES: No peripheral edema.  No calf tenderness, Moves all extremities PROVIDED HISTORY: Reason for exam:->fall, L knee pain Reason for Exam: lt knee pain Acuity: Acute Type of Exam: Initial Mechanism of Injury: fall Relevant Medical/Surgical History: dm; ORDERING SYSTEM PROVIDED HISTORY: L knee injury TECHNOLOGIST PROVIDED HISTORY: Reason for exam:->L knee injury Reason for Exam: lt ankle pain Acuity: Acute Type of Exam: Initial Mechanism of Injury: fall Relevant Medical/Surgical History: dm; ORDERING SYSTEM PROVIDED HISTORY: L foot wounds, fall TECHNOLOGIST PROVIDED HISTORY: Reason for exam:->L foot wounds, fall Reason for Exam: rt foot pain, 2 diabetic ulcers on bottom of foot Acuity: Acute Type of Exam: Initial Relevant Medical/Surgical History: dm FINDINGS: Left knee: No stress, insufficiency, or traumatic fractures identified. There is tricompartmental degenerative disease, greater laterally, moderate degree. There is a moderate-sized joint effusion. Periarticular soft tissue swelling noted. Left ankle: The bones are demineralized. The medial and lateral malleoli are intact. The ankle mortise is congruent. The talar dome is intact. The subtalar joint is unremarkable, with maintenance of the anterior process of the calcaneus. Circumferential soft tissue swelling noted, greater anteriorly. Right foot: Patient status post transmetatarsal amputation of the 1st ray, with preservation of the great toe. Disarticulation of the 3rd toe noted. There is a transmetatarsal amputation of the 5th ray at the base. There is destruction of the 4th toe PIP joint, but that is unchanged when compared to the previous exam.  No osteolysis or periosteal reaction is identified to suggest osteomyelitis. Circumferential soft tissue swelling noted. The lateral examination, postsurgical changes noted in the lower tibia. Left knee: Moderate-sized joint effusion. Otherwise unremarkable. Left ankle: Circumferential soft tissue swelling. No bony abnormality.  Right foot: Postsurgical changes as described above. No acute bony abnormality detected. No radiographic evidence of osteomyelitis. Xr Ankle Left (min 3 Views)    Result Date: 7/5/2020  EXAMINATION: 2 XRAY VIEWS OF THE LEFT KNEE; THREE XRAY VIEWS OF THE LEFT ANKLE; 3 XRAY VIEWS OF THE RIGHT FOOT 7/5/2020 7:02 pm COMPARISON: None. HISTORY: ORDERING SYSTEM PROVIDED HISTORY: fall, L knee pain TECHNOLOGIST PROVIDED HISTORY: Reason for exam:->fall, L knee pain Reason for Exam: lt knee pain Acuity: Acute Type of Exam: Initial Mechanism of Injury: fall Relevant Medical/Surgical History: dm; ORDERING SYSTEM PROVIDED HISTORY: L knee injury TECHNOLOGIST PROVIDED HISTORY: Reason for exam:->L knee injury Reason for Exam: lt ankle pain Acuity: Acute Type of Exam: Initial Mechanism of Injury: fall Relevant Medical/Surgical History: dm; ORDERING SYSTEM PROVIDED HISTORY: L foot wounds, fall TECHNOLOGIST PROVIDED HISTORY: Reason for exam:->L foot wounds, fall Reason for Exam: rt foot pain, 2 diabetic ulcers on bottom of foot Acuity: Acute Type of Exam: Initial Relevant Medical/Surgical History: dm FINDINGS: Left knee: No stress, insufficiency, or traumatic fractures identified. There is tricompartmental degenerative disease, greater laterally, moderate degree. There is a moderate-sized joint effusion. Periarticular soft tissue swelling noted. Left ankle: The bones are demineralized. The medial and lateral malleoli are intact. The ankle mortise is congruent. The talar dome is intact. The subtalar joint is unremarkable, with maintenance of the anterior process of the calcaneus. Circumferential soft tissue swelling noted, greater anteriorly. Right foot: Patient status post transmetatarsal amputation of the 1st ray, with preservation of the great toe. Disarticulation of the 3rd toe noted. There is a transmetatarsal amputation of the 5th ray at the base.   There is destruction of the 4th toe PIP joint, but that is unchanged when compared to the previous exam. No osteolysis or periosteal reaction is identified to suggest osteomyelitis. Circumferential soft tissue swelling noted. The lateral examination, postsurgical changes noted in the lower tibia. Left knee: Moderate-sized joint effusion. Otherwise unremarkable. Left ankle: Circumferential soft tissue swelling. No bony abnormality. Right foot: Postsurgical changes as described above. No acute bony abnormality detected. No radiographic evidence of osteomyelitis. Xr Foot Right (min 3 Views)    Result Date: 7/5/2020  EXAMINATION: 2 XRAY VIEWS OF THE LEFT KNEE; THREE XRAY VIEWS OF THE LEFT ANKLE; 3 XRAY VIEWS OF THE RIGHT FOOT 7/5/2020 7:02 pm COMPARISON: None. HISTORY: ORDERING SYSTEM PROVIDED HISTORY: fall, L knee pain TECHNOLOGIST PROVIDED HISTORY: Reason for exam:->fall, L knee pain Reason for Exam: lt knee pain Acuity: Acute Type of Exam: Initial Mechanism of Injury: fall Relevant Medical/Surgical History: dm; ORDERING SYSTEM PROVIDED HISTORY: L knee injury TECHNOLOGIST PROVIDED HISTORY: Reason for exam:->L knee injury Reason for Exam: lt ankle pain Acuity: Acute Type of Exam: Initial Mechanism of Injury: fall Relevant Medical/Surgical History: dm; ORDERING SYSTEM PROVIDED HISTORY: L foot wounds, fall TECHNOLOGIST PROVIDED HISTORY: Reason for exam:->L foot wounds, fall Reason for Exam: rt foot pain, 2 diabetic ulcers on bottom of foot Acuity: Acute Type of Exam: Initial Relevant Medical/Surgical History: dm FINDINGS: Left knee: No stress, insufficiency, or traumatic fractures identified. There is tricompartmental degenerative disease, greater laterally, moderate degree. There is a moderate-sized joint effusion. Periarticular soft tissue swelling noted. Left ankle: The bones are demineralized. The medial and lateral malleoli are intact. The ankle mortise is congruent. The talar dome is intact. The subtalar joint is unremarkable, with maintenance of the anterior process of the calcaneus. Circumferential soft tissue swelling noted, greater anteriorly. Right foot: Patient status post transmetatarsal amputation of the 1st ray, with preservation of the great toe. Disarticulation of the 3rd toe noted. There is a transmetatarsal amputation of the 5th ray at the base. There is destruction of the 4th toe PIP joint, but that is unchanged when compared to the previous exam.  No osteolysis or periosteal reaction is identified to suggest osteomyelitis. Circumferential soft tissue swelling noted. The lateral examination, postsurgical changes noted in the lower tibia. Left knee: Moderate-sized joint effusion. Otherwise unremarkable. Left ankle: Circumferential soft tissue swelling. No bony abnormality. Right foot: Postsurgical changes as described above. No acute bony abnormality detected. No radiographic evidence of osteomyelitis. ED COURSE/MDM  Patient seen and evaluated. Old records reviewed. Labs and imaging reviewed and results discussed with patient. 81yo F s/p fall, no acute process on XRs, no evidence of osteo on XR, patient concerned about her wound, took culture and started keflex for wound and explained this should treat UTI as well, but would send urine to culture, daughter reports she just takes tylenol at home for pain, declined anything else here, no current suspicion for pyelo/sepsis, no visible cellulitis/abscess, mentation currently baseline per daughter, encouraged controlling her DM and HTN, Strict return precautions given, all questions answered, will return if any worsening symptoms or new concerns, see AVS for further discharge information, patient/daughter verbalized understanding of plan, felt comfortable going home.     Orders Placed This Encounter   Procedures    Culture, Wound    XR KNEE LEFT (1-2 VIEWS)    XR FOOT RIGHT (MIN 3 VIEWS)    XR ANKLE LEFT (MIN 3 VIEWS)    Urinalysis, reflex to microscopic    Microscopic Urinalysis    Wound care    Strapping ace wrap     Orders Placed This Encounter   Medications    cephALEXin (KEFLEX) capsule 500 mg    acetaminophen (TYLENOL) tablet 650 mg    cephALEXin (KEFLEX) 500 MG capsule     Sig: Take 1 capsule by mouth 4 times daily for 7 days     Dispense:  14 capsule     Refill:  0     ED Course as of Jul 06 0229   Sun Jul 05, 2020 2147 Patient declined anything for pain at this time.    [SY]      ED Course User Index  [SY] Yair Mcwilliams DO       Patient was given scripts for the following medications. I counseled patient how to take these medications. Discharge Medication List as of 7/5/2020 11:26 PM      START taking these medications    Details   cephALEXin (KEFLEX) 500 MG capsule Take 1 capsule by mouth 4 times daily for 7 days, Disp-14 capsule, R-0Print             CLINICAL IMPRESSION  1. Fall, initial encounter    2. Acute pain of left knee    3. Effusion of left knee    4. H/O diabetic neuropathy    5. Diabetic ulcer of right foot associated with type 2 diabetes mellitus, unspecified part of foot, unspecified ulcer stage ([facility] [address]    6. Essential hypertension    7. Acute UTI    8. Type 2 diabetes mellitus with foot ulcer, without long-term current use of insulin ([facility])        Blood pressure (!) 181/63, pulse 61, temperature 98.2 °F (36.8 °C), temperature source Oral, resp. rate 16, height 5' 10\" (1.778 m), weight 160 lb (72.6 kg), SpO2 97 %, not currently breastfeeding. DISPOSITION  Kaitlin Colon was discharged to home in stable condition.                    Yair Mcwilliams DO  07/29/20 4590

## 2020-07-08 LAB
GRAM STAIN RESULT: ABNORMAL
ORGANISM: ABNORMAL
ORGANISM: ABNORMAL
WOUND/ABSCESS: ABNORMAL

## 2020-07-21 ENCOUNTER — HOSPITAL ENCOUNTER (OUTPATIENT)
Age: 85
Setting detail: SPECIMEN
Discharge: HOME | End: 2020-07-21
Payer: MEDICARE

## 2020-07-21 PROCEDURE — 87086 URINE CULTURE/COLONY COUNT: CPT

## 2020-07-23 LAB
ORGANISM: ABNORMAL
URINE CULTURE, ROUTINE: ABNORMAL

## 2020-09-23 ENCOUNTER — HOSPITAL ENCOUNTER (OUTPATIENT)
Age: 85
Setting detail: SPECIMEN
Discharge: HOME | End: 2020-09-23
Payer: MEDICARE

## 2020-09-23 LAB
A/G RATIO: 1.6 (ref 1.1–2.2)
ALBUMIN SERPL-MCNC: 4.2 G/DL (ref 3.4–5)
ALP BLD-CCNC: 85 U/L (ref 40–129)
ALT SERPL-CCNC: 13 U/L (ref 10–40)
ANION GAP SERPL CALCULATED.3IONS-SCNC: 7 MMOL/L (ref 3–16)
AST SERPL-CCNC: 25 U/L (ref 15–37)
BASOPHILS ABSOLUTE: 0 K/UL (ref 0–0.2)
BASOPHILS RELATIVE PERCENT: 0.3 %
BILIRUB SERPL-MCNC: <0.2 MG/DL (ref 0–1)
BUN BLDV-MCNC: 29 MG/DL (ref 7–20)
CALCIUM SERPL-MCNC: 10.2 MG/DL (ref 8.3–10.6)
CHLORIDE BLD-SCNC: 102 MMOL/L (ref 99–110)
CO2: 33 MMOL/L (ref 21–32)
CREAT SERPL-MCNC: 1.2 MG/DL (ref 0.6–1.2)
EOSINOPHILS ABSOLUTE: 0.3 K/UL (ref 0–0.6)
EOSINOPHILS RELATIVE PERCENT: 4.6 %
GFR AFRICAN AMERICAN: 52
GFR NON-AFRICAN AMERICAN: 43
GLOBULIN: 2.6 G/DL
GLUCOSE BLD-MCNC: 160 MG/DL (ref 70–99)
HCT VFR BLD CALC: 32.4 % (ref 36–48)
HEMOGLOBIN: 10.7 G/DL (ref 12–16)
LYMPHOCYTES ABSOLUTE: 1 K/UL (ref 1–5.1)
LYMPHOCYTES RELATIVE PERCENT: 17.4 %
MCH RBC QN AUTO: 31.1 PG (ref 26–34)
MCHC RBC AUTO-ENTMCNC: 33 G/DL (ref 31–36)
MCV RBC AUTO: 94.1 FL (ref 80–100)
MONOCYTES ABSOLUTE: 0.5 K/UL (ref 0–1.3)
MONOCYTES RELATIVE PERCENT: 8.5 %
NEUTROPHILS ABSOLUTE: 4.1 K/UL (ref 1.7–7.7)
NEUTROPHILS RELATIVE PERCENT: 69.2 %
PDW BLD-RTO: 13.5 % (ref 12.4–15.4)
PLATELET # BLD: 218 K/UL (ref 135–450)
PMV BLD AUTO: 9.1 FL (ref 5–10.5)
POTASSIUM SERPL-SCNC: 5.8 MMOL/L (ref 3.5–5.1)
RBC # BLD: 3.44 M/UL (ref 4–5.2)
SODIUM BLD-SCNC: 142 MMOL/L (ref 136–145)
TOTAL PROTEIN: 6.8 G/DL (ref 6.4–8.2)
WBC # BLD: 5.9 K/UL (ref 4–11)

## 2020-09-23 PROCEDURE — 36415 COLL VENOUS BLD VENIPUNCTURE: CPT

## 2020-09-23 PROCEDURE — 85025 COMPLETE CBC W/AUTO DIFF WBC: CPT

## 2020-09-23 PROCEDURE — 83550 IRON BINDING TEST: CPT

## 2020-09-23 PROCEDURE — 83036 HEMOGLOBIN GLYCOSYLATED A1C: CPT

## 2020-09-23 PROCEDURE — 80053 COMPREHEN METABOLIC PANEL: CPT

## 2020-09-23 PROCEDURE — 83540 ASSAY OF IRON: CPT

## 2020-09-23 PROCEDURE — 87086 URINE CULTURE/COLONY COUNT: CPT

## 2020-09-24 LAB
ESTIMATED AVERAGE GLUCOSE: 197.3 MG/DL
HBA1C MFR BLD: 8.5 %
IRON: 68 UG/DL (ref 37–145)
TOTAL IRON BINDING CAPACITY: 247 UG/DL (ref 260–445)

## 2020-09-25 LAB — URINE CULTURE, ROUTINE: NORMAL

## 2020-11-03 PROBLEM — N17.9 AKI (ACUTE KIDNEY INJURY) (HCC): Status: RESOLVED | Noted: 2017-05-29 | Resolved: 2020-11-03

## 2020-11-18 ENCOUNTER — HOSPITAL ENCOUNTER (OUTPATIENT)
Age: 85
Setting detail: SPECIMEN
Discharge: HOME | End: 2020-11-18
Payer: MEDICARE

## 2020-11-18 LAB
A/G RATIO: 1.8 (ref 1.1–2.2)
ALBUMIN SERPL-MCNC: 4.4 G/DL (ref 3.4–5)
ALP BLD-CCNC: 95 U/L (ref 40–129)
ALT SERPL-CCNC: 23 U/L (ref 10–40)
ANION GAP SERPL CALCULATED.3IONS-SCNC: 11 MMOL/L (ref 3–16)
AST SERPL-CCNC: 29 U/L (ref 15–37)
BASOPHILS ABSOLUTE: 0 K/UL (ref 0–0.2)
BASOPHILS RELATIVE PERCENT: 0.3 %
BILIRUB SERPL-MCNC: 0.3 MG/DL (ref 0–1)
BUN BLDV-MCNC: 42 MG/DL (ref 7–20)
CALCIUM SERPL-MCNC: 9.9 MG/DL (ref 8.3–10.6)
CHLORIDE BLD-SCNC: 102 MMOL/L (ref 99–110)
CO2: 26 MMOL/L (ref 21–32)
CREAT SERPL-MCNC: 1.5 MG/DL (ref 0.6–1.2)
EOSINOPHILS ABSOLUTE: 0.3 K/UL (ref 0–0.6)
EOSINOPHILS RELATIVE PERCENT: 3.6 %
GFR AFRICAN AMERICAN: 40
GFR NON-AFRICAN AMERICAN: 33
GLOBULIN: 2.4 G/DL
GLUCOSE BLD-MCNC: 183 MG/DL (ref 70–99)
HCT VFR BLD CALC: 33.3 % (ref 36–48)
HEMOGLOBIN: 11 G/DL (ref 12–16)
LYMPHOCYTES ABSOLUTE: 1 K/UL (ref 1–5.1)
LYMPHOCYTES RELATIVE PERCENT: 14.1 %
MCH RBC QN AUTO: 31.2 PG (ref 26–34)
MCHC RBC AUTO-ENTMCNC: 33.1 G/DL (ref 31–36)
MCV RBC AUTO: 94.3 FL (ref 80–100)
MONOCYTES ABSOLUTE: 0.5 K/UL (ref 0–1.3)
MONOCYTES RELATIVE PERCENT: 7.4 %
NEUTROPHILS ABSOLUTE: 5.3 K/UL (ref 1.7–7.7)
NEUTROPHILS RELATIVE PERCENT: 74.6 %
PDW BLD-RTO: 14.1 % (ref 12.4–15.4)
PLATELET # BLD: 227 K/UL (ref 135–450)
PMV BLD AUTO: 8.5 FL (ref 5–10.5)
POTASSIUM SERPL-SCNC: 4.7 MMOL/L (ref 3.5–5.1)
RBC # BLD: 3.53 M/UL (ref 4–5.2)
SODIUM BLD-SCNC: 139 MMOL/L (ref 136–145)
TOTAL PROTEIN: 6.8 G/DL (ref 6.4–8.2)
WBC # BLD: 7.1 K/UL (ref 4–11)

## 2020-11-18 PROCEDURE — 80053 COMPREHEN METABOLIC PANEL: CPT

## 2020-11-18 PROCEDURE — 85025 COMPLETE CBC W/AUTO DIFF WBC: CPT

## 2020-11-18 PROCEDURE — 83036 HEMOGLOBIN GLYCOSYLATED A1C: CPT

## 2020-11-18 PROCEDURE — 83550 IRON BINDING TEST: CPT

## 2020-11-18 PROCEDURE — 83540 ASSAY OF IRON: CPT

## 2020-11-18 PROCEDURE — 36415 COLL VENOUS BLD VENIPUNCTURE: CPT

## 2020-11-19 LAB
ESTIMATED AVERAGE GLUCOSE: 188.6 MG/DL
HBA1C MFR BLD: 8.2 %
IRON: 66 UG/DL (ref 37–145)
TOTAL IRON BINDING CAPACITY: 255 UG/DL (ref 260–445)

## 2021-03-09 ENCOUNTER — HOSPITAL ENCOUNTER (EMERGENCY)
Age: 86
Discharge: HOME | End: 2021-03-09
Attending: EMERGENCY MEDICINE
Payer: MEDICARE

## 2021-03-09 ENCOUNTER — APPOINTMENT (OUTPATIENT)
Dept: GENERAL RADIOLOGY | Age: 86
End: 2021-03-09
Payer: MEDICARE

## 2021-03-09 VITALS
RESPIRATION RATE: 16 BRPM | BODY MASS INDEX: 19.47 KG/M2 | OXYGEN SATURATION: 96 % | HEIGHT: 70 IN | HEART RATE: 68 BPM | TEMPERATURE: 98.3 F | SYSTOLIC BLOOD PRESSURE: 128 MMHG | WEIGHT: 136 LBS | DIASTOLIC BLOOD PRESSURE: 74 MMHG

## 2021-03-09 DIAGNOSIS — M79.604 RIGHT LEG PAIN: Primary | ICD-10-CM

## 2021-03-09 LAB
A/G RATIO: 1.6 (ref 1.1–2.2)
ALBUMIN SERPL-MCNC: 4.2 G/DL (ref 3.4–5)
ALP BLD-CCNC: 99 U/L (ref 40–129)
ALT SERPL-CCNC: 15 U/L (ref 10–40)
ANION GAP SERPL CALCULATED.3IONS-SCNC: 6 MMOL/L (ref 3–16)
APTT: 32.1 SEC (ref 24.2–36.2)
AST SERPL-CCNC: 21 U/L (ref 15–37)
BASOPHILS ABSOLUTE: 0 K/UL (ref 0–0.2)
BASOPHILS RELATIVE PERCENT: 0.3 %
BILIRUB SERPL-MCNC: 0.3 MG/DL (ref 0–1)
BUN BLDV-MCNC: 51 MG/DL (ref 7–20)
CALCIUM SERPL-MCNC: 10.1 MG/DL (ref 8.3–10.6)
CHLORIDE BLD-SCNC: 103 MMOL/L (ref 99–110)
CO2: 30 MMOL/L (ref 21–32)
CREAT SERPL-MCNC: 1.4 MG/DL (ref 0.6–1.2)
D DIMER: <200 NG/ML DDU (ref 0–229)
EOSINOPHILS ABSOLUTE: 0.2 K/UL (ref 0–0.6)
EOSINOPHILS RELATIVE PERCENT: 2.3 %
GFR AFRICAN AMERICAN: 43
GFR NON-AFRICAN AMERICAN: 36
GLOBULIN: 2.7 G/DL
GLUCOSE BLD-MCNC: 138 MG/DL (ref 70–99)
HCT VFR BLD CALC: 34 % (ref 36–48)
HEMOGLOBIN: 11.4 G/DL (ref 12–16)
INR BLD: 0.98 (ref 0.86–1.14)
LACTIC ACID: 0.7 MMOL/L (ref 0.4–2)
LYMPHOCYTES ABSOLUTE: 1 K/UL (ref 1–5.1)
LYMPHOCYTES RELATIVE PERCENT: 11 %
MCH RBC QN AUTO: 31.8 PG (ref 26–34)
MCHC RBC AUTO-ENTMCNC: 33.6 G/DL (ref 31–36)
MCV RBC AUTO: 94.8 FL (ref 80–100)
MONOCYTES ABSOLUTE: 0.7 K/UL (ref 0–1.3)
MONOCYTES RELATIVE PERCENT: 8 %
NEUTROPHILS ABSOLUTE: 7 K/UL (ref 1.7–7.7)
NEUTROPHILS RELATIVE PERCENT: 78.4 %
PDW BLD-RTO: 13.7 % (ref 12.4–15.4)
PLATELET # BLD: 189 K/UL (ref 135–450)
PMV BLD AUTO: 8.4 FL (ref 5–10.5)
POTASSIUM REFLEX MAGNESIUM: 4.9 MMOL/L (ref 3.5–5.1)
PROTHROMBIN TIME: 11.4 SEC (ref 10–13.2)
RBC # BLD: 3.59 M/UL (ref 4–5.2)
SODIUM BLD-SCNC: 139 MMOL/L (ref 136–145)
TOTAL PROTEIN: 6.9 G/DL (ref 6.4–8.2)
WBC # BLD: 8.9 K/UL (ref 4–11)

## 2021-03-09 PROCEDURE — 83605 ASSAY OF LACTIC ACID: CPT

## 2021-03-09 PROCEDURE — 85610 PROTHROMBIN TIME: CPT

## 2021-03-09 PROCEDURE — 80053 COMPREHEN METABOLIC PANEL: CPT

## 2021-03-09 PROCEDURE — 73630 X-RAY EXAM OF FOOT: CPT

## 2021-03-09 PROCEDURE — 72170 X-RAY EXAM OF PELVIS: CPT

## 2021-03-09 PROCEDURE — 85379 FIBRIN DEGRADATION QUANT: CPT

## 2021-03-09 PROCEDURE — 85730 THROMBOPLASTIN TIME PARTIAL: CPT

## 2021-03-09 PROCEDURE — 73552 X-RAY EXAM OF FEMUR 2/>: CPT

## 2021-03-09 PROCEDURE — 36415 COLL VENOUS BLD VENIPUNCTURE: CPT

## 2021-03-09 PROCEDURE — 87040 BLOOD CULTURE FOR BACTERIA: CPT

## 2021-03-09 PROCEDURE — 99284 EMERGENCY DEPT VISIT MOD MDM: CPT

## 2021-03-09 PROCEDURE — 85025 COMPLETE CBC W/AUTO DIFF WBC: CPT

## 2021-03-09 PROCEDURE — 73590 X-RAY EXAM OF LOWER LEG: CPT

## 2021-03-09 RX ORDER — CALCIUM CARBONATE 500(1250)
500 TABLET ORAL DAILY
COMMUNITY

## 2021-03-09 ASSESSMENT — PAIN DESCRIPTION - PAIN TYPE: TYPE: ACUTE PAIN

## 2021-03-09 ASSESSMENT — PAIN DESCRIPTION - ORIENTATION: ORIENTATION: RIGHT

## 2021-03-09 ASSESSMENT — PAIN DESCRIPTION - DESCRIPTORS: DESCRIPTORS: DISCOMFORT

## 2021-03-09 NOTE — ED PROVIDER NOTES
at [address] Left 05/30/2017    PARTIAL LEFT FOOT AMPUTATION              FOOT SURGERY Right 12/11/2017    HYSTERECTOMY      OTHER SURGICAL HISTORY Left     Macular Grid Left eye argon    TOE AMPUTATION  2003    2nd toe 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    TOE AMPUTATION  2011    5th toe right foot    TONSILLECTOMY      UPPER GASTROINTESTINAL ENDOSCOPY  06/05/2017    Dieulafoy lesion in duodenum     Family History   Problem Relation Age of Onset    Diabetes Mother     High Blood Pressure Mother     Cancer Father     Heart Disease Maternal Uncle      Social History     Socioeconomic History    Marital status:      Spouse name: Not on file    Number of children: Not on file    Years of education: Not on file    Highest education level: Not on file   Occupational History    Not on file   Social Needs    Financial resource strain: Not on file    Food insecurity     Worry: Not on file     Inability: Not on file    Transportation needs     Medical: Not on file     Non-medical: Not on file   Tobacco Use    Smoking status: Never Smoker    Smokeless tobacco: Never Used   Substance and Sexual Activity    Alcohol use: No    Drug use: No    Sexual activity: Never   Lifestyle    Physical activity     Days per week: Not on file     Minutes per session: Not on file    Stress: Not on file   Relationships    Social connections     Talks on phone: Not on file     Gets together: Not on file     Attends Christian service: Not on file     Active member of club or organization: Not on file     Attends meetings of clubs or organizations: Not on file     Relationship status: Not on file    Intimate partner violence     Fear of current or ex partner: Not on file     Emotionally abused: Not on file     Physically abused: Not on file     Forced sexual activity: Not on file   Other Topics Concern    Not on file   Social History Narrative    Not on file     No current facility-administered medications for this encounter. Current Outpatient Medications   Medication Sig Dispense Refill    calcium carbonate (OSCAL) 500 MG TABS tablet Take 500 mg by mouth daily      NONFORMULARY tragenta      lisinopril (PRINIVIL;ZESTRIL) 10 MG tablet Take 10 mg by mouth daily      Cholecalciferol (VITAMIN D3) 50 MCG (2000 UT) CAPS Take 2,000 Units by mouth daily      mirtazapine (REMERON) 15 MG tablet Take 1 tablet by mouth daily      pramipexole (MIRAPEX) 1 MG tablet TAKE 1 TABLET BY ORAL ROUTE ONCE A DAY (IN THE EVENING) AROUND 8-9 PM FOR RLS  4    metoprolol tartrate (LOPRESSOR) 25 MG tablet TAKE 1 TABLET BY ORAL ROUTE 2 TIMES A DAY FOR BP  4    ferrous sulfate 325 (65 Fe) MG tablet Take 325 mg by mouth 3 times daily (with meals)       Multiple Vitamins-Minerals (MULTIVITAMIN PO) Take by mouth daily      Cyanocobalamin (VITAMIN B-12) 5000 MCG SUBL Place 500 mcg under the tongue daily       pravastatin (PRAVACHOL) 10 MG tablet Take 10 mg by mouth daily        No Known Allergies    REVIEW OF SYSTEMS  10 systems reviewed, pertinent positives per HPI otherwise noted to be negative. PHYSICAL EXAM  BP (!) 171/47   Pulse 74   Temp 97.5 °F (36.4 °C) (Oral)   Resp 16   Ht 5' 10\" (1.778 m)   Wt 136 lb (61.7 kg)   SpO2 96%   Breastfeeding No   BMI 19.51 kg/m²   GENERAL APPEARANCE: Awake and alert. Cooperative. No acute distress, she is very thin  HEAD: Normocephalic. Atraumatic. EYES: PERRL. EOM's grossly intact. ENT: Mucous membranes are moist.  Patent, no stridor  NECK: Supple. No rigidity  HEART: RRR. No murmurs  LUNGS: Respirations nonlabored. Lungs are clear to auscultation bilaterally. ABDOMEN: Soft. Non-distended. Non-tender. No guarding or rebound. Normal bowel sounds. EXTREMITIES: No peripheral edema.   She was wearing flat soled shoes and diabetic socks, exam of right lower extremity: No obvious deformity, patient voiced pain/groaning and grimacing with palpation of the entire right lower extremity, sometimes inconsistently, at one point she stated she jumped because my hand was cold, but no significant bony point tenderness, she was a little tender in the calf area/posterior knee and thigh, compartments are soft, but not really tender in the groin or greater trochanter area, no significant pain of the patella, no laxity with ligamentous exam of the knee, 1+ DP and PT pulses, she has sensation in the distal digits though diminished compared to upper legs, she has missing fourth and fifth digits and partial amputation metatarsal, healing wound noted on right lateral plantar aspect of foot distally, no purulent drainage, no significant warmth or erythema, no ecchymosis or evidence of trauma  SKIN: Warm and dry. No acute rashes. NEUROLOGICAL: Alert and answering questions appropriately, though she defers to her daughter for some questions due to memory loss issues. No gross facial drooping. Strength 5/5, sensation diminished in bilateral lower leg/feet, but right foot is tender to touch, no truncal ataxia. Normal speech  PSYCHIATRIC: Normal mood and flat affect. LABS  I have reviewed all labs for this visit. No results found for this visit on 03/09/21. pending    RADIOLOGY  pending    ED COURSE/MDM  Patient seen and evaluated. Old records reviewed. Labs and imaging reviewed and results discussed with patient. 51-year-old female with right leg pain, recent osteomyelitis and toe amputation, daughter concerned for spreading of this, I was also concerned for the possibility of DVT, though the wound actually appeared to be healing appropriately, I did not find the packing that the daughter states she had placed yesterday, leg does not appear edematous/erythematous compared to the leg, my evaluation was cut short because the patient had to use the restroom, daughter took her in a wheelchair, initiated work-up and signed the patient out to dayshift Dr. Marylou Reed.       Orders Placed This Encounter   Procedures    Culture, Blood 2    Culture, Blood 1    XR FEMUR RIGHT (MIN 2 VIEWS)    XR TIBIA FIBULA RIGHT (2 VIEWS)    XR FOOT RIGHT (2 VIEWS)    CBC Auto Differential    Comprehensive Metabolic Panel w/ Reflex to MG    Lactic acid, plasma    Protime-INR    APTT       CLINICAL IMPRESSION  1. Right leg pain        Blood pressure (!) 171/47, pulse 74, temperature 97.5 °F (36.4 °C), temperature source Oral, resp. rate 16, height 5' 10\" (1.778 m), weight 136 lb (61.7 kg), SpO2 96 %, not currently breastfeeding. DISPOSITION  Liezt Medeiros was transitioned care to Dr. Mahsa Silva in stable condition.                   José Miguel Walton,   03/09/21 0710

## 2021-03-09 NOTE — ED PROVIDER NOTES
ED attending note: This patient is explicitly outlined in Dr. Dick Ramirez note reveals that she had some right hip right femur and right leg pain. Very thorough work-up and evaluation was performed by Dr. Christopher Garcia. Review of the objective findings however revealed very normal findings. .    It should be noted she had no history of any fall or trauma to her right knee in fact her knee was not tender at all so I think that her changes noted in the right knee x-ray are old related to arthritic changes      There was no acute fracture no signs of sepsis toxicity and fortunately her D-dimer was normal ruling out a DVT  She was able to put weight on it she is able to walk her daughter feels comfortable taking her home  They do have a follow-up appointment on Friday with her doctors in [place] they are advised to keep this and of course return if any worsening at this point she has no abdominal complaints no fever stable vital signs and is discharged home      Past Medical History:   Diagnosis Date    Clostridium difficile infection 06/06/2017    antigen +    [facility]     Diabetes mellitus ([facility]     History of blood transfusion     Hyperlipidemia     Hypertension     Infection     foot    MDRO (multiple drug resistant organisms) resistance 04/09/2019    urine    MRSA infection 12/07/2017    foot    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 Left 05/30/2017    PARTIAL LEFT FOOT AMPUTATION              FOOT SURGERY Right 12/11/2017    HYSTERECTOMY      OTHER SURGICAL HISTORY Left Macular Grid Left eye argon    TOE AMPUTATION  2003    2nd toe 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    TOE AMPUTATION  2011    5th toe right foot    TONSILLECTOMY      UPPER GASTROINTESTINAL ENDOSCOPY  06/05/2017    Dieulafoy lesion in duodenum       Family History   Problem Relation Age of Onset    Diabetes Mother     High Blood Pressure Mother     Cancer Father     Heart Disease Maternal Uncle        Social History     Socioeconomic History    Marital status:      Spouse name: Not on file    Number of children: Not on file    Years of education: Not on file    Highest education level: Not on file   Occupational History    Not on file   Social Needs    Financial resource strain: Not on file    Food insecurity     Worry: Not on file     Inability: Not on file    Transportation needs     Medical: Not on file     Non-medical: Not on file   Tobacco Use    Smoking status: Never Smoker    Smokeless tobacco: Never Used   Substance and Sexual Activity    Alcohol use: No    Drug use: No    Sexual activity: Never   Lifestyle    Physical activity     Days per week: Not on file     Minutes per session: Not on file    Stress: Not on file   Relationships    Social connections     Talks on phone: Not on file     Gets together: Not on file     Attends Buddhist service: Not on file     Active member of club or organization: Not on file     Attends meetings of clubs or organizations: Not on file     Relationship status: Not on file    Intimate partner violence     Fear of current or ex partner: Not on file     Emotionally abused: Not on file     Physically abused: Not on file     Forced sexual activity: Not on file   Other Topics Concern    Not on file   Social History Narrative    Not on file       Patient Vitals for the past 24 hrs:   BP Temp Temp src Pulse Resp SpO2 Height Weight   03/09/21 0550 (!) 171/47 97.5 °F (36.4 °C) Oral 74 16 96 % 5' 10\" (1.778 m) 136 lb (61.7 kg)         Medications - No data to display    Results for orders placed or performed during the hospital encounter of 03/09/21   CBC Auto Differential   Result Value Ref Range    WBC 8.9 4.0 - 11.0 K/uL    RBC 3.59 (L) 4.00 - 5.20 M/uL    Hemoglobin 11.4 (L) 12.0 - 16.0 g/dL    Hematocrit 34.0 (L) 36.0 - 48.0 %    MCV 94.8 80.0 - 100.0 fL    MCH 31.8 26.0 - 34.0 pg    MCHC 33.6 31.0 - 36.0 g/dL    RDW 13.7 12.4 - 15.4 %    Platelets 167 760 - 556 K/uL    MPV 8.4 5.0 - 10.5 fL    Neutrophils % 78.4 %    Lymphocytes % 11.0 %    Monocytes % 8.0 %    Eosinophils % 2.3 %    Basophils % 0.3 %    Neutrophils Absolute 7.0 1.7 - 7.7 K/uL    Lymphocytes Absolute 1.0 1.0 - 5.1 K/uL    Monocytes Absolute 0.7 0.0 - 1.3 K/uL    Eosinophils Absolute 0.2 0.0 - 0.6 K/uL    Basophils Absolute 0.0 0.0 - 0.2 K/uL   Comprehensive Metabolic Panel w/ Reflex to MG   Result Value Ref Range    Sodium 139 136 - 145 mmol/L    Potassium reflex Magnesium 4.9 3.5 - 5.1 mmol/L    Chloride 103 99 - 110 mmol/L    CO2 30 21 - 32 mmol/L    Anion Gap 6 3 - 16    Glucose 138 (H) 70 - 99 mg/dL    BUN 51 (H) 7 - 20 mg/dL    CREATININE 1.4 (H) 0.6 - 1.2 mg/dL    GFR Non- 36 (A) >60    GFR  43 (A) >60    Calcium 10.1 8.3 - 10.6 mg/dL    Total Protein 6.9 6.4 - 8.2 g/dL    Albumin 4.2 3.4 - 5.0 g/dL    Albumin/Globulin Ratio 1.6 1.1 - 2.2    Total Bilirubin 0.3 0.0 - 1.0 mg/dL    Alkaline Phosphatase 99 40 - 129 U/L    ALT 15 10 - 40 U/L    AST 21 15 - 37 U/L    Globulin 2.7 g/dL   Lactic acid, plasma   Result Value Ref Range    Lactic Acid 0.7 0.4 - 2.0 mmol/L   Protime-INR   Result Value Ref Range    Protime 11.4 10.0 - 13.2 sec    INR 0.98 0.86 - 1.14   APTT   Result Value Ref Range    aPTT 32.1 24.2 - 36.2 sec   D-Dimer, Quantitative   Result Value Ref Range    D-Dimer, Quant <200 0 - 229 ng/mL DDU       Xr Pelvis (1-2 Views)    Result Date: 3/9/2021  PELVIS: No acute abnormality.  Degenerative changes to both hips, both SI joints and to visualized lower lumbar spine. Diffuse bone demineralization. RIGHT FEMUR: No acute abnormality. Degenerative changes to the right hip and right knee along with chondrocalcinosis to the knee. Diffuse bone demineralization. RIGHT TIBIA/FIBULA: Irregular linear transversely oriented sclerosis subchondral to the medial tibial plateau extending into the midline, potentially reflecting impacted fracture which could be acute. Clinical correlation can be made and MRI may be of use for further evaluation (rather than CT given the diffuse bone demineralization). Chronic postsurgical/posttraumatic change to the distal fibula and distal tibia with orthopedic hardware to the distal tibia. No evidence for hardware complication. Degenerative changes to the right knee and to the right ankle. Diffuse bone demineralization. RIGHT FOOT: No acute abnormality. Chronic postsurgical change to the foot. Stable degenerative changes to the midfoot. Diffuse bone demineralization. Xr Femur Right (min 2 Views)    Result Date: 3/9/2021  PELVIS: No acute abnormality. Degenerative changes to both hips, both SI joints and to visualized lower lumbar spine. Diffuse bone demineralization. RIGHT FEMUR: No acute abnormality. Degenerative changes to the right hip and right knee along with chondrocalcinosis to the knee. Diffuse bone demineralization. RIGHT TIBIA/FIBULA: Irregular linear transversely oriented sclerosis subchondral to the medial tibial plateau extending into the midline, potentially reflecting impacted fracture which could be acute. Clinical correlation can be made and MRI may be of use for further evaluation (rather than CT given the diffuse bone demineralization). Chronic postsurgical/posttraumatic change to the distal fibula and distal tibia with orthopedic hardware to the distal tibia. No evidence for hardware complication. Degenerative changes to the right knee and to the right ankle. Diffuse bone demineralization.  RIGHT FOOT: No acute abnormality. Chronic postsurgical change to the foot. Stable degenerative changes to the midfoot. Diffuse bone demineralization. Xr Tibia Fibula Right (2 Views)    Result Date: 3/9/2021  PELVIS: No acute abnormality. Degenerative changes to both hips, both SI joints and to visualized lower lumbar spine. Diffuse bone demineralization. RIGHT FEMUR: No acute abnormality. Degenerative changes to the right hip and right knee along with chondrocalcinosis to the knee. Diffuse bone demineralization. RIGHT TIBIA/FIBULA: Irregular linear transversely oriented sclerosis subchondral to the medial tibial plateau extending into the midline, potentially reflecting impacted fracture which could be acute. Clinical correlation can be made and MRI may be of use for further evaluation (rather than CT given the diffuse bone demineralization). Chronic postsurgical/posttraumatic change to the distal fibula and distal tibia with orthopedic hardware to the distal tibia. No evidence for hardware complication. Degenerative changes to the right knee and to the right ankle. Diffuse bone demineralization. RIGHT FOOT: No acute abnormality. Chronic postsurgical change to the foot. Stable degenerative changes to the midfoot. Diffuse bone demineralization. Xr Foot Right (min 3 Views)    Result Date: 3/9/2021  PELVIS: No acute abnormality. Degenerative changes to both hips, both SI joints and to visualized lower lumbar spine. Diffuse bone demineralization. RIGHT FEMUR: No acute abnormality. Degenerative changes to the right hip and right knee along with chondrocalcinosis to the knee. Diffuse bone demineralization. RIGHT TIBIA/FIBULA: Irregular linear transversely oriented sclerosis subchondral to the medial tibial plateau extending into the midline, potentially reflecting impacted fracture which could be acute.   Clinical correlation can be made and MRI may be of use for further evaluation (rather than CT given the diffuse bone

## 2021-03-14 LAB
BLOOD CULTURE, ROUTINE: NORMAL
CULTURE, BLOOD 2: NORMAL

## 2021-04-06 ENCOUNTER — HOSPITAL ENCOUNTER (EMERGENCY)
Age: 86
Discharge: HOME | End: 2021-04-06
Attending: EMERGENCY MEDICINE
Payer: MEDICARE

## 2021-04-06 ENCOUNTER — APPOINTMENT (OUTPATIENT)
Dept: GENERAL RADIOLOGY | Age: 86
End: 2021-04-06
Payer: MEDICARE

## 2021-04-06 VITALS
WEIGHT: 140 LBS | BODY MASS INDEX: 20.09 KG/M2 | SYSTOLIC BLOOD PRESSURE: 173 MMHG | TEMPERATURE: 97.6 F | RESPIRATION RATE: 18 BRPM | HEART RATE: 62 BPM | DIASTOLIC BLOOD PRESSURE: 56 MMHG | OXYGEN SATURATION: 97 %

## 2021-04-06 DIAGNOSIS — R29.91 ABNORMAL FOOT FINDING: Primary | ICD-10-CM

## 2021-04-06 DIAGNOSIS — R03.0 ELEVATED BLOOD PRESSURE READING: ICD-10-CM

## 2021-04-06 PROCEDURE — 73630 X-RAY EXAM OF FOOT: CPT

## 2021-04-06 PROCEDURE — 99282 EMERGENCY DEPT VISIT SF MDM: CPT

## 2021-04-06 NOTE — ED PROVIDER NOTES
Osteomyelitis of spine ([facility] [address] 2010    thoraculumbar spine    Restless leg syndrome      Past Surgical History:   Procedure Laterality Date    APPENDECTOMY      COLONOSCOPY  1997    polyps    COLONOSCOPY  7/15/15    normal colon    EYE SURGERY      right cataract    FOOT AMPUTATION Right 05/26/2019    PARTIAL RIGHT FOOT AMPUTATION    FOOT AMPUTATION Right 5/26/2019    PARTIAL RIGHT FOOT AMPUTATION performed by Vincenzo Johnson DPM at [address] Left 05/30/2017    PARTIAL LEFT FOOT AMPUTATION              FOOT SURGERY Right 12/11/2017    HYSTERECTOMY      OTHER SURGICAL HISTORY Left     Macular Grid Left eye argon    TOE AMPUTATION  2003    2nd toe 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    TOE AMPUTATION  2011    5th toe right foot    TONSILLECTOMY      UPPER GASTROINTESTINAL ENDOSCOPY  06/05/2017    Dieulafoy lesion in duodenum     Family History   Problem Relation Age of Onset    Diabetes Mother     High Blood Pressure Mother     Cancer Father     Heart Disease Maternal Uncle      Social History     Socioeconomic History    Marital status:      Spouse name: Not on file    Number of children: Not on file    Years of education: Not on file    Highest education level: Not on file   Occupational History    Not on file   Social Needs    Financial resource strain: Not on file    Food insecurity     Worry: Not on file     Inability: Not on file    Transportation needs     Medical: Not on file     Non-medical: Not on file   Tobacco Use    Smoking status: Never Smoker    Smokeless tobacco: Never Used   Substance and Sexual Activity    Alcohol use: No    Drug use: No    Sexual activity: Never   Lifestyle    Physical activity     Days per week: Not on file     Minutes per session: Not on file    Stress: Not on file   Relationships    Social connections     Talks on phone: Not on file     Gets together: Not on file     Attends Mu-ism service: Not on file     Active member of club or organization: Not on file     Attends meetings of clubs or organizations: Not on file     Relationship status: Not on file    Intimate partner violence     Fear of current or ex partner: Not on file     Emotionally abused: Not on file     Physically abused: Not on file     Forced sexual activity: Not on file   Other Topics Concern    Not on file   Social History Narrative    Not on file     No current facility-administered medications for this encounter. Current Outpatient Medications   Medication Sig Dispense Refill    Empagliflozin (JARDIANCE PO) Take by mouth      LINAGLIPTIN PO Take by mouth      calcium carbonate (OSCAL) 500 MG TABS tablet Take 500 mg by mouth daily      NONFORMULARY tragenta      lisinopril (PRINIVIL;ZESTRIL) 10 MG tablet Take 10 mg by mouth daily      Cholecalciferol (VITAMIN D3) 50 MCG (2000 UT) CAPS Take 2,000 Units by mouth daily      mirtazapine (REMERON) 15 MG tablet Take 1 tablet by mouth daily      pramipexole (MIRAPEX) 1 MG tablet TAKE 1 TABLET BY ORAL ROUTE ONCE A DAY (IN THE EVENING) AROUND 8-9 PM FOR RLS  4    metoprolol tartrate (LOPRESSOR) 25 MG tablet TAKE 1 TABLET BY ORAL ROUTE 2 TIMES A DAY FOR BP  4    ferrous sulfate 325 (65 Fe) MG tablet Take 325 mg by mouth 3 times daily (with meals)       Multiple Vitamins-Minerals (MULTIVITAMIN PO) Take by mouth daily      Cyanocobalamin (VITAMIN B-12) 5000 MCG SUBL Place 500 mcg under the tongue daily       pravastatin (PRAVACHOL) 10 MG tablet Take 10 mg by mouth daily        No Known Allergies    REVIEW OF SYSTEMS  10 systems reviewed, pertinent positives per HPI otherwise noted to be negative. PHYSICAL EXAM  BP (!) 173/56   Pulse 62   Temp 97.6 °F (36.4 °C) (Oral)   Resp 18   Wt 140 lb (63.5 kg)   SpO2 97%   BMI 20.09 kg/m²    GENERAL APPEARANCE: Awake and alert. Cooperative. No acute distress. HENT: Normocephalic. Atraumatic. Mucous membranes are moist.  No drooling or stridor. NECK: Supple.   No central C-spine tenderness. EYES: PERRL. EOM's grossly intact. HEART/CHEST: RRR. No murmurs. LUNGS: Respirations unlabored. CTAB. Good air exchange. Speaking comfortably in full sentences. ABDOMEN: No tenderness. Soft. Non-distended. No masses. No organomegaly. No guarding or rebound. MUSCULOSKELETAL: No extremity edema. Compartments soft. No deformity. No tenderness in the extremities, however sensory is lacking in the entire right foot and toes. There is deformity noted between the right great toe and the first metatarsal.  The patient surgically is lacking the third through fifth toes of the right foot with postsurgical changes noted. .  All extremities neurovascularly intact. SKIN: Warm and dry. No acute rashes. Bandage noted to the patient's right second toe which is clean dry and intact. There is a well-healed surgical incision to the lateral aspect of the right foot without any erythema. There is some slight bruising to the medial aspect of the right foot. NEUROLOGICAL: Alert and oriented. CN's 2-12 intact. No gross facial drooping. Strength 5/5, sensation intact. Diminished sensation throughout the patient's entire right foot and remaining toes. PSYCHIATRIC: Normal mood and affect. LABS  I have reviewed all labs for this visit. No results found for this visit on 04/06/21. RADIOLOGY  Xr Foot Right (min 3 Views)    Result Date: 4/6/2021  EXAMINATION: 3 XRAY VIEWS OF THE RIGHT FOOT 4/6/2021 4:14 pm COMPARISON: 03/09/2021 HISTORY: ORDERING SYSTEM PROVIDED HISTORY: Concern for injury to right big toe TECHNOLOGIST PROVIDED HISTORY: Reason for exam:->Concern for injury to right big toe Reason for Exam: big toe injury Acuity: Acute Type of Exam: Initial FINDINGS: Stable fixation screws are in the distal tibia. There is generalized osteopenia. Osteotomy has been performed of the distal 2/3 of the 1st metatarsal.  The 3rd toe has been amputated.   The 4th and 5th rays are amputated at the levels of the shafts. No new focal osteolytic areas identified. No acute fracture is seen. No acute osseous injury is identified. Previous osteotomy of the distal 1st metatarsal is in stable configuration. Multiple additional postoperative changes are stable. ED COURSE/MDM  Patient seen and evaluated. Old records reviewed. Imaging reviewed and results discussed with patient. Patient presenting for evaluation due to concerns for abnormal digit appearance between the first toe and the first metatarsal.  It actually appears that she has had partial resection of that first metatarsal and stable appearance of the bones on x-ray. No open wound to that area. Unclear if she possibly did hit it in any way she does not feel that area is possibly slightly contused. However, no evidence of infection clinically with any warmth or any noted fevers. We will have her closely monitor the area and follow-up with her doctor as she may benefit from MRI to look for any possible ligamentous injury or change, versus close monitoring. Patient and daughter felt very comfortable with that plan and all questions answered prior to discharge. I estimate there is LOW risk for COMPARTMENT SYNDROME, DEEP VENOUS THROMBOSIS, SEPTIC ARTHRITIS, TENDON OR NEUROVASCULAR INJURY, thus I consider the discharge disposition reasonable. [facility] I have discussed the diagnosis and risks, and we agree with discharging home to follow-up with their primary doctor or the referral orthopedist. We also discussed returning to the Emergency Department immediately if new or worsening symptoms occur. We have discussed the symptoms which are most concerning (e.g., changing or worsening pain, numbness, weakness) that necessitate immediate return. During the patient's ED course, the patient was given:  Medications - No data to display     CLINICAL IMPRESSION  1. Abnormal foot finding    2.  Elevated blood pressure reading Blood pressure (!) 173/56, pulse 62, temperature 97.6 °F (36.4 °C), temperature source Oral, resp. rate 18, weight 140 lb (63.5 kg), SpO2 97 %, not currently breastfeeding. DISPOSITION  Rosemarie Flowers was discharged to home in stable condition. Patient was given scripts for the following medications. I counseled patient how to take these medications. New Prescriptions    No medications on file       Follow-up with:  Shiela Rosenberg MD  [address] [place] [address]  [phone]    Schedule an appointment as soon as possible for a visit in 3 days  For recheck      DISCLAIMER: This chart was created using Dragon dictation software. Efforts were made by me to ensure accuracy, however some errors may be present due to limitations of this technology and occasionally words are not transcribed correctly.         Anton Rodríguez MD  04/06/21 5521

## 2021-04-06 NOTE — ED NOTES
Discharge instructions reviewed with Pt. Pt verbalizes understanding at this time. . Pt condition stable at this time. No concerns voiced.       Zaid Wu RN  04/06/21 4598

## 2021-07-01 ENCOUNTER — HOSPITAL ENCOUNTER (EMERGENCY)
Age: 86
Discharge: ANOTHER HOSPITAL | End: 2021-07-01
Attending: EMERGENCY MEDICINE
Payer: MEDICARE

## 2021-07-01 ENCOUNTER — APPOINTMENT (OUTPATIENT)
Dept: GENERAL RADIOLOGY | Age: 86
End: 2021-07-01
Payer: MEDICARE

## 2021-07-01 ENCOUNTER — HOSPITAL ENCOUNTER (INPATIENT)
Age: 86
LOS: 3 days | Discharge: HOME HEALTH | DRG: 065 | End: 2021-07-04
Attending: INTERNAL MEDICINE | Admitting: INTERNAL MEDICINE
Payer: MEDICARE

## 2021-07-01 ENCOUNTER — APPOINTMENT (OUTPATIENT)
Dept: CT IMAGING | Age: 86
End: 2021-07-01
Payer: MEDICARE

## 2021-07-01 VITALS
DIASTOLIC BLOOD PRESSURE: 74 MMHG | OXYGEN SATURATION: 100 % | BODY MASS INDEX: 20.04 KG/M2 | HEIGHT: 70 IN | SYSTOLIC BLOOD PRESSURE: 183 MMHG | HEART RATE: 66 BPM | RESPIRATION RATE: 16 BRPM | WEIGHT: 140 LBS | TEMPERATURE: 97.5 F

## 2021-07-01 DIAGNOSIS — R77.8 ELEVATED TROPONIN LEVEL: ICD-10-CM

## 2021-07-01 DIAGNOSIS — R94.31 NONSPECIFIC ST-T WAVE ELECTROCARDIOGRAPHIC CHANGES: ICD-10-CM

## 2021-07-01 DIAGNOSIS — R27.9 UNSPECIFIED LACK OF COORDINATION: ICD-10-CM

## 2021-07-01 DIAGNOSIS — R53.1 GENERALIZED WEAKNESS: Primary | ICD-10-CM

## 2021-07-01 DIAGNOSIS — R41.0 CONFUSION: ICD-10-CM

## 2021-07-01 LAB
A/G RATIO: 1.8 (ref 1.1–2.2)
ALBUMIN SERPL-MCNC: 4.3 G/DL (ref 3.4–5)
ALP BLD-CCNC: 86 U/L (ref 40–129)
ALT SERPL-CCNC: 14 U/L (ref 10–40)
ANION GAP SERPL CALCULATED.3IONS-SCNC: 11 MMOL/L (ref 3–16)
AST SERPL-CCNC: 22 U/L (ref 15–37)
BACTERIA: ABNORMAL /HPF
BASOPHILS ABSOLUTE: 0 K/UL (ref 0–0.2)
BASOPHILS RELATIVE PERCENT: 0.4 %
BILIRUB SERPL-MCNC: 0.5 MG/DL (ref 0–1)
BILIRUBIN URINE: NEGATIVE
BLOOD, URINE: ABNORMAL
BUN BLDV-MCNC: 42 MG/DL (ref 7–20)
CALCIUM SERPL-MCNC: 10.1 MG/DL (ref 8.3–10.6)
CHLORIDE BLD-SCNC: 104 MMOL/L (ref 99–110)
CHP ED QC CHECK: NORMAL
CLARITY: ABNORMAL
CO2: 27 MMOL/L (ref 21–32)
COLOR: YELLOW
CREAT SERPL-MCNC: 1.4 MG/DL (ref 0.6–1.2)
EKG ATRIAL RATE: 57 BPM
EKG ATRIAL RATE: 58 BPM
EKG ATRIAL RATE: 61 BPM
EKG DIAGNOSIS: NORMAL
EKG P AXIS: 52 DEGREES
EKG P AXIS: 63 DEGREES
EKG P AXIS: 65 DEGREES
EKG P-R INTERVAL: 214 MS
EKG P-R INTERVAL: 222 MS
EKG P-R INTERVAL: 222 MS
EKG Q-T INTERVAL: 416 MS
EKG Q-T INTERVAL: 416 MS
EKG Q-T INTERVAL: 420 MS
EKG QRS DURATION: 80 MS
EKG QRS DURATION: 82 MS
EKG QRS DURATION: 86 MS
EKG QTC CALCULATION (BAZETT): 404 MS
EKG QTC CALCULATION (BAZETT): 412 MS
EKG QTC CALCULATION (BAZETT): 418 MS
EKG R AXIS: 66 DEGREES
EKG R AXIS: 72 DEGREES
EKG R AXIS: 75 DEGREES
EKG T AXIS: 72 DEGREES
EKG T AXIS: 73 DEGREES
EKG T AXIS: 74 DEGREES
EKG VENTRICULAR RATE: 57 BPM
EKG VENTRICULAR RATE: 58 BPM
EKG VENTRICULAR RATE: 61 BPM
EOSINOPHILS ABSOLUTE: 0.2 K/UL (ref 0–0.6)
EOSINOPHILS RELATIVE PERCENT: 2.6 %
EPITHELIAL CELLS, UA: ABNORMAL /HPF (ref 0–5)
GFR AFRICAN AMERICAN: 43
GFR NON-AFRICAN AMERICAN: 36
GLOBULIN: 2.4 G/DL
GLUCOSE BLD-MCNC: 140 MG/DL
GLUCOSE BLD-MCNC: 140 MG/DL (ref 70–99)
GLUCOSE BLD-MCNC: 155 MG/DL (ref 70–99)
GLUCOSE URINE: >=1000 MG/DL
HCT VFR BLD CALC: 35.8 % (ref 36–48)
HEMOGLOBIN: 11.8 G/DL (ref 12–16)
KETONES, URINE: NEGATIVE MG/DL
LACTIC ACID: 0.6 MMOL/L (ref 0.4–2)
LEUKOCYTE ESTERASE, URINE: ABNORMAL
LIPASE: 34 U/L (ref 13–60)
LYMPHOCYTES ABSOLUTE: 0.9 K/UL (ref 1–5.1)
LYMPHOCYTES RELATIVE PERCENT: 14.6 %
MAGNESIUM: 2.2 MG/DL (ref 1.8–2.4)
MCH RBC QN AUTO: 31.3 PG (ref 26–34)
MCHC RBC AUTO-ENTMCNC: 32.9 G/DL (ref 31–36)
MCV RBC AUTO: 95.1 FL (ref 80–100)
MICROSCOPIC EXAMINATION: YES
MONOCYTES ABSOLUTE: 0.5 K/UL (ref 0–1.3)
MONOCYTES RELATIVE PERCENT: 7.9 %
NEUTROPHILS ABSOLUTE: 4.6 K/UL (ref 1.7–7.7)
NEUTROPHILS RELATIVE PERCENT: 74.5 %
NITRITE, URINE: NEGATIVE
PDW BLD-RTO: 12.9 % (ref 12.4–15.4)
PERFORMED ON: ABNORMAL
PH UA: 6.5 (ref 5–8)
PLATELET # BLD: 169 K/UL (ref 135–450)
PMV BLD AUTO: 8.8 FL (ref 5–10.5)
POTASSIUM REFLEX MAGNESIUM: 4.4 MMOL/L (ref 3.5–5.1)
PROTEIN UA: NEGATIVE MG/DL
RBC # BLD: 3.76 M/UL (ref 4–5.2)
RBC UA: ABNORMAL /HPF (ref 0–4)
SODIUM BLD-SCNC: 142 MMOL/L (ref 136–145)
SPECIFIC GRAVITY UA: 1.01 (ref 1–1.03)
TOTAL PROTEIN: 6.7 G/DL (ref 6.4–8.2)
TROPONIN: 0.1 NG/ML
TROPONIN: 0.11 NG/ML
URINE REFLEX TO CULTURE: YES
URINE TYPE: ABNORMAL
UROBILINOGEN, URINE: 0.2 E.U./DL
WBC # BLD: 6.1 K/UL (ref 4–11)
WBC UA: ABNORMAL /HPF (ref 0–5)

## 2021-07-01 PROCEDURE — 93005 ELECTROCARDIOGRAM TRACING: CPT

## 2021-07-01 PROCEDURE — 36415 COLL VENOUS BLD VENIPUNCTURE: CPT

## 2021-07-01 PROCEDURE — 1200000000 HC SEMI PRIVATE

## 2021-07-01 PROCEDURE — 70450 CT HEAD/BRAIN W/O DYE: CPT

## 2021-07-01 PROCEDURE — 80053 COMPREHEN METABOLIC PANEL: CPT

## 2021-07-01 PROCEDURE — 85025 COMPLETE CBC W/AUTO DIFF WBC: CPT

## 2021-07-01 PROCEDURE — 87088 URINE BACTERIA CULTURE: CPT

## 2021-07-01 PROCEDURE — 87040 BLOOD CULTURE FOR BACTERIA: CPT

## 2021-07-01 PROCEDURE — 87186 SC STD MICRODIL/AGAR DIL: CPT

## 2021-07-01 PROCEDURE — 83735 ASSAY OF MAGNESIUM: CPT

## 2021-07-01 PROCEDURE — 6370000000 HC RX 637 (ALT 250 FOR IP): Performed by: EMERGENCY MEDICINE

## 2021-07-01 PROCEDURE — 71046 X-RAY EXAM CHEST 2 VIEWS: CPT

## 2021-07-01 PROCEDURE — 93010 ELECTROCARDIOGRAM REPORT: CPT | Performed by: INTERNAL MEDICINE

## 2021-07-01 PROCEDURE — 87086 URINE CULTURE/COLONY COUNT: CPT

## 2021-07-01 PROCEDURE — 99285 EMERGENCY DEPT VISIT HI MDM: CPT

## 2021-07-01 PROCEDURE — 83605 ASSAY OF LACTIC ACID: CPT

## 2021-07-01 PROCEDURE — 81001 URINALYSIS AUTO W/SCOPE: CPT

## 2021-07-01 PROCEDURE — 93005 ELECTROCARDIOGRAM TRACING: CPT | Performed by: EMERGENCY MEDICINE

## 2021-07-01 PROCEDURE — 83690 ASSAY OF LIPASE: CPT

## 2021-07-01 PROCEDURE — 84484 ASSAY OF TROPONIN QUANT: CPT

## 2021-07-01 RX ORDER — ASPIRIN 81 MG/1
324 TABLET, CHEWABLE ORAL ONCE
Status: COMPLETED | OUTPATIENT
Start: 2021-07-01 | End: 2021-07-01

## 2021-07-01 RX ADMIN — ASPIRIN 324 MG: 81 TABLET, CHEWABLE ORAL at 15:43

## 2021-07-01 ASSESSMENT — ENCOUNTER SYMPTOMS
CHOKING: 0
RHINORRHEA: 0
SHORTNESS OF BREATH: 0
COUGH: 0
ABDOMINAL DISTENTION: 0
ABDOMINAL PAIN: 0
STRIDOR: 0

## 2021-07-01 NOTE — ED PROVIDER NOTES
[address] [facility]      Pt Name: Heather Woodward  MRN: [id number]  [place] 1935  Date of evaluation: 7/1/2021  Provider: Vilma Chairez MD    CHIEF COMPLAINT       Chief Complaint   Patient presents with    Extremity Weakness     Left arm weakness x2hrs. Daughter  patient slightly disoriented yesterday evening         HISTORY OF PRESENT ILLNESS   (Location/Symptom, Timing/Onset, Context/Setting, Quality, Duration, Modifying Factors, Severity)  Note limiting factors. Heather Woodward is a 80 y.o. female who presents to the emergency department     Daughter states that she has generalized weakness this morning and that she has altered mental status with mild to moderate confusion patient has no known prior cardiac disease. Patient voiced really no chest pain  She has no dysuria frequency  No nausea no vomiting no headache  We immediately calculated an NIH stroke score which came back at 0  She however complained a little bit of some lack of coordination of her left hand  There was no visual field deficits as mentioned no complaints of pain we did do a full count of generalized weakness work-up and her troponins did come back 0.10 and 0.11 but there was no significant delta change  Also EKG showed some nonspecific changes in lead V2 it really was not suggestive of a Wellens syndrome but it was new changes from prior tracings    The history is provided by the patient and a relative. Nursing Notes were reviewed. REVIEW OF SYSTEMS    (2-9 systems for level 4, 10 or more for level 5)     Review of Systems   Constitutional: Positive for activity change. Negative for appetite change, chills, diaphoresis, fatigue and unexpected weight change. HENT: Negative for congestion and rhinorrhea. Eyes: Negative for visual disturbance. Respiratory: Negative for cough, choking, shortness of breath and stridor.     Cardiovascular: Negative for chest pain, UT) CAPS    Take 2,000 Units by mouth daily    CYANOCOBALAMIN (VITAMIN B-12) 5000 MCG SUBL    Place 500 mcg under the tongue daily     EMPAGLIFLOZIN (JARDIANCE PO)    Take by mouth    FERROUS SULFATE 325 (65 FE) MG TABLET    Take 325 mg by mouth 3 times daily (with meals)     LINAGLIPTIN PO    Take by mouth    LISINOPRIL (PRINIVIL;ZESTRIL) 10 MG TABLET    Take 10 mg by mouth daily    METOPROLOL TARTRATE (LOPRESSOR) 25 MG TABLET    TAKE 1 TABLET BY ORAL ROUTE 2 TIMES A DAY FOR BP    MIRTAZAPINE (REMERON) 15 MG TABLET    Take 1 tablet by mouth daily    MULTIPLE VITAMINS-MINERALS (MULTIVITAMIN PO)    Take by mouth daily    NONFORMULARY    tragenta    PRAMIPEXOLE (MIRAPEX) 1 MG TABLET    TAKE 1 TABLET BY ORAL ROUTE ONCE A DAY (IN THE EVENING) AROUND 8-9 PM FOR RLS    PRAVASTATIN (PRAVACHOL) 10 MG TABLET    Take 10 mg by mouth daily        ALLERGIES     Patient has no known allergies. FAMILY HISTORY       Family History   Problem Relation Age of Onset    Diabetes Mother     High Blood Pressure Mother     Cancer Father     Heart Disease Maternal Uncle           SOCIAL HISTORY       Social History     Socioeconomic History    Marital status:      Spouse name: Not on file    Number of children: Not on file    Years of education: Not on file    Highest education level: Not on file   Occupational History    Not on file   Tobacco Use    Smoking status: Never Smoker    Smokeless tobacco: Never Used   Vaping Use    Vaping Use: Never used   Substance and Sexual Activity    Alcohol use: No    Drug use: No    Sexual activity: Never   Other Topics Concern    Not on file   Social History Narrative    Not on file     Social Determinants of Health     Financial Resource Strain:     Difficulty of Paying Living Expenses:    Food Insecurity:     Worried About Running Out of Food in the Last Year:     Ran Out of Food in the Last Year:    Transportation Needs:     Lack of Transportation (Medical):      Lack Mouth/Throat:      Mouth: Mucous membranes are moist.   Eyes:      Extraocular Movements: Extraocular movements intact. Pupils: Pupils are equal, round, and reactive to light. Neck:      Vascular: No carotid bruit. Cardiovascular:      Rate and Rhythm: Normal rate and regular rhythm. Pulses: Normal pulses. Heart sounds: Normal heart sounds. Pulmonary:      Effort: Pulmonary effort is normal.      Breath sounds: Normal breath sounds. Musculoskeletal:         General: No swelling, tenderness, deformity or signs of injury. Normal range of motion. Cervical back: Neck supple. No rigidity or tenderness. Lymphadenopathy:      Cervical: No cervical adenopathy. Skin:     General: Skin is warm. Coloration: Skin is not pale. Findings: No bruising. Neurological:      General: No focal deficit present. Mental Status: She is alert and oriented to person, place, and time. Cranial Nerves: No cranial nerve deficit. Sensory: No sensory deficit. Comments: Patient is able to hold both hands up for count of 10 and according to the NIH stroke score calculation she is 0  She also has no posterior circulation abnormalities there is no nystagmus no dizziness no ataxia patient has no slurring of her speech  Patient denies any headache  Patient had no double vision no blurred vision         DIAGNOSTIC RESULTS     EKG: All EKG's are interpreted by the Emergency Department Physician tl márquez either signs or Co-signs this chart in the absence of a cardiologist.    Rate 61  Rhythm sinus  First-degree AV block  Nonspecific ST-T wave changes no signs of an obvious acute ischemic or injury pattern.   No other ectopy  Patient did undergo a second EKG which still reveals he is very nonspecific flipped T waves in V2 which are not criteria for Wellens syndrome but is very nonspecific and not totally normal and does appear to be change from an old tracing noted in her chart    0RADIOLOGY: Non-plain film images such as CT, Ultrasound and MRI are read by the radiologist. Plain radiographic images are visualized and preliminarily interpreted by the emergency physician with the below findings:        Interpretation per the Radiologist below, if available at the time of this note:    CT HEAD WO CONTRAST   Final Result   No acute intracranial abnormality. Diffuse atrophic changes with findings suggesting chronic microvascular   ischemia         XR CHEST (2 VW)   Final Result   No evidence of an acute cardiopulmonary process.                  LABS:  Results for orders placed or performed during the hospital encounter of 07/01/21   CBC auto differential   Result Value Ref Range    WBC 6.1 4.0 - 11.0 K/uL    RBC 3.76 (L) 4.00 - 5.20 M/uL    Hemoglobin 11.8 (L) 12.0 - 16.0 g/dL    Hematocrit 35.8 (L) 36.0 - 48.0 %    MCV 95.1 80.0 - 100.0 fL    MCH 31.3 26.0 - 34.0 pg    MCHC 32.9 31.0 - 36.0 g/dL    RDW 12.9 12.4 - 15.4 %    Platelets 398 408 - 617 K/uL    MPV 8.8 5.0 - 10.5 fL    Neutrophils % 74.5 %    Lymphocytes % 14.6 %    Monocytes % 7.9 %    Eosinophils % 2.6 %    Basophils % 0.4 %    Neutrophils Absolute 4.6 1.7 - 7.7 K/uL    Lymphocytes Absolute 0.9 (L) 1.0 - 5.1 K/uL    Monocytes Absolute 0.5 0.0 - 1.3 K/uL    Eosinophils Absolute 0.2 0.0 - 0.6 K/uL    Basophils Absolute 0.0 0.0 - 0.2 K/uL   Comprehensive Metabolic Panel w/ Reflex to MG   Result Value Ref Range    Sodium 142 136 - 145 mmol/L    Potassium reflex Magnesium 4.4 3.5 - 5.1 mmol/L    Chloride 104 99 - 110 mmol/L    CO2 27 21 - 32 mmol/L    Anion Gap 11 3 - 16    Glucose 155 (H) 70 - 99 mg/dL    BUN 42 (H) 7 - 20 mg/dL    CREATININE 1.4 (H) 0.6 - 1.2 mg/dL    GFR Non- 36 (A) >60    GFR  43 (A) >60    Calcium 10.1 8.3 - 10.6 mg/dL    Total Protein 6.7 6.4 - 8.2 g/dL    Albumin 4.3 3.4 - 5.0 g/dL    Albumin/Globulin Ratio 1.8 1.1 - 2.2    Total Bilirubin 0.5 0.0 - 1.0 mg/dL    Alkaline Phosphatase 86 40 - 129 U/L    ALT 14 10 - 40 U/L    AST 22 15 - 37 U/L    Globulin 2.4 g/dL   Troponin   Result Value Ref Range    Troponin 0.11 (H) <0.01 ng/mL   Lactic Acid, Plasma   Result Value Ref Range    Lactic Acid 0.6 0.4 - 2.0 mmol/L   Urinalysis Reflex to Culture    Specimen: Urine, clean catch   Result Value Ref Range    Color, UA Yellow Straw/Yellow    Clarity, UA SL CLOUDY (A) Clear    Glucose, Ur >=1000 (A) Negative mg/dL    Bilirubin Urine Negative Negative    Ketones, Urine Negative Negative mg/dL    Specific Gravity, UA 1.010 1.005 - 1.030    Blood, Urine TRACE-INTACT (A) Negative    pH, UA 6.5 5.0 - 8.0    Protein, UA Negative Negative mg/dL    Urobilinogen, Urine 0.2 <2.0 E.U./dL    Nitrite, Urine Negative Negative    Leukocyte Esterase, Urine SMALL (A) Negative    Microscopic Examination YES     Urine Type NotGiven     Urine Reflex to Culture Yes    Lipase   Result Value Ref Range    Lipase 34.0 13.0 - 60.0 U/L   Magnesium   Result Value Ref Range    Magnesium 2.20 1.80 - 2.40 mg/dL   Troponin   Result Value Ref Range    Troponin 0.10 (H) <0.01 ng/mL   Microscopic Urinalysis   Result Value Ref Range    WBC, UA 10-20 (A) 0 - 5 /HPF    RBC, UA 3-4 0 - 4 /HPF    Epithelial Cells, UA 6-10 (A) 0 - 5 /HPF    Bacteria, UA 2+ (A) None Seen /HPF   POCT Glucose   Result Value Ref Range    POC Glucose 140 (H) 70 - 99 mg/dl    Performed on ACCU-CHEK    POCT glucose   Result Value Ref Range    Glucose 140 mg/dL    QC OK? ok    EKG 12 Lead   Result Value Ref Range    Ventricular Rate 61 BPM    Atrial Rate 61 BPM    P-R Interval 214 ms    QRS Duration 80 ms    Q-T Interval 416 ms    QTc Calculation (Bazett) 418 ms    P Axis 52 degrees    R Axis 72 degrees    T Axis 73 degrees    Diagnosis       Sinus rhythm with 1st degree A-V blockSeptal infarct , age undeterminedNonspecific ST abnormalityNo previous ECGs availableConfirmed by MARC BAZZI MD ([id number]) on 7/1/2021 6:36:39 PM   EKG 12 Lead   Result Value Ref Range Ventricular Rate 58 BPM    Atrial Rate 58 BPM    P-R Interval 222 ms    QRS Duration 86 ms    Q-T Interval 420 ms    QTc Calculation (Bazett) 412 ms    P Axis 65 degrees    R Axis 66 degrees    T Axis 72 degrees    Diagnosis       Sinus bradycardia with 1st degree A-V blockAnteroseptal infarct (cited on or before 11-MAR-2018)Nonspecific ST abnormalityWhen compared with ECG of 01-JUL-2021 14:11, (unconfirmed)No significant change was foundConfirmed by MARC Garcia MD ([id number]) on 7/1/2021 6:37:06 PM   EKG 12 Lead   Result Value Ref Range    Ventricular Rate 57 BPM    Atrial Rate 57 BPM    P-R Interval 222 ms    QRS Duration 82 ms    Q-T Interval 416 ms    QTc Calculation (Bazett) 404 ms    P Axis 63 degrees    R Axis 75 degrees    T Axis 74 degrees    Diagnosis       Sinus bradycardia with 1st degree A-V blockAnteroseptal infarct (cited on or before 11-MAR-2018)T wave abnormality, consider anterior ischemiaAbnormal ECGWhen compared with ECG of 01-JUL-2021 15:05, (unconfirmed)anterior T inversion more prominentConfirmed by MARC Garcia MD ([id number]) on 7/1/2021 6:38:09 PM            EMERGENCY DEPARTMENT COURSE and DIFFERENTIAL DIAGNOSIS/MDM:     Vitals:    07/01/21 1545 07/01/21 1600 07/01/21 1709 07/01/21 1745   BP: (!) 178/60 (!) 178/60 (!) 169/47 (!) 185/60   Pulse: 67 76 54 87   Resp: 16 16 16 16   Temp:       TempSrc:       SpO2: 99% 97% 99%    Weight:       Height:               MDM    I discussed the case in detail especially with the elevated troponin with our cardiology staff and then subsequently with internal medicine it was felt that it would be prudent to admit the patient her CTs were negative her NIH stroke score remains 0 she may benefit from an MRI tomorrow to make sure she did not have a small stroke but she is definitely not a candidate for thrombolytic therapy  REASSESSMENT          CRITICAL CARE TIME   This patient underwent 40 minutes of critical care time for both evaluation of a potential acute stroke as well as potentially elevated troponin and possibly cardiac event. Continuous cardiac monitoring performed  Repeat neurological assessment with recalculation of NIH stroke score performed each time it was 0. No significant risk factors for cardiac event she does have a history of diabetes on Jardiance no high blood pressure never smoker she had no fever no cough no nausea vomiting diarrhea no dysuria I did give her 4 baby aspirin. And we did repeat her EKG which revealed still a sinus rhythm but no advancing changes the patient at this point will be transferred to [facility] at the request of our cardiology group. No procedure time  CONSULTS:  None      PROCEDURES:     Procedures    MEDICATIONS GIVEN THIS VISIT:  Medications   aspirin chewable tablet 324 mg (324 mg Oral Given 7/1/21 0433)        FINAL IMPRESSION      1. Generalized weakness    2. Confusion    3. Unspecified lack of coordination    4. Elevated troponin level    5. Nonspecific ST-T wave electrocardiographic changes            DISPOSITION/PLAN   DISPOSITION Decision To Transfer 07/01/2021 06:28:16 PM      PATIENT REFERRED TO:  No follow-up provider specified. DISCHARGE MEDICATIONS:  New Prescriptions    No medications on file       Controlled Substances Monitoring  RX Monitoring 10/29/2018   Attestation The Prescription Monitoring Report for this patient was reviewed today. Periodic Controlled Substance Monitoring Possible medication side effects, risk of tolerance/dependence & alternative treatments discussed. ;No signs of potential drug abuse or diversion identified. (Please note that portions of this note were completed with a voice recognition program.  Efforts were made to edit the dictations but occasionally words are mis-transcribed.)    Patient was advised to return to the Emergency Department if there was any worsening.     Bianka Rubio MD (electronically signed)  Attending Emergency Physician         Andrea Fan MD  07/01/21 2373

## 2021-07-02 ENCOUNTER — APPOINTMENT (OUTPATIENT)
Dept: MRI IMAGING | Age: 86
DRG: 065 | End: 2021-07-02
Attending: INTERNAL MEDICINE
Payer: MEDICARE

## 2021-07-02 PROBLEM — N18.32 STAGE 3B CHRONIC KIDNEY DISEASE (HCC): Status: ACTIVE | Noted: 2021-07-02

## 2021-07-02 LAB
ANION GAP SERPL CALCULATED.3IONS-SCNC: 10 MMOL/L (ref 3–16)
BASOPHILS ABSOLUTE: 0 K/UL (ref 0–0.2)
BASOPHILS RELATIVE PERCENT: 0.3 %
BUN BLDV-MCNC: 38 MG/DL (ref 7–20)
CALCIUM SERPL-MCNC: 9.6 MG/DL (ref 8.3–10.6)
CHLORIDE BLD-SCNC: 105 MMOL/L (ref 99–110)
CHOLESTEROL, TOTAL: 135 MG/DL (ref 0–199)
CO2: 27 MMOL/L (ref 21–32)
CREAT SERPL-MCNC: 1.3 MG/DL (ref 0.6–1.2)
EOSINOPHILS ABSOLUTE: 0.2 K/UL (ref 0–0.6)
EOSINOPHILS RELATIVE PERCENT: 2.9 %
ESTIMATED AVERAGE GLUCOSE: 182.9 MG/DL
GFR AFRICAN AMERICAN: 47
GFR NON-AFRICAN AMERICAN: 39
GLUCOSE BLD-MCNC: 106 MG/DL (ref 70–99)
GLUCOSE BLD-MCNC: 124 MG/DL (ref 70–99)
GLUCOSE BLD-MCNC: 80 MG/DL (ref 70–99)
GLUCOSE BLD-MCNC: 89 MG/DL (ref 70–99)
GLUCOSE BLD-MCNC: 94 MG/DL (ref 70–99)
GLUCOSE BLD-MCNC: 96 MG/DL (ref 70–99)
GLUCOSE BLD-MCNC: 99 MG/DL (ref 70–99)
HBA1C MFR BLD: 8 %
HCT VFR BLD CALC: 34.5 % (ref 36–48)
HDLC SERPL-MCNC: 57 MG/DL (ref 40–60)
HEMOGLOBIN: 11.8 G/DL (ref 12–16)
LDL CHOLESTEROL CALCULATED: 66 MG/DL
LV EF: 58 %
LVEF MODALITY: NORMAL
LYMPHOCYTES ABSOLUTE: 1.3 K/UL (ref 1–5.1)
LYMPHOCYTES RELATIVE PERCENT: 17.7 %
MAGNESIUM: 2.2 MG/DL (ref 1.8–2.4)
MCH RBC QN AUTO: 31.7 PG (ref 26–34)
MCHC RBC AUTO-ENTMCNC: 34.1 G/DL (ref 31–36)
MCV RBC AUTO: 92.9 FL (ref 80–100)
MONOCYTES ABSOLUTE: 0.6 K/UL (ref 0–1.3)
MONOCYTES RELATIVE PERCENT: 8.8 %
NEUTROPHILS ABSOLUTE: 5 K/UL (ref 1.7–7.7)
NEUTROPHILS RELATIVE PERCENT: 70.3 %
PDW BLD-RTO: 12.8 % (ref 12.4–15.4)
PERFORMED ON: ABNORMAL
PERFORMED ON: ABNORMAL
PERFORMED ON: NORMAL
PLATELET # BLD: 173 K/UL (ref 135–450)
PMV BLD AUTO: 8.8 FL (ref 5–10.5)
POTASSIUM SERPL-SCNC: 4.2 MMOL/L (ref 3.5–5.1)
RBC # BLD: 3.72 M/UL (ref 4–5.2)
SODIUM BLD-SCNC: 142 MMOL/L (ref 136–145)
TRIGL SERPL-MCNC: 61 MG/DL (ref 0–150)
TROPONIN: 0.06 NG/ML
TROPONIN: 0.06 NG/ML
VLDLC SERPL CALC-MCNC: 12 MG/DL
WBC # BLD: 7.2 K/UL (ref 4–11)

## 2021-07-02 PROCEDURE — 85025 COMPLETE CBC W/AUTO DIFF WBC: CPT

## 2021-07-02 PROCEDURE — 84484 ASSAY OF TROPONIN QUANT: CPT

## 2021-07-02 PROCEDURE — 80048 BASIC METABOLIC PNL TOTAL CA: CPT

## 2021-07-02 PROCEDURE — 83735 ASSAY OF MAGNESIUM: CPT

## 2021-07-02 PROCEDURE — 70551 MRI BRAIN STEM W/O DYE: CPT

## 2021-07-02 PROCEDURE — 83036 HEMOGLOBIN GLYCOSYLATED A1C: CPT

## 2021-07-02 PROCEDURE — 93306 TTE W/DOPPLER COMPLETE: CPT

## 2021-07-02 PROCEDURE — 1200000000 HC SEMI PRIVATE

## 2021-07-02 PROCEDURE — 80061 LIPID PANEL: CPT

## 2021-07-02 PROCEDURE — 6370000000 HC RX 637 (ALT 250 FOR IP): Performed by: INTERNAL MEDICINE

## 2021-07-02 PROCEDURE — 36415 COLL VENOUS BLD VENIPUNCTURE: CPT

## 2021-07-02 PROCEDURE — 99223 1ST HOSP IP/OBS HIGH 75: CPT | Performed by: INTERNAL MEDICINE

## 2021-07-02 RX ORDER — MAGNESIUM SULFATE 1 G/100ML
1000 INJECTION INTRAVENOUS PRN
Status: DISCONTINUED | OUTPATIENT
Start: 2021-07-02 | End: 2021-07-04 | Stop reason: HOSPADM

## 2021-07-02 RX ORDER — ACETAMINOPHEN 325 MG/1
650 TABLET ORAL EVERY 6 HOURS PRN
Status: DISCONTINUED | OUTPATIENT
Start: 2021-07-02 | End: 2021-07-04 | Stop reason: HOSPADM

## 2021-07-02 RX ORDER — DEXTROSE MONOHYDRATE 25 G/50ML
12.5 INJECTION, SOLUTION INTRAVENOUS PRN
Status: DISCONTINUED | OUTPATIENT
Start: 2021-07-02 | End: 2021-07-04 | Stop reason: HOSPADM

## 2021-07-02 RX ORDER — ONDANSETRON 2 MG/ML
4 INJECTION INTRAMUSCULAR; INTRAVENOUS EVERY 6 HOURS PRN
Status: DISCONTINUED | OUTPATIENT
Start: 2021-07-02 | End: 2021-07-04 | Stop reason: HOSPADM

## 2021-07-02 RX ORDER — NICOTINE POLACRILEX 4 MG
15 LOZENGE BUCCAL PRN
Status: DISCONTINUED | OUTPATIENT
Start: 2021-07-02 | End: 2021-07-04 | Stop reason: HOSPADM

## 2021-07-02 RX ORDER — MIRTAZAPINE 15 MG/1
15 TABLET, FILM COATED ORAL NIGHTLY
Status: DISCONTINUED | OUTPATIENT
Start: 2021-07-02 | End: 2021-07-04 | Stop reason: HOSPADM

## 2021-07-02 RX ORDER — ACETAMINOPHEN 650 MG/1
650 SUPPOSITORY RECTAL EVERY 6 HOURS PRN
Status: DISCONTINUED | OUTPATIENT
Start: 2021-07-02 | End: 2021-07-04 | Stop reason: HOSPADM

## 2021-07-02 RX ORDER — ONDANSETRON 4 MG/1
4 TABLET, ORALLY DISINTEGRATING ORAL EVERY 8 HOURS PRN
Status: DISCONTINUED | OUTPATIENT
Start: 2021-07-02 | End: 2021-07-04 | Stop reason: HOSPADM

## 2021-07-02 RX ORDER — POTASSIUM CHLORIDE 20 MEQ/1
40 TABLET, EXTENDED RELEASE ORAL PRN
Status: DISCONTINUED | OUTPATIENT
Start: 2021-07-02 | End: 2021-07-04 | Stop reason: HOSPADM

## 2021-07-02 RX ORDER — FERROUS SULFATE 325(65) MG
325 TABLET ORAL
Status: DISCONTINUED | OUTPATIENT
Start: 2021-07-02 | End: 2021-07-04 | Stop reason: HOSPADM

## 2021-07-02 RX ORDER — PRAVASTATIN SODIUM 20 MG
10 TABLET ORAL NIGHTLY
Status: DISCONTINUED | OUTPATIENT
Start: 2021-07-02 | End: 2021-07-04 | Stop reason: HOSPADM

## 2021-07-02 RX ORDER — ASPIRIN 81 MG/1
81 TABLET, CHEWABLE ORAL DAILY
Status: DISCONTINUED | OUTPATIENT
Start: 2021-07-02 | End: 2021-07-04 | Stop reason: HOSPADM

## 2021-07-02 RX ORDER — SODIUM CHLORIDE 9 MG/ML
25 INJECTION, SOLUTION INTRAVENOUS PRN
Status: DISCONTINUED | OUTPATIENT
Start: 2021-07-02 | End: 2021-07-04 | Stop reason: HOSPADM

## 2021-07-02 RX ORDER — VITAMIN B COMPLEX
2000 TABLET ORAL DAILY
Status: DISCONTINUED | OUTPATIENT
Start: 2021-07-02 | End: 2021-07-04 | Stop reason: HOSPADM

## 2021-07-02 RX ORDER — HYDRALAZINE HYDROCHLORIDE 25 MG/1
25 TABLET, FILM COATED ORAL EVERY 4 HOURS PRN
Status: DISCONTINUED | OUTPATIENT
Start: 2021-07-02 | End: 2021-07-02

## 2021-07-02 RX ORDER — LANOLIN ALCOHOL/MO/W.PET/CERES
3 CREAM (GRAM) TOPICAL NIGHTLY PRN
Status: DISCONTINUED | OUTPATIENT
Start: 2021-07-02 | End: 2021-07-04 | Stop reason: HOSPADM

## 2021-07-02 RX ORDER — DEXTROSE MONOHYDRATE 50 MG/ML
100 INJECTION, SOLUTION INTRAVENOUS PRN
Status: DISCONTINUED | OUTPATIENT
Start: 2021-07-02 | End: 2021-07-04 | Stop reason: HOSPADM

## 2021-07-02 RX ORDER — INSULIN GLARGINE 100 [IU]/ML
0.15 INJECTION, SOLUTION SUBCUTANEOUS NIGHTLY
Status: DISCONTINUED | OUTPATIENT
Start: 2021-07-02 | End: 2021-07-04 | Stop reason: HOSPADM

## 2021-07-02 RX ORDER — POTASSIUM CHLORIDE 7.45 MG/ML
10 INJECTION INTRAVENOUS PRN
Status: DISCONTINUED | OUTPATIENT
Start: 2021-07-02 | End: 2021-07-04 | Stop reason: HOSPADM

## 2021-07-02 RX ORDER — SODIUM CHLORIDE 0.9 % (FLUSH) 0.9 %
10 SYRINGE (ML) INJECTION PRN
Status: DISCONTINUED | OUTPATIENT
Start: 2021-07-02 | End: 2021-07-04 | Stop reason: HOSPADM

## 2021-07-02 RX ORDER — PRAMIPEXOLE DIHYDROCHLORIDE 1 MG/1
1 TABLET ORAL NIGHTLY
Status: DISCONTINUED | OUTPATIENT
Start: 2021-07-02 | End: 2021-07-04 | Stop reason: HOSPADM

## 2021-07-02 RX ADMIN — PRAMIPEXOLE DIHYDROCHLORIDE 1 MG: 1 TABLET ORAL at 20:13

## 2021-07-02 RX ADMIN — PRAVASTATIN SODIUM 10 MG: 20 TABLET ORAL at 01:27

## 2021-07-02 RX ADMIN — METOPROLOL TARTRATE 25 MG: 25 TABLET, FILM COATED ORAL at 20:13

## 2021-07-02 RX ADMIN — PRAMIPEXOLE DIHYDROCHLORIDE 1 MG: 1 TABLET ORAL at 01:27

## 2021-07-02 RX ADMIN — FERROUS SULFATE TAB 325 MG (65 MG ELEMENTAL FE) 325 MG: 325 (65 FE) TAB at 17:16

## 2021-07-02 RX ADMIN — MIRTAZAPINE 15 MG: 15 TABLET, FILM COATED ORAL at 20:13

## 2021-07-02 RX ADMIN — METOPROLOL TARTRATE 25 MG: 25 TABLET, FILM COATED ORAL at 01:27

## 2021-07-02 RX ADMIN — PRAVASTATIN SODIUM 10 MG: 20 TABLET ORAL at 20:13

## 2021-07-02 RX ADMIN — MIRTAZAPINE 15 MG: 15 TABLET, FILM COATED ORAL at 01:27

## 2021-07-02 ASSESSMENT — PAIN SCALES - GENERAL
PAINLEVEL_OUTOF10: 0
PAINLEVEL_OUTOF10: 0

## 2021-07-02 NOTE — PROGRESS NOTES
RN spoke with daughter on the phone who states eye procedure was in 2019 and was a laser procedure, no implants were put in her eye. RN called [facility] tech left .

## 2021-07-02 NOTE — PROGRESS NOTES
Page sent to Dr. La Salvador: [id number] MRI Results: Acute small-vessel ischemic infarction in the posterior limb of the right internal capsule. No associated hemorrhage. 2. Diffuse parenchymal volume loss and sequela of mild-to-moderate chronic microvascular ischemic changes. The findings were sent to the Radiology Echo results are in as well. Is she able to have a diet order? Awaiting response.

## 2021-07-02 NOTE — PROGRESS NOTES
4 Eyes Skin Assessment     The patient is being assess for   Admission    I agree that 2 RN's have performed a thorough Head to Toe Skin Assessment on the patient. ALL assessment sites listed below have been assessed. Areas assessed for pressure by both nurses:   [x]   Head, Face, and Ears   [x]   Shoulders, Back, and Chest, Abdomen  [x]   Arms, Elbows, and Hands   [x]   Coccyx, Sacrum, and Ischium  [x]   Legs, Feet, and Heels        Skin Assessed Under all Medical Devices by both nurses:  No skin issues              All Mepilex Borders were peeled back and area peeked at by both nurses:  No: No mepilex borders on patient  Please list where Mepilex Borders are located:               **SHARE this note so that the co-signing nurse is able to place an eSignature**    Co-signer eSignature: Electronically signed by Debby Malone on 7/2/21 at 6:13 AM EDT    Does the Patient have Skin Breakdown related to pressure?   No          Shukri Prevention initiated:  No   Wound Care Orders initiated:  No      [facility] nurse consulted for Pressure Injury (Stage 3,4, Unstageable, DTI, NWPT, Complex wounds)and New or Established Ostomies:  No      Primary Nurse eSignature: Electronically signed by Duke Villalba RN on 7/1/21 at 10:40 PM EDT

## 2021-07-02 NOTE — CARE COORDINATION
CASE MANAGEMENT INITIAL ASSESSMENT    Reviewed chart and completed assessment with: Patient    Explained Case Management role/services. Primary contact information: [address] Maker :   Primary Decision Maker: Yamilet Parks - [phone]    Secondary Decision Maker: Scot Zambrano - [phone]        Can this person be reached and be able to respond quickly, such as within a few minutes or hours? Yes    Admit date/status:07/01/2021 Inpatient   Diagnosis:Generalized Weakness    Is this a Readmission?:  No transfer from [place]. [place]    Insurance:Medicare   Precert required for SNF: No       3 night stay required: No    Living arrangements, Adls, care needs, prior to admission:Mira reports lives at home with dtr two steps to enter one level living once inside, uses walker for mobility. Transportation: Dtr supports      [address] at home:  Walker_x_Cane__RTS__ BSC__Shower Chair__  02__ HHN__ CPAP__  BiPap__  Hospital Bed__ W/C___ Other__________    Services in the home and/or outpatient, prior to admission: Reports has a week foot check unsure of provider, Writer placed call to dtr to see if she can provide providers name    PT/OT recs:not ordered     Hospital Exemption Notification (HEN): NA    Barriers to discharge:NPO at this time    Plan/comments: Patient plans to return home with dtr. Call out to confirm Provider that completes weekly visits. SW will ct to follow for [facility] needs. RONDA Vazquez  21 : Writer received call from dtr [address] care call placed to notify in hospital, will need new order re inpatient. RONDA Vazquez       [facility] on chart for MD signature

## 2021-07-02 NOTE — H&P
Hospital Medicine History & Physical      Patient:  Jayna Monroy  :   1935  MRN:   [id number]  Date of Service: 21    Chief Complaint  Patient presents with  Extremity Weakness   Left arm weakness x2hrs. Daughter  patient slightly disoriented yesterday evening    HISTORY OF PRESENT ILLNESS:    Jayna Monroy is a 80 y.o. female. She was accepted in transfer from [place] [address]. [facility] ER confusion and generalized weakness. For her part the patient complained of left hand coordination problems at the time. Upon my evaluation the patient has no complaints whatsoever. Review of Systems:  All pertinent positives and negatives are as noted in the HPI section. All other systems were reviewed and are negative.     Past Medical History:   Diagnosis Date    Clostridium difficile infection 2017    antigen +    Dementia ([facility]     Diabetes mellitus ([facility]     History of blood transfusion     Hyperlipidemia     Hypertension     Infection     foot    MDRO (multiple drug resistant organisms) resistance 2019    urine    MRSA infection 2017    foot    Osteomyelitis of spine ([facility] [address] [facility] 2010    thoraculumbar spine    Restless leg syndrome        Past Surgical History:   Procedure Laterality Date    APPENDECTOMY      COLONOSCOPY      polyps    COLONOSCOPY  7/15/15    normal colon    EYE SURGERY      right cataract    FOOT AMPUTATION Right 2019    PARTIAL RIGHT FOOT AMPUTATION    FOOT AMPUTATION Right 2019    PARTIAL RIGHT FOOT AMPUTATION performed by Manuela Sin DPM at [place] [address] [place] [place] Left 2017    PARTIAL LEFT FOOT AMPUTATION              FOOT SURGERY Right 2017    HYSTERECTOMY      OTHER SURGICAL HISTORY Left     Macular Grid Left eye argon    TOE AMPUTATION      2nd toe 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    TOE AMPUTATION      5th toe right foot    TONSILLECTOMY      UPPER GASTROINTESTINAL ENDOSCOPY  2017    Dieulafoy lesion in duodenum         Prior to Admission medications    Medication Sig Start Date End Date Taking? Authorizing Provider   Empagliflozin (JARDIANCE PO) Take by mouth    Historical Provider, MD   LINAGLIPTIN PO Take by mouth    Historical Provider, MD   calcium carbonate (OSCAL) 500 MG TABS tablet Take 500 mg by mouth daily    Historical Provider, MD   NONFORMULARY tragenta    Historical Provider, MD   lisinopril (PRINIVIL;ZESTRIL) 10 MG tablet Take 10 mg by mouth daily    Historical Provider, MD   Cholecalciferol (VITAMIN D3) 50 MCG (2000 UT) CAPS Take 2,000 Units by mouth daily    Historical Provider, MD   mirtazapine (REMERON) 15 MG tablet Take 1 tablet by mouth daily 3/6/19   Jerrell Frey MD   pramipexole (MIRAPEX) 1 MG tablet TAKE 1 TABLET BY ORAL ROUTE ONCE A DAY (IN THE EVENING) AROUND 8-9 PM FOR RLS 1/11/19   Historical Provider, MD   metoprolol tartrate (LOPRESSOR) 25 MG tablet TAKE 1 TABLET BY ORAL ROUTE 2 TIMES A DAY FOR BP 1/11/19   Historical Provider, MD   ferrous sulfate 325 (65 Fe) MG tablet Take 325 mg by mouth 3 times daily (with meals)     Historical Provider, MD   Multiple Vitamins-Minerals (MULTIVITAMIN PO) Take by mouth daily    Historical Provider, MD   Cyanocobalamin (VITAMIN B-12) 5000 MCG SUBL Place 500 mcg under the tongue daily     Historical Provider, MD   pravastatin (PRAVACHOL) 10 MG tablet Take 10 mg by mouth daily     Historical Provider, MD       Allergies:   Patient has no known allergies. Social:   reports that she has never smoked. She has never used smokeless tobacco.   reports current alcohol use. Social History     Substance and Sexual Activity   Drug Use No       Family History   Problem Relation Age of Onset    Diabetes Mother     High Blood Pressure Mother     Cancer Father     Heart Disease Maternal Uncle        PHYSICAL EXAM:  I performed this physical examination.     Vitals:  Patient Vitals for the past 24 hrs:   BP Temp Temp src Pulse Resp SpO2 Height Weight 07/01/21 2336 (!) 177/67 98 °F (36.7 °C) Oral 58 16 99 % -- --   07/01/21 2215 -- -- -- -- -- -- 5' 10\" (1.778 m) 140 lb (63.5 kg)   07/01/21 2200 (!) 184/66 97.8 °F (36.6 °C) Oral 73 16 97 % -- --     No intake or output data in the 24 hours ending 07/02/21 0048    Room air    GEN:  Appearance:  Elderly female in NAD . Level of Consciousness:  alert . Orientation:  full    HEENT: Sclera anicteric.  no conjunctival chemosis. moist mucus membranes. no specific or diagnostic oral lesions. NECK:  no signs of meningismus. Jugular veins not distended. Carotid pulses  2+.  no cervical lymphadenopathy. no thyromegaly. CV:  regular rhythm. normal S1 & S2.    no murmur. no rub.  no gallop. PULM:  Chest excursion is symmetric. Breath sounds are vesicular. Adventitious sounds:  none    AB:  Abdominal shape is normal.  Bowel sounds are active. Generally sof to palpation. no tenderness is present. no involuntary guarding. no rebound guarding. EXTR:  Skin is warm. Capillary refill brisk. no specific or pathognomic rash. no clubbing. no pitting edema. Both feet in are in podiatric walking shoes. NEURO:  CN 2-12 intact. Strength 5/5 x 4 (for age). Sensation intact except reduced in both feet. LABS:  Lab Results   Component Value Date    WBC 6.1 07/01/2021    HGB 11.8 (L) 07/01/2021    HCT 35.8 (L) 07/01/2021    MCV 95.1 07/01/2021     07/01/2021     Lab Results   Component Value Date    CREATININE 1.4 (H) 07/01/2021    BUN 42 (H) 07/01/2021     07/01/2021    K 4.4 07/01/2021     07/01/2021    CO2 27 07/01/2021     Lab Results   Component Value Date    ALT 14 07/01/2021    AST 22 07/01/2021    ALKPHOS 86 07/01/2021    BILITOT 0.5 07/01/2021     Lab Results   Component Value Date    CKTOTAL 290 (H) 03/03/2019    TROPONINI 0.10 (H) 07/01/2021     No results for input(s): PHART, NZK3HHA, PO2ART in the last 72 hours.     IMAGING:  XR CHEST (2 VW)    Result Date: 7/1/2021  EXAMINATION: TWO XRAY VIEWS OF THE CHEST 7/1/2021 3:21 pm COMPARISON: Chest radiograph 04/10/2019 HISTORY: ORDERING SYSTEM PROVIDED HISTORY: PAIN TECHNOLOGIST PROVIDED HISTORY: Reason for exam:->PAIN Reason for Exam: pt states no CP, normal SOB Acuity: Acute Type of Exam: Initial FINDINGS: No pulmonary consolidation, effusion or pneumothorax. Mild blunting of the costophrenic angles is not changed significantly and most likely due to scar. No significant change in the appearance of the cardiomediastinal silhouette with tortuosity of the descending thoracic aorta. No enlargement of the cardiac silhouette. No free air under the diaphragm. Multilevel degenerative changes of the spine are present with mildly accentuated kyphosis. No evidence of an acute cardiopulmonary process. CT HEAD WO CONTRAST    Result Date: 7/1/2021  EXAMINATION: CT OF THE HEAD WITHOUT CONTRAST  7/1/2021 3:21 pm TECHNIQUE: CT of the head was performed without the administration of intravenous contrast. Dose modulation, iterative reconstruction, and/or weight based adjustment of the mA/kV was utilized to reduce the radiation dose to as low as reasonably achievable. COMPARISON: 03/03/2019 HISTORY: ORDERING SYSTEM PROVIDED HISTORY: HEADACHE TECHNOLOGIST PROVIDED HISTORY: Has a \"code stroke\" or \"stroke alert\" been called? ->No Reason for exam:->HEADACHE Decision Support Exception - unselect if not a suspected or confirmed emergency medical condition->Emergency Medical Condition (MA) Reason for Exam: funny feeling in her LT arm today Acuity: Acute Type of Exam: Initial FINDINGS: BRAIN/VENTRICLES: The ventricles and sulci are diffusely enlarged. Low attenuation is seen in the periventricular and subcortical white matter. No acute intracranial hemorrhage or acute infarct is identified. ORBITS: The visualized portion of the orbits demonstrate no acute abnormality.  SINUSES: The visualized paranasal sinuses and mastoid air cells demonstrate no acute abnormality. SOFT TISSUES/SKULL:  No acute abnormality of the visualized skull or soft tissues. No acute intracranial abnormality. Diffuse atrophic changes with findings suggesting chronic microvascular ischemia       I directly reviewed all recent imaging studies as well as pertinent prior studies. Radiology reports may or may not be available at the time of my review. EKG:  New and pertinent prior tracings were directly reviewed. My interpretation is as follows:  Sinus bradycardia w/ 1st degree AV block. Possible old anteroseptal MI. Active Hospital Problems    Diagnosis Date Noted    Essential hypertension [I10]      Priority: High    Elevated troponin [R77.8]      Priority: High    Stage 3b chronic kidney disease ([facility] [address] [N18.32] 07/02/2021    Generalized weakness [R53.1] 07/01/2021    Type 2 diabetes mellitus with foot ulcer, without long-term current use of insulin ([facility] [address] [B47.855, L97.509] 05/29/2017       ASSESSMENT & PLAN  Elevated troponin, HTN  -  Trend out troponin. TTE requested for the morning. Cardiology on board. Will keep pt NPO in case a procedure is planned. -  Continue ASA, statin, metoprolol. CKD 3b, HTN  -  Creatinine is 1.4mg/dL which is in keeping with her prior baseline.  -  ACE/ARB agents held in case of contrast use such as with [facility]. Will make non-nephrotoxic antihypertensives available prn. Left Hand discoordination  -  Normal neurologic exam at this time. -  Will follow through with MRI brain to exclude stroke. DM2  -  Hold all oral antidiabetic agents. Start s.c. Insulin regimen based on weight.       DVT prophylaxis: SCDs, lovenox  Code Status:  Full  Disposition:  Inpatient    Erlinda Donnelly MD MD

## 2021-07-02 NOTE — CONSULTS
[facility]  [phone]      Attending Physician: Yumiko Alanis MD  Reason for Consultation/Chief Complaint: elevated trops    Subjective   History of Present Illness:  Deidra Middleton is a 80 y.o. patient who presented to the hospital with complaints of left arm weakness. Started about a month ago and went into PCP but eventually states that her daughter just brought her in. Pt is forgetful and not able to give much more hx on this. Stated her left arm is always weeker, tries not to use it. Not using it so not sure if drops objects but states it is difficult. No change with excertion. No cardiac hx in past    No CP, no SOB no palpitations,     Past Medical History:   has a past medical history of Clostridium difficile infection, Dementia ([address] Diabetes mellitus ([address] History of blood transfusion, Hyperlipidemia, Hypertension, Infection, MDRO (multiple drug resistant organisms) resistance, MRSA infection, Osteomyelitis of spine ([address] and Restless leg syndrome. Surgical History:   has a past surgical history that includes Tonsillectomy; Hysterectomy; Appendectomy; Toe amputation (2003); Toe amputation (2011); eye surgery; Colonoscopy (1997); Colonoscopy (7/15/15); other surgical history (Left); Foot surgery (Left, 05/30/2017); Upper gastrointestinal endoscopy (06/05/2017); Foot surgery (Right, 12/11/2017); Foot Amputation (Right, 05/26/2019); and Foot Amputation (Right, 5/26/2019). Social History:   reports that she has never smoked. She has never used smokeless tobacco. She reports current alcohol use. She reports that she does not use drugs. Family History:  family history includes Cancer in her father; Diabetes in her mother; Heart Disease in her maternal uncle; High Blood Pressure in her mother. Home Medications:  Were reviewed and are listed in nursing record and/or below  Prior to Admission medications    Medication Sig Start Date End Date Taking? Authorizing Provider   Empagliflozin (JARDIANCE PO) Take by mouth    Historical Provider, MD   LINAGLIPTIN PO Take by mouth    Historical Provider, MD   calcium carbonate (OSCAL) 500 MG TABS tablet Take 500 mg by mouth daily    Historical Provider, MD   NONFORMULARY tragenta    Historical Provider, MD   lisinopril (PRINIVIL;ZESTRIL) 10 MG tablet Take 10 mg by mouth daily    Historical Provider, MD   Cholecalciferol (VITAMIN D3) 50 MCG (2000 UT) CAPS Take 2,000 Units by mouth daily    Historical Provider, MD   mirtazapine (REMERON) 15 MG tablet Take 1 tablet by mouth daily 3/6/19   Dulce Maria Wright MD   pramipexole (MIRAPEX) 1 MG tablet TAKE 1 TABLET BY ORAL ROUTE ONCE A DAY (IN THE EVENING) AROUND 8-9 PM FOR RLS 1/11/19   Historical Provider, MD   metoprolol tartrate (LOPRESSOR) 25 MG tablet TAKE 1 TABLET BY ORAL ROUTE 2 TIMES A DAY FOR BP 1/11/19   Historical Provider, MD   ferrous sulfate 325 (65 Fe) MG tablet Take 325 mg by mouth 3 times daily (with meals)     Historical Provider, MD   Multiple Vitamins-Minerals (MULTIVITAMIN PO) Take by mouth daily    Historical Provider, MD   Cyanocobalamin (VITAMIN B-12) 5000 MCG SUBL Place 500 mcg under the tongue daily     Historical Provider, MD   pravastatin (PRAVACHOL) 10 MG tablet Take 10 mg by mouth daily     Historical Provider, MD        CURRENT Medications:  metoprolol tartrate (LOPRESSOR) tablet 25 mg, BID  mirtazapine (REMERON) tablet 15 mg, Nightly  pramipexole (MIRAPEX) tablet 1 mg, Nightly  pravastatin (PRAVACHOL) tablet 10 mg, Nightly  ferrous sulfate (IRON 325) tablet 325 mg, TID WC  Vitamin D (CHOLECALCIFEROL) tablet 2,000 Units, Daily  glucose (GLUTOSE) 40 % oral gel 15 g, PRN  dextrose 50 % IV solution, PRN  glucagon (rDNA) injection 1 mg, PRN  dextrose 5 % solution, PRN  sodium chloride flush 0.9 % injection 10 mL, PRN  0.9 % sodium chloride infusion, PRN  potassium chloride (KLOR-CON M) extended release tablet 40 mEq, PRN   Or  potassium bicarb-citric acid (EFFER-K) effervescent tablet 40 mEq, PRN   Or  potassium chloride 10 mEq/100 mL IVPB (Peripheral Line), PRN  magnesium sulfate 1000 mg in dextrose 5% 100 mL IVPB, PRN  enoxaparin (LOVENOX) injection 30 mg, Daily  ondansetron (ZOFRAN-ODT) disintegrating tablet 4 mg, Q8H PRN   Or  ondansetron (ZOFRAN) injection 4 mg, Q6H PRN  acetaminophen (TYLENOL) tablet 650 mg, Q6H PRN   Or  acetaminophen (TYLENOL) suppository 650 mg, Q6H PRN  insulin glargine (LANTUS) injection vial 10 Units, Nightly  insulin lispro (HUMALOG) injection vial 3 Units, TID WC  insulin lispro (HUMALOG) injection vial 0-6 Units, TID WC  insulin lispro (HUMALOG) injection vial 0-3 Units, Nightly  insulin lispro (HUMALOG) injection vial 0-6 Units, Q4H  perflutren lipid microspheres (DEFINITY) injection 1.65 mg, ONCE PRN  melatonin tablet 3 mg, Nightly PRN  aspirin chewable tablet 81 mg, Daily        Allergies:  Patient has no known allergies. Review of Systems:   A 14 point review of symptoms completed. Pertinent positives identified in the HPI, all other review of symptoms negative as below.       Objective   PHYSICAL EXAM:    Vitals:    07/02/21 0301   BP: (!) 131/51   Pulse: 51   Resp: 16   Temp: 97.5 °F (36.4 °C)   SpO2: 96%    Weight: 136 lb (61.7 kg)         General Appearance:  Alert, cooperative, no distress, appears stated age   Head:  Normocephalic, without obvious abnormality, atraumatic   Eyes:  PERRL, conjunctiva/corneas clear   Nose: Nares normal, no drainage or sinus tenderness   Throat: Lips, mucosa, and tongue normal   Neck: Supple, symmetrical, trachea midline, no adenopathy, thyroid: not enlarged, symmetric, no tenderness/mass/nodules, no carotid bruit or JVD   Lungs:   Clear to auscultation bilaterally, respirations unlabored   Chest Wall:  No deformity or tenderness   Heart:  Regular rate and rhythm, S1, S2 normal, no murmur, rub or gallop   Abdomen:   Soft, non-tender, bowel sounds active all four quadrants,  no masses, no organomegaly   Extremities: Extremities normal, atraumatic, no cyanosis or edema   Pulses: 2+ and symmetric   Skin: Skin color, texture, turgor normal, no rashes or lesions   Pysch: Normal mood and affect   Neurologic: Normal gross motor and sensory exam.         Labs   CBC:   Lab Results   Component Value Date    WBC 7.2 2021    RBC 3.72 2021    HGB 11.8 2021    HCT 34.5 2021    MCV 92.9 2021    RDW 12.8 2021     2021     CMP:  Lab Results   Component Value Date     2021    K 4.2 2021    K 4.4 2021     2021    CO2 27 2021    BUN 38 2021    CREATININE 1.3 2021    GFRAA 47 2021    AGRATIO 1.8 2021    LABGLOM 39 2021    GLUCOSE 94 2021    PROT 6.7 2021    CALCIUM 9.6 2021    BILITOT 0.5 2021    ALKPHOS 86 2021    AST 22 2021    ALT 14 2021     PT/INR:  No results found for: PTINR  HgBA1c:  Lab Results   Component Value Date    LABA1C 8.2 2020     Lab Results   Component Value Date    CKTOTAL 290 (H) 2019    TROPONINI 0.06 (H) 2021         Cardiac Data     Last EK2021 NSR with 1st AVB, septal infarct    Echo: 2017    Hyperdynamic normal left ventricular systolic function with an estimated   ejection fraction of greater than 65%. Grade II diastolic dysfunction with elevated filling pressure. Moderate aortic regurgitation. Moderate tricuspid regurgitation. Systolic pulmonary artery pressure (SPAP) is estimated at 46 mmHg consistent   with mild pulmonary hypertension (RA pressure 3 mmHg). There is a large left pleural effusion. Stress Test:    Cath:    Studies:     Assessment and Plan      1. CP  2. Elevated trop: downtrending  3. HTN: Elevated on admission  4. RUBEN: Cr 1.4 on admission    PLAN  1. Pt with atypical CP but trops out of proportion  2. Awaiting neuro workup  3.  Update echo   - if abnormal may need cath (once Cr improves/stabilizes)   - if normal, would get stress test to eval for any ischemia        Patient Active Problem List   Diagnosis    Type 2 diabetes mellitus with foot ulcer, without long-term current use of insulin (HCC)    Acute blood loss anemia    Severe protein-calorie malnutrition (HCC)    Encephalopathy    Elevated troponin    Essential hypertension    Altered mental status    Acute renal failure (HCC)    Contusion of left hip    Acute UTI    Mild dehydration    Urinary incontinence    Ulcer of foot due to secondary diabetes mellitus [facility] [address]    Osteomyelitis of right foot (HCC)    Chronic kidney disease    Generalized weakness    Stage 3b chronic kidney disease ([facility] [address]           Thank you for allowing us to participate in the care of Jeo Putnam. Please call me with any questions [phone]. Gonzalez Tracy MD, [id number] [facility] Cardi[facility]  [facility]  [phone] [facility]  [phone] [facility]  7/2/2021 7:43 AM    I will address the patient's cardiac risk factors and adjusted pharmacologic treatment as needed. In addition, I have reinforced the need for patient directed risk factor modification. All questions and concerns were addressed to the patient/family. Alternatives to my treatment were discussed. The note was completed using EMR. Every effort was made to ensure accuracy; however, inadvertent computerized transcription errors may be present.

## 2021-07-03 ENCOUNTER — APPOINTMENT (OUTPATIENT)
Dept: MRI IMAGING | Age: 86
DRG: 065 | End: 2021-07-03
Attending: INTERNAL MEDICINE
Payer: MEDICARE

## 2021-07-03 LAB
ANION GAP SERPL CALCULATED.3IONS-SCNC: 10 MMOL/L (ref 3–16)
BASOPHILS ABSOLUTE: 0 K/UL (ref 0–0.2)
BASOPHILS RELATIVE PERCENT: 0.4 %
BUN BLDV-MCNC: 33 MG/DL (ref 7–20)
CALCIUM SERPL-MCNC: 9.7 MG/DL (ref 8.3–10.6)
CHLORIDE BLD-SCNC: 101 MMOL/L (ref 99–110)
CO2: 27 MMOL/L (ref 21–32)
CREAT SERPL-MCNC: 1.3 MG/DL (ref 0.6–1.2)
EOSINOPHILS ABSOLUTE: 0.1 K/UL (ref 0–0.6)
EOSINOPHILS RELATIVE PERCENT: 2.1 %
GFR AFRICAN AMERICAN: 47
GFR NON-AFRICAN AMERICAN: 39
GLUCOSE BLD-MCNC: 120 MG/DL (ref 70–99)
GLUCOSE BLD-MCNC: 128 MG/DL (ref 70–99)
GLUCOSE BLD-MCNC: 132 MG/DL (ref 70–99)
GLUCOSE BLD-MCNC: 192 MG/DL (ref 70–99)
GLUCOSE BLD-MCNC: 201 MG/DL (ref 70–99)
HCT VFR BLD CALC: 36.3 % (ref 36–48)
HEMOGLOBIN: 12.3 G/DL (ref 12–16)
LYMPHOCYTES ABSOLUTE: 0.8 K/UL (ref 1–5.1)
LYMPHOCYTES RELATIVE PERCENT: 14.7 %
MAGNESIUM: 2.1 MG/DL (ref 1.8–2.4)
MCH RBC QN AUTO: 31.8 PG (ref 26–34)
MCHC RBC AUTO-ENTMCNC: 33.9 G/DL (ref 31–36)
MCV RBC AUTO: 93.9 FL (ref 80–100)
MONOCYTES ABSOLUTE: 0.4 K/UL (ref 0–1.3)
MONOCYTES RELATIVE PERCENT: 7.1 %
NEUTROPHILS ABSOLUTE: 4.3 K/UL (ref 1.7–7.7)
NEUTROPHILS RELATIVE PERCENT: 75.7 %
ORGANISM: ABNORMAL
PDW BLD-RTO: 13.2 % (ref 12.4–15.4)
PERFORMED ON: ABNORMAL
PLATELET # BLD: 185 K/UL (ref 135–450)
PMV BLD AUTO: 8.4 FL (ref 5–10.5)
POTASSIUM SERPL-SCNC: 4.2 MMOL/L (ref 3.5–5.1)
RBC # BLD: 3.87 M/UL (ref 4–5.2)
SODIUM BLD-SCNC: 138 MMOL/L (ref 136–145)
URINE CULTURE, ROUTINE: ABNORMAL
WBC # BLD: 5.7 K/UL (ref 4–11)

## 2021-07-03 PROCEDURE — 1200000000 HC SEMI PRIVATE

## 2021-07-03 PROCEDURE — 70547 MR ANGIOGRAPHY NECK W/O DYE: CPT

## 2021-07-03 PROCEDURE — 6370000000 HC RX 637 (ALT 250 FOR IP): Performed by: INTERNAL MEDICINE

## 2021-07-03 PROCEDURE — 83735 ASSAY OF MAGNESIUM: CPT

## 2021-07-03 PROCEDURE — 99233 SBSQ HOSP IP/OBS HIGH 50: CPT | Performed by: INTERNAL MEDICINE

## 2021-07-03 PROCEDURE — 6360000002 HC RX W HCPCS: Performed by: INTERNAL MEDICINE

## 2021-07-03 PROCEDURE — 97165 OT EVAL LOW COMPLEX 30 MIN: CPT

## 2021-07-03 PROCEDURE — 92526 ORAL FUNCTION THERAPY: CPT

## 2021-07-03 PROCEDURE — 92610 EVALUATE SWALLOWING FUNCTION: CPT

## 2021-07-03 PROCEDURE — 2580000003 HC RX 258: Performed by: INTERNAL MEDICINE

## 2021-07-03 PROCEDURE — 97110 THERAPEUTIC EXERCISES: CPT

## 2021-07-03 PROCEDURE — 85025 COMPLETE CBC W/AUTO DIFF WBC: CPT

## 2021-07-03 PROCEDURE — 6360000002 HC RX W HCPCS: Performed by: NURSE PRACTITIONER

## 2021-07-03 PROCEDURE — 97116 GAIT TRAINING THERAPY: CPT

## 2021-07-03 PROCEDURE — 97161 PT EVAL LOW COMPLEX 20 MIN: CPT

## 2021-07-03 PROCEDURE — 80048 BASIC METABOLIC PNL TOTAL CA: CPT

## 2021-07-03 PROCEDURE — 97535 SELF CARE MNGMENT TRAINING: CPT

## 2021-07-03 PROCEDURE — 36415 COLL VENOUS BLD VENIPUNCTURE: CPT

## 2021-07-03 RX ORDER — INSULIN GLARGINE 100 [IU]/ML
8 INJECTION, SOLUTION SUBCUTANEOUS ONCE
Status: COMPLETED | OUTPATIENT
Start: 2021-07-03 | End: 2021-07-03

## 2021-07-03 RX ORDER — ASPIRIN 81 MG/1
81 TABLET, CHEWABLE ORAL DAILY
Qty: 30 TABLET | Refills: 3 | Status: SHIPPED | OUTPATIENT
Start: 2021-07-04

## 2021-07-03 RX ORDER — HYDRALAZINE HYDROCHLORIDE 20 MG/ML
10 INJECTION INTRAMUSCULAR; INTRAVENOUS ONCE
Status: COMPLETED | OUTPATIENT
Start: 2021-07-03 | End: 2021-07-03

## 2021-07-03 RX ADMIN — Medication 2000 UNITS: at 08:22

## 2021-07-03 RX ADMIN — Medication 10 ML: at 08:22

## 2021-07-03 RX ADMIN — MIRTAZAPINE 15 MG: 15 TABLET, FILM COATED ORAL at 22:20

## 2021-07-03 RX ADMIN — INSULIN LISPRO 3 UNITS: 100 INJECTION, SOLUTION INTRAVENOUS; SUBCUTANEOUS at 08:30

## 2021-07-03 RX ADMIN — ENOXAPARIN SODIUM 30 MG: 30 INJECTION SUBCUTANEOUS at 08:22

## 2021-07-03 RX ADMIN — INSULIN LISPRO 3 UNITS: 100 INJECTION, SOLUTION INTRAVENOUS; SUBCUTANEOUS at 17:21

## 2021-07-03 RX ADMIN — INSULIN GLARGINE 8 UNITS: 100 INJECTION, SOLUTION SUBCUTANEOUS at 22:43

## 2021-07-03 RX ADMIN — FERROUS SULFATE TAB 325 MG (65 MG ELEMENTAL FE) 325 MG: 325 (65 FE) TAB at 17:21

## 2021-07-03 RX ADMIN — INSULIN LISPRO 2 UNITS: 100 INJECTION, SOLUTION INTRAVENOUS; SUBCUTANEOUS at 08:25

## 2021-07-03 RX ADMIN — PRAMIPEXOLE DIHYDROCHLORIDE 1 MG: 1 TABLET ORAL at 22:20

## 2021-07-03 RX ADMIN — INSULIN LISPRO 3 UNITS: 100 INJECTION, SOLUTION INTRAVENOUS; SUBCUTANEOUS at 11:55

## 2021-07-03 RX ADMIN — FERROUS SULFATE TAB 325 MG (65 MG ELEMENTAL FE) 325 MG: 325 (65 FE) TAB at 11:55

## 2021-07-03 RX ADMIN — METOPROLOL TARTRATE 25 MG: 25 TABLET, FILM COATED ORAL at 22:20

## 2021-07-03 RX ADMIN — METOPROLOL TARTRATE 25 MG: 25 TABLET, FILM COATED ORAL at 08:22

## 2021-07-03 RX ADMIN — PRAVASTATIN SODIUM 10 MG: 20 TABLET ORAL at 22:21

## 2021-07-03 RX ADMIN — ASPIRIN 81 MG: 81 TABLET, CHEWABLE ORAL at 08:22

## 2021-07-03 RX ADMIN — HYDRALAZINE HYDROCHLORIDE 10 MG: 20 INJECTION INTRAMUSCULAR; INTRAVENOUS at 00:15

## 2021-07-03 RX ADMIN — FERROUS SULFATE TAB 325 MG (65 MG ELEMENTAL FE) 325 MG: 325 (65 FE) TAB at 08:22

## 2021-07-03 ASSESSMENT — PAIN SCALES - GENERAL
PAINLEVEL_OUTOF10: 0

## 2021-07-03 NOTE — PROGRESS NOTES
RN called Pt's daughter regarding for discharge and  time. Daughter stated she can't able to come today since she has some kind of emergency and she is out of town. Coming back tomorrow morning to pick her up. RN informed Dr. Bernardo Bueno. He is ok with pt to stay 1 night. discharge order is cancelled per Dr. Clovis Camargo.

## 2021-07-03 NOTE — PROGRESS NOTES
of the R internal capsule. PMhx includes dementia, DM, HTN, and foot amputation. Per MD note, pt has severe protein-calorie malnutrition and dehydration. Oral mechanism examination unremarkable. Pt alert and O x 4. She denies speech changes. She also denies dysphagia, odynophagia, or globus sensation. Observed pt consume her breakfast of English toast, strawberries, and thin liquids x 4 oz via straw. Mild prolonged mastiction of soft and regular solids d/t reduced dentition. Trace lingual residue cleared with a liquid rinse. Occ delayed and inconsistent throat clearing; did not appear related to the swallow. Clear vocal quality post swallow. 02 >96% and RR stable. No clinical s/s aspiration. Educated pt on softer diet levels as pt reports a little bit of difficulty chewing meats because of her teeth. States she had moreno this AM and tolerated that without difficulty. Pt wishing to remain on current diet level. She states that she will select items that she knows she can chew. No difficulty swallowing meds whole with liquid this AM. Rec: con't current diet level of regular with thin liquids. Oropharyngeal phase of the swallow WFL. Will sign off at this time, please re-consult if concerns arise. Dysphagia Outcome Severity Scale: Level 6: Within functional limits/Modified independence     Treatment Plan  Requires SLP Intervention: No    Recommended Diet and Intervention  Diet Solids Recommendation: Regular (pt to choose softer foods off the menu)  Liquid Consistency Recommendation: Thin  Recommended Form of Meds: Whole with water    Compensatory Swallowing Strategies  Compensatory Swallowing Strategies: Alternate solids and liquids; Remain upright for 30-45 minutes after meals;Upright as possible for all oral intake    Treatment/Goals: N/A     General  Chart Reviewed: Yes  Comments: Per MD note,    Behavior/Cognition: Alert; Cooperative;Pleasant moodDosoniahy Reyna Boyle is a 80 y.o. female.    She was accepted in transfer from [place]. [place] ER confusion and generalized weakness. For her part the patient complained of left hand coordination problems at the time. Respiratory Status: Room air  Communication Observation: Functional  Follows Directions: Simple  Dentition: Adequate; Some missing teeth;Dentures top (missing most of her teeth on the bottom)  Patient Positioning: Upright in bed  Baseline Vocal Quality: Normal  Volitional Cough: Strong  Prior Dysphagia History: No h/o dysphagia  Consistencies Administered: Reg solid; Dysphagia Minced and Moist (Dysphagia II); Thin - straw    Oral Motor Deficits  Oral/Motor  Oral Motor: Within functional limits    Oral Phase Dysfunction  Oral Phase  Oral Phase: WFL  Oral Phase  Oral Phase - Comment: Mild prolonged mastication of soft and regular solids with trace lingual residue in b/w bites cleared with a liquid rinse. Suspect oral deficits d/t missing molars on the bottom. Indicators of Pharyngeal Phase Dysfunction   Pharyngeal Phase  Pharyngeal Phase: WFL  Pharyngeal Phase   Pharyngeal: Swallow appearing timely upon palpation. Occ delayed throat clearing, does not appear related to the swallow. Clear voice post swallow. Prognosis  Prognosis  Prognosis for safe diet advancement: excellent  Individuals consulted  Consulted and agree with results and recommendations: Patient;RN    Education  Patient Education: Educated pt on the role of SLP, diet recs, and safe swallow precautions. Patient Education Response: Verbalizes understanding;Demonstrated understanding  Safety Devices in place: Yes  Type of devices: All fall risk precautions in place; Left in bed;Bed alarm in place;Call light within reach;Nurse notified       Therapy Time  SLP Individual Minutes  Time In: 0825  Time Out: 6449  Minutes: [place], [place]  7/3/2021 9:06 AM

## 2021-07-03 NOTE — PROGRESS NOTES
CARDIOLOGY PROGRESS NOTE      Patient Name: Pat Halsted  Date of admission: 7/1/2021  9:59 PM  Admission Dx: Generalized weakness [R53.1]  Reason for Consult: Atypical chest pain,  Requesting Physician: Bolivar Fine MD  Primary Care physician: Tory Up MD    Subjective:     Pat Halsted is a 80 y.o. patient with prior history notable for diabetes mellitus, hyperlipidemia, hypertension, chronic kidney disease who presented to the hospital 7/1/2021 with complaints of left arm weakness. Patient was evaluated by my partner Dr. Merline Arrow yesterday who noted the patient be quite forgetful. She complained of left arm weakness. Atypical chest discomfort was endorsed. Patient was noted to have elevated troponin on arrival of 0.11 with downtrend to 0.06. Creatinine at baseline 1.3-1.5. Chest x-ray with no concerns. Brain MRI showed acute small vessel ischemic infarction in the posterior limb of the right internal capsule. No associated hemorrhage. Echocardiogram showed normal LV function with mild MR/AR. Today, she reports no chest pain, dyspnea at rest, palpitations, dizziness, pre-syncope. Home Medications:  Were reviewed and are listed in nursing record and/or below  Prior to Admission medications    Medication Sig Start Date End Date Taking?  Authorizing Provider   Empagliflozin (JARDIANCE PO) Take by mouth    Historical Provider, MD   LINAGLIPTIN PO Take by mouth    Historical Provider, MD   calcium carbonate (OSCAL) 500 MG TABS tablet Take 500 mg by mouth daily    Historical ProviderMD VALLESORMULARY tragenta    Historical Provider, MD   lisinopril (PRINIVIL;ZESTRIL) 10 MG tablet Take 10 mg by mouth daily    Historical Provider, MD   Cholecalciferol (VITAMIN D3) 50 MCG (2000 UT) CAPS Take 2,000 Units by mouth daily    Historical Provider, MD   mirtazapine (REMERON) 15 MG tablet Take 1 tablet by mouth daily 3/6/19   Jerrell Frey MD   pramipexole (MIRAPEX) 1 MG tablet TAKE 1 TABLET BY ORAL ROUTE ONCE A DAY (IN THE EVENING) AROUND 8-9 PM FOR RLS 1/11/19   Historical Provider, MD   metoprolol tartrate (LOPRESSOR) 25 MG tablet TAKE 1 TABLET BY ORAL ROUTE 2 TIMES A DAY FOR BP 1/11/19   Historical Provider, MD   ferrous sulfate 325 (65 Fe) MG tablet Take 325 mg by mouth 3 times daily (with meals)     Historical Provider, MD   Multiple Vitamins-Minerals (MULTIVITAMIN PO) Take by mouth daily    Historical Provider, MD   Cyanocobalamin (VITAMIN B-12) 5000 MCG SUBL Place 500 mcg under the tongue daily     Historical Provider, MD   pravastatin (PRAVACHOL) 10 MG tablet Take 10 mg by mouth daily     Historical Provider, MD        CURRENT Medications:  metoprolol tartrate (LOPRESSOR) tablet 25 mg, BID  mirtazapine (REMERON) tablet 15 mg, Nightly  pramipexole (MIRAPEX) tablet 1 mg, Nightly  pravastatin (PRAVACHOL) tablet 10 mg, Nightly  ferrous sulfate (IRON 325) tablet 325 mg, TID WC  Vitamin D (CHOLECALCIFEROL) tablet 2,000 Units, Daily  glucose (GLUTOSE) 40 % oral gel 15 g, PRN  dextrose 50 % IV solution, PRN  glucagon (rDNA) injection 1 mg, PRN  dextrose 5 % solution, PRN  sodium chloride flush 0.9 % injection 10 mL, PRN  0.9 % sodium chloride infusion, PRN  potassium chloride (KLOR-CON M) extended release tablet 40 mEq, PRN   Or  potassium bicarb-citric acid (EFFER-K) effervescent tablet 40 mEq, PRN   Or  potassium chloride 10 mEq/100 mL IVPB (Peripheral Line), PRN  magnesium sulfate 1000 mg in dextrose 5% 100 mL IVPB, PRN  enoxaparin (LOVENOX) injection 30 mg, Daily  ondansetron (ZOFRAN-ODT) disintegrating tablet 4 mg, Q8H PRN   Or  ondansetron (ZOFRAN) injection 4 mg, Q6H PRN  acetaminophen (TYLENOL) tablet 650 mg, Q6H PRN   Or  acetaminophen (TYLENOL) suppository 650 mg, Q6H PRN  insulin glargine (LANTUS) injection vial 10 Units, Nightly  insulin lispro (HUMALOG) injection vial 3 Units, TID WC  insulin lispro (HUMALOG) injection vial 0-6 Units, TID WC  insulin lispro (HUMALOG) injection vial 0-3 Units, Nightly  perflutren lipid microspheres (DEFINITY) injection 1.65 mg, ONCE PRN  melatonin tablet 3 mg, Nightly PRN  aspirin chewable tablet 81 mg, Daily        Allergies:  Patient has no known allergies. Review of Systems:   A 14 point review of symptoms completed. Pertinent positives identified in the HPI, all other review of symptoms negative. Objective:     Vitals:    07/02/21 2354 07/03/21 0331 07/03/21 0625 07/03/21 0813   BP: (!) 191/69 139/64  (!) 164/64   Pulse:  63  65   Resp:  16  16   Temp:  97.7 °F (36.5 °C)  97.9 °F (36.6 °C)   TempSrc:  Oral  Oral   SpO2:  97%  98%   Weight:   129 lb 6.4 oz (58.7 kg)    Height:          Weight: 129 lb 6.4 oz (58.7 kg)       PHYSICAL EXAM:    General:  Alert, cooperative, conversant, mildly confused    Head:  Normocephalic, atraumatic   Eyes:  Conjunctiva/corneas clear, anicteric sclerae    Nose: Nares normal, no drainage or sinus tenderness   Throat: No abnormalities of the lips, oral mucosa or tongue. MMM   Neck: Trachea midline. Neck supple with no lymphadenopathy, thyroid not enlarged, symmetric, no tenderness/mass/nodules, no Jugular venous pressure elevation    Lungs:   Clear to auscultation bilaterally, no wheezes, no rales, no respiratory distress   Chest Wall:  No deformity or tenderness to palpation   Heart:  Regular rate and rhythm, normal S1, normal S2, no murmur, no rub, no S3/S4, PMI non-displaced. Abdomen:   Soft, non-tender, with normoactive bowel sounds. No masses, no hepatosplenomegaly   Extremities: No cyanosis, clubbing or pitting edema. Vascular: 2+ radial, diminished dorsalis pedis and posterior tibial pulses bilaterally. Brisk carotid upstrokes without carotid bruit. Skin: Skin color, texture, turgor are normal with no rashes or ulceration. Pysch: Euthymic mood, appropriate affect   Neurologic: Oriented to person, place and time. No slurred speech or facial asymmetry.  No motor or sensory deficits on gross examination. Labs:   CBC:   Lab Results   Component Value Date    WBC 5.7 2021    RBC 3.87 2021    HGB 12.3 2021    HCT 36.3 2021    MCV 93.9 2021    RDW 13.2 2021     2021     CMP:  Lab Results   Component Value Date     2021    K 4.2 2021    K 4.4 2021     2021    CO2 27 2021    BUN 33 2021    CREATININE 1.3 2021    GFRAA 47 2021    AGRATIO 1.8 2021    LABGLOM 39 2021    GLUCOSE 192 2021    PROT 6.7 2021    CALCIUM 9.7 2021    BILITOT 0.5 2021    ALKPHOS 86 2021    AST 22 2021    ALT 14 2021     PT/INR:  No results found for: PTINR  HgBA1c:  Lab Results   Component Value Date    LABA1C 8.0 2021     Lab Results   Component Value Date    CKTOTAL 290 (H) 2019    TROPONINI 0.06 (H) 2021         Interval Testing/Data:     Last EK2021 NSR with 1st AVB, septal infarct     Telemetry personally reviewed; NSR 60's. Nocturnal bradycardia. Echo:   2021   Conclusions      Summary   Normal left ventricular systolic function with ejection fraction of 55-60%. No regional wall motion abnormalites are seen. Grade I diastolic dysfunction with normal filling pressure. Mild mitral and aortic regurgitation. Systolic pulmonic artery pressure (SPAP) is normal estimated at 30 mmHg   (Right atrial pressure of 3 mmHg). Stress 2018  Summary    There is normal perfusion at rest and stress. There is no stress induced    ischemia or infarct.    There are no regional wall motion abnormalities.    Normal left ventricular systolic function with ejection fraction of 76 %.    This is a normal myocardial perfusion study.          Echo: 2017    Hyperdynamic normal left ventricular systolic function with an estimated   ejection fraction of greater than 65%.   Grade II diastolic dysfunction with elevated filling pressure.   Moderate aortic regurgitation.   Moderate tricuspid regurgitation.   Systolic pulmonary artery pressure (SPAP) is estimated at 46 mmHg consistent   with mild pulmonary hypertension (RA pressure 3 mmHg).   There is a large left pleural effusion. Impression and Plan      1. Acute cerebrovascular accident with left arm weakness, small vessel, not embolic etiology  2. Troponin elevation in the setting of #1 with normal LV function  3. Altered mental status  4. Hyperlipidemia, LDL 66, triglyceride 61  5. Hypertension, permissive/poorly controlled  6. Diabetes mellitus, A1c 8.0  7. Chronic kidney disease        Patient Active Problem List   Diagnosis    Type 2 diabetes mellitus with foot ulcer, without long-term current use of insulin (HCC)    Acute blood loss anemia    Severe protein-calorie malnutrition (HCC)    Encephalopathy    Elevated troponin    Essential hypertension    Altered mental status    Acute renal failure (HCC)    Contusion of left hip    Acute UTI    Mild dehydration    Urinary incontinence    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)    Osteomyelitis of right foot (HCC)    Chronic kidney disease    Generalized weakness    Stage 3b chronic kidney disease (HCC)    Chest pain       PLAN:  1. Continue aspirin, statin, beta-blocker for treatment of possible underlying coronary artery disease and cerebrovascular accident  2. Strict BP control will be prudent as per neurology guidance; neurology consultation placed. 3.  Normal LV function by echocardiogram.  No evidence of acute plaque rupture/ACS. No chest pain. Suspect troponin is due to demand-supply mismatch. 4.  Further risk ratification with stress test would be reasonable but will defer at this time and reassess on outpatient basis once clinical condition improves.   Medical management alone may be reasonable given her age and comorbidities    Will schedule for outpatient reassessment with Dr. Ramone Sood status post recovery from acute CVA. Cardiology will sign off. Please call for any further questions/concerns. I will address the patient's cardiac risk factors and adjusted pharmacologic treatment as needed. In addition, I have reinforced the need for patient directed risk factor modification. All questions and concerns were addressed to the patient/family. Alternatives to my treatment were discussed. Thank you for allowing us to participate in the care of Rosi Boyle. Please call me with any questions [phone].     Sima Gaona MD, [facility]  Cardiovascular Disease  [facility]  [phone] [facility]  [phone] [address] [place] [address]  7/3/2021 7:49 AM

## 2021-07-03 NOTE — PROGRESS NOTES
DW Dr. Maria C Kinney  MRI showed a small right internal capsule stroke. Patient likely will be discharged today according to primary team.  No need for for inpatient neurology consultation if medically stable. Can be seen in outpatient setting after discharge with her family. She will need MRA of the neck before discharge to rule out any carotid stenosis. Continue aspirin  Statin  Continue blood pressure control  Can be discharged from neurology once medically stable.

## 2021-07-03 NOTE — PROGRESS NOTES
Perfect serve sent to [address] at 0004: \"Pt here for generalized weakness. Pt BP at 2005 was 187/73. Scheduled 25mg metoprolol was given at 2013. BP rechecked at 2158 and was 176/64. Been monitoring and BP was checked again at 2352 and was 179/84. Then once more a couple minutes later and it was 191/69. Pt is awake and asymptomatic. States her BP at home is not usually this high. Would you like anything ordered? Thank you! \"    Waiting for response.

## 2021-07-03 NOTE — CARE COORDINATION
CASE MANAGEMENT DISCHARGE SUMMARY      Discharge to: home with [facility]    IMM given: (date)     [address] ordered/agency: na    Transportation:    Family/car: private    Confirmed discharge plan with:RN, [facility]     Patient: yes     Family:  yes/no    Name: Contact number:     Facility/Agency, name:  Ernst Coulter RN, name: jo ann    Note: Discharging nurse to complete ANTHONY, reconcile AVS, and place final copy with patient's discharge packet. RN to ensure that written prescriptions for  Level II medications are sent with patient to the facility as per protocol.

## 2021-07-03 NOTE — PLAN OF CARE
Problem: Nutrition  Intervention: Swallowing evaluation  Note: SLP evaluation completed. All further notes may be found in EMR. Maryam Ríos MS-CCC-SLP [id number]    Intervention: Aspiration precautions  Note: SLP evaluation completed. All further notes may be found in EMR.     Maryam Ríos MS-CCC-SLP [id number]

## 2021-07-03 NOTE — DISCHARGE INSTR - COC
Continuity of Care Form    Patient Name: Morro Lama   :  1935  MRN:  [id number]    Admit date:  2021  Discharge date:      Code Status Order: Full Code   Advance Directives:   Advance Care Flowsheet Documentation       Date/Time Healthcare Directive Type of Healthcare Directive Copy in [address] Agent's Name Healthcare Agent's Phone Number    21 2203  No, patient does not have an advance directive for healthcare treatment -- -- -- -- --            Admitting Physician:  Stewart Pendleton MD  PCP: Tania Stevenson MD    Discharging Nurse: [facility] Mach[facility] Unit/Room#: 9729/4007-46  Discharging Unit Phone Number: [phone]    Emergency Contact:   Extended Emergency Contact Information  Primary Emergency Contact: Dagmar Chow  Address: [address] [place] [address] Phone: [phone]  Relation: Child  Secondary Emergency Contact: Jacque Woody  Address: [facility] ([address] [place] [address])           [id number]  Home Phone: [phone]  Relation: Grandchild    Past Surgical History:  Past Surgical History:   Procedure Laterality Date    APPENDECTOMY      COLONOSCOPY      polyps    COLONOSCOPY  7/15/15    normal colon    EYE SURGERY      right cataract    FOOT AMPUTATION Right 2019    PARTIAL RIGHT FOOT AMPUTATION    FOOT AMPUTATION Right 2019    PARTIAL RIGHT FOOT AMPUTATION performed by Jose Armando Emery DPM at [address] Left 2017    PARTIAL LEFT FOOT AMPUTATION              FOOT SURGERY Right 2017    HYSTERECTOMY      OTHER SURGICAL HISTORY Left     Macular Grid Left eye argon    TOE AMPUTATION      2nd toe 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    TOE AMPUTATION      5th toe right foot    TONSILLECTOMY      UPPER GASTROINTESTINAL ENDOSCOPY  2017    Dieulafoy lesion in duodenum       Immunization History:   Immunization History   Administered Date(s) Administered    Influenza Vaccine, unspecified formulation 03/03/2017    Influenza Virus Vaccine 10/03/2017, 10/18/2018    Pneumococcal Polysaccharide ([id number]) 02/28/2017       Active Problems:  Patient Active Problem List   Diagnosis Code    Type 2 diabetes mellitus with foot ulcer, without long-term current use of insulin ([facility]) E11.621, L97.509    Acute blood loss anemia D62    Severe protein-calorie malnutrition ([facility]) E43    Encephalopathy G93.40    Elevated troponin R77.8    Essential hypertension I10    Altered mental status R41.82    Acute renal failure (HCC) N17.9    Contusion of left hip S70. 02XA    Acute UTI N39.0    Mild dehydration E86.0    Urinary incontinence R32    Ulcer of foot due to secondary diabetes mellitus ([facility]) E13.621, L97.509    Osteomyelitis of right foot ([facility]) M86.9    Chronic kidney disease N18.9    Generalized weakness R53.1    Stage 3b chronic kidney disease ([facility]) N18.32    Chest pain R07.9       Isolation/Infection:   Isolation            No Isolation          Patient Infection Status       Infection Onset Added Last Indicated Last Indicated By Review Planned Expiration Resolved Resolved By    None active    Resolved    MDRO (multi-drug resistant organism) 04/10/19 04/13/19 04/10/19 Urine culture   03/09/21 Patrice Martinez RN    MRSA  12/11/17 12/11/17 Ankur Lino RN   03/04/19 Ankur Lino RN            Nurse Assessment:  Last Vital Signs: BP (!) 166/72   Pulse 57   Temp 97.8 °F (36.6 °C) (Oral)   Resp 16   Ht 5' 10\" (1.778 m)   Wt 129 lb 6.4 oz (58.7 kg)   SpO2 97%   BMI 18.57 kg/m²     Last documented pain score (0-10 scale): Pain Level: 0  Last Weight:   Wt Readings from Last 1 Encounters:   07/03/21 129 lb 6.4 oz (58.7 kg)     Mental Status:  oriented, alert, coherent and logical    IV Access:  - None    Nursing Mobility/ADLs:  Walking   Assisted  Transfer  Assisted  Bathing  Assisted  Dressing  Assisted  Toileting  Assisted  Feeding Assisted  Med Admin  Independent  Med Delivery   whole    Wound Care Documentation and Therapy:  Wound 05/24/19 Toe (Comment  which one) Posterior (Active)   Number of days: 771        Elimination:  Continence:   · Bowel: Yes  · Bladder: Yes  Urinary Catheter: None   Colostomy/Ileostomy/Ileal Conduit: No       Date of Last BM:     Intake/Output Summary (Last 24 hours) at 7/3/2021 1520  Last data filed at 7/3/2021 0814  Gross per 24 hour   Intake 240 ml   Output --   Net 240 ml     I/O last 3 completed shifts: In: 240 [P.O.:240]  Out: -     Safety Concerns: At Risk for Falls    Impairments/Disabilities:      None    Nutrition Therapy:  Current Nutrition Therapy:   - Oral Diet:  General    Routes of Feeding: Oral  Liquids: No Restrictions  Daily Fluid Restriction: no  Last Modified Barium Swallow with Video (Video Swallowing Test): not done    Treatments at the Time of Hospital Discharge:   Respiratory Treatments:   Oxygen Therapy:  is not on home oxygen therapy.   Ventilator:    - No ventilator support    Rehab Therapies: Physical Therapy and Occupational Therapy  Weight Bearing Status/Restrictions: No weight bearing restirctions  Other Medical Equipment (for information only, NOT a DME order):  walker  Other Treatments:     Patient's personal belongings (please select all that are sent with patient):  None    RN SIGNATURE:  Electronically signed by Samantha Madden RN on 7/4/21 at 11:15 AM EDT    CASE MANAGEMENT/SOCIAL WORK SECTION    Inpatient Status Date: ***    Readmission Risk Assessment Score:  Readmission Risk              Risk of Unplanned Readmission:  19           Discharging to Facility/ Agency   · Name: [address] care  · Address:  · Phone:[phone]  · Fax:[phone]    Dialysis Facility (if applicable)   · Name:  · Address:  · Dialysis Schedule:  · Phone:  · Fax:    / signature: Electronically signed by Vaibhav Mcgee RN on 7/3/21 at 3:47 PM EDT    PHYSICIAN SECTION    Prognosis: Fair    Condition at Discharge: Stable    Rehab Potential (if transferring to Rehab): Fair    Recommended Labs or Other Treatments After Discharge:     Physician Certification: I certify the above information and transfer of Levert Gitelman  is necessary for the continuing treatment of the diagnosis listed and that she requires Home Care for greater 30 days.      Update Admission H&P: No change in H&P    PHYSICIAN SIGNATURE:  Electronically signed by Adam Harrell MD on 7/3/21 at 3:24 PM EDT

## 2021-07-03 NOTE — DISCHARGE SUMMARY
Hospital Discharge Summary    Patient's PCP: Abad Sheehan MD  Admit Date: 7/1/2021   Discharge Date: 7/4/2021    Admitting Physician: Dr. Td Etienne MD  Discharge Physician: Dr. Sumanth Sutherland MD   Consults: cardiology and neurology    Brief HPI/hospital course:     80 y.o. female. She was accepted in transfer from [place]. [place] ER confusion and generalized weakness. For her part the patient complained of left hand coordination problems at the time. CT head showed no acute issues. She was noted to have mild troponin elevation. On admission she underwent an MRI scan of the brain that showed an acute small vessel ischemic infarction in the posterior limb of the right internal capsule. MRA of the brain showed 60% stenosis in the right and coronary artery. . Lipid panel was normal.. Hemoglobin A1c was 8.0.... Ngoc Bloodgood The patient was treated with aspirin and statin. .. Confusion resolved. . Had mild left hand weakness, evaluated by PT and OT. Ngoc Casarez Recommended outpatient therapies. . Patient also was given referral to follow-up outpatient with neurology and vascular surgery. .. Cardiology did not recommend any further work-up for mild troponin elevation. .. Invasive procedures:  none    Discharge Diagnoses: Active Problems:    Elevated troponin    Essential hypertension    Type 2 diabetes mellitus with foot ulcer, without long-term current use of insulin (HCC)    Generalized weakness    Stage 3b chronic kidney disease ([facility] [address]    Chest pain  Resolved Problems:    * No resolved hospital problems. *      Physical Exam: BP (!) 163/68   Pulse 63   Temp 98.1 °F (36.7 °C) (Oral)   Resp 16   Ht 5' 10\" (1.778 m)   Wt 129 lb 6.4 oz (58.7 kg)   SpO2 98%   BMI 18.57 kg/m²   Gen/overall appearance: Not in acute distress. Alert. Head: Normocephalic, atraumatic  Eyes: EOMI, good acuity  ENT:- Oral mucosa moist  Neck: No JVD, thyromegaly  CVS: Nml S1S2, no MRG, RRR  Pulm: Clear bilaterally.  No crackles/wheezes  Gastrointestinal: Soft, NT/ND, +BS  Musculoskeletal: No edema. Warm  Neuro: No focal deficit. Moves extremity spontaneously. Psychiatry: Appropriate affect. Not agitated. Skin: Warm, dry with normal turgor. No rash        Significant Diagnostic Studies:      MRI neck. Lawernce Roughen Approximately 60% stenosis involving the proximal left internal carotid   artery. MRI brain    1. Acute small-vessel ischemic infarction in the posterior limb of the right   internal capsule.  No associated hemorrhage. 2. Diffuse parenchymal volume loss and sequela of mild-to-moderate chronic   microvascular ischemic changes. Treatments: As above. Discharge Medications:     Medication List      START taking these medications    aspirin 81 MG chewable tablet  Take 1 tablet by mouth daily        CONTINUE taking these medications    calcium carbonate 500 MG Tabs tablet  Commonly known as: OSCAL     ferrous sulfate 325 (65 Fe) MG tablet  Commonly known as: IRON 325     JARDIANCE PO     LINAGLIPTIN PO     lisinopril 10 MG tablet  Commonly known as: PRINIVIL;ZESTRIL     metoprolol tartrate 25 MG tablet  Commonly known as: LOPRESSOR     mirtazapine 15 MG tablet  Commonly known as: Remeron  Take 1 tablet by mouth daily     MULTIVITAMIN PO     NONFORMULARY     pramipexole 1 MG tablet  Commonly known as: MIRAPEX     Pravachol 10 MG tablet  Generic drug: pravastatin     Vitamin B-12 5000 MCG Subl     Vitamin D3 50 MCG (2000 UT) Caps           Where to Get Your Medications      These medications were sent to [facility] [address] [place] [id number]    Phone: [phone]   · aspirin 81 MG chewable tablet         Activity: activity as tolerated  Diet: ADULT DIET;  Regular; 4 carb choices (60 gm/meal)      Disposition: home  Discharged Condition: Stable  Follow Up:   MD Zamzam Black [address] [id number]  [phone]    Schedule an appointment as soon as possible for a visit      Jorge Jaime MD  [address] [facility] [address]  [phone]    Schedule an appointment as soon as possible for a visit  Carotid artery stenosis    Santa Bello MD  [address] [facility] [address]  [address]  [phone]    Schedule an appointment as soon as possible for a visit  sroke        Code status:  Full Code         Total time spent on discharge, finalizing medications, referrals and arranging outpatient follow up was more than 45 minutes      Thank you Dr. Char Gauthier MD for the opportunity to be involved in this patients care.

## 2021-07-03 NOTE — PROGRESS NOTES
Physical Therapy    Facility/Department: [place] [address] - MED SURG/ORTHO  Initial Assessment and Treatment    NAME: Deidra Middleton  : 1935  MRN: [id number]    Date of Service: 7/3/2021    Discharge Recommendations:  Outpatient PT, Home with assist PRN (outpatient hand therapy)   PT Equipment Recommendations  Equipment Needed: No  Other: patient said that she already has a rolling walker at home. If pt is unable to be seen after this session, please let this note serve as discharge summary. Please see case management note for discharge disposition. Thank you. Assessment   Body structures, Functions, Activity limitations: Decreased functional mobility ; Decreased strength;Decreased endurance;Decreased balance  Assessment: Patient is an 80year old female who was admitted with left sided weakness and confusion. Patient's MRI of her brain revealed an acute ischemic infarction in right internal capsule. Patient said that at baseline she ambulates household distances with a rolling walker with independence. Patient today performed bed mobility with independence, completed transfers with SBA and ambulated 50 feet with a rolling walker and SBA. Patient would benefit from outpatient physical therapy to maximize her balance and left upper extremity strength. Patient is safe for home, when medically stable, with PRN supervision/assistance and continued use of her rolling walker. PT to continue to follow. Treatment Diagnosis: decreased independence with functional mobility. Specific instructions for Next Treatment: progress mobility as tolerated  Prognosis: Excellent  Decision Making: Low Complexity  PT Education: Goals;PT Role;Plan of Care;Transfer Training;Pressure Relief; Injury Prevention; Functional Mobility Training;Gait Training;Equipment;Disease Specific Education;General Safety;Home Exercise Program  Patient Education: Patient educated on how to safely utilize RW, benefits of out of bed mobility.  Patient verbalized understanding. Barriers to Learning: none  REQUIRES PT FOLLOW UP: Yes  Activity Tolerance  Activity Tolerance: Patient Tolerated treatment well  Activity Tolerance: Vitals: 137/64 63 BPM 98% room air. Patient Diagnosis(es): There were no encounter diagnoses. has a past medical history of Clostridium difficile infection, Dementia ([facility] [address] Diabetes mellitus ([facility] [address] History of blood transfusion, Hyperlipidemia, Hypertension, Infection, MDRO (multiple drug resistant organisms) resistance, MRSA infection, Osteomyelitis of spine ([facility] [address] and Restless leg syndrome. has a past surgical history that includes Tonsillectomy; Hysterectomy; Appendectomy; Toe amputation (2003); Toe amputation (2011); eye surgery; Colonoscopy (1997); Colonoscopy (7/15/15); other surgical history (Left); Foot surgery (Left, 05/30/2017); Upper gastrointestinal endoscopy (06/05/2017); Foot surgery (Right, 12/11/2017); Foot Amputation (Right, 05/26/2019); and Foot Amputation (Right, 5/26/2019). Restrictions  Restrictions/Precautions  Restrictions/Precautions: General Precautions, Fall Risk  Position Activity Restriction  Other position/activity restrictions: Up with assistance. Vision/Hearing  Vision: Impaired  Vision Exceptions: Wears glasses for reading  Hearing: Within functional limits     Subjective  General  Chart Reviewed: Yes  Patient assessed for rehabilitation services?: Yes  Additional Pertinent Hx: HPI per chart, \"Whitney Kendall is a 80 y.o. female. She was accepted in transfer from [place]. [place] [facility] confusion and generalized weakness. For her part the patient complained of left hand coordination problems at the time. \" MRI of brain, \"1. Acute small-vessel ischemic infarction in the posterior limb of the rightinternal capsule. No associated hemorrhage. 2. Diffuse parenchymal volume loss and sequela of mild-to-moderate chronicmicrovascular ischemic changes. \"  Response To Previous Treatment: Not applicable  Family / Caregiver Present: No  Referring Practitioner: Nelson Encinas MD  Referral Date : 07/03/21  Follows Commands: Within Functional Limits  General Comment  Comments: Supine in bed upon entry of therapy. Cleared for therapy. Subjective  Subjective: Patient agreed to participate. Pain Screening  Patient Currently in Pain: Denies  Vital Signs  Patient Currently in Pain: Denies  Pre Treatment Pain Screening  Intervention List: Patient able to continue with treatment    Orientation  Orientation  Overall Orientation Status: Within Functional Limits  Social/Functional History  Social/Functional History  Lives With: Daughter (and son in law. daughter works on the farm at home. son in law works from home)  Type of Home: [place]: Two level, Able to Live on Main level with bedroom/bathroom  Home Access: Stairs to enter without rails  Entrance Stairs - Number of Steps: 1+1 no rails. Entrance Stairs - Rails: None  Bathroom Shower/Tub: Tub/Shower unit  Bathroom Toilet: Bedside commode  Bathroom Equipment: Shower chair  Bathroom Accessibility: Accessible  Home Equipment: Rolling walker, [employer], Electric scooter  Receives Help From: Family, Personal care attendant  ADL Assistance: Needs assistance  Bath: Moderate assistance (sponge bath, due to difficulty with using bath tub. patient has an aide that helps patient with sponge bathing as well)  Dressing: Independent  Grooming: Minimal assistance (with washing/managing her hair)  Feeding: Independent  Toileting: Independent  Homemaking Assistance: Needs assistance (daughter completes cleaning/laundry)  Meal Prep: Moderate  Laundry: Total  Vacuuming: Total  Cleaning:  Total  Ambulation Assistance: Independent (patient ambulates household distances with her rolling walker)  Transfer Assistance: Independent  Active : No  Patient's  Info: daughter drives  Occupation: Retired  Leisure & Hobbies: spending time with family  Additional Comments: No falls in the past 6 months. Cognition   Cognition  Overall Cognitive Status: [facility]    Objective     Observation/Palpation  Posture: Good    PROM RLE (degrees)  RLE PROM: WFL  AROM RLE (degrees)  RLE AROM: WFL  PROM LLE (degrees)  LLE PROM: WFL  AROM LLE (degrees)  LLE AROM : WFL  Strength RLE  Strength RLE: WFL  Strength LLE  Strength LLE: WFL  Motor Control  Gross Motor?: WFL  Coordination  Finger to Nose: Normal  Heel to Shin: Normal  Sensation  Overall Sensation Status: [facility]  Bed mobility  Supine to Sit: Modified independent  Sit to Supine: Unable to assess  Comment: head of bed elevated  Transfers  Sit to Stand: Stand by assistance  Stand to sit: Stand by assistance  Bed to Chair: Stand by assistance  Comment: with RW  Ambulation  Ambulation?: Yes  More Ambulation?: No  Ambulation 1  Surface: level tile  Device: Rolling Walker  Assistance: Stand by assistance  Quality of Gait: forward flexed posture, cueing to [employer] base of support within rolling walker. Gait Deviations: Slow Christine;Decreased step length;Decreased step height  Distance: 10 feet x 2. 50 feet. Comments: No complaints of shortness of breath, chest pain or dizziness. No loss of balance. Stairs/Curb  Stairs?: No     Balance  Posture: Good  Sitting - Static: Good  Sitting - Dynamic: Good  Standing - Static: Good  Standing - Dynamic: Fair;+  Comments: with RW  Exercises  Straight Leg Raise: 1 x 10 bilateral  Hip Abduction: 1 x 10 bilateral  Knee Long Arc Quad: 1 x 10  Ankle Pumps: 1 x 10 bilateral  Comments: cueing for sequencing and technique.   Other exercises  Other exercises?: No     Plan   Plan  Times per week: 3-5/week  Plan weeks: 1 week 7/10/21  Specific instructions for Next Treatment: progress mobility as tolerated  Current Treatment Recommendations: Strengthening, Safety Education & Training, Balance Training, Endurance Training, Patient/Caregiver Education & Training, Functional Mobility Training, Equipment Evaluation, Education, & procurement, Transfer Training, Gait Training, Stair training, Pain Management, Positioning, Home Exercise Program  Safety Devices  Type of devices: All fall risk precautions in place, Call light within reach, Gait belt, Patient at risk for falls, Chair alarm in place, Left in chair, Nurse notified    AM-PAC Score     AM-PAC Inpatient Mobility without Stair Climbing Raw Score : 17 (07/03/21 1324)  AM-PAC Inpatient without Stair Climbing T-Scale Score : 48.47 (07/03/21 1324)  Mobility Inpatient CMS 0-100% Score: 32.72 (07/03/21 1324)  Mobility Inpatient without Stair CMS G-Code Modifier : Obdulia Veronica (07/03/21 1324)       Goals  Short term goals  Time Frame for Short term goals: 1 week 7/10/21  Short term goal 1: Sit <> stand with LRAD with mod I  Short term goal 2: Bed<> chair with LRAD and mod I  Short term goal 3: Ambulate 100 feet with LRAD and mod I  Short term goal 4: Ascend 2 steps with min assist.  Short term goal 5: Patient will tolerate 12-15 reps of her exercises to maximize her strength and endurance. Patient Goals   Patient goals : To become stronger.  To go home       Therapy Time   Individual Concurrent Group Co-treatment   Time In 1038         Time Out 1115         Minutes 37         Timed Code Treatment Minutes: 27 Minutes (10 minute evaluation)       Dhiraj Sims PT

## 2021-07-03 NOTE — PROGRESS NOTES
Occupational Therapy   Occupational Therapy Initial Assessment and treatment    Date: 7/3/2021   Patient Name: Joe Putnam  MRN: [id number]     : 1935    Date of Service: 7/3/2021    Discharge Recommendations:  24 hour supervision or assist, Outpatient OT       Assessment   Performance deficits / Impairments: Decreased functional mobility ; Decreased ADL status; Decreased strength;Decreased safe awareness;Decreased endurance;Decreased balance  Assessment: Pt pleasant and cooperative, admitted with L UE weakness. Pt presents with very mild deficits in strength on L UE, that mildly impairs function. Finger to nose, opposition WFL. Pt educated on UE ex to promote return to baseline, pt verb understanding. Recommend home with 24 hr, rec outpt OT for hand/arm weakness,. Prognosis: Good  OT Education: OT Role;Plan of Care;ADL Adaptive Strategies; Home Exercise Program;Transfer Training  Patient Education: Ot role, ADls, transfers, UE ex  REQUIRES OT FOLLOW UP: Yes  Activity Tolerance  Activity Tolerance: Patient Tolerated treatment well  Activity Tolerance: 137/64 HR 63 98% on RA  Safety Devices  Safety Devices in place: Yes  Type of devices: Gait belt;Call light within reach; Chair alarm in place; Left in chair           Patient Diagnosis(es): There were no encounter diagnoses. has a past medical history of Clostridium difficile infection, Dementia ([address] Diabetes mellitus ([address] History of blood transfusion, Hyperlipidemia, Hypertension, Infection, MDRO (multiple drug resistant organisms) resistance, MRSA infection, Osteomyelitis of spine ([address] and Restless leg syndrome. has a past surgical history that includes Tonsillectomy; Hysterectomy; Appendectomy; Toe amputation (); Toe amputation (); eye surgery; Colonoscopy (); Colonoscopy (7/15/15); other surgical history (Left); Foot surgery (Left, 2017); Upper gastrointestinal endoscopy (2017); Foot surgery (Right, 2017);  Foot Amputation (Right, 05/26/2019); and Foot Amputation (Right, 5/26/2019). Restrictions  Restrictions/Precautions  Restrictions/Precautions: General Precautions, Fall Risk  Position Activity Restriction  Other position/activity restrictions: Up with assistance. Subjective   General  Chart Reviewed: Yes  Patient assessed for rehabilitation services?: Yes  Family / Caregiver Present: No  Referring Practitioner: TONIO García MD  Diagnosis: L UE weakness  Subjective  Subjective: Pt supine, agreeable  General Comment  Comments: RN clears for therapy  Patient Currently in Pain: Denies  Pain Assessment  Pain Assessment: 0-10  Pain Level: 0  Non-Pharmaceutical Pain Intervention(s): Repositioned  Vital Signs  Temp: 98.4 °F (36.9 °C)  Temp Source: Oral  Pulse: 74  Heart Rate Source: Monitor  Resp: 18  BP: 135/68  BP Location: Right upper arm  MAP (mmHg): 90  Patient Position: Sitting  Patient Currently in Pain: Denies  Oxygen Therapy  SpO2: 98 %  O2 Device: None (Room air)  Social/Functional History  Social/Functional History  Lives With: Daughter (and son in law. daughter works on the farm at home. son in law works from home)  Type of Home: [address] [place] [address],[address]: Two level, Able to Live on Main level with bedroom/bathroom  Home Access: Stairs to enter without rails  Entrance Stairs - Number of Steps: 1+1 no rails.   Entrance Stairs - Rails: None  Bathroom Shower/Tub: Tub/Shower unit  Bathroom Toilet: Bedside commode  Bathroom Equipment: Shower chair  Bathroom Accessibility: Accessible  Home Equipment: Rolling walker, [employer], Electric scooter  Receives Help From: Family, Personal care attendant  ADL Assistance: Needs assistance  Bath: Moderate assistance (sponge bath, due to difficulty with using bath tub. patient has an aide that helps patient with sponge bathing as well)  Dressing: Independent  Grooming: Minimal assistance (with washing/managing her hair)  Feeding: Independent  Toileting: Independent  Homemaking Assistance: Needs assistance (daughter completes cleaning/laundry)  Meal Prep: Moderate  Laundry: Total  Vacuuming: Total  Cleaning: Total  Ambulation Assistance: Independent (patient ambulates household distances with her rolling walker)  Transfer Assistance: Independent  Active : No  Patient's  Info: daughter drives  Occupation: Retired  Leisure & Hobbies: spending time with family  Additional Comments: No falls in the past 6 months. Objective        Orientation  Overall Orientation Status: Within Functional Limits  Observation/Palpation  Posture: Good  Balance  Sitting Balance: Supervision  Standing Balance: Stand by assistance  Standing Balance  Time: 2-3 min x 2  Activity: to.from restroom, toileting,. grooming standing at sink  Functional Mobility  Activity: To/from bathroom  Assist Level: Stand by assistance  Toilet Transfers  Toilet - Technique: Ambulating  Equipment Used: Standard toilet  Toilet Transfer: Stand by assistance  Toilet Transfers Comments: grab bar on L side  ADL  Feeding: Independent; Beverage management  Grooming: Supervision (standing at sink for washinig hands, declines further grooming tasks)  Toileting: Supervision  Additional Comments: increased time for clothing mgmt on L side d/t slight hand weakness, declines ADL other than toileting/washing hands  Tone RUE  RUE Tone: Normotonic  Tone LUE  LUE Tone: Normotonic  Coordination  Movements Are Fluid And Coordinated: Yes        Transfers  Sit to stand: Stand by assistance  Stand to sit: Stand by assistance     Cognition  Overall Cognitive Status: WFL        Sensation  Overall Sensation Status: WFL        LUE AROM (degrees)  LUE AROM : WFL  Left Hand AROM (degrees)  Left Hand AROM: WFL  RUE AROM (degrees)  RUE AROM : WFL  Right Hand AROM (degrees)  Right Hand AROM: WFL  LUE Strength  Gross LUE Strength: WFL  RUE Strength  Gross RUE Strength: WFL                   Plan   Plan  Times per week: 3-5x/wk    AM-PAC Score        AM-PAC Inpatient Daily Activity Raw Score: 18 (07/03/21 1351)  AM-PAC Inpatient ADL T-Scale Score : 38.66 (07/03/21 1351)  ADL Inpatient CMS 0-100% Score: 46.65 (07/03/21 1351)  ADL Inpatient CMS G-Code Modifier : CK (07/03/21 1351)    Goals  Short term goals  Time Frame for Short term goals: 1 week by 7/10  Short term goal 1: Pt will complete LB dressing with SPV  Short term goal 2: Pt will complete 2-3 grooming tasks standing at sink with SBA w/o fatigue  Short term goal 3: Pt will complete functional transfers with SPV  Short term goal 4: Pt will tolerate B UE ex x 20 reps w/o fatigue  Patient Goals   Patient goals : \"go home\"       Therapy Time   Individual Concurrent Group Co-treatment   Time In 1059         Time Out 1117         Minutes 18         Timed Code Treatment Minutes: 8 Minutes (10 min eval)      If pt discharges prior to next session, this note will serve as discharge summary. See case management note for discharge disposition.      Thuy Goetz, OT

## 2021-07-03 NOTE — PROGRESS NOTES
07/03/21 9991 -Neurology consult completed at this time and Dr. Rubi Osorio added to treatment team.    -Zeferino Rosario, PCA

## 2021-07-04 VITALS
HEIGHT: 70 IN | WEIGHT: 129.4 LBS | OXYGEN SATURATION: 98 % | SYSTOLIC BLOOD PRESSURE: 163 MMHG | HEART RATE: 63 BPM | TEMPERATURE: 98.1 F | DIASTOLIC BLOOD PRESSURE: 68 MMHG | RESPIRATION RATE: 16 BRPM | BODY MASS INDEX: 18.52 KG/M2

## 2021-07-04 LAB
ANION GAP SERPL CALCULATED.3IONS-SCNC: 9 MMOL/L (ref 3–16)
BASOPHILS ABSOLUTE: 0 K/UL (ref 0–0.2)
BASOPHILS RELATIVE PERCENT: 0.3 %
BUN BLDV-MCNC: 31 MG/DL (ref 7–20)
CALCIUM SERPL-MCNC: 9.8 MG/DL (ref 8.3–10.6)
CHLORIDE BLD-SCNC: 103 MMOL/L (ref 99–110)
CO2: 25 MMOL/L (ref 21–32)
CREAT SERPL-MCNC: 1.3 MG/DL (ref 0.6–1.2)
EOSINOPHILS ABSOLUTE: 0.2 K/UL (ref 0–0.6)
EOSINOPHILS RELATIVE PERCENT: 3.8 %
GFR AFRICAN AMERICAN: 47
GFR NON-AFRICAN AMERICAN: 39
GLUCOSE BLD-MCNC: 122 MG/DL (ref 70–99)
GLUCOSE BLD-MCNC: 161 MG/DL (ref 70–99)
HCT VFR BLD CALC: 35.1 % (ref 36–48)
HEMOGLOBIN: 12.2 G/DL (ref 12–16)
LYMPHOCYTES ABSOLUTE: 1.3 K/UL (ref 1–5.1)
LYMPHOCYTES RELATIVE PERCENT: 20.9 %
MAGNESIUM: 2.2 MG/DL (ref 1.8–2.4)
MCH RBC QN AUTO: 31.9 PG (ref 26–34)
MCHC RBC AUTO-ENTMCNC: 34.8 G/DL (ref 31–36)
MCV RBC AUTO: 91.6 FL (ref 80–100)
MONOCYTES ABSOLUTE: 0.7 K/UL (ref 0–1.3)
MONOCYTES RELATIVE PERCENT: 10.7 %
NEUTROPHILS ABSOLUTE: 3.9 K/UL (ref 1.7–7.7)
NEUTROPHILS RELATIVE PERCENT: 64.3 %
PDW BLD-RTO: 12.7 % (ref 12.4–15.4)
PERFORMED ON: ABNORMAL
PLATELET # BLD: 177 K/UL (ref 135–450)
PMV BLD AUTO: 8.6 FL (ref 5–10.5)
POTASSIUM SERPL-SCNC: 3.7 MMOL/L (ref 3.5–5.1)
RBC # BLD: 3.83 M/UL (ref 4–5.2)
SODIUM BLD-SCNC: 137 MMOL/L (ref 136–145)
WBC # BLD: 6.1 K/UL (ref 4–11)

## 2021-07-04 PROCEDURE — 83735 ASSAY OF MAGNESIUM: CPT

## 2021-07-04 PROCEDURE — 6360000002 HC RX W HCPCS: Performed by: INTERNAL MEDICINE

## 2021-07-04 PROCEDURE — 80048 BASIC METABOLIC PNL TOTAL CA: CPT

## 2021-07-04 PROCEDURE — 85025 COMPLETE CBC W/AUTO DIFF WBC: CPT

## 2021-07-04 PROCEDURE — 6370000000 HC RX 637 (ALT 250 FOR IP): Performed by: INTERNAL MEDICINE

## 2021-07-04 PROCEDURE — 36415 COLL VENOUS BLD VENIPUNCTURE: CPT

## 2021-07-04 RX ADMIN — ASPIRIN 81 MG: 81 TABLET, CHEWABLE ORAL at 08:41

## 2021-07-04 RX ADMIN — INSULIN LISPRO 3 UNITS: 100 INJECTION, SOLUTION INTRAVENOUS; SUBCUTANEOUS at 08:42

## 2021-07-04 RX ADMIN — Medication 2000 UNITS: at 08:41

## 2021-07-04 RX ADMIN — FERROUS SULFATE TAB 325 MG (65 MG ELEMENTAL FE) 325 MG: 325 (65 FE) TAB at 08:41

## 2021-07-04 RX ADMIN — METOPROLOL TARTRATE 25 MG: 25 TABLET, FILM COATED ORAL at 08:41

## 2021-07-04 RX ADMIN — ENOXAPARIN SODIUM 30 MG: 30 INJECTION SUBCUTANEOUS at 08:41

## 2021-07-04 NOTE — PROGRESS NOTES
Pt d/c'd home. Removed IV, catheter intact. Pt tolerated well. No redness noted at site. Notified [facility] and removed tele box. Reviewed d/c instructions, home meds, and  f/u information utilizing teach-back method. Patient verbalized understanding. Pt wheeled to main entrance with all belongings.

## 2021-07-04 NOTE — PROGRESS NOTES
pt's bp 174/70 gets scheduled metoprolol but pulse is 56 give still? also not charted how eating pt could not tell me has bs 128 lantus 10 units ordered was held last night don't know why give or hold?  Perfect served hospitalists

## 2021-07-06 ENCOUNTER — TELEPHONE (OUTPATIENT)
Dept: CARDIOLOGY CLINIC | Age: 86
End: 2021-07-06

## 2021-07-06 LAB — BLOOD CULTURE, ROUTINE: NORMAL

## 2021-07-06 NOTE — TELEPHONE ENCOUNTER
I spoke with pt's dtr Latha Dorantes and she was not informed to followup with Bryn Hurd in 1month. She stated she was informed to contact 's office, neurology, and family medicine MD. Does pt need to see Bryn Hurd? If so for what reason. I informed Latha Dorantes that I will call her back with more information.

## 2021-07-06 NOTE — TELEPHONE ENCOUNTER
Patient seen in the hospital and discharged yesterday. Please call to schedule a one month follow up with CAMMIE.

## 2021-07-07 NOTE — TELEPHONE ENCOUNTER
7-7-21 LM for Nyasia Machado, pt's contact, at [phone] informing her of CASIMIRO Gonzalez's response and to see if [facility] would [facility]

## 2021-07-07 NOTE — TELEPHONE ENCOUNTER
Janeth Johnson saw her in hospital. She had elevated cardiac enzymes and possibly needs a stress test outpatient per Dr Pina Parson note in hospital.

## 2021-07-09 ENCOUNTER — OFFICE VISIT (OUTPATIENT)
Dept: VASCULAR SURGERY | Age: 86
End: 2021-07-09
Payer: MEDICARE

## 2021-07-09 VITALS
WEIGHT: 129 LBS | SYSTOLIC BLOOD PRESSURE: 120 MMHG | BODY MASS INDEX: 18.06 KG/M2 | HEIGHT: 71 IN | DIASTOLIC BLOOD PRESSURE: 52 MMHG

## 2021-07-09 DIAGNOSIS — I63.9 CEREBROVASCULAR ACCIDENT (CVA), UNSPECIFIED MECHANISM (HCC): ICD-10-CM

## 2021-07-09 DIAGNOSIS — I65.22 LEFT CAROTID STENOSIS: Primary | ICD-10-CM

## 2021-07-09 PROCEDURE — G8419 CALC BMI OUT NRM PARAM NOF/U: HCPCS | Performed by: SURGERY

## 2021-07-09 PROCEDURE — 1090F PRES/ABSN URINE INCON ASSESS: CPT | Performed by: SURGERY

## 2021-07-09 PROCEDURE — 99203 OFFICE O/P NEW LOW 30 MIN: CPT | Performed by: SURGERY

## 2021-07-09 PROCEDURE — G8427 DOCREV CUR MEDS BY ELIG CLIN: HCPCS | Performed by: SURGERY

## 2021-07-09 NOTE — PROGRESS NOTES
Outpatient Consultation / H&P    Date of Consultation:  7/9/2021    PCP:  João Soni MD     Referring Provider:  Dr. Kadeem Kam     Chief Complaint:   Chief Complaint   Patient presents with    Other     patient is here regarding carotid artery stenosis. pt was in ED after having a stroke. pamlr        History of Present Illness: We are asked to see this patient in consultation by Dr. Kadeem Kam regarding carotid stenosis. Gamaliel Martinez is a 80 y.o. female who was diagnosed with R CVA after presenting with left sided weakness. She underwent MRI showing small punctate R Thalamic infarct. An MRA was performed showing ~ 60% Left carotid stenosis, no significant right carotid stenosis.            Past Medical History:  Past Medical History:   Diagnosis Date    Clostridium difficile infection 06/06/2017    antigen +    [facility]     Diabetes mellitus ([facility]     History of blood transfusion     Hyperlipidemia     Hypertension     Infection     foot    MDRO (multiple drug resistant organisms) resistance 04/09/2019    urine    MRSA infection 12/07/2017    foot    Osteomyelitis of spine ([facility] 2010    thoraculumbar spine    Restless leg syndrome        Past Surgical History:  Past Surgical History:   Procedure Laterality Date    APPENDECTOMY      COLONOSCOPY  1997    polyps    COLONOSCOPY  7/15/15    normal colon    EYE SURGERY      right cataract    FOOT AMPUTATION Right 05/26/2019    PARTIAL RIGHT FOOT AMPUTATION    FOOT AMPUTATION Right 5/26/2019    PARTIAL RIGHT FOOT AMPUTATION performed by Elizabeth Sanders DPM at [address] Left 05/30/2017    PARTIAL LEFT FOOT AMPUTATION              FOOT SURGERY Right 12/11/2017    HYSTERECTOMY      OTHER SURGICAL HISTORY Left     Macular Grid Left eye argon    TOE AMPUTATION  2003    2nd toe 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    TOE AMPUTATION  2011    5th toe right foot    TONSILLECTOMY      UPPER GASTROINTESTINAL ENDOSCOPY 06/05/2017    Dieulafoy lesion in duodenum       Home Medications:   Prior to Admission medications    Medication Sig Start Date End Date Taking? Authorizing Provider   aspirin 81 MG chewable tablet Take 1 tablet by mouth daily 7/4/21  Yes Molina Perry MD   Empagliflozin (JARDIANCE PO) Take by mouth   Yes Historical Provider, MD   LINAGLIPTIN PO Take by mouth   Yes Historical Provider, MD   calcium carbonate (OSCAL) 500 MG TABS tablet Take 500 mg by mouth daily   Yes Historical Provider, MD VALLESORMULARY tragenta   Yes Historical Provider, MD   lisinopril (PRINIVIL;ZESTRIL) 10 MG tablet Take 10 mg by mouth daily   Yes Historical Provider, MD   Cholecalciferol (VITAMIN D3) 50 MCG (2000 UT) CAPS Take 2,000 Units by mouth daily   Yes Historical Provider, MD   pramipexole (MIRAPEX) 1 MG tablet TAKE 1 TABLET BY ORAL ROUTE ONCE A DAY (IN THE EVENING) AROUND 8-9 PM FOR RLS 1/11/19  Yes Historical Provider, MD   metoprolol tartrate (LOPRESSOR) 25 MG tablet TAKE 1 TABLET BY ORAL ROUTE 2 TIMES A DAY FOR BP 1/11/19  Yes Historical Provider, MD   ferrous sulfate 325 (65 Fe) MG tablet Take 325 mg by mouth 3 times daily (with meals)    Yes Historical Provider, MD   Multiple Vitamins-Minerals (MULTIVITAMIN PO) Take by mouth daily   Yes Historical Provider, MD   Cyanocobalamin (VITAMIN B-12) 5000 MCG SUBL Place 500 mcg under the tongue daily    Yes Historical Provider, MD   pravastatin (PRAVACHOL) 10 MG tablet Take 10 mg by mouth daily    Yes Historical Provider, MD        Allergies:  Patient has no known allergies.       Social History:      Social History     Socioeconomic History    Marital status:      Spouse name: Not on file    Number of children: Not on file    Years of education: Not on file    Highest education level: Not on file   Occupational History    Not on file   Tobacco Use    Smoking status: Never Smoker    Smokeless tobacco: Never Used   Vaping Use    Vaping Use: Never used   Substance and Sexual Activity    Alcohol use: Yes     Comment: OCCASIONAL    Drug use: No    Sexual activity: Never   Other Topics Concern    Not on file   Social History Narrative    Not on file     Social Determinants of Health     Financial Resource Strain:     Difficulty of Paying Living Expenses:    Food Insecurity:     Worried About Running Out of Food in the Last Year:     [address] in the Last Year:    Transportation Needs:     Lack of Transportation (Medical):  Lack of Transportation (Non-Medical):    Physical Activity:     Days of Exercise per Week:     Minutes of Exercise per Session:    Stress:     Feeling of Stress :    Social Connections:     Frequency of Communication with Friends and Family:     Frequency of Social Gatherings with Friends and Family:     Attends Congregation Services:     Active Member of Clubs or Organizations:     Attends Club or Organization Meetings:     Marital Status:    Intimate Partner Violence:     Fear of Current or Ex-Partner:     Emotionally Abused:     Physically Abused:     Sexually Abused:        Family History:        Problem Relation Age of Onset    Diabetes Mother     High Blood Pressure Mother     Cancer Father     Heart Disease Maternal Uncle        Review of Systems:  A 14 point review of systems was completed. Pertinent positives identified in the HPI, all other review of systems negative. Physical Examination:    BP (!) 120/52 (Site: Right Upper Arm)   Ht 5' 10.5\" (1.791 m)   Wt 129 lb (58.5 kg)   BMI 18.25 kg/m²     Weight: 129 lb (58.5 kg)       General appearance: NAD  Eyes: PERRLA  Neck: no JVD, no lymphadenopathy. Respiratory: effort is unlabored, no crackles, wheezes or rubs. Cardiovascular: regular, no murmur. Left carotid bruits. Pulses:    carotid radial   RIGHT 2 2   LEFT 2 2   GI: abdomen soft, nondistended, no organomegaly. Musculoskeletal: Mild left upper ext weakness. Extremities: warm and pink.   Skin: no dermatitis or ulceration. Neuro/psychiatric: grossly intact. MEDICAL DECISION MAKING/TESTING    Imaging reviewed. MRA:       CAROTID ARTERIES: The common carotid arteries are patent.  There is   approximately 60% stenosis of the proximal left internal carotid artery.  The   remainder of the cervical internal carotid arteries are patent bilaterally.       VERTEBRAL ARTERIES: No dissection, arterial injury, or significant stenosis.         Impression   Approximately 60% stenosis involving the proximal left internal carotid   artery. MRI:    Impression   1. Acute small-vessel ischemic infarction in the posterior limb of the right   internal capsule.  No associated hemorrhage. 2. Diffuse parenchymal volume loss and sequela of mild-to-moderate chronic   microvascular ischemic changes. Assessment:      Diagnosis   1. Left carotid stenosis    2. Cerebrovascular accident (CVA), unspecified mechanism ([address] - Right     CVA on right unrelated to Left ICA stenosis. No intervention for asymptomatic stenosis < 80%. Recommendations/Plan:    Carotid Duplex in 6 months.       Laura Hartmann MD, MD, FACS

## 2021-07-15 ENCOUNTER — INITIAL CONSULT (OUTPATIENT)
Dept: NEUROLOGY | Age: 86
End: 2021-07-15
Payer: MEDICARE

## 2021-07-15 VITALS
DIASTOLIC BLOOD PRESSURE: 68 MMHG | BODY MASS INDEX: 18.47 KG/M2 | OXYGEN SATURATION: 97 % | HEART RATE: 60 BPM | HEIGHT: 70 IN | WEIGHT: 129 LBS | SYSTOLIC BLOOD PRESSURE: 139 MMHG

## 2021-07-15 DIAGNOSIS — I10 HTN (HYPERTENSION), BENIGN: ICD-10-CM

## 2021-07-15 DIAGNOSIS — E78.5 DYSLIPIDEMIA: ICD-10-CM

## 2021-07-15 DIAGNOSIS — I63.232 ARTERIAL ISCHEMIC STROKE, ICA, LEFT, ACUTE (HCC): Primary | ICD-10-CM

## 2021-07-15 DIAGNOSIS — I63.239 CAROTID ARTERY STENOSIS WITH CEREBRAL INFARCTION (HCC): ICD-10-CM

## 2021-07-15 PROCEDURE — G8420 CALC BMI NORM PARAMETERS: HCPCS | Performed by: PSYCHIATRY & NEUROLOGY

## 2021-07-15 PROCEDURE — G8400 PT W/DXA NO RESULTS DOC: HCPCS | Performed by: PSYCHIATRY & NEUROLOGY

## 2021-07-15 PROCEDURE — G8427 DOCREV CUR MEDS BY ELIG CLIN: HCPCS | Performed by: PSYCHIATRY & NEUROLOGY

## 2021-07-15 PROCEDURE — 99204 OFFICE O/P NEW MOD 45 MIN: CPT | Performed by: PSYCHIATRY & NEUROLOGY

## 2021-07-15 PROCEDURE — 1123F ACP DISCUSS/DSCN MKR DOCD: CPT | Performed by: PSYCHIATRY & NEUROLOGY

## 2021-07-15 PROCEDURE — 3052F HG A1C>EQUAL 8.0%<EQUAL 9.0%: CPT | Performed by: PSYCHIATRY & NEUROLOGY

## 2021-07-15 PROCEDURE — 1036F TOBACCO NON-USER: CPT | Performed by: PSYCHIATRY & NEUROLOGY

## 2021-07-15 PROCEDURE — 1111F DSCHRG MED/CURRENT MED MERGE: CPT | Performed by: PSYCHIATRY & NEUROLOGY

## 2021-07-15 PROCEDURE — 1090F PRES/ABSN URINE INCON ASSESS: CPT | Performed by: PSYCHIATRY & NEUROLOGY

## 2021-07-15 PROCEDURE — 4040F PNEUMOC VAC/ADMIN/RCVD: CPT | Performed by: PSYCHIATRY & NEUROLOGY

## 2021-07-15 NOTE — PROGRESS NOTES
The patient is a 80y.o. years old female who  was referred by [facility]  for consultation regarding new ischemic stroke. HPI:  The patient came today with her daughter. She was admitted at [facility] of July weekend with acute left-sided weakness. Onset was on the day of admission for degree was severe and persistent for few days. No other triggers or other associated symptoms. Further work-up with MRI brain showed acute right ischemic internal capsule stroke. MRA showed left ICA stenosis about 60%. Echo showed normal EF. She was discharged home on aspirin and statin. Today she feels back to her baseline. No residual  deficit. She has not seen cardiology due to elevated cardiac enzymes during her hospital stay. No prior history of cardiac arrhythmia or A. fib. She has not had any event monitor in the past.  She is diabetic and her last A1c was 8. She has issues with PVD. She is on blood pressure medications. No recent chest pain or headache. She is active and walks frequently at home. She lives with her family. No sleep issues or insomnia or major memory issues. Other review of system was unremarkable. ROS : A 10-14 system review of constitutional, cardiovascular, respiratory, GI, eyes, , ENT, musculoskeletal, endocrine, skin, SHEENT, genitourinary, psychiatric and neurologic systems was obtained and updated today and is unremarkable except as mentioned in my HPI        Exam:   Constitutional:   Vitals:    07/15/21 1156   BP: 139/68   Pulse: 60   SpO2: 97%   Weight: 129 lb (58.5 kg)   Height: 5' 10\" (1.778 m)       General appearance:  Normal development and appear in no acute distress. Mental Status:   Oriented to person, place, problem, and time. Memory: Good immediate recall. Intact remote memory  Normal attention span and concentration. Language: intact naming, repeating and fluency   Good fund of Knowledge.  Aware of current events and vocabulary   Cranial Nerves:   II: Visual fields: Full. Pupils: equal, round, reactive to light  III,IV,VI: Extra Ocular Movements are intact. No nystagmus  V: Facial sensation is intact  VII: Facial strength and movements: intact and symmetric  XII: Tongue movements are normal  Musculoskeletal: 5/5 in all 4 extremities. Tone: Normal tone. Reflexes: Symmetric 2+ in the arms and 1 in the legs   Coordination: no pronator drift, no dysmetria with FNF and normal REM  Sensation: normal to all modalities in both arms and legs. Gait/Posture: steady gait     Medical decision making:  I personally reviewed and updated social history, past medical history, medications, allergy, surgical history, and family history as documented in the patient's electronic health records. Labs and/or neuroimaging and other test results reviewed and discussed with the patient and her daughter. Reviewed notes from other physicians. Provided patient education regarding risk, benefits and treatment options as well as adherence to medication regimen and side effect from these medications. Assessment:  1. Arterial ischemic stroke, ICA, left, acute ([address] new. Likely thromboembolic versus cardioembolic  Continue aspirin  Statin  Continue blood sugar control closely at home and follow A1c  She is scheduled to see cardiology and would recommend event monitor for 1 month to rule out paroxysmal Vanesa Lindquist MD, Cardiology, [facility]    2. Carotid artery stenosis with cerebral infarction (HCC)  Left ICA stenosis, moderate. Asymptomatic  Follow carotid Doppler in 6-month    3. DM (diabetes mellitus), type 2, uncontrolled with complications ([address]  Not controlled  Follow A1c  Blood sugar control  Statin  Baby aspirin daily    4. HTN (hypertension), benign  Continue current blood pressure medications  Blood pressure monitor at home  Continue frequent walking    5.  Dyslipidemia  Statin for LDL

## 2021-07-16 NOTE — PROGRESS NOTES
Physician Progress Note      PATIENT:               Alin Álvarez  [facility] #:                  [id number]  :                       1935  ADMIT DATE:       2021 9:59 PM  [address] [place] DATE:        2021 12:05 PM  RESPONDING  PROVIDER #:        Sydnee To MD          QUERY TEXT:    Patient admitted with acute CVA. Problem list diagnoses include severe protein   calorie malnutrition without associated assessment or treatment. If possible,   please document in progress notes and discharge summary if you are evaluating   and /or treating any of the following: The medical record reflects the following:  Risk Factors: 80 y.o. female DM, CKD  Clinical Indicators: severe pcm on active problem lists without associated   plan/treatments. BMI 18.25  Treatment: regular 4 carb count adult diet, I/Os and weights per nursing,   supportive care    Thank you,  Devon Palma RN CDS  [phone]  Options provided:  -- No malnutrition, pt is underweight with BMI 18.25  -- Protein calorie malnutrition specified degree with indicators, please   specify degree and evidence. -- Other - I will add my own diagnosis  -- Disagree - Not applicable / Not valid  -- Disagree - Clinically unable to determine / Unknown  -- Refer to Clinical Documentation Reviewer    PROVIDER RESPONSE TEXT:    No malnutrition, patient is underweight with a BMI of 18.25. Query created by: Adrian Solis on 2021 4:53 PM      QUERY TEXT:    Patient admitted with acute CVA, noted to have elevated troponin noted as:   \"demand-supply mismatch\" per cardiology. If possible, please document in   progress notes and discharge summary if you are evaluating and/or treating any   of the following: The medical record reflects the following:  Risk Factors: 80 y.o. female, pmhx htn, ckd  Clinical Indicators: trop 0.11 ->0.09->0. 06. Cardiology note: \"Suspect   troponin is due to demand-supply mismatch. \"  Treatment: serial labs, cardiology consult, supportive care and monitoring    Thank you,  Addison Sahu RN CDS  [phone]  Options provided:  -- demand ischemia  -- demand mismatch without ischemia  -- Other - I will add my own diagnosis  -- Disagree - Not applicable / Not valid  -- Disagree - Clinically unable to determine / Unknown  -- Refer to Clinical Documentation Reviewer    PROVIDER RESPONSE TEXT:    demand mismatch without ischemia. Query created by:  César Mitchell on 7/13/2021 4:53 PM      Electronically signed by:  Aureliano Ramirez MD 7/16/2021 3:09 PM

## 2022-05-19 ENCOUNTER — HOSPITAL ENCOUNTER (OUTPATIENT)
Age: 87
Setting detail: SPECIMEN
Discharge: HOME | End: 2022-05-19
Payer: MEDICARE

## 2022-05-19 LAB
BACTERIA: ABNORMAL /HPF
BASOPHILS ABSOLUTE: 0 K/UL (ref 0–0.2)
BASOPHILS RELATIVE PERCENT: 0.4 %
BILIRUBIN URINE: NEGATIVE
BLOOD, URINE: NEGATIVE
CLARITY: ABNORMAL
COLOR: YELLOW
EOSINOPHILS ABSOLUTE: 0.3 K/UL (ref 0–0.6)
EOSINOPHILS RELATIVE PERCENT: 5.5 %
EPITHELIAL CELLS, UA: ABNORMAL /HPF (ref 0–5)
GLUCOSE URINE: >=1000 MG/DL
HCT VFR BLD CALC: 36.6 % (ref 36–48)
HEMOGLOBIN: 12.4 G/DL (ref 12–16)
KETONES, URINE: NEGATIVE MG/DL
LEUKOCYTE ESTERASE, URINE: ABNORMAL
LYMPHOCYTES ABSOLUTE: 1.2 K/UL (ref 1–5.1)
LYMPHOCYTES RELATIVE PERCENT: 22.7 %
MCH RBC QN AUTO: 31.4 PG (ref 26–34)
MCHC RBC AUTO-ENTMCNC: 33.8 G/DL (ref 31–36)
MCV RBC AUTO: 93 FL (ref 80–100)
MICROSCOPIC EXAMINATION: YES
MONOCYTES ABSOLUTE: 0.5 K/UL (ref 0–1.3)
MONOCYTES RELATIVE PERCENT: 10 %
NEUTROPHILS ABSOLUTE: 3.1 K/UL (ref 1.7–7.7)
NEUTROPHILS RELATIVE PERCENT: 61.4 %
NITRITE, URINE: NEGATIVE
PDW BLD-RTO: 13.8 % (ref 12.4–15.4)
PH UA: 5.5 (ref 5–8)
PLATELET # BLD: 163 K/UL (ref 135–450)
PMV BLD AUTO: 8.7 FL (ref 5–10.5)
PROTEIN UA: NEGATIVE MG/DL
RBC # BLD: 3.94 M/UL (ref 4–5.2)
RBC UA: ABNORMAL /HPF (ref 0–4)
SPECIFIC GRAVITY UA: 1.02 (ref 1–1.03)
URINE TYPE: ABNORMAL
UROBILINOGEN, URINE: 0.2 E.U./DL
WBC # BLD: 5.1 K/UL (ref 4–11)
WBC UA: ABNORMAL /HPF (ref 0–5)

## 2022-05-19 PROCEDURE — 82306 VITAMIN D 25 HYDROXY: CPT

## 2022-05-19 PROCEDURE — 36415 COLL VENOUS BLD VENIPUNCTURE: CPT

## 2022-05-19 PROCEDURE — 83540 ASSAY OF IRON: CPT

## 2022-05-19 PROCEDURE — 81001 URINALYSIS AUTO W/SCOPE: CPT

## 2022-05-19 PROCEDURE — 83036 HEMOGLOBIN GLYCOSYLATED A1C: CPT

## 2022-05-19 PROCEDURE — 85025 COMPLETE CBC W/AUTO DIFF WBC: CPT

## 2022-05-20 LAB
ESTIMATED AVERAGE GLUCOSE: 162.8 MG/DL
HBA1C MFR BLD: 7.3 %
IRON: 86 UG/DL (ref 37–145)
VITAMIN D 25-HYDROXY: 81.7 NG/ML

## 2022-05-26 ENCOUNTER — HOSPITAL ENCOUNTER (EMERGENCY)
Age: 87
Discharge: ANOTHER HOSPITAL | End: 2022-05-27
Attending: STUDENT IN AN ORGANIZED HEALTH CARE EDUCATION/TRAINING PROGRAM
Payer: MEDICARE

## 2022-05-26 ENCOUNTER — APPOINTMENT (OUTPATIENT)
Dept: GENERAL RADIOLOGY | Age: 87
End: 2022-05-26
Payer: MEDICARE

## 2022-05-26 DIAGNOSIS — R77.8 ELEVATED TROPONIN: ICD-10-CM

## 2022-05-26 DIAGNOSIS — R53.83 FATIGUE, UNSPECIFIED TYPE: ICD-10-CM

## 2022-05-26 DIAGNOSIS — I21.4 NSTEMI (NON-ST ELEVATED MYOCARDIAL INFARCTION) (HCC): ICD-10-CM

## 2022-05-26 DIAGNOSIS — R31.9 URINARY TRACT INFECTION WITH HEMATURIA, SITE UNSPECIFIED: Primary | ICD-10-CM

## 2022-05-26 DIAGNOSIS — U07.1 COVID: ICD-10-CM

## 2022-05-26 DIAGNOSIS — N18.9 CHRONIC KIDNEY DISEASE, UNSPECIFIED CKD STAGE: ICD-10-CM

## 2022-05-26 DIAGNOSIS — N39.0 URINARY TRACT INFECTION WITH HEMATURIA, SITE UNSPECIFIED: Primary | ICD-10-CM

## 2022-05-26 LAB
A/G RATIO: 1.7 (ref 1.1–2.2)
ALBUMIN SERPL-MCNC: 3.9 G/DL (ref 3.4–5)
ALP BLD-CCNC: 58 U/L (ref 40–129)
ALT SERPL-CCNC: 13 U/L (ref 10–40)
ANION GAP SERPL CALCULATED.3IONS-SCNC: 8 MMOL/L (ref 3–16)
ANISOCYTOSIS: ABNORMAL
APTT: 27.4 SEC (ref 23–34.3)
AST SERPL-CCNC: 21 U/L (ref 15–37)
BACTERIA: ABNORMAL /HPF
BASOPHILS ABSOLUTE: 0 K/UL (ref 0–0.2)
BASOPHILS RELATIVE PERCENT: 0 %
BILIRUB SERPL-MCNC: 0.4 MG/DL (ref 0–1)
BILIRUBIN URINE: NEGATIVE
BLOOD, URINE: ABNORMAL
BUN BLDV-MCNC: 35 MG/DL (ref 7–20)
CALCIUM SERPL-MCNC: 9.2 MG/DL (ref 8.3–10.6)
CHLORIDE BLD-SCNC: 100 MMOL/L (ref 99–110)
CLARITY: CLEAR
CO2: 28 MMOL/L (ref 21–32)
COLOR: YELLOW
CREAT SERPL-MCNC: 1.7 MG/DL (ref 0.6–1.2)
EKG ATRIAL RATE: 66 BPM
EKG DIAGNOSIS: NORMAL
EKG P AXIS: 35 DEGREES
EKG P-R INTERVAL: 204 MS
EKG Q-T INTERVAL: 422 MS
EKG QRS DURATION: 120 MS
EKG QTC CALCULATION (BAZETT): 442 MS
EKG R AXIS: 43 DEGREES
EKG T AXIS: 90 DEGREES
EKG VENTRICULAR RATE: 66 BPM
EOSINOPHILS ABSOLUTE: 0.1 K/UL (ref 0–0.6)
EOSINOPHILS RELATIVE PERCENT: 1 %
EPITHELIAL CELLS, UA: ABNORMAL /HPF (ref 0–5)
GFR AFRICAN AMERICAN: 34
GFR NON-AFRICAN AMERICAN: 28
GLUCOSE BLD-MCNC: 166 MG/DL (ref 70–99)
GLUCOSE URINE: >=1000 MG/DL
HCT VFR BLD CALC: 32.9 % (ref 36–48)
HEMOGLOBIN: 11.1 G/DL (ref 12–16)
INFLUENZA A: NOT DETECTED
INFLUENZA B: NOT DETECTED
KETONES, URINE: NEGATIVE MG/DL
LEUKOCYTE ESTERASE, URINE: ABNORMAL
LYMPHOCYTES ABSOLUTE: 0.6 K/UL (ref 1–5.1)
LYMPHOCYTES RELATIVE PERCENT: 11 %
MCH RBC QN AUTO: 31.5 PG (ref 26–34)
MCHC RBC AUTO-ENTMCNC: 33.8 G/DL (ref 31–36)
MCV RBC AUTO: 93.2 FL (ref 80–100)
MICROSCOPIC EXAMINATION: YES
MONOCYTES ABSOLUTE: 0.2 K/UL (ref 0–1.3)
MONOCYTES RELATIVE PERCENT: 3 %
MYELOCYTE PERCENT: 1 %
NEUTROPHILS ABSOLUTE: 4.7 K/UL (ref 1.7–7.7)
NEUTROPHILS RELATIVE PERCENT: 84 %
NITRITE, URINE: POSITIVE
PDW BLD-RTO: 13.5 % (ref 12.4–15.4)
PH UA: 5.5 (ref 5–8)
PLATELET # BLD: 129 K/UL (ref 135–450)
PMV BLD AUTO: 8.2 FL (ref 5–10.5)
POTASSIUM REFLEX MAGNESIUM: 4.2 MMOL/L (ref 3.5–5.1)
PRO-BNP: 1633 PG/ML (ref 0–449)
PROTEIN UA: NEGATIVE MG/DL
RBC # BLD: 3.53 M/UL (ref 4–5.2)
RBC UA: ABNORMAL /HPF (ref 0–4)
SARS-COV-2 RNA, RT PCR: DETECTED
SLIDE REVIEW: ABNORMAL
SODIUM BLD-SCNC: 136 MMOL/L (ref 136–145)
SPECIFIC GRAVITY UA: 1.01 (ref 1–1.03)
TOTAL PROTEIN: 6.2 G/DL (ref 6.4–8.2)
TROPONIN: 0.11 NG/ML
TROPONIN: 0.12 NG/ML
TROPONIN: 0.13 NG/ML
URINE REFLEX TO CULTURE: YES
URINE TYPE: ABNORMAL
UROBILINOGEN, URINE: 0.2 E.U./DL
WBC # BLD: 5.5 K/UL (ref 4–11)
WBC UA: ABNORMAL /HPF (ref 0–5)

## 2022-05-26 PROCEDURE — 2580000003 HC RX 258: Performed by: STUDENT IN AN ORGANIZED HEALTH CARE EDUCATION/TRAINING PROGRAM

## 2022-05-26 PROCEDURE — 36415 COLL VENOUS BLD VENIPUNCTURE: CPT

## 2022-05-26 PROCEDURE — 96367 TX/PROPH/DG ADDL SEQ IV INF: CPT

## 2022-05-26 PROCEDURE — 87636 SARSCOV2 & INF A&B AMP PRB: CPT

## 2022-05-26 PROCEDURE — 80053 COMPREHEN METABOLIC PANEL: CPT

## 2022-05-26 PROCEDURE — 99285 EMERGENCY DEPT VISIT HI MDM: CPT

## 2022-05-26 PROCEDURE — 2580000003 HC RX 258: Performed by: NURSE PRACTITIONER

## 2022-05-26 PROCEDURE — 87088 URINE BACTERIA CULTURE: CPT

## 2022-05-26 PROCEDURE — 96376 TX/PRO/DX INJ SAME DRUG ADON: CPT

## 2022-05-26 PROCEDURE — 87186 SC STD MICRODIL/AGAR DIL: CPT

## 2022-05-26 PROCEDURE — 84484 ASSAY OF TROPONIN QUANT: CPT

## 2022-05-26 PROCEDURE — 85025 COMPLETE CBC W/AUTO DIFF WBC: CPT

## 2022-05-26 PROCEDURE — 87086 URINE CULTURE/COLONY COUNT: CPT

## 2022-05-26 PROCEDURE — 96366 THER/PROPH/DIAG IV INF ADDON: CPT

## 2022-05-26 PROCEDURE — 85730 THROMBOPLASTIN TIME PARTIAL: CPT

## 2022-05-26 PROCEDURE — 93005 ELECTROCARDIOGRAM TRACING: CPT | Performed by: NURSE PRACTITIONER

## 2022-05-26 PROCEDURE — 93010 ELECTROCARDIOGRAM REPORT: CPT | Performed by: INTERNAL MEDICINE

## 2022-05-26 PROCEDURE — 6370000000 HC RX 637 (ALT 250 FOR IP): Performed by: NURSE PRACTITIONER

## 2022-05-26 PROCEDURE — 6360000002 HC RX W HCPCS: Performed by: NURSE PRACTITIONER

## 2022-05-26 PROCEDURE — 83880 ASSAY OF NATRIURETIC PEPTIDE: CPT

## 2022-05-26 PROCEDURE — 71046 X-RAY EXAM CHEST 2 VIEWS: CPT

## 2022-05-26 PROCEDURE — 81001 URINALYSIS AUTO W/SCOPE: CPT

## 2022-05-26 PROCEDURE — 96365 THER/PROPH/DIAG IV INF INIT: CPT

## 2022-05-26 RX ORDER — ASPIRIN 325 MG
325 TABLET ORAL ONCE
Status: COMPLETED | OUTPATIENT
Start: 2022-05-26 | End: 2022-05-26

## 2022-05-26 RX ORDER — HEPARIN SODIUM 10000 [USP'U]/100ML
710 INJECTION, SOLUTION INTRAVENOUS CONTINUOUS
Status: DISCONTINUED | OUTPATIENT
Start: 2022-05-26 | End: 2022-05-27 | Stop reason: HOSPADM

## 2022-05-26 RX ORDER — HEPARIN SODIUM 1000 [USP'U]/ML
1800 INJECTION, SOLUTION INTRAVENOUS; SUBCUTANEOUS PRN
Status: DISCONTINUED | OUTPATIENT
Start: 2022-05-26 | End: 2022-05-27 | Stop reason: HOSPADM

## 2022-05-26 RX ORDER — 0.9 % SODIUM CHLORIDE 0.9 %
500 INTRAVENOUS SOLUTION INTRAVENOUS ONCE
Status: COMPLETED | OUTPATIENT
Start: 2022-05-26 | End: 2022-05-27

## 2022-05-26 RX ORDER — HEPARIN SODIUM 1000 [USP'U]/ML
3500 INJECTION, SOLUTION INTRAVENOUS; SUBCUTANEOUS ONCE
Status: COMPLETED | OUTPATIENT
Start: 2022-05-26 | End: 2022-05-26

## 2022-05-26 RX ORDER — HEPARIN SODIUM 1000 [USP'U]/ML
3500 INJECTION, SOLUTION INTRAVENOUS; SUBCUTANEOUS PRN
Status: DISCONTINUED | OUTPATIENT
Start: 2022-05-26 | End: 2022-05-27 | Stop reason: HOSPADM

## 2022-05-26 RX ADMIN — ASPIRIN 325 MG: 325 TABLET ORAL at 16:52

## 2022-05-26 RX ADMIN — HEPARIN SODIUM 3500 UNITS: 1000 INJECTION INTRAVENOUS; SUBCUTANEOUS at 22:00

## 2022-05-26 RX ADMIN — CEFTRIAXONE SODIUM 1000 MG: 1 INJECTION, POWDER, FOR SOLUTION INTRAMUSCULAR; INTRAVENOUS at 16:53

## 2022-05-26 RX ADMIN — HEPARIN SODIUM 710 UNITS/HR: 10000 INJECTION, SOLUTION INTRAVENOUS; SUBCUTANEOUS at 22:00

## 2022-05-26 RX ADMIN — SODIUM CHLORIDE 500 ML: 9 INJECTION, SOLUTION INTRAVENOUS at 21:55

## 2022-05-26 ASSESSMENT — ENCOUNTER SYMPTOMS
ABDOMINAL PAIN: 0
SHORTNESS OF BREATH: 0
VOMITING: 0
NAUSEA: 0
FACIAL SWELLING: 0
RHINORRHEA: 0
COLOR CHANGE: 0
SORE THROAT: 0

## 2022-05-26 ASSESSMENT — PAIN - FUNCTIONAL ASSESSMENT: PAIN_FUNCTIONAL_ASSESSMENT: NONE - DENIES PAIN

## 2022-05-26 ASSESSMENT — HEART SCORE: ECG: 0

## 2022-05-26 NOTE — ED NOTES
Pt continues resting in bed at this time. Pt provided with food per provider's clearance. Pt declines further needs. Call light remains in reach.      Bhavesh Sarkar RN  05/26/22 [id number]

## 2022-05-26 NOTE — ED NOTES
Pt presents to ED with CC of testing positive for Covid. Pt verbalizes some nasal congestion. Pt denies SOB, cough, CP, nausea, vomiting, or any other sx. Pt stable at time of assessment. Pt states \"my daughter wanted me to be seen. \"     Himanshu Antonio RN  05/26/22 [id number]

## 2022-05-26 NOTE — ED NOTES
Pt continues resting in bed at this time. Pt provided with tissues, declines further needs. Call light remains in reach. VS remain stable.      Campos Hoyos RN  05/26/22 [id number]

## 2022-05-26 NOTE — ED PROVIDER NOTES
[address] ED  EMERGENCY DEPARTMENT ENCOUNTER        Pt Name: Chasidy Rock  MRN: [id number]  Chavogfeloise 1935  Dateof evaluation: 5/26/2022  Provider: GERMAN Lund - DAVE  PCP: Trace Simon MD  ED Attending: Lucille Viveros MD    CHIEF COMPLAINT       Chief Complaint   Patient presents with    Positive For Covid-19     pt states she is unsure why she is here states her daughter wanted her to come in and be seen. HISTORY OF PRESENTILLNESS   (Location/Symptom, Timing/Onset, Context/Setting, Quality, Duration, Modifying Factors, Severity)  Note limiting factors. Chasidy Rock is a 80 y.o. female for positive home test for COVID. Onset was today. Context includes patient was brought in by EMS and is unable to state while she was here. I was able to call the daughter to verify the patient's chief complaint and the daughter reports the patient has had weakness achiness and congestion since yesterday. Daughter reports that she tested positive for COVID this morning. Daughter also reports that she has been having some increased confusion and fatigue. Daughter was concerned that she was dehydrated. Daughter reports that she wrote out a long list of concerns and scented with EMS when they brought the patient to the ED. ED nursing staff states that did not see this. Patient denies any complaints. Alleviating factors include nothing. Aggravating factors include nothing. Pain is 0/10. Nothing has been used for pain today. Nursing Notes were all reviewed and agreed with or any disagreements were addressed  in the HPI. REVIEW OF SYSTEMS    (2-9 systems for level 4, 10 or more for level 5)     Review of Systems   Constitutional: Positive for fatigue. Negative for activity change, appetite change and fever. HENT: Negative for congestion, facial swelling, rhinorrhea and sore throat. Eyes: Negative for visual disturbance.    Respiratory: Negative for shortness of breath. Cardiovascular: Negative for chest pain. Gastrointestinal: Negative for abdominal pain, nausea and vomiting. Genitourinary: Negative for difficulty urinating. Musculoskeletal: Negative for arthralgias and myalgias. Skin: Negative for color change and rash. Neurological: Negative for dizziness and light-headedness. Psychiatric/Behavioral: Negative for agitation. All other systems reviewed and are negative. Positives and Pertinent negatives as per HPI. Except as noted above in the ROS, all other systems were reviewed and negative.        PAST MEDICAL HISTORY     Past Medical History:   Diagnosis Date    Clostridium difficile infection 06/06/2017    antigen +    [facility]     Diabetes mellitus ([facility]     History of blood transfusion     Hyperlipidemia     Hypertension     Infection     foot    MDRO (multiple drug resistant organisms) resistance 04/09/2019    urine    MRSA infection 12/07/2017    foot    Osteomyelitis of spine ([facility] [address] 2010    thoraculumbar spine    Restless leg syndrome          SURGICAL HISTORY       Past Surgical History:   Procedure Laterality Date    APPENDECTOMY      COLONOSCOPY  1997    polyps    COLONOSCOPY  7/15/15    normal colon    EYE SURGERY      right cataract    FOOT AMPUTATION Right 05/26/2019    PARTIAL RIGHT FOOT AMPUTATION    FOOT AMPUTATION Right 5/26/2019    PARTIAL RIGHT FOOT AMPUTATION performed by Elizabeth Sanders DPM at [address] Left 05/30/2017    PARTIAL LEFT FOOT AMPUTATION              FOOT SURGERY Right 12/11/2017    HYSTERECTOMY      OTHER SURGICAL HISTORY Left     Macular Grid Left eye argon    TOE AMPUTATION  2003    2nd toe 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    TOE AMPUTATION  2011    5th toe right foot    TONSILLECTOMY      UPPER GASTROINTESTINAL ENDOSCOPY  06/05/2017    Dieulafoy lesion in duodenum         CURRENT MEDICATIONS       Previous Medications    ASPIRIN 81 MG CHEWABLE TABLET    Take 1 tablet by mouth daily    CALCIUM CARBONATE (OSCAL) 500 MG TABS TABLET    Take 500 mg by mouth daily    CHOLECALCIFEROL (VITAMIN D3) 50 MCG (2000 UT) CAPS    Take 2,000 Units by mouth daily    CYANOCOBALAMIN (VITAMIN B-12) 5000 MCG SUBL    Place 500 mcg under the tongue daily     EMPAGLIFLOZIN (JARDIANCE PO)    Take by mouth    FERROUS SULFATE 325 (65 FE) MG TABLET    Take 325 mg by mouth 3 times daily (with meals)     LINAGLIPTIN PO    Take by mouth    LISINOPRIL (PRINIVIL;ZESTRIL) 10 MG TABLET    Take 10 mg by mouth daily    METOPROLOL TARTRATE (LOPRESSOR) 25 MG TABLET    TAKE 1 TABLET BY ORAL ROUTE 2 TIMES A DAY FOR BP    MULTIPLE VITAMINS-MINERALS (MULTIVITAMIN PO)    Take by mouth daily    NONFORMULARY    tragenta    PRAMIPEXOLE (MIRAPEX) 1 MG TABLET    TAKE 1 TABLET BY ORAL ROUTE ONCE A DAY (IN THE EVENING) AROUND 8-9 PM FOR RLS    PRAVASTATIN (PRAVACHOL) 10 MG TABLET    Take 10 mg by mouth daily          ALLERGIES     Patient has no known allergies.     FAMILY HISTORY       Family History   Problem Relation Age of Onset    Diabetes Mother     High Blood Pressure Mother     Cancer Father     Heart Disease Maternal Uncle           SOCIAL HISTORY       Social History     Socioeconomic History    Marital status:      Spouse name: None    Number of children: None    Years of education: None    Highest education level: None   Occupational History    None   Tobacco Use    Smoking status: Never Smoker    Smokeless tobacco: Never Used   Vaping Use    Vaping Use: Never used   Substance and Sexual Activity    Alcohol use: Yes     Comment: OCCASIONAL    Drug use: No    Sexual activity: Never   Other Topics Concern    None   Social History Narrative    None     Social Determinants of Health     Financial Resource Strain:     Difficulty of Paying Living Expenses: Not on file   Food Insecurity:     Worried About Running Out of Food in the Last Year: Not on file    [address] in the Last Year: Not on file   Transportation Needs:     Lack of Transportation (Medical): Not on file    Lack of Transportation (Non-Medical): Not on file   Physical Activity:     Days of Exercise per Week: Not on file    Minutes of Exercise per Session: Not on file   Stress:     Feeling of Stress : Not on file   Social Connections:     Frequency of Communication with Friends and Family: Not on file    Frequency of Social Gatherings with Friends and Family: Not on file    Attends Cheondo[employer] Services: Not on file    Active Member of [address] [place] [address] or Organizations: Not on file    Attends Club or Organization Meetings: Not on file    Marital Status: Not on file   Intimate Partner Violence:     Fear of Current or Ex-Partner: Not on file    Emotionally Abused: Not on file    Physically Abused: Not on file    Sexually Abused: Not on file   Housing Stability:     Unable to Pay for Housing in the Last Year: Not on file    Number of [place] in the Last Year: Not on file    Unstable Housing in the Last Year: Not on file       SCREENINGS    Qian Coma Scale  Eye Opening: Spontaneous  Best Verbal Response: Oriented  Best Motor Response: Obeys commands  Qian Coma Scale Score: 15 Heart Score for chest pain patients  History: Slightly Suspicious  ECG: Normal  Patient Age: > 65 years  *Risk factors for Atherosclerotic disease: Diabetes Mellitus,Hypercholesterolemia,Hypertension  Risk Factors: > 3 Risk factors or history of atherosclerotic disease*  Troponin: < 1X normal limit  Heart Score Total: 4      PHYSICAL EXAM  (up to 7 for level 4, 8 or more for level 5)     ED Triage Vitals [05/26/22 1333]   BP Temp Temp Source Heart Rate Resp SpO2 Height Weight   (!) 122/44 98.6 °F (37 °C) Oral 77 16 93 % 5' 10\" (1.778 m) 130 lb (59 kg)       Physical Exam  Constitutional:       Appearance: She is well-developed. HENT:      Head: Normocephalic and atraumatic. Cardiovascular:      Rate and Rhythm: Normal rate.    Pulmonary: Effort: Pulmonary effort is normal. No respiratory distress. Abdominal:      General: There is no distension. Palpations: Abdomen is soft. Tenderness: There is no abdominal tenderness. Musculoskeletal:         General: Normal range of motion. Cervical back: Normal range of motion. Skin:     General: Skin is warm and dry. Neurological:      Mental Status: She is alert and oriented to person, place, and time.          DIAGNOSTIC RESULTS   LABS:    Labs Reviewed   COVID-19 & INFLUENZA COMBO - Abnormal; Notable for the following components:       Result Value    SARS-CoV-2 RNA, RT PCR DETECTED (*)     All other components within normal limits   CBC WITH AUTO DIFFERENTIAL - Abnormal; Notable for the following components:    RBC 3.53 (*)     Hemoglobin 11.1 (*)     Hematocrit 32.9 (*)     Platelets 890 (*)     Lymphocytes Absolute 0.6 (*)     Myelocyte Percent 1 (*)     Anisocytosis Occasional (*)     All other components within normal limits   COMPREHENSIVE METABOLIC PANEL W/ REFLEX TO MG FOR LOW K - Abnormal; Notable for the following components:    Glucose 166 (*)     BUN 35 (*)     CREATININE 1.7 (*)     GFR Non- 28 (*)     GFR  34 (*)     Total Protein 6.2 (*)     All other components within normal limits   URINALYSIS WITH REFLEX TO CULTURE - Abnormal; Notable for the following components:    Glucose, Ur >=1000 (*)     Blood, Urine SMALL (*)     Nitrite, Urine POSITIVE (*)     Leukocyte Esterase, Urine SMALL (*)     All other components within normal limits   BRAIN NATRIURETIC PEPTIDE - Abnormal; Notable for the following components:    Pro-BNP 1,633 (*)     All other components within normal limits   TROPONIN - Abnormal; Notable for the following components:    Troponin 0.11 (*)     All other components within normal limits   MICROSCOPIC URINALYSIS - Abnormal; Notable for the following components:    WBC, UA  (*)     Bacteria, UA 3+ (*)     All other components within normal limits   TROPONIN - Abnormal; Notable for the following components:    Troponin 0.12 (*)     All other components within normal limits   TROPONIN - Abnormal; Notable for the following components:    Troponin 0.13 (*)     All other components within normal limits   CULTURE, URINE   APTT   TROPONIN   ANTI-XA, UNFRACTIONATED HEPARIN       All other labs werewithin normal range or not returned as of this dictation. EKG: All EKG's are interpreted by the Emergency Department Physician who either signs or Co-signs this chart in the absence of a cardiologist.  Please see their note for interpretation of EKG. RADIOLOGY:   Chest x-ray interpreted by radiologist for no acute process. Interpretation per the Radiologist below, if available at the time of this note:    XR CHEST (2 VW)   Final Result   No acute process. XR CHEST (2 VW)    Result Date: 5/26/2022  EXAMINATION: TWO XRAY VIEWS OF THE CHEST 5/26/2022 3:49 pm COMPARISON: 07/01/2021 HISTORY: ORDERING SYSTEM PROVIDED HISTORY: fatigue TECHNOLOGIST PROVIDED HISTORY: Reason for exam:->fatigue Reason for Exam: Positive For Covid-19 (pt states she is unsure why she is here states her daughter wanted her to come in and be seen. ) FINDINGS: The lungs are without acute focal process. There is no effusion or pneumothorax. The cardiomediastinal silhouette is stable. The osseous structures are stable. No acute process.          PROCEDURES   Unless otherwise noted below, none     Procedures     CRITICAL CARE TIME   N/A    CONSULTS:  IP CONSULT TO CARDIOLOGY  IP CONSULT TO HOSPITALIST      EMERGENCYDEPARTMENT COURSE and DIFFERENTIAL DIAGNOSIS/MDM:   Vitals:    Vitals:    05/26/22 1702 05/26/22 1802 05/26/22 2048 05/26/22 2155   BP: (!) 141/51 115/66 139/68 (!) 157/53   Pulse:  79 66 62   Resp:  18 12 12   Temp:       TempSrc:       SpO2: 98% 97% 95% 96%   Weight:       Height:           Patient was given the following medications:  Medications   cefTRIAXone (ROCEPHIN) 1000 mg IVPB in 50 mL D5W minibag (0 mg IntraVENous Stopped 5/26/22 1733)   heparin (porcine) injection 3,500 Units (has no administration in time range)   heparin (porcine) injection 1,800 Units (has no administration in time range)   heparin 25,000 units in dextrose 5% 250 mL (premix) infusion (710 Units/hr IntraVENous New Bag 5/26/22 2200)   0.9 % sodium chloride bolus (500 mLs IntraVENous New Bag 5/26/22 2155)   aspirin tablet 325 mg (325 mg Oral Given 5/26/22 1652)   heparin (porcine) injection 3,500 Units (3,500 Units IntraVENous Given 5/26/22 2200)       Patient was seen and evaluated by myself and Dr Liz Sol. Patient here for concerns for COVID. Patient reportedly was brought in by EMS at the daughter's request.  Call was placed to the daughter to validate the concerns. Daughter reports the patient is here for generalized fatigue and weakness achiness congestion. Daughter reports the patient tested positive for COVID this morning and has a close contact in the home and also was tested positive. Daughter was concerned that she has become increased fatigue confused and has had generalized weakness since yesterday. Daughter reports that she wrote a long list that was sent with EMS however I was unable to locate this. On exam the patient is awake and alert hemodynamically stable nontoxic in appearance. Patient denies any complaints. She is not sure why she is even here. Basic labs have been reviewed. Patient was found to have elevated troponin at point 011 however she does have a chronically elevated troponin however today is slightly more than her baseline. Repeat troponin will be obtained at 1522 which is a 3-hour sahara. Patient was given a dose of aspirin in the ED. Urine was concerning for UTI. Patient was given Rocephin. Patient's repeat troponin has elevated 2.012.   Consult was placed to cardiology who recommended a heparin drip and transferred to [facility]. Consult was placed to the [facility] hospitalist.    2011- consult placed to [facility] [place] hospitalist.    2205 Dr. Theron Scott returned the call and ultimately excepted the patient for admission. Patient's care was transferred to [facility] for further evaluation. The patient tolerated their visit well. I have evaluated this patient. My supervising physician was available for consultation. The patient and / or the family were informed of the results of any tests, a time was given to answer questions, a plan was proposed and they agreed with plan. FINAL IMPRESSION      1. Urinary tract infection with hematuria, site unspecified    2. COVID    3. Elevated troponin    4. Fatigue, unspecified type    5. Chronic kidney disease, unspecified CKD stage    6. NSTEMI (non-ST elevated myocardial infarction) [facility])          DISPOSITION/PLAN   DISPOSITION Decision To Transfer 05/26/2022 08:12:46 PM      PATIENT REFERRED TO:  No follow-up provider specified.     DISCHARGE MEDICATIONS:  New Prescriptions    No medications on file       DISCONTINUED MEDICATIONS:  Discontinued Medications    No medications on file              (Please note that portions of this note were completed with a voice recognition program.  Efforts were made to edit the dictations but occasionally words are mis-transcribed.)    GERMAN Chavarria CNP (electronically signed)         GERMAN Chavarria CNP  05/27/22 [address] [place] [address], GERMAN Castellano CNP  05/27/22 0038

## 2022-05-27 ENCOUNTER — HOSPITAL ENCOUNTER (INPATIENT)
Age: 87
LOS: 3 days | Discharge: NURSING, REHAB OR LONG-TERM CARE | DRG: 698 | End: 2022-05-30
Attending: INTERNAL MEDICINE | Admitting: INTERNAL MEDICINE
Payer: MEDICARE

## 2022-05-27 VITALS
OXYGEN SATURATION: 95 % | HEIGHT: 70 IN | RESPIRATION RATE: 14 BRPM | SYSTOLIC BLOOD PRESSURE: 149 MMHG | TEMPERATURE: 98.6 F | DIASTOLIC BLOOD PRESSURE: 61 MMHG | WEIGHT: 130 LBS | HEART RATE: 78 BPM | BODY MASS INDEX: 18.61 KG/M2

## 2022-05-27 PROBLEM — E11.9 DM2 (DIABETES MELLITUS, TYPE 2) (HCC): Status: ACTIVE | Noted: 2017-05-29

## 2022-05-27 PROBLEM — Z86.73 HISTORY OF CEREBROVASCULAR ACCIDENT: Status: ACTIVE | Noted: 2022-05-27

## 2022-05-27 PROBLEM — U07.1 INFECTION DUE TO 2019 NOVEL CORONAVIRUS: Status: ACTIVE | Noted: 2022-05-27

## 2022-05-27 PROBLEM — R79.89 ELEVATED TROPONIN: Status: ACTIVE | Noted: 2022-05-27

## 2022-05-27 PROBLEM — R77.8 ELEVATED TROPONIN: Status: ACTIVE | Noted: 2022-05-27

## 2022-05-27 LAB
A/G RATIO: 1.2 (ref 1.1–2.2)
ALBUMIN SERPL-MCNC: 3.7 G/DL (ref 3.4–5)
ALP BLD-CCNC: 55 U/L (ref 40–129)
ALT SERPL-CCNC: 14 U/L (ref 10–40)
ANION GAP SERPL CALCULATED.3IONS-SCNC: 10 MMOL/L (ref 3–16)
APTT: 71 SEC (ref 23–34.3)
AST SERPL-CCNC: 24 U/L (ref 15–37)
BASOPHILS ABSOLUTE: 0 K/UL (ref 0–0.2)
BASOPHILS RELATIVE PERCENT: 0.3 %
BILIRUB SERPL-MCNC: 0.3 MG/DL (ref 0–1)
BUN BLDV-MCNC: 35 MG/DL (ref 7–20)
C-REACTIVE PROTEIN: 10.5 MG/L (ref 0–5.1)
CALCIUM SERPL-MCNC: 9.1 MG/DL (ref 8.3–10.6)
CHLORIDE BLD-SCNC: 102 MMOL/L (ref 99–110)
CO2: 27 MMOL/L (ref 21–32)
CREAT SERPL-MCNC: 1.9 MG/DL (ref 0.6–1.2)
D DIMER: 1.51 UG/ML FEU (ref 0–0.6)
EOSINOPHILS ABSOLUTE: 0.2 K/UL (ref 0–0.6)
EOSINOPHILS RELATIVE PERCENT: 2.7 %
ESTIMATED AVERAGE GLUCOSE: 162.8 MG/DL
FERRITIN: 515.1 NG/ML (ref 15–150)
FIBRINOGEN: 462 MG/DL (ref 207–509)
GFR AFRICAN AMERICAN: 30
GFR NON-AFRICAN AMERICAN: 25
GLUCOSE BLD-MCNC: 145 MG/DL (ref 70–99)
GLUCOSE BLD-MCNC: 173 MG/DL (ref 70–99)
GLUCOSE BLD-MCNC: 275 MG/DL (ref 70–99)
GLUCOSE BLD-MCNC: 66 MG/DL (ref 70–99)
HBA1C MFR BLD: 7.3 %
HCT VFR BLD CALC: 34.8 % (ref 36–48)
HEMOGLOBIN: 11.6 G/DL (ref 12–16)
INR BLD: 1.17 (ref 0.87–1.14)
LACTATE DEHYDROGENASE: 267 U/L (ref 100–190)
LYMPHOCYTES ABSOLUTE: 0.5 K/UL (ref 1–5.1)
LYMPHOCYTES RELATIVE PERCENT: 6.8 %
MCH RBC QN AUTO: 31 PG (ref 26–34)
MCHC RBC AUTO-ENTMCNC: 33.2 G/DL (ref 31–36)
MCV RBC AUTO: 93.2 FL (ref 80–100)
MONOCYTES ABSOLUTE: 0.8 K/UL (ref 0–1.3)
MONOCYTES RELATIVE PERCENT: 10.8 %
NEUTROPHILS ABSOLUTE: 5.7 K/UL (ref 1.7–7.7)
NEUTROPHILS RELATIVE PERCENT: 79.4 %
PDW BLD-RTO: 13.7 % (ref 12.4–15.4)
PERFORMED ON: ABNORMAL
PLATELET # BLD: 138 K/UL (ref 135–450)
PMV BLD AUTO: 8.9 FL (ref 5–10.5)
POTASSIUM REFLEX MAGNESIUM: 4 MMOL/L (ref 3.5–5.1)
PROCALCITONIN: 0.14 NG/ML (ref 0–0.15)
PROTHROMBIN TIME: 14.7 SEC (ref 11.7–14.5)
RBC # BLD: 3.73 M/UL (ref 4–5.2)
SODIUM BLD-SCNC: 139 MMOL/L (ref 136–145)
TOTAL PROTEIN: 6.7 G/DL (ref 6.4–8.2)
TROPONIN: 0.11 NG/ML
WBC # BLD: 7.2 K/UL (ref 4–11)

## 2022-05-27 PROCEDURE — 80053 COMPREHEN METABOLIC PANEL: CPT

## 2022-05-27 PROCEDURE — 86140 C-REACTIVE PROTEIN: CPT

## 2022-05-27 PROCEDURE — 6370000000 HC RX 637 (ALT 250 FOR IP): Performed by: INTERNAL MEDICINE

## 2022-05-27 PROCEDURE — 36415 COLL VENOUS BLD VENIPUNCTURE: CPT

## 2022-05-27 PROCEDURE — 85730 THROMBOPLASTIN TIME PARTIAL: CPT

## 2022-05-27 PROCEDURE — 84145 PROCALCITONIN (PCT): CPT

## 2022-05-27 PROCEDURE — 85384 FIBRINOGEN ACTIVITY: CPT

## 2022-05-27 PROCEDURE — 85025 COMPLETE CBC W/AUTO DIFF WBC: CPT

## 2022-05-27 PROCEDURE — 1200000000 HC SEMI PRIVATE

## 2022-05-27 PROCEDURE — 99222 1ST HOSP IP/OBS MODERATE 55: CPT | Performed by: INTERNAL MEDICINE

## 2022-05-27 PROCEDURE — 83036 HEMOGLOBIN GLYCOSYLATED A1C: CPT

## 2022-05-27 PROCEDURE — 82728 ASSAY OF FERRITIN: CPT

## 2022-05-27 PROCEDURE — C8923 2D TTE W OR W/O FOL W/CON,CO: HCPCS

## 2022-05-27 PROCEDURE — 83615 LACTATE (LD) (LDH) ENZYME: CPT

## 2022-05-27 PROCEDURE — 6360000002 HC RX W HCPCS: Performed by: INTERNAL MEDICINE

## 2022-05-27 PROCEDURE — 2060000000 HC ICU INTERMEDIATE R&B

## 2022-05-27 PROCEDURE — 84484 ASSAY OF TROPONIN QUANT: CPT

## 2022-05-27 PROCEDURE — 85379 FIBRIN DEGRADATION QUANT: CPT

## 2022-05-27 PROCEDURE — 85610 PROTHROMBIN TIME: CPT

## 2022-05-27 RX ORDER — INSULIN LISPRO 100 [IU]/ML
0-3 INJECTION, SOLUTION INTRAVENOUS; SUBCUTANEOUS NIGHTLY
Status: DISCONTINUED | OUTPATIENT
Start: 2022-05-27 | End: 2022-05-30 | Stop reason: HOSPADM

## 2022-05-27 RX ORDER — LANOLIN ALCOHOL/MO/W.PET/CERES
3 CREAM (GRAM) TOPICAL NIGHTLY PRN
Status: DISCONTINUED | OUTPATIENT
Start: 2022-05-27 | End: 2022-05-30 | Stop reason: HOSPADM

## 2022-05-27 RX ORDER — ONDANSETRON 2 MG/ML
4 INJECTION INTRAMUSCULAR; INTRAVENOUS EVERY 6 HOURS PRN
Status: DISCONTINUED | OUTPATIENT
Start: 2022-05-27 | End: 2022-05-30 | Stop reason: HOSPADM

## 2022-05-27 RX ORDER — CHOLECALCIFEROL (VITAMIN D3) 125 MCG
500 CAPSULE ORAL DAILY
Status: DISCONTINUED | OUTPATIENT
Start: 2022-05-27 | End: 2022-05-30 | Stop reason: HOSPADM

## 2022-05-27 RX ORDER — INSULIN LISPRO 100 [IU]/ML
0.05 INJECTION, SOLUTION INTRAVENOUS; SUBCUTANEOUS
Status: DISCONTINUED | OUTPATIENT
Start: 2022-05-27 | End: 2022-05-30 | Stop reason: HOSPADM

## 2022-05-27 RX ORDER — FERROUS SULFATE 325(65) MG
325 TABLET ORAL
Status: DISCONTINUED | OUTPATIENT
Start: 2022-05-27 | End: 2022-05-30 | Stop reason: HOSPADM

## 2022-05-27 RX ORDER — DEXTROSE MONOHYDRATE 50 MG/ML
100 INJECTION, SOLUTION INTRAVENOUS PRN
Status: DISCONTINUED | OUTPATIENT
Start: 2022-05-27 | End: 2022-05-30 | Stop reason: HOSPADM

## 2022-05-27 RX ORDER — PRAMIPEXOLE DIHYDROCHLORIDE 1 MG/1
1 TABLET ORAL NIGHTLY
Status: DISCONTINUED | OUTPATIENT
Start: 2022-05-27 | End: 2022-05-30 | Stop reason: HOSPADM

## 2022-05-27 RX ORDER — GUAIFENESIN/DEXTROMETHORPHAN 100-10MG/5
5 SYRUP ORAL EVERY 4 HOURS PRN
Status: DISCONTINUED | OUTPATIENT
Start: 2022-05-27 | End: 2022-05-30 | Stop reason: HOSPADM

## 2022-05-27 RX ORDER — VITAMIN B COMPLEX
2000 TABLET ORAL DAILY
Status: DISCONTINUED | OUTPATIENT
Start: 2022-05-27 | End: 2022-05-30 | Stop reason: HOSPADM

## 2022-05-27 RX ORDER — ASPIRIN 81 MG/1
81 TABLET, CHEWABLE ORAL DAILY
Status: DISCONTINUED | OUTPATIENT
Start: 2022-05-27 | End: 2022-05-30 | Stop reason: HOSPADM

## 2022-05-27 RX ORDER — ENOXAPARIN SODIUM 100 MG/ML
30 INJECTION SUBCUTANEOUS 2 TIMES DAILY
Status: DISCONTINUED | OUTPATIENT
Start: 2022-05-27 | End: 2022-05-27

## 2022-05-27 RX ORDER — INSULIN GLARGINE 100 [IU]/ML
0.15 INJECTION, SOLUTION SUBCUTANEOUS NIGHTLY
Status: DISCONTINUED | OUTPATIENT
Start: 2022-05-27 | End: 2022-05-30 | Stop reason: HOSPADM

## 2022-05-27 RX ORDER — SODIUM CHLORIDE 9 MG/ML
INJECTION, SOLUTION INTRAVENOUS PRN
Status: DISCONTINUED | OUTPATIENT
Start: 2022-05-27 | End: 2022-05-30 | Stop reason: HOSPADM

## 2022-05-27 RX ORDER — ACETAMINOPHEN 325 MG/1
650 TABLET ORAL EVERY 6 HOURS PRN
Status: DISCONTINUED | OUTPATIENT
Start: 2022-05-27 | End: 2022-05-30 | Stop reason: HOSPADM

## 2022-05-27 RX ORDER — PRAVASTATIN SODIUM 20 MG
10 TABLET ORAL DAILY
Status: DISCONTINUED | OUTPATIENT
Start: 2022-05-27 | End: 2022-05-30 | Stop reason: HOSPADM

## 2022-05-27 RX ORDER — INSULIN LISPRO 100 [IU]/ML
0-6 INJECTION, SOLUTION INTRAVENOUS; SUBCUTANEOUS
Status: DISCONTINUED | OUTPATIENT
Start: 2022-05-27 | End: 2022-05-30 | Stop reason: HOSPADM

## 2022-05-27 RX ORDER — CALCIUM CARBONATE 500(1250)
500 TABLET ORAL DAILY
Status: DISCONTINUED | OUTPATIENT
Start: 2022-05-27 | End: 2022-05-30 | Stop reason: HOSPADM

## 2022-05-27 RX ORDER — SODIUM CHLORIDE 0.9 % (FLUSH) 0.9 %
10 SYRINGE (ML) INJECTION PRN
Status: DISCONTINUED | OUTPATIENT
Start: 2022-05-27 | End: 2022-05-30 | Stop reason: HOSPADM

## 2022-05-27 RX ORDER — ONDANSETRON 4 MG/1
4 TABLET, ORALLY DISINTEGRATING ORAL EVERY 8 HOURS PRN
Status: DISCONTINUED | OUTPATIENT
Start: 2022-05-27 | End: 2022-05-30 | Stop reason: HOSPADM

## 2022-05-27 RX ORDER — ENOXAPARIN SODIUM 100 MG/ML
30 INJECTION SUBCUTANEOUS DAILY
Status: DISCONTINUED | OUTPATIENT
Start: 2022-05-27 | End: 2022-05-30 | Stop reason: HOSPADM

## 2022-05-27 RX ORDER — ACETAMINOPHEN 650 MG/1
650 SUPPOSITORY RECTAL EVERY 6 HOURS PRN
Status: DISCONTINUED | OUTPATIENT
Start: 2022-05-27 | End: 2022-05-30 | Stop reason: HOSPADM

## 2022-05-27 RX ADMIN — FERROUS SULFATE TAB 325 MG (65 MG ELEMENTAL FE) 325 MG: 325 (65 FE) TAB at 12:48

## 2022-05-27 RX ADMIN — CALCIUM 500 MG: 500 TABLET ORAL at 09:58

## 2022-05-27 RX ADMIN — ASPIRIN 81 MG 81 MG: 81 TABLET ORAL at 09:58

## 2022-05-27 RX ADMIN — INSULIN GLARGINE 9 UNITS: 100 INJECTION, SOLUTION SUBCUTANEOUS at 20:47

## 2022-05-27 RX ADMIN — CYANOCOBALAMIN TAB 500 MCG 500 MCG: 500 TAB at 09:58

## 2022-05-27 RX ADMIN — FERROUS SULFATE TAB 325 MG (65 MG ELEMENTAL FE) 325 MG: 325 (65 FE) TAB at 09:56

## 2022-05-27 RX ADMIN — INSULIN LISPRO 3 UNITS: 100 INJECTION, SOLUTION INTRAVENOUS; SUBCUTANEOUS at 12:48

## 2022-05-27 RX ADMIN — PRAVASTATIN SODIUM 10 MG: 20 TABLET ORAL at 09:59

## 2022-05-27 RX ADMIN — Medication 2000 UNITS: at 09:58

## 2022-05-27 RX ADMIN — METOPROLOL TARTRATE 25 MG: 25 TABLET, FILM COATED ORAL at 09:58

## 2022-05-27 RX ADMIN — ENOXAPARIN SODIUM 30 MG: 100 INJECTION SUBCUTANEOUS at 09:58

## 2022-05-27 RX ADMIN — INSULIN LISPRO 1 UNITS: 100 INJECTION, SOLUTION INTRAVENOUS; SUBCUTANEOUS at 20:48

## 2022-05-27 RX ADMIN — METOPROLOL TARTRATE 25 MG: 25 TABLET, FILM COATED ORAL at 20:47

## 2022-05-27 RX ADMIN — PRAMIPEXOLE DIHYDROCHLORIDE 1 MG: 1 TABLET ORAL at 20:47

## 2022-05-27 RX ADMIN — FERROUS SULFATE TAB 325 MG (65 MG ELEMENTAL FE) 325 MG: 325 (65 FE) TAB at 16:56

## 2022-05-27 NOTE — ED NOTES
Call placed to transfer center for follow up on consult with Dr. Kan Rodriguez. Transfer center states they just paged him again.      Og Flores  05/26/22 2124

## 2022-05-27 NOTE — CONSULTS
[facility]  [phone]      Attending Physician: Marco Cardoso MD  Reason for Consultation/Chief Complaint: elevated trop    Subjective   History of Present Illness:  Saad Parker is a 80 y.o. patient who presented to the hospital with complaints of cough and +COVID test. Pt states no CP or pressure now or previous, is able to move around and no CP. Some SOB. States more fatigued but \"getting older\"    Past Medical History:   has a past medical history of Clostridium difficile infection, Dementia ([facility] Diabetes mellitus ([facility] History of blood transfusion, Hyperlipidemia, Hypertension, Infection, MDRO (multiple drug resistant organisms) resistance, MRSA infection, Osteomyelitis of spine ([facility] [address] and Restless leg syndrome. Surgical History:   has a past surgical history that includes Tonsillectomy; Hysterectomy; Appendectomy; Toe amputation (2003); Toe amputation (2011); eye surgery; Colonoscopy (1997); Colonoscopy (7/15/15); other surgical history (Left); Foot surgery (Left, 05/30/2017); Upper gastrointestinal endoscopy (06/05/2017); Foot surgery (Right, 12/11/2017); Foot Amputation (Right, 05/26/2019); and Foot Amputation (Right, 5/26/2019). Social History:   reports that she has never smoked. She has never used smokeless tobacco. She reports current alcohol use. She reports that she does not use drugs. Family History:  family history includes Cancer in her father; Diabetes in her mother; Heart Disease in her maternal uncle; High Blood Pressure in her mother. Home Medications:  Were reviewed and are listed in nursing record and/or below  Prior to Admission medications    Medication Sig Start Date End Date Taking?  Authorizing Provider   aspirin 81 MG chewable tablet Take 1 tablet by mouth daily 7/4/21   Meli Veloz MD   Empagliflozin (JARDIANCE PO) Take by mouth    Historical Provider, MD   LINAGLIPTIN PO Take by mouth    Historical Provider, MD calcium carbonate (OSCAL) 500 MG TABS tablet Take 500 mg by mouth daily    Historical Provider, MD VALLESORMKATHYA hessta    Historical Provider, MD   lisinopril (PRINIVIL;ZESTRIL) 10 MG tablet Take 10 mg by mouth daily    Historical Provider, MD   Cholecalciferol (VITAMIN D3) 50 MCG (2000 UT) CAPS Take 2,000 Units by mouth daily    Historical Provider, MD   pramipexole (MIRAPEX) 1 MG tablet TAKE 1 TABLET BY ORAL ROUTE ONCE A DAY (IN THE EVENING) AROUND 8-9 PM FOR RLS 1/11/19   Historical Provider, MD   metoprolol tartrate (LOPRESSOR) 25 MG tablet TAKE 1 TABLET BY ORAL ROUTE 2 TIMES A DAY FOR BP 1/11/19   Historical Provider, MD   ferrous sulfate 325 (65 Fe) MG tablet Take 325 mg by mouth 3 times daily (with meals)     Historical Provider, MD   Multiple Vitamins-Minerals (MULTIVITAMIN PO) Take by mouth daily    Historical Provider, MD   Cyanocobalamin (VITAMIN B-12) 5000 MCG SUBL Place 500 mcg under the tongue daily     Historical Provider, MD   pravastatin (PRAVACHOL) 10 MG tablet Take 10 mg by mouth daily     Historical Provider, MD        CURRENT Medications:  aspirin chewable tablet 81 mg, Daily  metoprolol tartrate (LOPRESSOR) tablet 25 mg, BID  pramipexole (MIRAPEX) tablet 1 mg, Nightly  pravastatin (PRAVACHOL) tablet 10 mg, Daily  ferrous sulfate (IRON 325) tablet 325 mg, TID WC  vitamin B-12 (CYANOCOBALAMIN) tablet 500 mcg, Daily  Vitamin D (CHOLECALCIFEROL) tablet 2,000 Units, Daily  calcium elemental (OSCAL) tablet 500 mg, Daily  glucose chewable tablet 16 g, PRN  dextrose bolus 10% 125 mL, PRN   Or  dextrose bolus 10% 250 mL, PRN  glucagon (rDNA) injection 1 mg, PRN  dextrose 5 % solution, PRN  sodium chloride flush 0.9 % injection 10 mL, PRN  0.9 % sodium chloride infusion, PRN  ondansetron (ZOFRAN-ODT) disintegrating tablet 4 mg, Q8H PRN   Or  ondansetron (ZOFRAN) injection 4 mg, Q6H PRN  acetaminophen (TYLENOL) tablet 650 mg, Q6H PRN   Or  acetaminophen (TYLENOL) suppository 650 mg, Q6H PRN  insulin glargine (LANTUS) injection vial 9 Units, Nightly  insulin lispro (HUMALOG) injection vial 3 Units, TID WC  insulin lispro (HUMALOG) injection vial 0-6 Units, TID WC  insulin lispro (HUMALOG) injection vial 0-3 Units, Nightly  perflutren lipid microspheres (DEFINITY) injection 1.65 mg, ONCE PRN  melatonin tablet 3 mg, Nightly PRN  guaiFENesin-dextromethorphan (ROBITUSSIN DM) 100-10 MG/5ML syrup 5 mL, Q4H PRN  enoxaparin Sodium (LOVENOX) injection 30 mg, Daily        Allergies:  Patient has no known allergies. Review of Systems:   A 14 point review of symptoms completed. Pertinent positives identified in the HPI, all other review of symptoms negative as below. Objective   PHYSICAL EXAM:    Vitals:    05/27/22 0430   BP: (!) 167/63   Pulse: 74   Resp:    Temp:    SpO2: 92%    Weight: 138 lb 0.1 oz (62.6 kg)    Vitals:    05/27/22 0427 05/27/22 0430   BP: (!) 167/63 (!) 167/63   Pulse: 75 74   Resp: 16    Temp: 98.6 °F (37 °C)    TempSrc: Oral    SpO2: 93% 92%   Weight: 138 lb 0.1 oz (62.6 kg)    Height: 5' 10\" (1.778 m)       exam deferred as pt frequently hacking cough and without mask.  Due to COVID +, kept distence         Labs   CBC:   Lab Results   Component Value Date    WBC 7.2 05/27/2022    RBC 3.73 05/27/2022    HGB 11.6 05/27/2022    HCT 34.8 05/27/2022    MCV 93.2 05/27/2022    RDW 13.7 05/27/2022     05/27/2022     CMP:  Lab Results   Component Value Date     05/27/2022    K 4.0 05/27/2022     05/27/2022    CO2 27 05/27/2022    BUN 35 05/27/2022    CREATININE 1.9 05/27/2022    GFRAA 30 05/27/2022    AGRATIO 1.2 05/27/2022    LABGLOM 25 05/27/2022    GLUCOSE 173 05/27/2022    PROT 6.7 05/27/2022    CALCIUM 9.1 05/27/2022    BILITOT 0.3 05/27/2022    ALKPHOS 55 05/27/2022    AST 24 05/27/2022    ALT 14 05/27/2022     PT/INR:  No results found for: PTINR  HgBA1c:  Lab Results   Component Value Date    LABA1C 7.3 05/19/2022     Lab Results   Component Value Date    CKTOTAL 290 (H) 2019    TROPONINI 0.11 (H) 2022         Cardiac Data     Last EK2022 1529 NSR with 1st AVB, LBBB (slight QRS widening, otherwise no gross change)    Echo: 2021   Normal left ventricular systolic function with ejection fraction of 55-60%. No regional wall motion abnormalites are seen. Grade I diastolic dysfunction with normal filling pressure. Mild mitral and aortic regurgitation. Systolic pulmonic artery pressure (SPAP) is normal estimated at 30 mmHg   (Right atrial pressure of 3 mmHg). Stress Test: 3/11/2018     Larry Luke is normal perfusion at rest and stress. There is no stress induced    ischemia or infarct.    There are no regional wall motion abnormalities.    Normal left ventricular systolic function with ejection fraction of 76 %.    This is a normal myocardial perfusion study. Cath:    Studies:     Assessment and Plan      1. COVID-19: 2021  2. Elevated Trops: Flat, not felt to be ACS  3. HTN: 167/63 on admit  4. DM-2: a1c 7.3  5. CKD: 1.7->1.9  6. Dementia      PLAN  1. Pt with no active ischemia on exam or ECG. Hx of chronic troponin elevation and renal insufficieny  2. Echo for LVEF  3.  No ACS heparin at this time   - Cont ASA< lopressor, pravachol  Assuming normal echo, would continue conservative approach at this time given age, fraility, CKD, and COVID        Patient Active Problem List   Diagnosis    DM2 (diabetes mellitus, type 2) ([address]    Acute blood loss anemia    Severe protein-calorie malnutrition (HCC)    Encephalopathy    HTN (hypertension)    Altered mental status    Acute renal failure (HCC)    Contusion of left hip    Acute UTI    Mild dehydration    Urinary incontinence    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)    Osteomyelitis of right foot (HCC)    Chronic kidney disease    Generalized weakness    Stage 3b chronic kidney disease ([address]    Chest pain    Arterial ischemic stroke, ICA, left, acute ([address]    Carotid artery stenosis with cerebral infarction (HCC)    Dyslipidemia    NSTEMI (non-ST elevated myocardial infarction) ([facility] [address]    Infection due to 2019 novel coronavirus    History of cerebrovascular accident    Elevated troponin           Thank you for allowing us to participate in the care of Sheri Quiroga. Please call me with any questions [phone]. Kelvin Johnson MD, [address] Cardiologist  [facility]  [phone] [facility]  [phone] [address] [place] [address]  5/27/2022 7:52 AM    I will address the patient's cardiac risk factors and adjusted pharmacologic treatment as needed. In addition, I have reinforced the need for patient directed risk factor modification. All questions and concerns were addressed to the patient/family. Alternatives to my treatment were discussed. The note was completed using EMR. Every effort was made to ensure accuracy; however, inadvertent computerized transcription errors may be present.

## 2022-05-27 NOTE — PROGRESS NOTES
Chart reviewed. Apprec cards recs. Echo pending. Pt appears to be asymptomatic from covid standpoint at this time.  Monitor VS.

## 2022-05-27 NOTE — ED NOTES
Transport here for patient, daughter Carson Florencia aware patient leaving and updated on plan of care.  Pt given food and beverage per request.      Amanda Daley RN  05/27/22 0230

## 2022-05-27 NOTE — ED NOTES
[address] from [facility] called with Transport Information   Transport: [facility]   ETA: 0230     Geri Carrasco  05/26/22 8905

## 2022-05-27 NOTE — ED NOTES
Call placed to transfer center for consult with [facility]  hospitalist.     Hugh Flores  05/26/22 2024

## 2022-05-27 NOTE — PROGRESS NOTES
4 Eyes Skin Assessment     The patient is being assess for   Admission    I agree that 2 RN's have performed a thorough Head to Toe Skin Assessment on the patient. ALL assessment sites listed below have been assessed. Areas assessed for pressure by both nurses:   [x]   Head, Face, and Ears   [x]   Shoulders, Back, and Chest, Abdomen  [x]   Arms, Elbows, and Hands   [x]   Coccyx, Sacrum, and Ischium  [x]   Legs, Feet, and Heels        Skin Assessed Under all Medical Devices by both nurses:  none              All Mepilex Borders were peeled back and area peeked at by both nurses:  Yes  Please list where Mepilex Borders are located:  right foot. No wound found           **SHARE this note so that the co-signing nurse is able to place an eSignature**    Co-signer eSignature: Electronically signed by César Dempsey RN on 5/27/22 at 6:26 AM EDT    Does the Patient have Skin Breakdown related to pressure?   No              Shukri Prevention initiated:  No   Wound Care Orders initiated:  No      [facility] nurse consulted for Pressure Injury (Stage 3,4, Unstageable, DTI, NWPT, Complex wounds)and New or Established Ostomies:  No      Primary Nurse eSignature: Electronically signed by Chandana Littlejohn RN on 5/27/22 at 6:01 AM EDT      Secondary Nurse:

## 2022-05-27 NOTE — ED NOTES
Pt placed back in room and on monitor, pt aware she needs to stay in bed and wear mask     Matthias Verde RN  05/26/22 2048

## 2022-05-27 NOTE — ED NOTES
Nicky Melendez returned call and is speaking with Lorene Stevenson at this time.      Esa Chiu Mark  05/26/22 2009

## 2022-05-27 NOTE — H&P
Hospital Medicine History & Physical      Patient:  Arben Murry  :   1935  MRN:   [id number]  Date of Service: 22    Chief Complaint  Positive COVID test    HISTORY OF PRESENT ILLNESS:    Arben Murry is a 80 y.o. female. She was accepted in transfer from [address] [place] [address]. She initially presented there by ambulance. Her daughter activated EMS because she had tested positive for COVID-19 and her daughter wanted her to be assessed. The patient has a h/o dementia. She did not know why she was sent to the hospital.    Her daughter reported that she tested positive for COVID the morning of . She was tested because the day prior she seemed congested and weak. She also seemed to have increased confusion and fatigue. She was transferred to [facility] March because her troponin level was mildly elevated. She has been asymptomatic. She relates \"I feel great. \"  She does feel cold, but denies sweats. She has a strong appetite and is asking for food. Review of Systems:  All pertinent positives and negatives are as noted in the HPI section. All other systems were reviewed and are negative.     Past Medical History:   Diagnosis Date    Clostridium difficile infection 2017    antigen +    Dementia ([facility] [address]     Diabetes mellitus ([facility] [facility]     History of blood transfusion     Hyperlipidemia     Hypertension     Infection     foot    MDRO (multiple drug resistant organisms) resistance 2019    urine    MRSA infection 2017    foot    Osteomyelitis of spine ([facility] [address] 2010    thoraculumbar spine    Restless leg syndrome        Past Surgical History:   Procedure Laterality Date    APPENDECTOMY      COLONOSCOPY      polyps    COLONOSCOPY  7/15/15    normal colon    EYE SURGERY      right cataract    FOOT AMPUTATION Right 2019    PARTIAL RIGHT FOOT AMPUTATION    FOOT AMPUTATION Right 2019    PARTIAL RIGHT FOOT AMPUTATION performed by Dayron Jaime DPM at [address]  FOOT SURGERY Left 05/30/2017    PARTIAL LEFT FOOT AMPUTATION              FOOT SURGERY Right 12/11/2017    HYSTERECTOMY      OTHER SURGICAL HISTORY Left     Macular Grid Left eye argon    TOE AMPUTATION  2003    2nd toe 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    TOE AMPUTATION  2011    5th toe right foot    TONSILLECTOMY      UPPER GASTROINTESTINAL ENDOSCOPY  06/05/2017    Dieulafoy lesion in duodenum         Prior to Admission medications    Medication Sig Start Date End Date Taking? Authorizing Provider   aspirin 81 MG chewable tablet Take 1 tablet by mouth daily 7/4/21   Lara Solis MD   Empagliflozin (JARDIANCE PO) Take by mouth    Historical Provider, MD   LINAGLIPTIN PO Take by mouth    Historical Provider, MD   calcium carbonate (OSCAL) 500 MG TABS tablet Take 500 mg by mouth daily    Historical Provider, MD   NONFORMULARY tragenta    Historical Provider, MD   lisinopril (PRINIVIL;ZESTRIL) 10 MG tablet Take 10 mg by mouth daily    Historical Provider, MD   Cholecalciferol (VITAMIN D3) 50 MCG (2000 UT) CAPS Take 2,000 Units by mouth daily    Historical Provider, MD   pramipexole (MIRAPEX) 1 MG tablet TAKE 1 TABLET BY ORAL ROUTE ONCE A DAY (IN THE EVENING) AROUND 8-9 PM FOR RLS 1/11/19   Historical Provider, MD   metoprolol tartrate (LOPRESSOR) 25 MG tablet TAKE 1 TABLET BY ORAL ROUTE 2 TIMES A DAY FOR BP 1/11/19   Historical Provider, MD   ferrous sulfate 325 (65 Fe) MG tablet Take 325 mg by mouth 3 times daily (with meals)     Historical Provider, MD   Multiple Vitamins-Minerals (MULTIVITAMIN PO) Take by mouth daily    Historical Provider, MD   Cyanocobalamin (VITAMIN B-12) 5000 MCG SUBL Place 500 mcg under the tongue daily     Historical Provider, MD   pravastatin (PRAVACHOL) 10 MG tablet Take 10 mg by mouth daily     Historical Provider, MD       Allergies:   Patient has no known allergies. Social:   reports that she has never smoked.  She has never used smokeless tobacco.   reports current alcohol use.  Social History     Substance and Sexual Activity   Drug Use No       Family History   Problem Relation Age of Onset    Diabetes Mother     High Blood Pressure Mother     Cancer Father     Heart Disease Maternal Uncle        PHYSICAL EXAM:  I performed this physical examination. Vitals:  Patient Vitals for the past 24 hrs:   BP Temp Temp src Pulse Resp SpO2 Height Weight   05/27/22 0430 (!) 167/63 -- -- 74 -- 92 % -- --   05/27/22 0427 (!) 167/63 98.6 °F (37 °C) Oral 75 16 93 % 5' 10\" (1.778 m) 138 lb 0.1 oz (62.6 kg)     Room air    GEN:  Appearance:  Age appropriate female in NAD . Level of Consciousness:  alert . Orientation:  self    HEENT: Sclera anicteric.  no conjunctival chemosis. moist mucus membranes. no specific or diagnostic oral lesions. NECK:  no signs of meningismus. Jugular veins not distended. Carotid pulses  2+.  no cervical lymphadenopathy. no thyromegaly. CV:  regular rhythm. normal S1 & S2.    no murmur. no rub.  no gallop. PULM:  Chest excursion is symmetric. Breath sounds are vesicular. Adventitious sounds: There are bibasilar crackles that completely clear with repeated inspiratory effort    AB:  Abdominal shape is normal.  Bowel sounds are active. Generally soft to palpation. no tenderness is present. no involuntary guarding. no rebound guarding. EXTR:  Skin is warm. Capillary refill brisk. no specific or pathognomic rash. no clubbing. no pitting edema. no active wound or ulcer.   Pulses 2+ x 4    LABS:  Lab Results   Component Value Date    WBC 5.5 05/26/2022    HGB 11.1 (L) 05/26/2022    HCT 32.9 (L) 05/26/2022    MCV 93.2 05/26/2022     (L) 05/26/2022     Lab Results   Component Value Date    CREATININE 1.7 (H) 05/26/2022    BUN 35 (H) 05/26/2022     05/26/2022    K 4.2 05/26/2022     05/26/2022    CO2 28 05/26/2022     Lab Results   Component Value Date    ALT 13 05/26/2022    AST 21 05/26/2022 ALKPHOS 58 05/26/2022    BILITOT 0.4 05/26/2022     Lab Results   Component Value Date    CKTOTAL 290 (H) 03/03/2019    TROPONINI 0.12 (H) 05/26/2022     No results for input(s): PHART, [id number], PO2ART in the last 72 hours. IMAGING:  XR CHEST (2 VW)    Result Date: 5/26/2022  EXAMINATION: TWO XRAY VIEWS OF THE CHEST 5/26/2022 3:49 pm COMPARISON: 07/01/2021 HISTORY: ORDERING SYSTEM PROVIDED HISTORY: fatigue TECHNOLOGIST PROVIDED HISTORY: Reason for exam:->fatigue Reason for Exam: Positive For Covid-19 (pt states she is unsure why she is here states her daughter wanted her to come in and be seen. ) FINDINGS: The lungs are without acute focal process. There is no effusion or pneumothorax. The cardiomediastinal silhouette is stable. The osseous structures are stable. No acute process. I directly reviewed all recent imaging studies as well as pertinent prior studies. Radiology reports may or may not be available at the time of my review. EKG:  New and pertinent prior tracings were directly reviewed. My interpretation is as follows:  Normal sinus with 1st degree AV block and LBBB. Active Hospital Problems    Diagnosis Date Noted    HTN (hypertension) [I10]      Priority: High    Infection due to 2019 novel coronavirus [U07.1] 05/27/2022     Priority: Medium    History of cerebrovascular accident [Z86.73] 05/27/2022     Priority: Medium    Elevated troponin [R77.8] 05/27/2022     Priority: Medium    NSTEMI (non-ST elevated myocardial infarction) ([facility] [address] [I21.4] 05/26/2022     Priority: Medium    Stage 3b chronic kidney disease ([facility] [address] [N18.32] 07/02/2021    DM2 [facility]di[facility] mellitus, type 2) [facility] [E11.9] 05/29/2017       ASSESSMENT & PLAN  Elevated troponin, HTN  -  Trend out troponin. So far pattern is plateau. Limited TTE requested for the morning to compare to July, 2021.  -  Continue ASA, statin, metoprolol. Lisinopril on hold for now. -  Cardiology assistance requested.     COVID-19  - Unknown if patient has been vaccinated. -  Patient is essentially asymptomatic and not requiring O2.  -  General supportive care to include as needed antipyretics, antiemetics, antitussives, bronchodilators, etc..  -  Monitor COVID-associated inflammatory indices. CKD 3b, HTN  -  Baseline SCr ~ 1.3 and currently 1.9. Patient appears euvolemic.  -  Holding lisinopril. DM2  -  Hold all oral antidiabetic agents. Start s.c. Insulin regimen based on weight.      DVT prophylaxis:        SCDs, lovenox  Code Status:               Full  Disposition:                 Inpatient    Walker Mina MD MD

## 2022-05-27 NOTE — ACP (ADVANCE CARE PLANNING)
Advance Care Planning     Advance Care Planning Activator (Inpatient)  Conversation Note      Date of ACP Conversation: 5/27/2022     Conversation Conducted with: Daughter    ACP Activator: Ildefonso Saez RN        Health Care Decision Maker:     Current Designated Health Care Decision Maker:     Primary Decision Maker: Keenan Parks - [phone]    Secondary Decision Maker: Gilson Powell - [phone]      Care Preferences    Ventilation: \"If you were in your present state of health and suddenly became very ill and were unable to breathe on your own, what would your preference be about the use of a ventilator (breathing machine) if it were available to you? \"      Would the patient desire the use of ventilator (breathing machine)?: yes    \"If your health worsens and it becomes clear that your chance of recovery is unlikely, what would your preference be about the use of a ventilator (breathing machine) if it were available to you? \"     Would the patient desire the use of ventilator (breathing machine)?: Yes      Resuscitation  \"CPR works best to restart the heart when there is a sudden event, like a heart attack, in someone who is otherwise healthy. Unfortunately, CPR does not typically restart the heart for people who have serious health conditions or who are very sick. \"    \"In the event your heart stopped as a result of an underlying serious health condition, would you want attempts to be made to restart your heart (answer \"yes\" for attempt to resuscitate) or would you prefer a natural death (answer \"no\" for do not attempt to resuscitate)? \" yes

## 2022-05-27 NOTE — PROGRESS NOTES
Pharmacy to Manage Heparin Infusion per 10 [facility] dose weight based heparin ordered by GERMAN Snyder CNP in the ER. Pt wt = 59 kg.(will use adjusted wt if actual body weight > 120% ideal body weight). Baseline aPTT = 27.4 sec at 2100. Oral factor Xa-inhibitors may alter and elevate anti-Xa levels used for unfractionated heparin monitoring. As a result, anti-Xa monitoring is not accurate while Xa-inhibitor activity is detectable. Utilize aPTT monitoring when patient received an oral factor Xa-inhibitor (apixaban, betrixaban, edoxaban or rivaroxaban) within 72 hours prior to admission (please document last administration time). The goal is to allow a washout of oral factor Xa-inhibitors by using aPTT for 72 hours, then change to ant-Xa levels for UFH. Low Dose Heparin Infusion  Heparin 60 units/kg (3500 units) IVP bolus followed by Heparin infusion at 12 units/kg/hr (710 units/hr) (recommended initial max dose 1000 units./hr). Recheck anti-Xa (unless aPTT being used) in 6 hours. Goal anti-Xa 0.3-0.7 IU/mL  Goal aPTT =  seconds.

## 2022-05-28 LAB
A/G RATIO: 1.6 (ref 1.1–2.2)
ALBUMIN SERPL-MCNC: 3.8 G/DL (ref 3.4–5)
ALP BLD-CCNC: 50 U/L (ref 40–129)
ALT SERPL-CCNC: 13 U/L (ref 10–40)
ANION GAP SERPL CALCULATED.3IONS-SCNC: 9 MMOL/L (ref 3–16)
APTT: 28.6 SEC (ref 23–34.3)
AST SERPL-CCNC: 20 U/L (ref 15–37)
BASOPHILS ABSOLUTE: 0 K/UL (ref 0–0.2)
BASOPHILS RELATIVE PERCENT: 0.4 %
BILIRUB SERPL-MCNC: 0.3 MG/DL (ref 0–1)
BUN BLDV-MCNC: 27 MG/DL (ref 7–20)
C-REACTIVE PROTEIN: 8.5 MG/L (ref 0–5.1)
CALCIUM SERPL-MCNC: 8.9 MG/DL (ref 8.3–10.6)
CHLORIDE BLD-SCNC: 105 MMOL/L (ref 99–110)
CO2: 27 MMOL/L (ref 21–32)
CREAT SERPL-MCNC: 1.3 MG/DL (ref 0.6–1.2)
D DIMER: 0.89 UG/ML FEU (ref 0–0.6)
EOSINOPHILS ABSOLUTE: 0.3 K/UL (ref 0–0.6)
EOSINOPHILS RELATIVE PERCENT: 8 %
FERRITIN: 407.2 NG/ML (ref 15–150)
FIBRINOGEN: 457 MG/DL (ref 207–509)
GFR AFRICAN AMERICAN: 47
GFR NON-AFRICAN AMERICAN: 39
GLUCOSE BLD-MCNC: 105 MG/DL (ref 70–99)
GLUCOSE BLD-MCNC: 114 MG/DL (ref 70–99)
GLUCOSE BLD-MCNC: 130 MG/DL (ref 70–99)
GLUCOSE BLD-MCNC: 155 MG/DL (ref 70–99)
GLUCOSE BLD-MCNC: 84 MG/DL (ref 70–99)
HCT VFR BLD CALC: 33 % (ref 36–48)
HEMOGLOBIN: 11.2 G/DL (ref 12–16)
INR BLD: 1.18 (ref 0.87–1.14)
LACTATE DEHYDROGENASE: 214 U/L (ref 100–190)
LYMPHOCYTES ABSOLUTE: 0.7 K/UL (ref 1–5.1)
LYMPHOCYTES RELATIVE PERCENT: 22.9 %
MCH RBC QN AUTO: 31.4 PG (ref 26–34)
MCHC RBC AUTO-ENTMCNC: 34 G/DL (ref 31–36)
MCV RBC AUTO: 92.3 FL (ref 80–100)
MONOCYTES ABSOLUTE: 0.6 K/UL (ref 0–1.3)
MONOCYTES RELATIVE PERCENT: 17.9 %
NEUTROPHILS ABSOLUTE: 1.6 K/UL (ref 1.7–7.7)
NEUTROPHILS RELATIVE PERCENT: 50.8 %
ORGANISM: ABNORMAL
PDW BLD-RTO: 13.8 % (ref 12.4–15.4)
PERFORMED ON: ABNORMAL
PERFORMED ON: NORMAL
PLATELET # BLD: 131 K/UL (ref 135–450)
PMV BLD AUTO: 8.7 FL (ref 5–10.5)
POTASSIUM REFLEX MAGNESIUM: 3.9 MMOL/L (ref 3.5–5.1)
PROTHROMBIN TIME: 14.8 SEC (ref 11.7–14.5)
RBC # BLD: 3.57 M/UL (ref 4–5.2)
SODIUM BLD-SCNC: 141 MMOL/L (ref 136–145)
TOTAL PROTEIN: 6.2 G/DL (ref 6.4–8.2)
URINE CULTURE, ROUTINE: ABNORMAL
WBC # BLD: 3.2 K/UL (ref 4–11)

## 2022-05-28 PROCEDURE — 2580000003 HC RX 258: Performed by: INTERNAL MEDICINE

## 2022-05-28 PROCEDURE — 85379 FIBRIN DEGRADATION QUANT: CPT

## 2022-05-28 PROCEDURE — 83615 LACTATE (LD) (LDH) ENZYME: CPT

## 2022-05-28 PROCEDURE — 85730 THROMBOPLASTIN TIME PARTIAL: CPT

## 2022-05-28 PROCEDURE — 86140 C-REACTIVE PROTEIN: CPT

## 2022-05-28 PROCEDURE — 36415 COLL VENOUS BLD VENIPUNCTURE: CPT

## 2022-05-28 PROCEDURE — 85610 PROTHROMBIN TIME: CPT

## 2022-05-28 PROCEDURE — 82728 ASSAY OF FERRITIN: CPT

## 2022-05-28 PROCEDURE — 85025 COMPLETE CBC W/AUTO DIFF WBC: CPT

## 2022-05-28 PROCEDURE — 85384 FIBRINOGEN ACTIVITY: CPT

## 2022-05-28 PROCEDURE — 80053 COMPREHEN METABOLIC PANEL: CPT

## 2022-05-28 PROCEDURE — 1200000000 HC SEMI PRIVATE

## 2022-05-28 PROCEDURE — 6370000000 HC RX 637 (ALT 250 FOR IP): Performed by: INTERNAL MEDICINE

## 2022-05-28 PROCEDURE — 6360000002 HC RX W HCPCS: Performed by: INTERNAL MEDICINE

## 2022-05-28 RX ADMIN — METOPROLOL TARTRATE 25 MG: 25 TABLET, FILM COATED ORAL at 08:36

## 2022-05-28 RX ADMIN — FERROUS SULFATE TAB 325 MG (65 MG ELEMENTAL FE) 325 MG: 325 (65 FE) TAB at 08:36

## 2022-05-28 RX ADMIN — ENOXAPARIN SODIUM 30 MG: 100 INJECTION SUBCUTANEOUS at 08:36

## 2022-05-28 RX ADMIN — CYANOCOBALAMIN TAB 500 MCG 500 MCG: 500 TAB at 08:36

## 2022-05-28 RX ADMIN — CALCIUM 500 MG: 500 TABLET ORAL at 08:36

## 2022-05-28 RX ADMIN — Medication 10 ML: at 21:20

## 2022-05-28 RX ADMIN — METOPROLOL TARTRATE 25 MG: 25 TABLET, FILM COATED ORAL at 21:19

## 2022-05-28 RX ADMIN — PRAVASTATIN SODIUM 10 MG: 20 TABLET ORAL at 08:36

## 2022-05-28 RX ADMIN — INSULIN GLARGINE 9 UNITS: 100 INJECTION, SOLUTION SUBCUTANEOUS at 21:19

## 2022-05-28 RX ADMIN — ASPIRIN 81 MG 81 MG: 81 TABLET ORAL at 08:36

## 2022-05-28 RX ADMIN — PRAMIPEXOLE DIHYDROCHLORIDE 1 MG: 1 TABLET ORAL at 21:19

## 2022-05-28 RX ADMIN — Medication 2000 UNITS: at 08:36

## 2022-05-28 ASSESSMENT — PAIN SCALES - GENERAL: PAINLEVEL_OUTOF10: 0

## 2022-05-28 NOTE — PROGRESS NOTES
Hospitalist Progress Note      PCP: Eilza Bhandari MD    Date of Admission: 5/27/2022    Chief Complaint: positive covid test    Hospital Course: reviewed     Subjective: resting in bed, no complaints on room air currently       Medications:  Reviewed    Infusion Medications    dextrose      sodium chloride       Scheduled Medications    aspirin  81 mg Oral Daily    metoprolol tartrate  25 mg Oral BID    pramipexole  1 mg Oral Nightly    pravastatin  10 mg Oral Daily    ferrous sulfate  325 mg Oral TID WC    vitamin B-12  500 mcg Oral Daily    Vitamin D  2,000 Units Oral Daily    calcium elemental  500 mg Oral Daily    insulin glargine  0.15 Units/kg SubCUTAneous Nightly    insulin lispro  0.05 Units/kg SubCUTAneous TID WC    insulin lispro  0-6 Units SubCUTAneous TID WC    insulin lispro  0-3 Units SubCUTAneous Nightly    enoxaparin  30 mg SubCUTAneous Daily     PRN Meds: glucose, dextrose bolus **OR** dextrose bolus, glucagon (rDNA), dextrose, sodium chloride flush, sodium chloride, ondansetron **OR** ondansetron, acetaminophen **OR** acetaminophen, perflutren lipid microspheres, melatonin, guaiFENesin-dextromethorphan    No intake or output data in the 24 hours ending 05/28/22 0948    Physical Exam Performed:    BP (!) 150/73   Pulse 62   Temp 98 °F (36.7 °C) (Oral)   Resp 20   Ht 5' 10\" (1.778 m)   Wt 138 lb 0.1 oz (62.6 kg)   SpO2 96%   BMI 19.80 kg/m²     General appearance: No apparent distress, appears stated age and cooperative. HEENT: Pupils equal, round, and reactive to light. Conjunctivae/corneas clear. Neck: Supple, with full range of motion. No jugular venous distention. Trachea midline. Respiratory:  Normal respiratory effort. Clear to auscultation, bilaterally without Rales/Wheezes/Rhonchi. Cardiovascular: Regular rate and rhythm with normal S1/S2 without murmurs, rubs or gallops.   Abdomen: Soft, non-tender, non-distended with normal bowel sounds. Musculoskeletal: No clubbing, cyanosis or edema bilaterally. Full range of motion without deformity. Skin: Skin color, texture, turgor normal.  No rashes or lesions. Neurologic:  Neurovascularly intact without any focal sensory/motor deficits.  Cranial nerves: II-XII intact, grossly non-focal.  Psychiatric: Alert and oriented, thought content appropriate, normal insight  Capillary Refill: Brisk,3 seconds, normal   Peripheral Pulses: +2 palpable, equal bilaterally       Labs:   Recent Labs     05/26/22  1522 05/27/22  0511 05/28/22  0559   WBC 5.5 7.2 3.2*   HGB 11.1* 11.6* 11.2*   HCT 32.9* 34.8* 33.0*   * 138 131*     Recent Labs     05/26/22  1522 05/27/22  0511 05/28/22  0559    139 141   K 4.2 4.0 3.9    102 105   CO2 28 27 27   BUN 35* 35* 27*   CREATININE 1.7* 1.9* 1.3*   CALCIUM 9.2 9.1 8.9     Recent Labs     05/26/22  1522 05/27/22  0511 05/28/22  0559   AST 21 24 20   ALT 13 14 13   BILITOT 0.4 0.3 0.3   ALKPHOS 58 55 50     Recent Labs     05/27/22  0511 05/28/22  0559   INR 1.17* 1.18*     Recent Labs     05/26/22  1522 05/26/22  1842 05/27/22  0512   TROPONINI 0.11* 0.12* 0.11*       Urinalysis:      Lab Results   Component Value Date    NITRU POSITIVE 05/26/2022    WBCUA  05/26/2022    BACTERIA 3+ 05/26/2022    RBCUA 0-2 05/26/2022    BLOODU SMALL 05/26/2022    SPECGRAV 1.010 05/26/2022    GLUCOSEU >=1000 05/26/2022       Radiology:  No orders to display           Assessment/Plan:    Active Hospital Problems    Diagnosis     HTN (hypertension) [I10]      Priority: High    Infection due to 2019 novel coronavirus [U07.1]      Priority: Medium    Elevated troponin [R77.8]      Priority: Medium    NSTEMI (non-ST elevated myocardial infarction) ([facility] [I21.4]      Priority: Medium    History of cerebrovascular accident [Z86.73]     Stage 3b chronic kidney disease ([facility] [N18.32]     DM2 (diabetes mellitus, type 2) ([facility] [E11.9]      Elevated troponin, HTN  -  Trended out troponin.  So far pattern is plateau. -  Limited TTE requested for the morning to compare to July, 2021(ef 55%, septal bounce noted no change from 7/21 lv fcn)  -  Continued ASA, statin, metoprolol. Lisinopril on hold for now. -  Cardiology assistance requested.     COVID-19  -  Unknown if patient has been vaccinated. -  Patient is essentially asymptomatic and not requiring O2.  -  General supportive care to include as needed antipyretics, antiemetics, antitussives, bronchodilators, etc..  -  Monitored COVID-associated inflammatory indices.     CKD 3b, HTN  -  Baseline SCr ~ 1.3 and currently 1.9. Patient appears euvolemic.  -  Holding lisinopril.   -nephro consulted per family request, apprec recs    DM2  -  Held all oral antidiabetic agents.  Start s.c. Insulin regimen based on weight    DVT Prophylaxis: lovenox  Diet: ADULT DIET;  Regular; Low Fat/Low Chol/High Fiber/HANNAH  Code Status: Full Code    PT/OT Eval Status: not ordered    Dispo - nephro consult given elevated Cr    Jimmie Cornejo MD

## 2022-05-29 LAB
A/G RATIO: 1.5 (ref 1.1–2.2)
ALBUMIN SERPL-MCNC: 3.9 G/DL (ref 3.4–5)
ALP BLD-CCNC: 49 U/L (ref 40–129)
ALT SERPL-CCNC: 12 U/L (ref 10–40)
ANION GAP SERPL CALCULATED.3IONS-SCNC: 12 MMOL/L (ref 3–16)
APTT: 28.3 SEC (ref 23–34.3)
AST SERPL-CCNC: 19 U/L (ref 15–37)
BASOPHILS ABSOLUTE: 0 K/UL (ref 0–0.2)
BASOPHILS RELATIVE PERCENT: 0.7 %
BILIRUB SERPL-MCNC: 0.4 MG/DL (ref 0–1)
BUN BLDV-MCNC: 21 MG/DL (ref 7–20)
C-REACTIVE PROTEIN: 4.4 MG/L (ref 0–5.1)
CALCIUM SERPL-MCNC: 9 MG/DL (ref 8.3–10.6)
CHLORIDE BLD-SCNC: 102 MMOL/L (ref 99–110)
CO2: 27 MMOL/L (ref 21–32)
CREAT SERPL-MCNC: 1.3 MG/DL (ref 0.6–1.2)
D DIMER: 0.62 UG/ML FEU (ref 0–0.6)
EOSINOPHILS ABSOLUTE: 0.2 K/UL (ref 0–0.6)
EOSINOPHILS RELATIVE PERCENT: 6.3 %
FERRITIN: 418.7 NG/ML (ref 15–150)
FIBRINOGEN: 445 MG/DL (ref 207–509)
GFR AFRICAN AMERICAN: 47
GFR NON-AFRICAN AMERICAN: 39
GLUCOSE BLD-MCNC: 108 MG/DL (ref 70–99)
GLUCOSE BLD-MCNC: 119 MG/DL (ref 70–99)
GLUCOSE BLD-MCNC: 79 MG/DL (ref 70–99)
GLUCOSE BLD-MCNC: 91 MG/DL (ref 70–99)
GLUCOSE BLD-MCNC: 94 MG/DL (ref 70–99)
GLUCOSE BLD-MCNC: 96 MG/DL (ref 70–99)
HCT VFR BLD CALC: 32.9 % (ref 36–48)
HEMOGLOBIN: 11.2 G/DL (ref 12–16)
INR BLD: 1.09 (ref 0.87–1.14)
LACTATE DEHYDROGENASE: 211 U/L (ref 100–190)
LYMPHOCYTES ABSOLUTE: 0.8 K/UL (ref 1–5.1)
LYMPHOCYTES RELATIVE PERCENT: 22.3 %
MCH RBC QN AUTO: 31.3 PG (ref 26–34)
MCHC RBC AUTO-ENTMCNC: 34.1 G/DL (ref 31–36)
MCV RBC AUTO: 91.8 FL (ref 80–100)
MONOCYTES ABSOLUTE: 0.4 K/UL (ref 0–1.3)
MONOCYTES RELATIVE PERCENT: 10.4 %
NEUTROPHILS ABSOLUTE: 2.3 K/UL (ref 1.7–7.7)
NEUTROPHILS RELATIVE PERCENT: 60.3 %
PDW BLD-RTO: 13.3 % (ref 12.4–15.4)
PERFORMED ON: ABNORMAL
PERFORMED ON: ABNORMAL
PERFORMED ON: NORMAL
PLATELET # BLD: 143 K/UL (ref 135–450)
PMV BLD AUTO: 8.5 FL (ref 5–10.5)
POTASSIUM REFLEX MAGNESIUM: 4.1 MMOL/L (ref 3.5–5.1)
PROCALCITONIN: 0.08 NG/ML (ref 0–0.15)
PROTHROMBIN TIME: 13.9 SEC (ref 11.7–14.5)
RBC # BLD: 3.58 M/UL (ref 4–5.2)
SODIUM BLD-SCNC: 141 MMOL/L (ref 136–145)
TOTAL PROTEIN: 6.5 G/DL (ref 6.4–8.2)
WBC # BLD: 3.8 K/UL (ref 4–11)

## 2022-05-29 PROCEDURE — 1200000000 HC SEMI PRIVATE

## 2022-05-29 PROCEDURE — 97530 THERAPEUTIC ACTIVITIES: CPT

## 2022-05-29 PROCEDURE — 36415 COLL VENOUS BLD VENIPUNCTURE: CPT

## 2022-05-29 PROCEDURE — 85730 THROMBOPLASTIN TIME PARTIAL: CPT

## 2022-05-29 PROCEDURE — 6370000000 HC RX 637 (ALT 250 FOR IP): Performed by: INTERNAL MEDICINE

## 2022-05-29 PROCEDURE — 86140 C-REACTIVE PROTEIN: CPT

## 2022-05-29 PROCEDURE — 85379 FIBRIN DEGRADATION QUANT: CPT

## 2022-05-29 PROCEDURE — 85610 PROTHROMBIN TIME: CPT

## 2022-05-29 PROCEDURE — 85384 FIBRINOGEN ACTIVITY: CPT

## 2022-05-29 PROCEDURE — 97162 PT EVAL MOD COMPLEX 30 MIN: CPT

## 2022-05-29 PROCEDURE — 6360000002 HC RX W HCPCS: Performed by: INTERNAL MEDICINE

## 2022-05-29 PROCEDURE — 82728 ASSAY OF FERRITIN: CPT

## 2022-05-29 PROCEDURE — 83615 LACTATE (LD) (LDH) ENZYME: CPT

## 2022-05-29 PROCEDURE — 84145 PROCALCITONIN (PCT): CPT

## 2022-05-29 PROCEDURE — 85025 COMPLETE CBC W/AUTO DIFF WBC: CPT

## 2022-05-29 PROCEDURE — 97165 OT EVAL LOW COMPLEX 30 MIN: CPT

## 2022-05-29 PROCEDURE — 97535 SELF CARE MNGMENT TRAINING: CPT

## 2022-05-29 PROCEDURE — 97116 GAIT TRAINING THERAPY: CPT

## 2022-05-29 PROCEDURE — 80053 COMPREHEN METABOLIC PANEL: CPT

## 2022-05-29 RX ADMIN — FERROUS SULFATE TAB 325 MG (65 MG ELEMENTAL FE) 325 MG: 325 (65 FE) TAB at 17:08

## 2022-05-29 RX ADMIN — FERROUS SULFATE TAB 325 MG (65 MG ELEMENTAL FE) 325 MG: 325 (65 FE) TAB at 12:55

## 2022-05-29 RX ADMIN — CALCIUM 500 MG: 500 TABLET ORAL at 08:56

## 2022-05-29 RX ADMIN — METOPROLOL TARTRATE 25 MG: 25 TABLET, FILM COATED ORAL at 21:14

## 2022-05-29 RX ADMIN — ENOXAPARIN SODIUM 30 MG: 100 INJECTION SUBCUTANEOUS at 08:55

## 2022-05-29 RX ADMIN — FERROUS SULFATE TAB 325 MG (65 MG ELEMENTAL FE) 325 MG: 325 (65 FE) TAB at 08:56

## 2022-05-29 RX ADMIN — METOPROLOL TARTRATE 25 MG: 25 TABLET, FILM COATED ORAL at 08:56

## 2022-05-29 RX ADMIN — PRAMIPEXOLE DIHYDROCHLORIDE 1 MG: 1 TABLET ORAL at 21:14

## 2022-05-29 RX ADMIN — CYANOCOBALAMIN TAB 500 MCG 500 MCG: 500 TAB at 08:56

## 2022-05-29 RX ADMIN — INSULIN GLARGINE 9 UNITS: 100 INJECTION, SOLUTION SUBCUTANEOUS at 21:14

## 2022-05-29 RX ADMIN — ASPIRIN 81 MG 81 MG: 81 TABLET ORAL at 08:55

## 2022-05-29 RX ADMIN — Medication 2000 UNITS: at 08:56

## 2022-05-29 RX ADMIN — PRAVASTATIN SODIUM 10 MG: 20 TABLET ORAL at 08:55

## 2022-05-29 ASSESSMENT — PAIN SCALES - GENERAL
PAINLEVEL_OUTOF10: 0

## 2022-05-29 NOTE — PROGRESS NOTES
Page sent to Valentine: Nephro cleared patient for discharge. FYI most recent /68. Walked patient to restroom with portable pulse ox and patient maintained o2 of 97-98%. Can patient discharge?

## 2022-05-29 NOTE — CONSULTS
[facility] Consult Note           Reason for Consult:  Acute kidney injury  Requesting Physician:  Dr. Arthur zaragoza    Chief Complaint:  Weakness  History Obtained From:  patient, electronic medical record    History of Present Ilness:    80year old female with CKD-3, HTN, DM, Dementia admitted with weakness, COVID19+. We have been asked to assist in further mgmt of her RUBEN. SCr as high as 1.9 on admission, better at 1.3 on rechecks. Baseline SCr 1.1-1.3 at its best, previously required dialysis back in 2017 in setting of infection & surgery with debridement of right foot. UA small blood, negative protein,  wbc's, >1000 glucose. Intake adequate, no shortness of breath, abdominal pain, or fevers.     Past Medical History:        Diagnosis Date    Clostridium difficile infection 06/06/2017    antigen +    Dementia [facility]     Diabetes mellitus ([facility]     History of blood transfusion     Hyperlipidemia     Hypertension     Infection     foot    MDRO (multiple drug resistant organisms) resistance 04/09/2019    urine    MRSA infection 12/07/2017    foot    Osteomyelitis of spine ([facility] 2010    thoraculumbar spine    Restless leg syndrome        Past Surgical History:        Procedure Laterality Date    APPENDECTOMY      COLONOSCOPY  1997    polyps    COLONOSCOPY  7/15/15    normal colon    EYE SURGERY      right cataract    FOOT AMPUTATION Right 05/26/2019    PARTIAL RIGHT FOOT AMPUTATION    FOOT AMPUTATION Right 5/26/2019    PARTIAL RIGHT FOOT AMPUTATION performed by Demario Jang DPM at [address] [facility] Left 05/30/2017    PARTIAL LEFT FOOT AMPUTATION              FOOT SURGERY Right 12/11/2017    HYSTERECTOMY      OTHER SURGICAL HISTORY Left     Macular Grid Left eye argon    TOE AMPUTATION  2003    2nd toe 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    TOE AMPUTATION  2011    5th toe right foot    TONSILLECTOMY      UPPER GASTROINTESTINAL ENDOSCOPY  06/05/2017    Dieulafoy lesion in duodenum       Home Medications:    No current facility-administered medications on file prior to encounter. Current Outpatient Medications on File Prior to Encounter   Medication Sig Dispense Refill    aspirin 81 MG chewable tablet Take 1 tablet by mouth daily 30 tablet 3    Empagliflozin (JARDIANCE PO) Take by mouth      LINAGLIPTIN PO Take by mouth      calcium carbonate (OSCAL) 500 MG TABS tablet Take 500 mg by mouth daily      NONFORMULARY tragenta      lisinopril (PRINIVIL;ZESTRIL) 10 MG tablet Take 10 mg by mouth daily      Cholecalciferol (VITAMIN D3) 50 MCG (2000 UT) CAPS Take 2,000 Units by mouth daily      pramipexole (MIRAPEX) 1 MG tablet TAKE 1 TABLET BY ORAL ROUTE ONCE A DAY (IN THE EVENING) AROUND 8-9 PM FOR RLS  4    metoprolol tartrate (LOPRESSOR) 25 MG tablet TAKE 1 TABLET BY ORAL ROUTE 2 TIMES A DAY FOR BP  4    ferrous sulfate 325 (65 Fe) MG tablet Take 325 mg by mouth 3 times daily (with meals)       Multiple Vitamins-Minerals (MULTIVITAMIN PO) Take by mouth daily      Cyanocobalamin (VITAMIN B-12) 5000 MCG SUBL Place 500 mcg under the tongue daily       pravastatin (PRAVACHOL) 10 MG tablet Take 10 mg by mouth daily          Allergies:  Patient has no known allergies.     Social History:    Social History     Socioeconomic History    Marital status:      Spouse name: Not on file    Number of children: Not on file    Years of education: Not on file    Highest education level: Not on file   Occupational History    Not on file   Tobacco Use    Smoking status: Never Smoker    Smokeless tobacco: Never Used   Vaping Use    Vaping Use: Never used   Substance and Sexual Activity    Alcohol use: Yes     Comment: OCCASIONAL    Drug use: No    Sexual activity: Never   Other Topics Concern    Not on file   Social History Narrative    Not on file     Social Determinants of Health     Financial Resource Strain:     Difficulty of Paying Living Expenses: Not on file   Food Insecurity:     Worried About [address] [employer] in the Last Year: Not on file    Ran Out of Food in the Last Year: Not on file   Transportation Needs:     Lack of Transportation (Medical): Not on file    Lack of Transportation (Non-Medical): Not on file   Physical Activity:     Days of Exercise per Week: Not on file    Minutes of Exercise per Session: Not on file   Stress:     Feeling of Stress : Not on file   Social Connections:     Frequency of Communication with Friends and Family: Not on file    Frequency of Social Gatherings with Friends and Family: Not on file    Attends Orthodox Services: Not on file    Active Member of [address] [place] [address] or Organizations: Not on file    Attends Club or Organization Meetings: Not on file    Marital Status: Not on file   Intimate Partner Violence:     Fear of Current or Ex-Partner: Not on file    Emotionally Abused: Not on file    Physically Abused: Not on file    Sexually Abused: Not on file   Housing Stability:     Unable to Pay for Housing in the Last Year: Not on file    Number of [place] in the Last Year: Not on file    Unstable Housing in the Last Year: Not on file       Family History:   Family History   Problem Relation Age of Onset    Diabetes Mother     High Blood Pressure Mother     Cancer Father     Heart Disease Maternal Uncle        Review of Systems:   Pertinent positives stated above in HPI. Remainder of 10 point review of systems were reviewed and were negative.     Physical exam:   Constitutional:  VITALS:  BP (!) 132/56   Pulse 65   Temp 97.4 °F (36.3 °C) (Oral)   Resp 17   Ht 5' 10\" (1.778 m)   Wt 138 lb 0.1 oz (62.6 kg)   SpO2 93%   BMI 19.80 kg/m²   Gen: alert, awake, nad  Skin: no rash, turgor wnl  Heent:  eomi, mmm  Neck: no bruits or jvd noted, thyroid normal  Cardiovascular:  S1, S2 without m/r/g  Respiratory: CTA B without w/r/r; respiratory effort normal  Abdomen:  +bs, soft, nt, nd, no hepatosplenomegaly  Ext: no lower extremity edema  Psychiatric: mood and affect appropriate; judgement and insight intact  Musculoskeletal:  Rom, muscular strength limited; digits, nails normal    Data/  CBC:   Lab Results   Component Value Date    WBC 3.8 05/29/2022    RBC 3.58 05/29/2022    HGB 11.2 05/29/2022    HCT 32.9 05/29/2022    MCV 91.8 05/29/2022    MCH 31.3 05/29/2022    MCHC 34.1 05/29/2022    RDW 13.3 05/29/2022     05/29/2022    MPV 8.5 05/29/2022     BMP:    Lab Results   Component Value Date     05/29/2022    K 4.1 05/29/2022     05/29/2022    CO2 27 05/29/2022    BUN 21 05/29/2022    LABALBU 3.9 05/29/2022    CREATININE 1.3 05/29/2022    CALCIUM 9.0 05/29/2022    GFRAA 47 05/29/2022    LABGLOM 39 05/29/2022    GLUCOSE 94 05/29/2022         Assessment/    - Acute kidney injury - in setting of suspected pre-renal state & COVID-19, back to baseline levels    - Chronic kidney diease stage 3 from DM & prior RUBEN requiring dialysis   Baseline SCr ~1.1-1.3 in past    - Elevated troponins - plans per Cardiology    - Hypertension    - DM2      Plan/    - Favor to monitor off of lisinopril & jardiance on discharge  - Trend labs, bp's, & urine output    Okay for discharge when okay with others  Recheck labs in 1 week on ANTHONY    Thank you for the consultation. Please do not hesitate to call with questions. ____________________________________  Marcelo Black MD  [facility]  [email or web address]  Office: [phone]

## 2022-05-29 NOTE — PROGRESS NOTES
Page sent to Dr. Rebekah Dorantes: Eren Erickson called daughter who she lives with and she states she cannot pick the patient up today. Offered a cab voucher which they did not feel comfortable with. Daughter Regan Joe said her and her  are both very sick with covid and could pick her up tomorrow. Since shes staying can she move off if needed? Also daughter was asking about what treatment she is getting for UTI but couldnt see anything on my end.

## 2022-05-29 NOTE — DISCHARGE INSTR - COC
Continuity of Care Form    Patient Name: Chasidy Rock   :  1935  MRN:  [id number]    Admit date:  2022  Discharge date:  ***    Code Status Order: Full Code   Advance Directives:      Admitting Physician:  Carlos Goetz MD  PCP: Trace Simon MD    Discharging Nurse: [facility] Unit/Room#: 2551/1172-02  Discharging Unit Phone Number: ***    Emergency Contact:   Extended Emergency Contact Information  Primary Emergency Contact: Dagmar Chow  Address: [address] [place] [address] Phone: [phone]  Mobile Phone: [phone]  Relation: Child  Secondary Emergency Contact: Gustabo Schmidt  Address: [facility] ([address] [place] [address])           [id number]  Home Phone: [phone]  Mobile Phone: [phone]  Relation: Grandchild    Past Surgical History:  Past Surgical History:   Procedure Laterality Date    APPENDECTOMY      COLONOSCOPY      polyps    COLONOSCOPY  7/15/15    normal colon    EYE SURGERY      right cataract    FOOT AMPUTATION Right 2019    PARTIAL RIGHT FOOT AMPUTATION    FOOT AMPUTATION Right 2019    PARTIAL RIGHT FOOT AMPUTATION performed by Rowdy Leone DPM at [address] [facility] Left 2017    PARTIAL LEFT FOOT AMPUTATION              FOOT SURGERY Right 2017    HYSTERECTOMY      OTHER SURGICAL HISTORY Left     Macular Grid Left eye argon    TOE AMPUTATION      2nd toe 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    TOE AMPUTATION      5th toe right foot    TONSILLECTOMY      UPPER GASTROINTESTINAL ENDOSCOPY  2017    Dieulafoy lesion in duodenum       Immunization History:   Immunization History   Administered Date(s) Administered    Influenza Vaccine, unspecified formulation 2017    Influenza Virus Vaccine 10/03/2017, 10/18/2018    Pneumococcal Polysaccharide ([id number]) 2017       Active Problems:  Patient Active Problem List   Diagnosis Code    DM2 (diabetes mellitus, type 2) ([facility] E11.9    Acute blood loss anemia D62    Severe protein-calorie malnutrition (HCC) E43    Encephalopathy G93.40    HTN (hypertension) I10    Altered mental status R41.82    Acute renal failure (HCC) N17.9    Contusion of left hip S70. 02XA    Acute UTI N39.0    Mild dehydration E86.0    Urinary incontinence R32    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) E13.621, L97.509    Osteomyelitis of right foot (HCC) M86.9    Chronic kidney disease N18.9    Generalized weakness R53.1    Stage 3b chronic kidney disease (HCC) N18.32    Chest pain R07.9    Arterial ischemic stroke, ICA, left, acute ([facility]) M72.187    Carotid artery stenosis with cerebral infarction [facility]) Z02.007    Dyslipidemia E78.5    NSTEMI (non-ST elevated myocardial infarction) ([facility] I21.4    Infection due to 2019 novel coronavirus U07.1    History of cerebrovascular accident Z86.73    Elevated troponin R77.8       Isolation/Infection:   Isolation            Droplet Plus          Patient Infection Status       Infection Onset Added Last Indicated Last Indicated By Review Planned Expiration Resolved Resolved By    COVID-19 05/26/22 05/26/22 05/26/22 COVID-19 & Influenza Combo 06/02/22 06/09/22      Resolved    COVID-19 (Rule Out) 05/26/22 05/26/22 05/26/22 COVID-19 & Influenza Combo (Ordered)   05/26/22 Rule-Out Test Resulted    MDRO (multi-drug resistant organism) 04/10/19 04/13/19 04/10/19 Urine culture   03/09/21 Rey Chowdary RN    MRSA  12/11/17 12/11/17 Mayelin Villa RN   03/04/19 Mayelin Villa RN            Nurse Assessment:  Last Vital Signs: BP (!) 132/56   Pulse 65   Temp 97.4 °F (36.3 °C) (Oral)   Resp 17   Ht 5' 10\" (1.778 m)   Wt 138 lb 0.1 oz (62.6 kg)   SpO2 93%   BMI 19.80 kg/m²     Last documented pain score (0-10 scale): Pain Level: 0  Last Weight:   Wt Readings from Last 1 Encounters:   05/27/22 138 lb 0.1 oz (62.6 kg)     Mental Status:  disoriented, alert, coherent, and logical    IV Access:  - None    Nursing Mobility/ADLs:  Walking   Assisted  Transfer  Assisted  Bathing  Assisted  Dressing  Assisted  Toileting  Assisted  Feeding  Assisted  Med Admin  Assisted  Med Delivery   whole    Wound Care Documentation and Therapy:  Wound 05/24/19 Toe (Comment  which one) Posterior (Active)   Number of days: 1101       Incision 05/26/19 Foot Right (Active)   Number of days: 1099        Elimination:  Continence: Bowel: Yes  Bladder: Yes  Urinary Catheter: None   Colostomy/Ileostomy/Ileal Conduit: No       Date of Last BM: 05/30/22    No intake or output data in the 24 hours ending 05/29/22 1203  No intake/output data recorded. Safety Concerns:     [address] Safety Concerns:[id number]}    Impairments/Disabilities:      [address] Impairments/Disabilities:[id number]}    Nutrition Therapy:  Current Nutrition Therapy:   - Oral Diet:  regular, low fat, low cholesterol, high fiber, no added salt    Routes of Feeding: Oral  Liquids: Thin Liquids  Daily Fluid Restriction: no  Last Modified Barium Swallow with Video (Video Swallowing Test): not done    Treatments at the Time of Hospital Discharge:   Respiratory Treatments: See [facility]  Oxygen Therapy:  is not on home oxygen therapy.   Ventilator:    - No ventilator support    Rehab Therapies: Physical Therapy and Occupational Therapy  Weight Bearing Status/Restrictions: No weight bearing restrictions  Other Medical Equipment (for information only, NOT a DME order):  walker  Other Treatments: ***    Patient's personal belongings (please select all that are sent with patient):  As documented    RN SIGNATURE:  Electronically signed by Zaheer Alfredo RN on 5/30/22 at 4:02 PM EDT    CASE MANAGEMENT/SOCIAL WORK SECTION    Inpatient Status Date: ***    Readmission Risk Assessment Score:  Readmission Risk              Risk of Unplanned Readmission:  20           Discharging to Facility/ Agency   Name: [facility]  Address:  TENBY:[phone]  Fax:[phone]        /Social Worker signature: Electronically signed by Julia Koehler RN on 5/29/22 at 3:00 PM EDT    PHYSICIAN SECTION    Prognosis: Good    Condition at Discharge: Stable    Rehab Potential (if transferring to Rehab): Good    Recommended Labs or Other Treatments After Discharge: Renal function panel, CBC in 1 week after discharge    Follow up with PCP within 1-2 weeks. Stay in isolation through 6/4. Physician Certification: I certify the above information and transfer of Avery Alcantara  is necessary for the continuing treatment of the diagnosis listed and that she requires [facility] for less 30 days.      Update Admission H&P: No change in H&P    PHYSICIAN SIGNATURE:  Electronically signed by Jessica Norris MD on 5/29/22 at 1:57 PM EDT  Electronically signed by Danie Hamman, MD on 5/30/2022 at 7:55 AM  Electronically signed by Danie Hamman, MD on 5/30/2022 at 2:07 PM

## 2022-05-29 NOTE — CARE COORDINATION
CASE MANAGEMENT DISCHARGE SUMMARY      Discharge to: home with [address] ordered/agency: na    Transportation:    Family/car: private    Confirmed discharge plan with:RN,[facility]     Patient: yes/no     Family:  yes/no    Name: Contact number:     Facility/Agency, name:  [facility]    Phone number for report to facility:      RN, name: Rosa Elena    Note: Discharging nurse to complete ANTHONY, reconcile AVS, and place final copy with patient's discharge packet. RN to ensure that written prescriptions for  Level II medications are sent with patient to the facility as per protocol.

## 2022-05-29 NOTE — PROGRESS NOTES
Hospitalist Progress Note      PCP: Trace Simon MD    Date of Admission: 5/27/2022      Chief Complaint: positive covid test     Hospital Course: reviewed      Subjective: resting in bed, no complaints on room air currently        Medications:  Reviewed    Infusion Medications    dextrose      sodium chloride       Scheduled Medications    aspirin  81 mg Oral Daily    metoprolol tartrate  25 mg Oral BID    pramipexole  1 mg Oral Nightly    pravastatin  10 mg Oral Daily    ferrous sulfate  325 mg Oral TID WC    vitamin B-12  500 mcg Oral Daily    Vitamin D  2,000 Units Oral Daily    calcium elemental  500 mg Oral Daily    insulin glargine  0.15 Units/kg SubCUTAneous Nightly    insulin lispro  0.05 Units/kg SubCUTAneous TID WC    insulin lispro  0-6 Units SubCUTAneous TID WC    insulin lispro  0-3 Units SubCUTAneous Nightly    enoxaparin  30 mg SubCUTAneous Daily     PRN Meds: glucose, dextrose bolus **OR** dextrose bolus, glucagon (rDNA), dextrose, sodium chloride flush, sodium chloride, ondansetron **OR** ondansetron, acetaminophen **OR** acetaminophen, perflutren lipid microspheres, melatonin, guaiFENesin-dextromethorphan    No intake or output data in the 24 hours ending 05/29/22 1029    Physical Exam Performed:    BP (!) 132/56   Pulse 65   Temp 97.4 °F (36.3 °C) (Oral)   Resp 17   Ht 5' 10\" (1.778 m)   Wt 138 lb 0.1 oz (62.6 kg)   SpO2 93%   BMI 19.80 kg/m²     General appearance: No apparent distress, appears stated age and cooperative. HEENT: Pupils equal, round, and reactive to light. Conjunctivae/corneas clear. Neck: Supple, with full range of motion. No jugular venous distention. Trachea midline. Respiratory:  Normal respiratory effort. Clear to auscultation, bilaterally without Rales/Wheezes/Rhonchi. Cardiovascular: Regular rate and rhythm with normal S1/S2 without murmurs, rubs or gallops.   Abdomen: Soft, non-tender, non-distended with normal bowel sounds. Musculoskeletal: No clubbing, cyanosis or edema bilaterally. Full range of motion without deformity. Skin: Skin color, texture, turgor normal.  No rashes or lesions. Neurologic:  Neurovascularly intact without any focal sensory/motor deficits.  Cranial nerves: II-XII intact, grossly non-focal.  Psychiatric: Alert and oriented, thought content appropriate, normal insight  Capillary Refill: Brisk,3 seconds, normal   Peripheral Pulses: +2 palpable, equal bilaterally       Labs:   Recent Labs     05/27/22  0511 05/28/22  0559 05/29/22  0507   WBC 7.2 3.2* 3.8*   HGB 11.6* 11.2* 11.2*   HCT 34.8* 33.0* 32.9*    131* 143     Recent Labs     05/27/22  0511 05/28/22  0559 05/29/22  0507    141 141   K 4.0 3.9 4.1    105 102   CO2 27 27 27   BUN 35* 27* 21*   CREATININE 1.9* 1.3* 1.3*   CALCIUM 9.1 8.9 9.0     Recent Labs     05/27/22  0511 05/28/22  0559 05/29/22  0507   AST 24 20 19   ALT 14 13 12   BILITOT 0.3 0.3 0.4   ALKPHOS 55 50 49     Recent Labs     05/27/22  0511 05/28/22  0559 05/29/22  0507   INR 1.17* 1.18* 1.09     Recent Labs     05/26/22  1522 05/26/22  1842 05/27/22  0512   TROPONINI 0.11* 0.12* 0.11*       Urinalysis:      Lab Results   Component Value Date    NITRU POSITIVE 05/26/2022    WBCUA  05/26/2022    BACTERIA 3+ 05/26/2022    RBCUA 0-2 05/26/2022    BLOODU SMALL 05/26/2022    SPECGRAV 1.010 05/26/2022    GLUCOSEU >=1000 05/26/2022       Radiology:  No orders to display           Assessment/Plan:    Active Hospital Problems    Diagnosis     HTN (hypertension) [I10]      Priority: High    Infection due to 2019 novel coronavirus [U07.1]      Priority: Medium    Elevated troponin [R77.8]      Priority: Medium    NSTEMI (non-ST elevated myocardial infarction) ([facility] [I21.4]      Priority: Medium    History of cerebrovascular accident [Z86.73]     Stage 3b chronic kidney disease ([facility] [N18.32]     DM2 (diabetes mellitus, type 2) ([facility] [E11.9]      Elevated troponin, HTN  -  Trended out troponin.  So far pattern is plateau.  -  Limited TTE requested for the morning to compare to July, 2021(ef 55%, septal bounce noted no change from 7/21 lv fcn)  -  Continued ASA, statin, metoprolol.  Lisinopril on hold for now. -  Cardiology assistance requested.     COVID-19  -  Unknown if patient has been vaccinated. -  Patient is essentially asymptomatic and not requiring O2.  -  General supportive care to include as needed antipyretics, antiemetics, antitussives, bronchodilators, etc..  -  Monitored COVID-associated inflammatory indices.     CKD 3b, HTN  -  Baseline SCr ~ 1.3 and currently 1.9.  Patient appears euvolemic.  -  Holding lisinopril. Stopped on dc  -stopped jardiance as well   -nephro consulted per family request, apprec recs   -rec Stop acei/jardiance on dc    DM2  -  Held all oral antidiabetic agents.  Start s.c. Insulin regimen based on weight     Abn UA- noted on admission  -Ucx noted ecoli 100k sens to cipro/rocephin  -iv rocephin started 5/29    DVT Prophylaxis: lovenox  Diet: ADULT DIET;  Regular; Low Fat/Low Chol/High Fiber/HANNAH  Code Status: Full Code    PT/OT Eval Status: home pt/ot    Dispo - pending nephro recs, start iv rocephin, likely dc Monday on PO abx  John Stanley MD

## 2022-05-29 NOTE — PROGRESS NOTES
Physical Therapy  Facility/Department: [facility] C4 PCU  Physical Therapy Initial Assessment/Treatment    Name: Tomeka Holliday  : 1935  MRN: [id number]  Date of Service: 2022    Discharge Recommendations:  24 hour supervision or assist,Home with Home health PT   PT Equipment Recommendations  Equipment Needed: No  Other: Pt owns RW and w/c      Patient Diagnosis(es): There were no encounter diagnoses. Past Medical History:  has a past medical history of Clostridium difficile infection, Dementia ([facility] Diabetes mellitus ([facility] History of blood transfusion, Hyperlipidemia, Hypertension, Infection, MDRO (multiple drug resistant organisms) resistance, MRSA infection, Osteomyelitis of spine ([facility] and Restless leg syndrome. Past Surgical History:  has a past surgical history that includes Tonsillectomy; Hysterectomy; Appendectomy; Toe amputation (); Toe amputation (); eye surgery; Colonoscopy (); Colonoscopy (7/15/15); other surgical history (Left); Foot surgery (Left, 2017); Upper gastrointestinal endoscopy (2017); Foot surgery (Right, 2017); Foot Amputation (Right, 2019); and Foot Amputation (Right, 2019). Assessment   Body Structures, Functions, Activity Limitations Requiring Skilled Therapeutic Intervention: Decreased functional mobility ; Decreased endurance;Decreased balance;Decreased strength;Decreased safe awareness;Decreased posture  Assessment: Pt is an 80 y.o. female admitted to [facility] secondary to COVID, HTN and elevated troponin. Pt lives with family in two story home but stays on first floor. Pt reports she is typically MI with functional mobility and gait with RW. Pt reports she has 24hrS at d/c. Pt is currently functioning below her baseline requiring SBA for bed mobility, CGA for t/f and CGA to SBA for gait up to 30' with SW. Pt is limited by SOB, fatigue and decreased activity tolerance.  Pt is resistant to cues/education for safety at times but pleasant and cooperative throughout session. Pt will benefit from continued skilled PT in acute care setting to address above deficits. Recommend Pt d/c home with 24hrS and HHPT. Treatment Diagnosis: Impaired balance and gait  Specific Instructions for Next Treatment: Progress mobility as tolerated  Therapy Prognosis: Good  Decision Making: Medium Complexity  Barriers to Learning: resistant to cues/education at times  Requires PT Follow-Up: Yes  Activity Tolerance  Activity Tolerance: Patient limited by fatigue;Patient limited by endurance  Activity Tolerance Comments: Pt reports fatigue and SOB with mobility requiring intermittent standing rest breaks. Unable to get SpO2 reading following mobility (RN notified)     Plan   Plan  Plan: 2-3 times per week  Specific Instructions for Next Treatment: Progress mobility as tolerated  Current Treatment Recommendations: Strengthening,Equipment evaluation, education, & procurement,Balance training,Gait training,Functional mobility training,Stair training,Transfer training,Neuromuscular re-education,Home exercise program,Safety education & training,Patient/Caregiver education & training,Therapeutic activities,Endurance training  Safety Devices  Type of Devices: Patient at risk for falls,All fall risk precautions in place,Left in chair,Call light within reach,Chair alarm in place,Nurse notified,Gait belt     Restrictions  Restrictions/Precautions  Restrictions/Precautions: Isolation,Fall Risk  Position Activity Restriction  Other position/activity restrictions:  Up with assist, Droplet + (COVID +); B post op shoe (Baseline d/t partial foot amputation)     Subjective   Pain: Pt denies c/o pain  General  Chart Reviewed: Yes  Patient assessed for rehabilitation services?: Yes  Additional Pertinent Hx: Hx: Dementia, B partial foot amputation  Response To Previous Treatment: Not applicable  Family / Caregiver Present: No  Referring Practitioner: Rivera Dougherty MD  Referral Date : 05/29/22  Diagnosis: COVID 19, HTN, elevated troponin  Follows Commands: Within Functional Limits  General Comment  Comments: RN cleared pt for session  Subjective  Subjective: Pt resting in bed on approach, agreeable to PT evaluation         Social/Functional History  Social/Functional History  Lives With: Daughter (DAYNE)  Type of Home: House  Home Layout: Two level,Able to Live on Main level with bedroom/bathroom  Home Access: Stairs to enter without rails  Entrance Stairs - Number of Steps: 1 + 1  Bathroom Shower/Tub: Tub/Shower unit  Bathroom Toilet: Standard (BSC over toilet)  Bathroom Equipment: Shower chair (Pt reports she sponge bathes)  Home Equipment: Bk Brice, rolling,Reacher,Electric scooter,Wheelchair-manual  Receives Help From: Family  ADL Assistance: Independent  Homemaking Assistance: Needs assistance (reports she occasionally assists with cleaning/homemaking tasks)  Ambulation Assistance: Independent (household distances with RW, reports MI)  Transfer Assistance: Independent  Active : No  Additional Comments: Daughter works on farm, DAYNE works from home, typically has 24hrS.  Pt denies hx of falls in past 6 months  Vision/Hearing  Vision Exceptions: Wears glasses for reading  Hearing: Within functional limits    Cognition   Orientation  Overall Orientation Status: Within Functional Limits     Objective   Heart Rate: 65  Heart Rate Source: Monitor  BP: (!) 132/56  BP Location: Right upper arm  BP Method: Automatic  Patient Position: Sitting  MAP (Calculated): 81.33  Resp: 17  SpO2: 93 %  O2 Device: None (Room air)     Observation/Palpation  Posture: Fair  Observation: Increased kyphosis, forward head, rounded shoulders  Gross Assessment  AROM: Within functional limits  PROM: Within functional limits  Strength: Generally decreased, functional                    Bed mobility  Supine to Sit: Stand by assistance (HOB elevated, use of BR, increased time to complete)  Sit to Supine: Unable to assess (Pt seated in chair at end of session)     Transfers  Sit to Stand: Contact guard assistance  Stand to sit: Contact guard assistance  Comment: EOB to SW, commode to SW, increased time to complete, slow descent sit stand to sit, intermittent cues for safety and hand placement     Ambulation  Surface: level tile  Device: Standard Walker  Assistance: Contact guard assistance;Stand by assistance  Quality of Gait: Slow partial step through pattern, B decreased toe clearance and heel strike, B decreased hip extension in stance, B decreased hip/knee flexion in swing, forward flexed trunk, increased kyphosis. Pt with slow progression but overall steady without overt LOB. Gait Deviations: Slow Christine; Increased CONRADO; Decreased step length;Decreased step height;Shuffles  Distance: 13' + 30'  Comments: Distances limited by fatigue and SOB. Pt with poor SW management, cues provided however pt resistant to cues at times. Maintains SW too far from CONRADO at times. Stairs/Curb  Stairs?: No (deferred d/t px/isolation)        Balance  Posture: Fair  Sitting - Static: Good  Sitting - Dynamic: Good  Standing - Static: Fair;+  Standing - Dynamic: Fair  Comments: CGA to SBA standing with SW while completing clothing management and kelley-care. Pt completes hand washing at sink. Time in standing limited by fatigue with intermittent rest breaks leaning against sink.            AM-PAC Score  AM-PAC Inpatient Mobility Raw Score : 19 (05/29/22 0853)  AM-PAC Inpatient T-Scale Score : 45.44 (05/29/22 0853)  Mobility Inpatient CMS 0-100% Score: 41.77 (05/29/22 0853)  Mobility Inpatient CMS G-Code Modifier : CK (05/29/22 0853)          Goals  Short Term Goals  Time Frame for Short term goals: 1 week 6/05/22 (unless otherwise specified)  Short term goal 1: Pt will complete supine to/from sit with I  Short term goal 2: Pt will complete sit to/from stand with LRAD with S  Short term goal 3: Pt will ambulate >50' with RW with S without LOB  Short term goal 4: Pt will ascend/descend curb step with LRAD with SBA without LOB  Short term goal 5: 6/01/22: Pt will participate in 12-15 reps BLE exercises to increase strength and increase I with functional mobility and gait  Patient Goals   Patient goals : \"go home\"       Education  Patient Education  Education Given To: Patient  Education Provided: Role of Therapy;Plan of Care;Precautions;Transfer Training; Fall Prevention Strategies  Education Provided Comments: Educated on importance of OOB mobility and gait, safe use of AD and safe progression of mobility. Education Method: Demonstration;Verbal  Barriers to Learning: Cognition  Education Outcome: Verbalized understanding;Continued education needed      Therapy Time   Individual Concurrent Group Co-treatment   Time In 0758         Time Out 0839         Minutes 41         Timed Code Treatment Minutes: 31 Minutes (10 minutes for eval)       If pt is unable to be seen after this session, please let this note serve as discharge summary. Please see case management note for discharge disposition. Thank you.     Damián Subramanian, PT, DPT

## 2022-05-29 NOTE — PLAN OF CARE
Problem: Discharge Planning  Goal: Discharge to home or other facility with appropriate resources  Outcome: Progressing     Problem: Safety - Adult  Goal: Free from fall injury  5/29/2022 1439 by Helen Blackwell RN  Outcome: Progressing  5/29/2022 0645 by Ilia El RN  Outcome: Progressing     Problem: ABCDS Injury Assessment  Goal: Absence of physical injury  5/29/2022 1439 by Helen Blackwell RN  Outcome: Progressing  5/29/2022 0645 by Ilia El RN  Outcome: Progressing     Problem: Chronic Conditions and Co-morbidities  Goal: Patient's chronic conditions and co-morbidity symptoms are monitored and maintained or improved  Outcome: Progressing     Problem: Pain  Goal: Verbalizes/displays adequate comfort level or baseline comfort level  Outcome: Progressing

## 2022-05-29 NOTE — PROGRESS NOTES
Occupational Therapy  Facility/Department: [facility] C4 PCU  Occupational Therapy Initial/discharge Assessment  1x only      Name: Zack Hernandez  : 1935  MRN: [id number]  Date of Service: 2022    Discharge Recommendations:  24 hour supervision or assist          Patient Diagnosis(es): There were no encounter diagnoses. Past Medical History:  has a past medical history of Clostridium difficile infection, Dementia ([facility] Diabetes mellitus ([facility] History of blood transfusion, Hyperlipidemia, Hypertension, Infection, MDRO (multiple drug resistant organisms) resistance, MRSA infection, Osteomyelitis of spine ([facility] and Restless leg syndrome. Past Surgical History:  has a past surgical history that includes Tonsillectomy; Hysterectomy; Appendectomy; Toe amputation (); Toe amputation (); eye surgery; Colonoscopy (); Colonoscopy (7/15/15); other surgical history (Left); Foot surgery (Left, 2017); Upper gastrointestinal endoscopy (2017); Foot surgery (Right, 2017); Foot Amputation (Right, 2019); and Foot Amputation (Right, 2019). Assessment      No Skilled OT: At baseline function  REQUIRES OT FOLLOW-UP: No  Activity Tolerance  Activity Tolerance: Patient Tolerated treatment well  Activity Tolerance Comments: tires easily, but able to complete ADL's        Plan   Plan  Plan Comment: OT eval  only     Restrictions  Restrictions/Precautions  Restrictions/Precautions: Isolation,Fall Risk,General Precautions  Position Activity Restriction  Other position/activity restrictions:  Up with assist, Droplet + (COVID +); B post op shoe (Baseline d/t partial foot amputation), telemetry    Subjective   General  Chart Reviewed: Yes  Patient assessed for rehabilitation services?: Yes  Family / Caregiver Present: No  Referring Practitioner:   Diagnosis: NSTEMI, CoVID +  General Comment  Comments: RN cleared pt for OT eval; pt resting in bed, agreeable to get up with therapy     Social/Functional History  Social/Functional History  Lives With: Daughter (DAYNE)  Type of Home: House  Home Layout: Two level,Able to Live on Main level with bedroom/bathroom  Home Access: Stairs to enter without rails  Entrance Stairs - Number of Steps: 1 + 1  Bathroom Shower/Tub: Tub/Shower unit  Bathroom Toilet: Standard (BSC over toilet)  Bathroom Equipment: Shower chair (Pt reports she sponge bathes)  Home Equipment: Peggy Poser, rolling,Reacher,Electric scooter,Wheelchair-manual  Receives Help From: Family  ADL Assistance: Independent  Homemaking Assistance: Needs assistance (reports she occasionally assists with cleaning/homemaking tasks)  Homemaking Responsibilities: No  Ambulation Assistance: Independent (household distances with RW, reports MI)  Transfer Assistance: Independent  Active : No  Additional Comments: Daughter works on farm, DAYNE works from home, typically has 24hrS. Pt denies hx of falls in past 6 months       Objective   Heart Rate: 65  Heart Rate Source: Monitor  BP: (!) 132/56  BP Location: Right upper arm  BP Method: Automatic  Patient Position: Sitting  MAP (Calculated): 81.33  Resp: 17  SpO2: 93 %  O2 Device: None (Room air)          Observation/Palpation  Posture: Fair  Observation: Increased kyphosis, forward head, rounded shoulders  Safety Devices  Type of Devices: All maria fernanda prominences offloaded;Call light within reach;Nurse notified; Left in chair;Chair alarm in place;Gait belt     Toilet Transfers  Toilet - Technique: Ambulating (SBA with walker)  Equipment Used: Grab bars  Toilet Transfer: Stand by assistance     AROM: Within functional limits  Strength:  Within functional limits  Coordination: Within functional limits  Tone: Normal    ADL  Feeding: Independent  Grooming: Stand by assistance (standing at sink to wash hands after toileting)  LE Dressing: Minimal assistance (to pull pants up over hips, increased fatigue with exertion)  Toileting: Independent Activity Tolerance  Activity Tolerance: Patient limited by fatigue;Patient limited by endurance  Activity Tolerance Comments: Pt reports fatigue and SOB with mobility requiring intermittent standing rest breaks. Unable to get SpO2 reading following mobility (RN notified)     Transfers  Sit to stand: Stand by assistance  Stand to sit: Stand by assistance     Vision - Basic Assessment  Prior Vision: Wears glasses only for reading     Cognition  Overall Cognitive Status: Exceptions (pleasant cooperative)  Arousal/Alertness: Appropriate responses to stimuli  Following Commands:  Follows one step commands consistently  Attention Span: Appears intact  Memory: Decreased recall of precautions  Insights: Decreased awareness of deficits  Initiation: Does not require cues  Sequencing: Does not require cues                  Education Given To: Patient  Education Provided: Role of Therapy;Precautions  Education Provided Comments: disease specific: importance of OOB to chair for meals & to walk to bathroom with assist; use of RED/nurse call light for assist with transfers/ADL needs  Education Method: Verbal  Barriers to Learning: Cognition  Education Outcome: Verbalized understanding;Demonstrated understanding         AM-PAC Score        AM-[facility] Inpatient Daily Activity Raw Score: 21 (05/29/22 1017)  AM-PAC Inpatient ADL T-Scale Score : 44.27 (05/29/22 1017)  ADL Inpatient CMS 0-100% Score: 32.79 (05/29/22 1017)  ADL Inpatient CMS G-Code Modifier : Ever Caraballo (05/29/22 1017)    Goals  Patient Goals   Patient goals : \"go home when able\"       Therapy Time   Individual Concurrent Group Co-treatment   Time In 0800         Time Out 0840         Minutes [address]

## 2022-05-30 VITALS
HEART RATE: 63 BPM | TEMPERATURE: 98.1 F | WEIGHT: 137.35 LBS | RESPIRATION RATE: 14 BRPM | DIASTOLIC BLOOD PRESSURE: 71 MMHG | HEIGHT: 70 IN | SYSTOLIC BLOOD PRESSURE: 120 MMHG | BODY MASS INDEX: 19.66 KG/M2 | OXYGEN SATURATION: 97 %

## 2022-05-30 LAB
A/G RATIO: 2 (ref 1.1–2.2)
ALBUMIN SERPL-MCNC: 3.9 G/DL (ref 3.4–5)
ALP BLD-CCNC: 48 U/L (ref 40–129)
ALT SERPL-CCNC: 11 U/L (ref 10–40)
ANION GAP SERPL CALCULATED.3IONS-SCNC: 12 MMOL/L (ref 3–16)
APTT: 27.9 SEC (ref 23–34.3)
AST SERPL-CCNC: 18 U/L (ref 15–37)
BASOPHILS ABSOLUTE: 0 K/UL (ref 0–0.2)
BASOPHILS RELATIVE PERCENT: 0.3 %
BILIRUB SERPL-MCNC: 0.4 MG/DL (ref 0–1)
BUN BLDV-MCNC: 19 MG/DL (ref 7–20)
C-REACTIVE PROTEIN: <3 MG/L (ref 0–5.1)
CALCIUM SERPL-MCNC: 9 MG/DL (ref 8.3–10.6)
CHLORIDE BLD-SCNC: 103 MMOL/L (ref 99–110)
CO2: 24 MMOL/L (ref 21–32)
CREAT SERPL-MCNC: 1.1 MG/DL (ref 0.6–1.2)
D DIMER: 0.65 UG/ML FEU (ref 0–0.6)
EOSINOPHILS ABSOLUTE: 0.2 K/UL (ref 0–0.6)
EOSINOPHILS RELATIVE PERCENT: 6.1 %
FERRITIN: 410 NG/ML (ref 15–150)
FIBRINOGEN: 450 MG/DL (ref 207–509)
GFR AFRICAN AMERICAN: 57
GFR NON-AFRICAN AMERICAN: 47
GLUCOSE BLD-MCNC: 105 MG/DL (ref 70–99)
GLUCOSE BLD-MCNC: 74 MG/DL (ref 70–99)
GLUCOSE BLD-MCNC: 85 MG/DL (ref 70–99)
GLUCOSE BLD-MCNC: 88 MG/DL (ref 70–99)
HCT VFR BLD CALC: 32.7 % (ref 36–48)
HEMOGLOBIN: 11.3 G/DL (ref 12–16)
INR BLD: 1.15 (ref 0.87–1.14)
LACTATE DEHYDROGENASE: 220 U/L (ref 100–190)
LYMPHOCYTES ABSOLUTE: 1 K/UL (ref 1–5.1)
LYMPHOCYTES RELATIVE PERCENT: 25.7 %
MCH RBC QN AUTO: 31.5 PG (ref 26–34)
MCHC RBC AUTO-ENTMCNC: 34.4 G/DL (ref 31–36)
MCV RBC AUTO: 91.4 FL (ref 80–100)
MONOCYTES ABSOLUTE: 0.4 K/UL (ref 0–1.3)
MONOCYTES RELATIVE PERCENT: 10.9 %
NEUTROPHILS ABSOLUTE: 2.3 K/UL (ref 1.7–7.7)
NEUTROPHILS RELATIVE PERCENT: 57 %
PDW BLD-RTO: 13.1 % (ref 12.4–15.4)
PERFORMED ON: ABNORMAL
PERFORMED ON: NORMAL
PERFORMED ON: NORMAL
PLATELET # BLD: 158 K/UL (ref 135–450)
PMV BLD AUTO: 8.7 FL (ref 5–10.5)
POTASSIUM REFLEX MAGNESIUM: 3.9 MMOL/L (ref 3.5–5.1)
PROTHROMBIN TIME: 14.5 SEC (ref 11.7–14.5)
RBC # BLD: 3.58 M/UL (ref 4–5.2)
SODIUM BLD-SCNC: 139 MMOL/L (ref 136–145)
TOTAL PROTEIN: 5.9 G/DL (ref 6.4–8.2)
WBC # BLD: 4 K/UL (ref 4–11)

## 2022-05-30 PROCEDURE — 85610 PROTHROMBIN TIME: CPT

## 2022-05-30 PROCEDURE — 6360000002 HC RX W HCPCS: Performed by: INTERNAL MEDICINE

## 2022-05-30 PROCEDURE — 80053 COMPREHEN METABOLIC PANEL: CPT

## 2022-05-30 PROCEDURE — 85730 THROMBOPLASTIN TIME PARTIAL: CPT

## 2022-05-30 PROCEDURE — 36415 COLL VENOUS BLD VENIPUNCTURE: CPT

## 2022-05-30 PROCEDURE — 85384 FIBRINOGEN ACTIVITY: CPT

## 2022-05-30 PROCEDURE — 86140 C-REACTIVE PROTEIN: CPT

## 2022-05-30 PROCEDURE — 82728 ASSAY OF FERRITIN: CPT

## 2022-05-30 PROCEDURE — 83615 LACTATE (LD) (LDH) ENZYME: CPT

## 2022-05-30 PROCEDURE — 85025 COMPLETE CBC W/AUTO DIFF WBC: CPT

## 2022-05-30 PROCEDURE — 85379 FIBRIN DEGRADATION QUANT: CPT

## 2022-05-30 PROCEDURE — 6370000000 HC RX 637 (ALT 250 FOR IP): Performed by: INTERNAL MEDICINE

## 2022-05-30 RX ORDER — CIPROFLOXACIN 250 MG/1
250 TABLET, FILM COATED ORAL 2 TIMES DAILY
Qty: 10 TABLET | Refills: 0 | Status: SHIPPED | OUTPATIENT
Start: 2022-05-30 | End: 2022-06-04

## 2022-05-30 RX ADMIN — FERROUS SULFATE TAB 325 MG (65 MG ELEMENTAL FE) 325 MG: 325 (65 FE) TAB at 09:42

## 2022-05-30 RX ADMIN — CYANOCOBALAMIN TAB 500 MCG 500 MCG: 500 TAB at 09:42

## 2022-05-30 RX ADMIN — ASPIRIN 81 MG 81 MG: 81 TABLET ORAL at 09:42

## 2022-05-30 RX ADMIN — Medication 2000 UNITS: at 09:42

## 2022-05-30 RX ADMIN — ENOXAPARIN SODIUM 30 MG: 100 INJECTION SUBCUTANEOUS at 09:41

## 2022-05-30 RX ADMIN — CALCIUM 500 MG: 500 TABLET ORAL at 09:42

## 2022-05-30 RX ADMIN — PRAVASTATIN SODIUM 10 MG: 20 TABLET ORAL at 09:42

## 2022-05-30 RX ADMIN — METOPROLOL TARTRATE 25 MG: 25 TABLET, FILM COATED ORAL at 09:42

## 2022-05-30 ASSESSMENT — PAIN SCALES - GENERAL
PAINLEVEL_OUTOF10: 0
PAINLEVEL_OUTOF10: 0

## 2022-05-30 NOTE — PROGRESS NOTES
Second attempt at calling report to the [address]. Will attempt again. Facility answered, phone number left and nurse is to call back for report. 1803: Report given to Sunita at the [address] [place] [address].

## 2022-05-30 NOTE — DISCHARGE SUMMARY
Hospital Medicine Discharge Summary    Patient ID: Heather Woodward      Patient's PCP: Harvey Tillman MD    Admit Date: 5/27/2022     Discharge Date:   05/30/22     Admitting Provider: Lupe Altamirano MD     Discharge Provider: Dianne Rai MD     Discharge Diagnoses: Active Hospital Problems    Diagnosis     HTN (hypertension) [I10]      Priority: High    Infection due to 2019 novel coronavirus [U07.1]      Priority: Medium    Elevated troponin [R77.8]      Priority: Medium    NSTEMI (non-ST elevated myocardial infarction) (HCC) [I21.4]      Priority: Medium    History of cerebrovascular accident [Z86.73]     Stage 3b chronic kidney disease ([facility] [N18.32]     DM2 (diabetes mellitus, type 2) ([facility] [E11.9]        The patient was seen and examined on day of discharge and this discharge summary is in conjunction with any daily progress note from day of discharge. Hospital Course:  80 Y F felt badly at home on 5/25, tested positive for COVID on 5/26, and went to the [facility] ED that afternoon to be evaluated. Her troponin was positive so she was admitted to [facility]. Cardiology determined that she didn't need any further testing. Her respiratory status remained stable. She can be discharged home. Her RUBEN resolved. CKD3, baseline Cr perhaps 1.1 - 1.3 per nephrology. She has been diagnosed with UTI. Discharging with another 5 days of PO cipro. Physical Exam Performed:     BP (!) 151/62   Pulse 65   Temp 98.4 °F (36.9 °C) (Oral)   Resp 16   Ht 5' 10\" (1.778 m)   Wt 137 lb 5.6 oz (62.3 kg)   SpO2 93%   BMI 19.71 kg/m²       General appearance:  No apparent distress, appears stated age and cooperative. HEENT:  Normal cephalic, atraumatic without obvious deformity. Pupils equal, round, and reactive to light. Extra ocular muscles intact. Conjunctivae/corneas clear. Neck: Supple, with full range of motion. No jugular venous distention. Trachea midline.   Respiratory: Normal respiratory effort. Clear to auscultation, bilaterally without Rales/Wheezes/Rhonchi. Cardiovascular:  Regular rate and rhythm with normal S1/S2 without murmurs, rubs or gallops. Abdomen: Soft, non-tender, non-distended with normal bowel sounds. Musculoskeletal:  No clubbing, cyanosis or edema bilaterally. Full range of motion without deformity. Skin: Skin color, texture, turgor normal.  No rashes or lesions. Neurologic:  Neurovascularly intact without any focal sensory/motor deficits. Cranial nerves: II-XII intact, grossly non-focal.  Psychiatric:  Alert and oriented, thought content appropriate, normal insight  Capillary Refill: Brisk,< 3 seconds   Peripheral Pulses: +2 palpable, equal bilaterally       Labs: For convenience and continuity at follow-up the following most recent labs are provided:      CBC:    Lab Results   Component Value Date    WBC 4.0 05/30/2022    HGB 11.3 05/30/2022    HCT 32.7 05/30/2022     05/30/2022       Renal:    Lab Results   Component Value Date     05/30/2022    K 3.9 05/30/2022     05/30/2022    CO2 24 05/30/2022    BUN 19 05/30/2022    CREATININE 1.1 05/30/2022    CALCIUM 9.0 05/30/2022    PHOS 2.6 05/28/2019         Significant Diagnostic Studies    Radiology:   No orders to display          Consults:     IP CONSULT TO CARDIOLOGY  IP CONSULT TO NEPHROLOGY  IP CONSULT TO HOME CARE NEEDS    Disposition:  home     Condition at Discharge: Stable    Discharge Instructions/Follow-up:  Follow up with PCP within 1-2 weeks. Stay in isolation through 6/4.     Code Status:  Full Code     Activity: activity as tolerated    Diet: regular diet      Discharge Medications:     Current Discharge Medication List           Details   ciprofloxacin (CIPRO) 250 mg tablet Take 1 tablet by mouth 2 times daily for 5 days  Qty: 10 tablet, Refills: 0              Details   aspirin 81 MG chewable tablet Take 1 tablet by mouth daily  Qty: 30 tablet, Refills: 3      LINAGLIPTIN PO Take by mouth      calcium carbonate (OSCAL) 500 MG TABS tablet Take 500 mg by mouth daily      NONFORMULARY tragenta      Cholecalciferol (VITAMIN D3) 50 MCG (2000 UT) CAPS Take 2,000 Units by mouth daily      pramipexole (MIRAPEX) 1 MG tablet TAKE 1 TABLET BY ORAL ROUTE ONCE A DAY (IN THE EVENING) AROUND 8-9 PM FOR RLS  Refills: 4      metoprolol tartrate (LOPRESSOR) 25 MG tablet TAKE 1 TABLET BY ORAL ROUTE 2 TIMES A DAY FOR BP  Refills: 4      ferrous sulfate 325 (65 Fe) MG tablet Take 325 mg by mouth 3 times daily (with meals)       Multiple Vitamins-Minerals (MULTIVITAMIN PO) Take by mouth daily      Cyanocobalamin (VITAMIN B-12) 5000 MCG SUBL Place 500 mcg under the tongue daily       pravastatin (PRAVACHOL) 10 MG tablet Take 10 mg by mouth daily                Time Spent on discharge is more than 30 minutes in the examination, evaluation, counseling and review of medications and discharge plan. Signed:    Miguel Angel Street MD   5/30/2022      Thank you João Soni MD for the opportunity to be involved in this patient's care. If you have any questions or concerns, please feel free to contact me at [phone].

## 2022-05-30 NOTE — ED PROVIDER NOTES
not have complaints, patient's daughter reports increased confusion and generalized weakness. Physical exam unremarkable. I personally saw the patient and performed a substantive portion of the visit including all aspects of the medical decision making. Differential diagnosis includes but is not limited to: COVID, flu, arrhythmia, electrolyte abnormalities, UTI, ACS      Workup showed:    Imaging:  XR CHEST (2 VW)   Final Result   No acute process. Chest x-ray shows no evidence of pneumonia, pneumothorax, effusion, pulmonary edema    ECG:  The Ekg interpreted by me shows  normal sinus rhythm with a rate of 66  Axis is   Normal  QTc is  within an acceptable range  Intervals and Durations are unremarkable. ST Segments: nonspecific changes  No significant change from prior EKG dated 7/1/21-appears improved from previous. There was an EKG performed at 1540 that was incorrectly put under this patient's name that reported acute MI. This EKG was not completed on this patient. ED Course as of 05/30/22 2231   Thu May 26, 2022   2021 Urinalysis shows evidence of infection, patient receiving ceftriaxone. [ER]   2021 No leukocytosis. Mild anemia at 11.1 and thrombocytopenia is 129. [ER]   2021 BNP elevated to 1633. [ER]   2021 No electrolyte abnormalities, mildly worsened kidney dysfunction with creatinine of 1.7. [ER]   2022 COVID-positive, flu negative. [ER]   2022 Liver function testing unremarkable. [ER]   2022 Patient denies chest pain or shortness of breath. Oxygen levels appropriate and patient is not tachycardic. Low suspicion for pulmonary embolism. [ER]   2022 Troponin is elevated to 0.11, she has been this high in the past but most recent value was 0.06. Repeat up trended to 0. 12. Discussed with cardiology who did recommend heparin drip.   We will plan for transfer to [facility]. [ER]      ED Course User Index  [ER] Van Mccarthy MD     Based on results of work-up, I am concerned for elevated troponin, possible NSTEMI. Cardiology consulted did recommend heparin drip and transferred to [facility]. Patient also has COVID, a UTI, and acute on chronic kidney disease. At this time, do feel the patient requires admission for further work-up and management. Discussed the patient with hospital team.    CLINICAL IMPRESSION  1. Urinary tract infection with hematuria, site unspecified    2. COVID    3. Elevated troponin    4. Fatigue, unspecified type    5. Chronic kidney disease, unspecified CKD stage    6. NSTEMI (non-ST elevated myocardial infarction) ([facility])        Blood pressure (!) 149/61, pulse 78, temperature 98.6 °F (37 °C), temperature source Oral, resp. rate 14, height 5' 10\" (1.778 m), weight 130 lb (59 kg), SpO2 95 %, not currently breastfeeding. DISPOSITION  Malinda Ramos was transferred to [facility] in stable condition. All diagnostic, treatment, and disposition decisions were made by myself in conjunction with the advanced practice provider. For all further details of the patient's emergency department visit, please see the advanced practice provider's documentation. Comment: Please note this report has been produced using speech recognition software and may contain errors related to that system including errors in grammar, punctuation, and spelling, as well as words and phrases that may be inappropriate. If there are any questions or concerns please feel free to contact the dictating provider for clarification.         Amy Cee MD  05/30/22 1264

## 2022-05-30 NOTE — CARE COORDINATION
CASE MANAGEMENT DISCHARGE SUMMARY      Discharge to: [address] completed: [address] [facility] Exemption Notification (HENS) completed: Yes    IMM given: (date) today     New Durable Medical Equipment ordered/agency: defer to SNF    Transportation:      Medical Transport explained to pt/family. Pt/family voice no agency preference.   None  Agency used:[facility]   time:4:00    Ambulance form completed: Yes    Confirmed discharge plan with: Daughter/ MD/ [address] [place] [address]     Patient: yes     Family:  yes Colten Scott Contact number:[phone]     Facility/Agency, name:  [facility] faxed to [phone]   Phone number for report to facility: [phone]     RN, name: Martinez Bermudez

## 2022-05-30 NOTE — CARE COORDINATION
Discharge orders noted. Spoke with daughter who states they cannot care for her or come get her because they all have covid and are too sick. She is agreeable to patient going to skilled rehab under her Medicare benefit at a covid unit if she can qualify. Daughter aware that choices are limited and has no preference of which covid facility she goes to. She is agreeable to a referral to [address] who are accepting covid patients. Referral initiated with Tyrese Cat in admissions. He will let writer know if they can accept. Patient with dementia. Daughter is primary decision maker. Will await facility decision to accept or not. Tentatively arranged transport for 4 PM with [facility] in anticipation of facility acceptance.

## 2022-06-03 NOTE — PROGRESS NOTES
Physician Progress Note      PATIENT:               Candace Henderson  CSN #:                  [id number]  :                       1935  ADMIT DATE:       2022 4:01 AM  DISCH DATE:        2022 5:30 PM  RESPONDING  PROVIDER #:        Tennis Needs MD          QUERY TEXT:    Pt admitted with \" She was transferred to [facility] because her troponin level   was mildly elevated\". Pt noted to have elevated troponin levels. If possible,   please document in the progress notes and discharge summary if you are   evaluating and/or treating any of the following: The medical record reflects the following:  Risk Factors: DM. CKD -3-RUBEN -Covid  Clinical Indicators: Cardiology consult- \" Pt with no active ischemia on exam   or ECG. Hx of chronic troponin elevation and renal insufficieny-  Assuming   normal echo, would continue conservative approach at this time given age,   fraility, CKD, and COVID Nephrology consult- Chronic kidney disease stage 3   from DM & prior RUBEN requiring dialysis  Baseline SCr   1.1-1.3 in past  Treatment: Cardiology and Nephrology consults, Trend labs, bp's, & urine   output  Trend out troponin. ? So far pattern is plateau. Limited TTE requested   for the morning to compare to .- ? Continue ASA, statin, metoprolol. Lisinopril on hold for now. - Held all oral antidiabetic agents. Start s.c. Insulin regimen based on weight, ECHO    Thank-You, Sarina Hidalgo RN, BSN, CCDS  Options provided:  -- Elevated troponin without myocardial injury  possibly due to DM with   chronic renal insufficiency/Covid infection  -- Non-ischemic myocardial injury due to***, please specify suspected cause, ,   please specify other suspected cause.   -- Non-ischemic myocardial injury, unspecified cause  -- Other - I will add my own diagnosis  -- Disagree - Not applicable / Not valid  -- Disagree - Clinically unable to determine / Unknown  -- Refer to Clinical Documentation Reviewer    PROVIDER RESPONSE TEXT:    This patient has elevated troponin without myocardial injury possibly due to   DM with chronic renal insufficiency/Covid infection.     Query created by: Xiao Merino on 6/3/2022 9:58 AM      Electronically signed by:  Ricardo Gilliam MD 6/3/2022 4:53 PM

## 2022-06-26 PROBLEM — R77.8 ELEVATED TROPONIN: Status: RESOLVED | Noted: 2022-05-27 | Resolved: 2022-06-26

## 2022-06-26 PROBLEM — R79.89 ELEVATED TROPONIN: Status: RESOLVED | Noted: 2022-05-27 | Resolved: 2022-06-26

## 2023-08-29 NOTE — PROGRESS NOTES
Pt arrived to unit.  Dr Jessika Barrett verbally notified for admission This nurse spoke to pt's dtr Elena stating letter ready for pick-up     Dtr stated that she will come to [place] office later today to get letter.

## 2023-12-17 ENCOUNTER — HOSPITAL ENCOUNTER (INPATIENT)
Age: 88
LOS: 3 days | Discharge: NURSING, REHAB OR LONG-TERM CARE | DRG: 065 | End: 2023-12-20
Attending: STUDENT IN AN ORGANIZED HEALTH CARE EDUCATION/TRAINING PROGRAM | Admitting: STUDENT IN AN ORGANIZED HEALTH CARE EDUCATION/TRAINING PROGRAM
Payer: MEDICARE

## 2023-12-17 PROBLEM — I63.9 ACUTE CEREBROVASCULAR ACCIDENT (CVA) (HCC): Status: ACTIVE | Noted: 2023-12-17

## 2023-12-17 PROCEDURE — 2700000000 HC OXYGEN THERAPY PER DAY

## 2023-12-17 PROCEDURE — 2100000000 HC CCU R&B

## 2023-12-17 PROCEDURE — 94761 N-INVAS EAR/PLS OXIMETRY MLT: CPT

## 2023-12-17 RX ORDER — CARVEDILOL 6.25 MG/1
12.5 TABLET ORAL 2 TIMES DAILY WITH MEALS
Status: DISCONTINUED | OUTPATIENT
Start: 2023-12-18 | End: 2023-12-20 | Stop reason: HOSPADM

## 2023-12-17 RX ORDER — INSULIN LISPRO 100 [IU]/ML
0-4 INJECTION, SOLUTION INTRAVENOUS; SUBCUTANEOUS
Status: DISCONTINUED | OUTPATIENT
Start: 2023-12-18 | End: 2023-12-20 | Stop reason: HOSPADM

## 2023-12-17 RX ORDER — INSULIN LISPRO 100 [IU]/ML
0-4 INJECTION, SOLUTION INTRAVENOUS; SUBCUTANEOUS NIGHTLY
Status: DISCONTINUED | OUTPATIENT
Start: 2023-12-17 | End: 2023-12-20 | Stop reason: HOSPADM

## 2023-12-17 RX ORDER — DEXTROSE MONOHYDRATE 100 MG/ML
INJECTION, SOLUTION INTRAVENOUS CONTINUOUS PRN
Status: DISCONTINUED | OUTPATIENT
Start: 2023-12-17 | End: 2023-12-20 | Stop reason: HOSPADM

## 2023-12-17 RX ORDER — SODIUM CHLORIDE 9 MG/ML
INJECTION, SOLUTION INTRAVENOUS PRN
Status: DISCONTINUED | OUTPATIENT
Start: 2023-12-17 | End: 2023-12-20 | Stop reason: HOSPADM

## 2023-12-17 RX ORDER — SODIUM CHLORIDE 0.9 % (FLUSH) 0.9 %
5-40 SYRINGE (ML) INJECTION PRN
Status: DISCONTINUED | OUTPATIENT
Start: 2023-12-17 | End: 2023-12-19

## 2023-12-17 RX ORDER — FERROUS SULFATE 325(65) MG
325 TABLET ORAL 2 TIMES DAILY WITH MEALS
Status: DISCONTINUED | OUTPATIENT
Start: 2023-12-18 | End: 2023-12-20 | Stop reason: HOSPADM

## 2023-12-17 RX ORDER — POLYETHYLENE GLYCOL 3350 17 G/17G
17 POWDER, FOR SOLUTION ORAL DAILY PRN
Status: DISCONTINUED | OUTPATIENT
Start: 2023-12-17 | End: 2023-12-19

## 2023-12-17 RX ORDER — PRAVASTATIN SODIUM 20 MG
20 TABLET ORAL NIGHTLY
Status: DISCONTINUED | OUTPATIENT
Start: 2023-12-17 | End: 2023-12-18

## 2023-12-17 RX ORDER — ACETAMINOPHEN 650 MG/1
650 SUPPOSITORY RECTAL EVERY 6 HOURS PRN
Status: DISCONTINUED | OUTPATIENT
Start: 2023-12-17 | End: 2023-12-20 | Stop reason: HOSPADM

## 2023-12-17 RX ORDER — ACETAMINOPHEN 325 MG/1
650 TABLET ORAL EVERY 6 HOURS PRN
Status: DISCONTINUED | OUTPATIENT
Start: 2023-12-17 | End: 2023-12-20 | Stop reason: HOSPADM

## 2023-12-17 RX ORDER — MIRTAZAPINE 15 MG/1
15 TABLET, FILM COATED ORAL NIGHTLY
Status: DISCONTINUED | OUTPATIENT
Start: 2023-12-17 | End: 2023-12-20 | Stop reason: HOSPADM

## 2023-12-17 RX ORDER — ALOGLIPTIN 6.25 MG/1
6.25 TABLET, FILM COATED ORAL DAILY
Status: DISCONTINUED | OUTPATIENT
Start: 2023-12-18 | End: 2023-12-20 | Stop reason: HOSPADM

## 2023-12-17 RX ORDER — ONDANSETRON 2 MG/ML
4 INJECTION INTRAMUSCULAR; INTRAVENOUS EVERY 6 HOURS PRN
Status: DISCONTINUED | OUTPATIENT
Start: 2023-12-17 | End: 2023-12-20 | Stop reason: HOSPADM

## 2023-12-17 RX ORDER — ONDANSETRON 4 MG/1
4 TABLET, ORALLY DISINTEGRATING ORAL EVERY 8 HOURS PRN
Status: DISCONTINUED | OUTPATIENT
Start: 2023-12-17 | End: 2023-12-20 | Stop reason: HOSPADM

## 2023-12-17 RX ORDER — SODIUM CHLORIDE 0.9 % (FLUSH) 0.9 %
5-40 SYRINGE (ML) INJECTION EVERY 12 HOURS SCHEDULED
Status: DISCONTINUED | OUTPATIENT
Start: 2023-12-17 | End: 2023-12-19

## 2023-12-17 RX ORDER — PRAMIPEXOLE DIHYDROCHLORIDE 0.25 MG/1
1 TABLET ORAL NIGHTLY PRN
Status: DISCONTINUED | OUTPATIENT
Start: 2023-12-17 | End: 2023-12-20 | Stop reason: HOSPADM

## 2023-12-18 ENCOUNTER — APPOINTMENT (OUTPATIENT)
Dept: MRI IMAGING | Age: 88
DRG: 065 | End: 2023-12-18
Attending: STUDENT IN AN ORGANIZED HEALTH CARE EDUCATION/TRAINING PROGRAM
Payer: MEDICARE

## 2023-12-18 ENCOUNTER — APPOINTMENT (OUTPATIENT)
Dept: CT IMAGING | Age: 88
DRG: 065 | End: 2023-12-18
Attending: STUDENT IN AN ORGANIZED HEALTH CARE EDUCATION/TRAINING PROGRAM
Payer: MEDICARE

## 2023-12-18 PROBLEM — E13.8 DM (DIABETES MELLITUS), SECONDARY, WITH COMPLICATIONS (HCC): Status: ACTIVE | Noted: 2023-12-18

## 2023-12-18 PROBLEM — R79.89 ELEVATED TROPONIN LEVEL NOT DUE MYOCARDIAL INFARCTION: Status: ACTIVE | Noted: 2023-12-18

## 2023-12-18 PROBLEM — R77.8 ELEVATED TROPONIN LEVEL NOT DUE MYOCARDIAL INFARCTION: Status: ACTIVE | Noted: 2023-12-18

## 2023-12-18 LAB
ALBUMIN SERPL-MCNC: 3.8 G/DL (ref 3.4–5)
ALBUMIN/GLOB SERPL: 1.8 {RATIO} (ref 1.1–2.2)
ALP SERPL-CCNC: 51 U/L (ref 40–129)
ALT SERPL-CCNC: 14 U/L (ref 10–40)
ANION GAP SERPL CALCULATED.3IONS-SCNC: 10 MMOL/L (ref 3–16)
AST SERPL-CCNC: 22 U/L (ref 15–37)
BASOPHILS # BLD: 0 K/UL (ref 0–0.2)
BASOPHILS NFR BLD: 0.4 %
BILIRUB SERPL-MCNC: 0.4 MG/DL (ref 0–1)
BUN SERPL-MCNC: 28 MG/DL (ref 7–20)
CALCIUM SERPL-MCNC: 9.2 MG/DL (ref 8.3–10.6)
CHLORIDE SERPL-SCNC: 104 MMOL/L (ref 99–110)
CO2 SERPL-SCNC: 24 MMOL/L (ref 21–32)
CREAT SERPL-MCNC: 1.3 MG/DL (ref 0.6–1.2)
DEPRECATED RDW RBC AUTO: 13.5 % (ref 12.4–15.4)
EOSINOPHIL # BLD: 0.2 K/UL (ref 0–0.6)
EOSINOPHIL NFR BLD: 3.9 %
EST. AVERAGE GLUCOSE BLD GHB EST-MCNC: 139.9 MG/DL
GFR SERPLBLD CREATININE-BSD FMLA CKD-EPI: 40 ML/MIN/{1.73_M2}
GLUCOSE BLD-MCNC: 115 MG/DL (ref 70–99)
GLUCOSE BLD-MCNC: 204 MG/DL (ref 70–99)
GLUCOSE BLD-MCNC: 79 MG/DL (ref 70–99)
GLUCOSE BLD-MCNC: 94 MG/DL (ref 70–99)
GLUCOSE BLD-MCNC: 96 MG/DL (ref 70–99)
GLUCOSE SERPL-MCNC: 108 MG/DL (ref 70–99)
HBA1C MFR BLD: 6.5 %
HCT VFR BLD AUTO: 30.9 % (ref 36–48)
HGB BLD-MCNC: 10.5 G/DL (ref 12–16)
LYMPHOCYTES # BLD: 0.9 K/UL (ref 1–5.1)
LYMPHOCYTES NFR BLD: 16.4 %
MCH RBC QN AUTO: 32.8 PG (ref 26–34)
MCHC RBC AUTO-ENTMCNC: 34.1 G/DL (ref 31–36)
MCV RBC AUTO: 96.4 FL (ref 80–100)
MONOCYTES # BLD: 0.5 K/UL (ref 0–1.3)
MONOCYTES NFR BLD: 8.3 %
NEUTROPHILS # BLD: 4 K/UL (ref 1.7–7.7)
NEUTROPHILS NFR BLD: 71 %
PERFORMED ON: ABNORMAL
PERFORMED ON: ABNORMAL
PERFORMED ON: NORMAL
PLATELET # BLD AUTO: 171 K/UL (ref 135–450)
PMV BLD AUTO: 9.2 FL (ref 5–10.5)
POTASSIUM SERPL-SCNC: 3.7 MMOL/L (ref 3.5–5.1)
PROT SERPL-MCNC: 5.9 G/DL (ref 6.4–8.2)
RBC # BLD AUTO: 3.21 M/UL (ref 4–5.2)
SODIUM SERPL-SCNC: 138 MMOL/L (ref 136–145)
WBC # BLD AUTO: 5.6 K/UL (ref 4–11)

## 2023-12-18 PROCEDURE — 70551 MRI BRAIN STEM W/O DYE: CPT

## 2023-12-18 PROCEDURE — 97530 THERAPEUTIC ACTIVITIES: CPT

## 2023-12-18 PROCEDURE — 80053 COMPREHEN METABOLIC PANEL: CPT

## 2023-12-18 PROCEDURE — 85025 COMPLETE CBC W/AUTO DIFF WBC: CPT

## 2023-12-18 PROCEDURE — 92526 ORAL FUNCTION THERAPY: CPT

## 2023-12-18 PROCEDURE — 99291 CRITICAL CARE FIRST HOUR: CPT | Performed by: INTERNAL MEDICINE

## 2023-12-18 PROCEDURE — 97167 OT EVAL HIGH COMPLEX 60 MIN: CPT

## 2023-12-18 PROCEDURE — 99223 1ST HOSP IP/OBS HIGH 75: CPT | Performed by: PSYCHIATRY & NEUROLOGY

## 2023-12-18 PROCEDURE — 36415 COLL VENOUS BLD VENIPUNCTURE: CPT

## 2023-12-18 PROCEDURE — 97162 PT EVAL MOD COMPLEX 30 MIN: CPT

## 2023-12-18 PROCEDURE — 2100000000 HC CCU R&B

## 2023-12-18 PROCEDURE — 6360000002 HC RX W HCPCS: Performed by: STUDENT IN AN ORGANIZED HEALTH CARE EDUCATION/TRAINING PROGRAM

## 2023-12-18 PROCEDURE — 83036 HEMOGLOBIN GLYCOSYLATED A1C: CPT

## 2023-12-18 PROCEDURE — 2580000003 HC RX 258: Performed by: STUDENT IN AN ORGANIZED HEALTH CARE EDUCATION/TRAINING PROGRAM

## 2023-12-18 PROCEDURE — 6370000000 HC RX 637 (ALT 250 FOR IP): Performed by: INTERNAL MEDICINE

## 2023-12-18 PROCEDURE — 92610 EVALUATE SWALLOWING FUNCTION: CPT

## 2023-12-18 PROCEDURE — 2580000003 HC RX 258: Performed by: INTERNAL MEDICINE

## 2023-12-18 PROCEDURE — APPSS60 APP SPLIT SHARED TIME 46-60 MINUTES: Performed by: NURSE PRACTITIONER

## 2023-12-18 PROCEDURE — 97535 SELF CARE MNGMENT TRAINING: CPT

## 2023-12-18 PROCEDURE — 6370000000 HC RX 637 (ALT 250 FOR IP): Performed by: STUDENT IN AN ORGANIZED HEALTH CARE EDUCATION/TRAINING PROGRAM

## 2023-12-18 PROCEDURE — 70450 CT HEAD/BRAIN W/O DYE: CPT

## 2023-12-18 RX ORDER — SODIUM CHLORIDE 0.9 % (FLUSH) 0.9 %
5-40 SYRINGE (ML) INJECTION PRN
Status: DISCONTINUED | OUTPATIENT
Start: 2023-12-18 | End: 2023-12-20 | Stop reason: HOSPADM

## 2023-12-18 RX ORDER — ASPIRIN 300 MG/1
300 SUPPOSITORY RECTAL DAILY
Status: DISCONTINUED | OUTPATIENT
Start: 2023-12-19 | End: 2023-12-20 | Stop reason: HOSPADM

## 2023-12-18 RX ORDER — ATORVASTATIN CALCIUM 80 MG/1
80 TABLET, FILM COATED ORAL NIGHTLY
Status: DISCONTINUED | OUTPATIENT
Start: 2023-12-18 | End: 2023-12-20 | Stop reason: HOSPADM

## 2023-12-18 RX ORDER — POLYETHYLENE GLYCOL 3350 17 G/17G
17 POWDER, FOR SOLUTION ORAL DAILY PRN
Status: DISCONTINUED | OUTPATIENT
Start: 2023-12-18 | End: 2023-12-20 | Stop reason: HOSPADM

## 2023-12-18 RX ORDER — SODIUM CHLORIDE 0.9 % (FLUSH) 0.9 %
5-40 SYRINGE (ML) INJECTION EVERY 12 HOURS SCHEDULED
Status: DISCONTINUED | OUTPATIENT
Start: 2023-12-18 | End: 2023-12-20 | Stop reason: HOSPADM

## 2023-12-18 RX ORDER — ASPIRIN 81 MG/1
81 TABLET, CHEWABLE ORAL DAILY
Status: DISCONTINUED | OUTPATIENT
Start: 2023-12-19 | End: 2023-12-20 | Stop reason: HOSPADM

## 2023-12-18 RX ORDER — SODIUM CHLORIDE 9 MG/ML
INJECTION, SOLUTION INTRAVENOUS PRN
Status: DISCONTINUED | OUTPATIENT
Start: 2023-12-18 | End: 2023-12-20 | Stop reason: HOSPADM

## 2023-12-18 RX ADMIN — SODIUM CHLORIDE, PRESERVATIVE FREE 10 ML: 5 INJECTION INTRAVENOUS at 09:08

## 2023-12-18 RX ADMIN — CARVEDILOL 12.5 MG: 6.25 TABLET, FILM COATED ORAL at 11:42

## 2023-12-18 RX ADMIN — MUPIROCIN: 20 OINTMENT TOPICAL at 20:55

## 2023-12-18 RX ADMIN — MUPIROCIN: 20 OINTMENT TOPICAL at 09:48

## 2023-12-18 RX ADMIN — MIRTAZAPINE 15 MG: 15 TABLET, FILM COATED ORAL at 20:54

## 2023-12-18 RX ADMIN — ALOGLIPTIN 6.25 MG: 6.25 TABLET, FILM COATED ORAL at 11:14

## 2023-12-18 RX ADMIN — ONDANSETRON 4 MG: 2 INJECTION INTRAMUSCULAR; INTRAVENOUS at 09:45

## 2023-12-18 RX ADMIN — Medication 10 ML: at 20:56

## 2023-12-18 RX ADMIN — ACETAMINOPHEN 650 MG: 325 TABLET ORAL at 04:30

## 2023-12-18 RX ADMIN — MIRTAZAPINE 15 MG: 15 TABLET, FILM COATED ORAL at 02:09

## 2023-12-18 RX ADMIN — FERROUS SULFATE TAB 325 MG (65 MG ELEMENTAL FE) 325 MG: 325 (65 FE) TAB at 18:32

## 2023-12-18 RX ADMIN — PRAVASTATIN SODIUM 20 MG: 20 TABLET ORAL at 02:09

## 2023-12-18 RX ADMIN — SODIUM CHLORIDE, PRESERVATIVE FREE 10 ML: 5 INJECTION INTRAVENOUS at 02:09

## 2023-12-18 RX ADMIN — ATORVASTATIN CALCIUM 80 MG: 80 TABLET, FILM COATED ORAL at 20:54

## 2023-12-18 RX ADMIN — PRAMIPEXOLE DIHYDROCHLORIDE 1 MG: 0.25 TABLET ORAL at 04:48

## 2023-12-18 RX ADMIN — SODIUM CHLORIDE, PRESERVATIVE FREE 10 ML: 5 INJECTION INTRAVENOUS at 20:55

## 2023-12-18 RX ADMIN — CARVEDILOL 12.5 MG: 6.25 TABLET, FILM COATED ORAL at 18:32

## 2023-12-18 NOTE — PROGRESS NOTES
Admitted Pt from [facility] as a case of Stroke. Kept on room air. No signs of respiratory distress. Hooked on cardiac monitor and initial vital signs takena nd documented. Neuro checks assessment initiated and monitored accordingly. Bathed with chlorhexidine wipes. Call light given and instructed. Family has been updated. 4 Eyes Skin Assessment     NAME:  Kanu Mcdaniel  YOB: 1935  MEDICAL RECORD NUMBER:  [id number]    The patient is being assessed for  Admission    I agree that at least one RN has performed a thorough Head to Toe Skin Assessment on the patient. ALL assessment sites listed below have been assessed. Areas assessed by both nurses:    Head, Face, Ears, Shoulders, Back, Chest, Arms, Elbows, Hands, Sacrum. Buttock, Coccyx, Ischium, Legs. Feet and Heels, and Under Medical Devices         Does the Patient have a Wound?  No noted wound(s); with amputated 2 toes on LT foot and 3 toes on her LT foot       Shukri Prevention initiated by RN: Yes  Wound Care Orders initiated by RN: No wound on admission      Pressure Injury (Stage 3,4, Unstageable, DTI, NWPT, and Complex wounds) if present, place Wound referral order by RN under : No    New Ostomies, if present place, Ostomy referral order under : No     Nurse 1 eSignature: Electronically signed by Traci Tierney RN on 12/18/23 at 3:42 AM EST    **SHARE this note so that the co-signing nurse can place an eSignature**    Nurse 2 eSignature: Electronically signed by Althea Saldivar RN on 12/18/23 at 3:46 AM EST

## 2023-12-18 NOTE — PROGRESS NOTES
0900 Dr. Delarosa Ek seen patient. Okay to hold coreg if HR <60 bpm as per MD  [phone] Patient is noted to be nauseated, given Zofran IV PRN.

## 2023-12-18 NOTE — PROGRESS NOTES
Hospital Medicine Progress Note        Subjective:  She is feeling OK overall. R hand remains weak and clumsy. General appearance: No apparent distress, appears stated age and cooperative. HEENT: Pupils equal, round. Conjunctivae/corneas clear. Neck: No jugular venous distention. Respiratory:  Normal respiratory effort. Bilaterally without Rales/Wheezes/Rhonchi. Cardiovascular: Normal rate and regular rhythm with normal S1/S2 without murmurs, rubs or gallops. Abdomen: Soft, non-tender, non-distended with normal bowel sounds. Musculoskeletal: No cyanosis bilaterally. No edema. Without deformity. Skin: No jaundice. No rashes or lesions. Neurologic:  Neurovascularly intact without any focal sensory deficits. Cranial nerves: II-XII intact, grossly non-focal.  R hand with decreased  strength, also cannot extend fingers fully. Psychiatric: Alert and oriented, normal insight. Assessment and Plan:       \"80 y.o. female with past medical history of CVA, T2DM, HLD, RLS, HTN presented to [facility] as transfer from [place]. [facility] after receiving TNK. Patient initially was brought to ED for SOB but endorses right hand weakness upon presentation to [place]. [facility]. Was found to have NIH of 3. Code Stroke was called and tele-neurologist felt patient was appropriate for TNK. After TNK was administered, patient was transferred. \"      Possible acute ischemic CVA. Given tenectaplase. F/u MRI brain and TTE. Aspirin (after safety CT), increased statin. Elevated troponin. Perhaps due to stroke. Cardiology recommended conservative management. TTE as above. HTN. Carvedilol. DM2. A1c 6.5. Continue alogliptin. She is on mirtazapine for appetite stimulation. Iron deficiency anemia. Ferrous sulfate. Incidental thyroid nodule on CT. PCP can consider outpatient thyroid ultrasound. Diet: ADULT DIET;  Regular; 4 carb choices (60 gm/meal)  DVT Prophylaxis: SCDs for now, can use

## 2023-12-18 NOTE — CONSULTS
[address]   Cardiovascular Evaluation    PATIENT: Richard Kendall  DATE: 2023  MRN: [id number]  CSN: [id number]  : 1935    Primary Care Doctor/Referring provider: Ruben Dial MD, Isabella Rider DO     Reason for evaluation/Chief complaint:   Elevated troponin levels    Subjective:    History of present illness on initial date of evaluation:   Richard Kendall is a 80 y.o. patient who presented to the hospital further evaluation of right arm weakness. Patient initially presented to outside emergency room with new onset right upper extremity weakness. She was evaluated and treated for an acute CVA. Patient received TNK and transferred to [facility] for further neurological and critical care evaluation. As part of the patient's workup, troponin levels were noted to be elevated. Interventional cardiology was asked see the patient for further recommendations and management regarding the patient's elevated troponin levels. The patient was seen and evaluated in the medical intensive care unit this morning she denies any chest pain, shortness of breath or palpitations. Unfortunately, the patient has somewhat of a forgetful memory and unable to provide reliable history of present illness. There is no family at bedside. Majority information is taken from discussion with medical staff and review of the chart. .              Cardiac Testing: I have reviewed the findings below. EKG:  ECHO:   STRESS TEST:  CATH:  BYPASS:  VASCULAR:    Past Medical History:   has a past medical history of Clostridium difficile infection, Dementia ([address]), Diabetes mellitus ([address]), History of blood transfusion, Hyperlipidemia, Hypertension, Infection, MDRO (multiple drug resistant organisms) resistance, MRSA infection, Osteomyelitis of spine ([address]), and Restless leg syndrome. Surgical History:   has a past surgical history that includes Tonsillectomy;  Hysterectomy; Appendectomy; Toe amputation (2003); Toe amputation (2011); eye surgery; Colonoscopy (1997); Colonoscopy (7/15/15); other surgical history (Left); Foot surgery (Left, 05/30/2017); Upper gastrointestinal endoscopy (06/05/2017); Foot surgery (Right, 12/11/2017); Foot Amputation (Right, 05/26/2019); and Foot Amputation (Right, 5/26/2019). Social History:   reports that she has never smoked. She has never used smokeless tobacco. She reports current alcohol use. She reports that she does not use drugs. Family History:  No evidence for sudden cardiac death or premature CAD    Medications:  Reviewed and are listed in nursing record. and/or listed below  Outpatient Medications:  Prior to Admission medications    Medication Sig Start Date End Date Taking? Authorizing Provider   carvedilol (COREG) 12.5 MG tablet TAKE 12.5 MG ORALLY EVERY 12 HOURS FOR BLOOD PRESSURE AND PULSE.  MUST ADMINISTER WITH A MEAL/FOOD 10/8/23   Madalyn Salinas MD   mirtazapine (REMERON SOL-TAB) 15 MG disintegrating tablet DISSOLVE 1 TABLET BY MOUTH BEDTIME FOR INSOMNIA AND APPETITE 10/23/23   Madalyn Salinas MD   Probiotic Product (PROBIOTIC-10 PO) Take by mouth    Madalyn Salinas MD   GINSENG PO Take by mouth    Madalyn Salinas MD   linagliptin (TRADJENTA) 5 MG tablet Take 1 tablet by mouth every morning    Madalyn Salinas MD   calcium carbonate (OSCAL) 500 MG TABS tablet Take 1 tablet by mouth daily    Madalyn Salinas MD   Cholecalciferol (VITAMIN D3) 50 MCG (2000 UT) CAPS Take 100 Units by mouth daily    Madalyn Salinas MD   ferrous sulfate 325 (65 Fe) MG tablet Take 1 tablet by mouth 3 times daily (with meals)    Madalyn Salinas MD   Multiple Vitamins-Minerals (MULTIVITAMIN PO) Take by mouth daily    Madalyn Salinas MD   Cyanocobalamin (VITAMIN B-12) 5000 MCG SUBL Take 1,000 mcg by mouth daily    Madalyn Salinas MD   pravastatin (PRAVACHOL) 20 MG tablet Take 1 tablet by mouth at

## 2023-12-18 NOTE — CARE COORDINATION
Advance Care Planning     General Advance Care Planning (ACP) Conversation    Date of Conversation: 12/17/2023  Conducted with: Patient with Decision Making Capacity   Healthcare Decision Maker: Named in Advance Directive or Healthcare Power of  (name) [address] [place] [address] Decision Maker:    Primary Decision Maker: Elijah Carolyn Parks - [phone]    Secondary Decision Maker: Preeti Owen - [phone]  Click here to complete Healthcare Decision Makers including selection of the Healthcare Decision Maker Relationship (ie \"Primary\"). Today we documented Decision Maker(s) consistent with ACP documents on file. Content/Action Overview:   Has ACP document(s) on file - reflects the patient's care preferences  Reviewed DNR/DNI and patient elects Full Code (Attempt Resuscitation)        Length of Voluntary ACP Conversation in minutes:  <16 minutes (Non-Billable)    Jennifer Velar, RN

## 2023-12-18 NOTE — PLAN OF CARE
SLP completed evaluation. Please refer to notes in EMR.       Matt Maldonado MA [facility]  Speech Language Pathologist

## 2023-12-18 NOTE — PROGRESS NOTES
1010 - Daughter Geri Hendrix called into check on patient and updated by RN. Daughter stated that patient always has numbness to both feet d/t  her diabetes and history of ulcer to feet and surgeries to her feet. Also stated that patient is on a medication to increase her appetite as she does not eat well at home.    Alexei Manriquez RN

## 2023-12-18 NOTE — H&P
Hospital Medicine History & Physical      Date of Admission: 12/17/2023    Date of Service:  Pt seen/examined on 12/17/2023     [x]Admitted to Inpatient with expected LOS greater than two midnights due to medical therapy. []Placed in Observation status. Chief Admission Complaint:  right hand weakness     Presenting Admission History:      80 y.o. female with past medical history of CVA, T2DM, HLD, RLS, HTN presented to [facility] as transfer from [place]. [place] after receiving TNK. Patient initially was brought to ED for SOB but endorses right hand weakness upon presentation to [place]. [facility]. Was found to have NIH of 3. Code Stroke was called and tele-neurologist felt patient was appropriate for TNK. After TNK was administered, patient was transferred    Assessment/Plan:      Current Principal Problem:  Acute cerebrovascular accident (CVA) ([address])    Acute CVA   -endorsed right hand weakness; states improvement of weakness after receiving TNK   -only mild difference in strength between right and left hand strength on my exam   -will admit to ICU for neuro checks, repeat CT brain w/o contrast in AM     Elevated Troponin   - 165 --> 157  -T wave inversions   -not endorsing chest pain   -tele, cards consult     HTN  -will restart home coreg in AM, prn control for now     T2DM   -continue home oral meds   -low dose SSI, BG ACHS    HLD   -continue pravachol     Depression  -continue mirtazapine     WALLACE  -continue BID Fe       Discussed management and the need for Hospitalization of the patient w/ the Emergency Department Provider: Da Cortes     CXR: I have reviewed the CXR with the following interpretation: chronic fibrotic changes   EKG:  I have reviewed the EKG with the following interpretation: T wave inversions     Physical Exam Performed: There were no vitals taken for this visit. General appearance:  No apparent distress, appears stated age and cooperative.   HEENT:  Pupils equal, round, and reactive to

## 2023-12-18 NOTE — CARE COORDINATION
Case Management Assessment  Initial Evaluation    Date/Time of Evaluation: 12/18/2023 3:25 PM  Assessment Completed by: Nelson Mcguire RN    If patient is discharged prior to next notation, then this note serves as note for discharge by case management. Patient Name: Sophy Nunn                   YOB: 1935  Diagnosis: Acute cerebrovascular accident (CVA) [facility]) [I63.9]                   Date / Time: 12/17/2023 10:19 PM    Patient Admission Status: Inpatient   Readmission Risk (Low < 19, Mod (19-27), High > 27): Readmission Risk Score: 15.9    Current PCP: Yanira Valera MD  PCP verified by CM? Yes    Chart Reviewed: Yes      History Provided by: Child/Family (SPoke to [place])  Patient Orientation: Unable to Assess (sleeping)    Patient Cognition: Short Term Memory Deficit    Hospitalization in the last 30 days (Readmission):  No    If yes, Readmission Assessment in CM Navigator will be completed. Advance Directives:      Code Status: Full Code   Patient's Primary Decision Maker is: Named in [address] [place] [address]    [facility] - [phone]    Secondary Decision Maker: Thony Mullins - [phone]    Discharge Planning:    Patient lives with: Children Type of Home: House  Primary Care Giver:    Patient Support Systems include: Children, Family Members   Current Financial resources: Medicare  Current community resources: None  Current services prior to admission: None            Current DME:              Type of Home Care services:  Aide Services    ADLS  Prior functional level: Assistance with the following:, Bathing  Current functional level: Assistance with the following:, Bathing    PT AM-PAC: 10 /24  OT AM-PAC: 10 /24    Family can provide assistance at DC: Yes  Would you like Case Management to discuss the discharge plan with any other family members/significant others, and if so, who?  No  Plans to Return to Present Housing: Yes  Other Identified Issues/Barriers to RETURNING to current housing: no  Potential Assistance needed at discharge: N/A            Potential DME:    Patient expects to discharge to: [address] [facility] for transportation at discharge:      Financial    Payor: Hero Martinez / Plan: MEDICARE PART A AND B / Product Type: *No Product type* /     Does insurance require precert for SNF: No    Potential assistance Purchasing Medications: No  Meds-to-Beds request: Yes      [facility] [address] [facility],[address] [facility]  [address] [facility] [address]  Phone: [phone] Fax: [phone]      Notes:    Factors facilitating achievement of predicted outcomes: Family support, Cooperative, Pleasant, and Has needed Durable Medical Equipment at home    Barriers to discharge: Confusion, Cognitive deficit, and Limited insight into deficits    Additional Case Management Notes: Spoke to pt.'s daughter Dirk Yee. Pt has some dementia and r/o CVA. Per Dirk Yee the pt lives with her and her spouse . The pt uses a walker all the time. She is able to dress herself but needs some assistance to bathe. They family all have Covid now but pt tested negative  yesterday. Pt  had a CVA 2 years ago and was on Aspirin but MD stopped it  back in the spring. Pt received TNK. Will monitor progress and needs. Dirk Yee is sending me the AD info. The Plan for Transition of Care is related to the following treatment goals of Acute cerebrovascular accident (CVA) ([address]) [H67.5]    IF APPLICABLE: The Patient and/or patient representative Earl Braun and her family were provided with a choice of provider and agrees with the discharge plan.  Freedom of choice list with basic dialogue that supports the patient's individualized plan of care/goals and shares the quality data associated with the providers was provided to:     Patient Representative Name:       The Patient and/or Patient Representative Agree with the Discharge

## 2023-12-18 NOTE — PROGRESS NOTES
Speech Language Pathology  Clinical Bedside Swallow Assessment  Facility/Department: [facility] C2 CARD TELEMETRY        Recommendations:  Diet recommendation: IDDSI 7 Regular Solids (encouraged to choose softer foods); IDDSI 0 Thin Liquids; Meds PO as tolerated  Instrumentation: Not clinically indicated at this time. Will continue to monitor  Risk management: upright for all intake, stay upright for at least 30 mins after intake, small bites/sips, distant supervision with intake, oral care 2-3x/day to reduce adverse affects in the event of aspiration, alternate bites/sips, slow rate of intake, general GERD precautions, general aspiration precautions, and hold PO and contact SLP if s/s of aspiration or worsening respiratory status develop. *pt s/p TNK. Downgrade to NPO and contact SLP if worsening respiratory status or overt s/s of aspiration. Pt will benefit from a cognitive-linguistic eval during admission. NAME:Whitney Spence  : 1935 (80 y.o.)   MRN: [id number]  ROOM: [address] [place] [address]  ADMISSION DATE: 2023  PATIENT DIAGNOSIS(ES): Acute cerebrovascular accident (CVA) ([address]) [I63.9]  No chief complaint on file.     Patient Active Problem List    Diagnosis Date Noted    HTN (hypertension)     Altered mental status     Infection due to 2019 novel coronavirus 2022    NSTEMI (non-ST elevated myocardial infarction) ([address]) 2022    Elevated troponin level not due myocardial infarction 2023    Acute cerebrovascular accident (CVA) ([address]) 2023    History of cerebrovascular accident 2022    Arterial ischemic stroke, ICA, left, acute ([address]) 07/15/2021    Carotid artery stenosis with cerebral infarction ([address]) 07/15/2021    Dyslipidemia 07/15/2021    Stage 3b chronic kidney disease ([address]) 2021    Chest pain     Generalized weakness 2021    Osteomyelitis of right foot ([address])     Chronic kidney disease     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

## 2023-12-18 NOTE — PROGRESS NOTES
Occupational Therapy  Facility/Department: [facility] C2 CARD TELEMETRY  Occupational Therapy Initial Assessment    Name: Mary Jones  : 1935  MRN: [id number]  Date of Service: 2023    Discharge Recommendations:  IP Rehab     Therapy discharge recommendations take into account each patient's current medical complexities and are subject to input/oversight from the patient's healthcare team.   Barriers to Home Discharge:   [x] Steps to access home entry or bed/bath:   [] Unable to transfer, ambulate, or propel wheelchair household distances without assist   [] Limited available assist at home upon discharge    [] Patient or family requests d/c to post-acute facility    [x] Poor cognition/safety awareness for d/c to home alone    []Unable to maintain ordered weight bearing status    [] Patient with salient signs of long-standing immobility   [x] Patient is at risk for falls due to: cognition, decreased mobility status/weakness   [x] Other: Decreased ADL status    If pt is unable to be seen after this session, please let this note serve as discharge summary. Please see case management note for discharge disposition. Thank you. Patient Diagnosis(es): There were no encounter diagnoses. Past Medical History:  has a past medical history of Clostridium difficile infection, Dementia ([address]), Diabetes mellitus ([address]), History of blood transfusion, Hyperlipidemia, Hypertension, Infection, MDRO (multiple drug resistant organisms) resistance, MRSA infection, Osteomyelitis of spine ([address]), and Restless leg syndrome. Past Surgical History:  has a past surgical history that includes Tonsillectomy; Hysterectomy; Appendectomy; Toe amputation (); Toe amputation (); eye surgery; Colonoscopy (); Colonoscopy (7/15/15); other surgical history (Left); Foot surgery (Left, 2017); Upper gastrointestinal endoscopy (2017); Foot surgery (Right, 2017);  Foot Amputation (Right, 2019); and Foot complete 3 grooming tasks seated EOB with SBA and Min VCs by 12/23  Patient Goals   Patient goals : \"to get better\"       Therapy Time   Individual Concurrent Group Co-treatment   Time In 6788         Time Out 0913         Minutes 36         Timed Code Treatment Minutes: 26 Minutes (10 minute eval)       Foreign Thacker, OT

## 2023-12-18 NOTE — PROGRESS NOTES
Physical Therapy  Facility/Department: [facility] C2 CARD TELEMETRY  Physical Therapy Initial Assessment    Name: Mikayla Grimes  : 1935  MRN: [id number]  Date of Service: 2023    Discharge Recommendations:  IP Rehab, Patient able to tolerate 3hrs of therapy a day   PT Equipment Recommendations  Equipment Needed: No  Other: TBD at rehab facility    Therapy discharge recommendations take into account each patient's current medical complexities and are subject to input/oversight from the patient's healthcare team.     Barriers to Home Discharge:   [x] Steps to access home entry or bed/bath: 2 HAZEL without handrails   [x] Unable to transfer, ambulate, or propel wheelchair household distances without assist   [] Limited available assist at home upon discharge    [] Patient or family requests d/c to post-acute facility    [] Poor cognition/safety awareness for d/c to home alone    [] Unable to maintain ordered weight bearing status    [] Patient with salient signs of long-standing immobility   [x] Decreased independence with ADLs   [x] Increased risk for falls    If pt is unable to be seen after this session, please let this note serve as discharge summary. Please see case management note for discharge disposition. Thank you. Patient Diagnosis(es): There were no encounter diagnoses. Past Medical History:  has a past medical history of Clostridium difficile infection, Dementia ([address]), Diabetes mellitus ([address]), History of blood transfusion, Hyperlipidemia, Hypertension, Infection, MDRO (multiple drug resistant organisms) resistance, MRSA infection, Osteomyelitis of spine ([address]), and Restless leg syndrome. Past Surgical History:  has a past surgical history that includes Tonsillectomy; Hysterectomy; Appendectomy; Toe amputation (); Toe amputation (); eye surgery; Colonoscopy (); Colonoscopy (7/15/15); other surgical history (Left); Foot surgery (Left, 2017);  Upper gastrointestinal endoscopy

## 2023-12-18 NOTE — PLAN OF CARE
Problem: Discharge Planning  Goal: Discharge to home or other facility with appropriate resources  Outcome: Progressing     Problem: Neurosensory - Adult  Goal: Achieves stable or improved neurological status  Outcome: Progressing  Goal: Achieves maximal functionality and self care  Outcome: Progressing     Problem: Respiratory - Adult  Goal: Achieves optimal ventilation and oxygenation  Outcome: Progressing     Problem: Cardiovascular - Adult  Goal: Absence of cardiac dysrhythmias or at baseline  Outcome: Progressing     Problem: Skin/Tissue Integrity - Adult  Goal: Skin integrity remains intact  Outcome: Progressing  Goal: Oral mucous membranes remain intact  Outcome: Progressing     Problem: Musculoskeletal - Adult  Goal: Return mobility to safest level of function  Outcome: Progressing  Goal: Maintain proper alignment of affected body part  Outcome: Progressing  Goal: Return ADL status to a safe level of function  Outcome: Progressing     Problem: Gastrointestinal - Adult  Goal: Minimal or absence of nausea and vomiting  Outcome: Progressing  Goal: Maintains or returns to baseline bowel function  Outcome: Progressing  Goal: Maintains adequate nutritional intake  Outcome: Progressing     Problem: Genitourinary - Adult  Goal: Absence of urinary retention  Outcome: Progressing     Problem: Infection - Adult  Goal: Absence of infection during hospitalization  Outcome: Progressing     Problem: Safety - Adult  Goal: Free from fall injury  Outcome: Progressing

## 2023-12-18 NOTE — CONSULTS
In patient Neurology consult        [facility]      MD Stephanie Galvan  1935    Date of Service: 12/18/2023    Referring Physician: Hira William MD      Reason for the consult and CC: Acute right-sided weakness    HPI:   The patient is a 80 y.o.  female, with a PMH of HTN, HLD, DM2, who presented to [facility] with acute right-sided weakness. The patient resides with her son. She was complaining to her family that she felt SOB, so she came to the ED for an evaluation. Upon arrival, she was found to have right-sided weakness and a code stroke was called. [facility] Stroke Team was consulted and recommended TNK, which was administered. At the time of my exam, she is still complaining of right arm weakness. No family present. Constitutional:   Vitals:    12/18/23 0900 12/18/23 1000 12/18/23 1100 12/18/23 1142   BP:  (!) 140/52 (!) 131/50 (!) 131/50   Pulse: 78 66 62 72   Resp: 24 16 13    Temp:       TempSrc:       SpO2: 98% 96% 96%    Weight:             I personally reviewed and updated social history, past medical history, medications, allergy, surgical history, and family history as documented in the patient's electronic health records. ROS: 10-14 ROS reviewed with the patient/nurse/family which were unremarkable except mentioned in H&P. General appearance:  Normal development and appear in no acute distress. OOB to chair. Mental Status:   Oriented to person, place, problem, and time. Poor memory. Normal attention span and concentration. Language: intact naming, repeating and fluency   Poor fund of knowledge. Cranial Nerves:   II: Visual fields: Full. Pupils: equal, round, reactive to light, bilaterally  III,IV,VI: Extra Ocular Movements are intact.  No nystagmus  V: Facial sensation is intact  VII: Facial strength and movements: intact and symmetric  IX: Normal palatal elevation and shoulder shrug  XII: Tongue movements are normal  Musculoskeletal: 4/5 right arm, 5/5 all other. Tone: Normal tone. Reflexes: Symmetric 2+ in the arms and 2+ in the legs   Planters: Flexor bilaterally  Coordination: +drift on right. Sensation: normal in both arms and legs. Gait/Posture: steady gait with normal posturing and station. Medical decision making:      A. Problems (any 1)    High:    [x] Acute/Chronic Illness/injury posing threat to life or bodily function:    [] Severe exacerbation of chronic illness: Moderate:    [x]     1 or more chronic illness with exacerbation, progression or side effect of treatment or  []     2 or more stable chronic illnesses or  []     1 acute illness with systemic symptoms     ---------------------------------------------------------------------  B. Risk of Treatment (any 1)   [x] Drugs/treatments that require intensive monitoring for toxicity include:  TNK  [] Change in code status:    [] Decision to escalate care:    [] Major surgery/procedure with associated risk factors:    [] Prescription drug management  ----------------------------------------------------------------------  [x] High (any 2)  [] Moderate (any 1)    C.  Data (any 2 for high and any 1 for moderate)  [x] Discussed management of the case with:  ICU nurse  [x] Imaging personally reviewed and interpreted, includes:    [x] Data Review (any 3)  [x] Collateral history obtained from:  ICU nurse  [x] All available Consultant notes from yesterday/today were reviewed  [x] All current labs were reviewed and interpreted for clinical significance   [x] Appropriate follow-up labs were ordered    Data: reviewed   LABS:   Lab Results   Component Value Date/Time     12/18/2023 04:34 AM    K 3.7 12/18/2023 04:34 AM     12/18/2023 04:34 AM    CO2 24 12/18/2023 04:34 AM    BUN 28 12/18/2023 04:34 AM    CREATININE 1.3 12/18/2023 04:34 AM    GFRAA 57 05/30/2022 05:03 AM    LABGLOM 40 12/18/2023 04:34 AM    GLUCOSE 108 12/18/2023 04:34 AM    PHOS 2.6 05/28/2019

## 2023-12-19 PROBLEM — I42.9 CARDIOMYOPATHY (HCC): Status: ACTIVE | Noted: 2023-12-19

## 2023-12-19 LAB
ANION GAP SERPL CALCULATED.3IONS-SCNC: 8 MMOL/L (ref 3–16)
BUN SERPL-MCNC: 21 MG/DL (ref 7–20)
CALCIUM SERPL-MCNC: 9.3 MG/DL (ref 8.3–10.6)
CHLORIDE SERPL-SCNC: 104 MMOL/L (ref 99–110)
CO2 SERPL-SCNC: 25 MMOL/L (ref 21–32)
CREAT SERPL-MCNC: 1.2 MG/DL (ref 0.6–1.2)
DEPRECATED RDW RBC AUTO: 13.3 % (ref 12.4–15.4)
GFR SERPLBLD CREATININE-BSD FMLA CKD-EPI: 43 ML/MIN/{1.73_M2}
GLUCOSE BLD-MCNC: 111 MG/DL (ref 70–99)
GLUCOSE BLD-MCNC: 121 MG/DL (ref 70–99)
GLUCOSE BLD-MCNC: 183 MG/DL (ref 70–99)
GLUCOSE BLD-MCNC: 227 MG/DL (ref 70–99)
GLUCOSE BLD-MCNC: 237 MG/DL (ref 70–99)
GLUCOSE SERPL-MCNC: 106 MG/DL (ref 70–99)
HCT VFR BLD AUTO: 31 % (ref 36–48)
HGB BLD-MCNC: 10.3 G/DL (ref 12–16)
MCH RBC QN AUTO: 32.5 PG (ref 26–34)
MCHC RBC AUTO-ENTMCNC: 33.4 G/DL (ref 31–36)
MCV RBC AUTO: 97.3 FL (ref 80–100)
PERFORMED ON: ABNORMAL
PLATELET # BLD AUTO: 153 K/UL (ref 135–450)
PMV BLD AUTO: 9.2 FL (ref 5–10.5)
POTASSIUM SERPL-SCNC: 4.1 MMOL/L (ref 3.5–5.1)
RBC # BLD AUTO: 3.18 M/UL (ref 4–5.2)
SODIUM SERPL-SCNC: 137 MMOL/L (ref 136–145)
WBC # BLD AUTO: 5.2 K/UL (ref 4–11)

## 2023-12-19 PROCEDURE — C8929 TTE W OR WO FOL WCON,DOPPLER: HCPCS

## 2023-12-19 PROCEDURE — 2580000003 HC RX 258: Performed by: INTERNAL MEDICINE

## 2023-12-19 PROCEDURE — 36415 COLL VENOUS BLD VENIPUNCTURE: CPT

## 2023-12-19 PROCEDURE — 93356 MYOCRD STRAIN IMG SPCKL TRCK: CPT

## 2023-12-19 PROCEDURE — 94761 N-INVAS EAR/PLS OXIMETRY MLT: CPT

## 2023-12-19 PROCEDURE — 80048 BASIC METABOLIC PNL TOTAL CA: CPT

## 2023-12-19 PROCEDURE — 2700000000 HC OXYGEN THERAPY PER DAY

## 2023-12-19 PROCEDURE — APPSS45 APP SPLIT SHARED TIME 31-45 MINUTES: Performed by: NURSE PRACTITIONER

## 2023-12-19 PROCEDURE — 99233 SBSQ HOSP IP/OBS HIGH 50: CPT | Performed by: PSYCHIATRY & NEUROLOGY

## 2023-12-19 PROCEDURE — 6370000000 HC RX 637 (ALT 250 FOR IP): Performed by: NURSE PRACTITIONER

## 2023-12-19 PROCEDURE — 85027 COMPLETE CBC AUTOMATED: CPT

## 2023-12-19 PROCEDURE — 99232 SBSQ HOSP IP/OBS MODERATE 35: CPT | Performed by: NURSE PRACTITIONER

## 2023-12-19 PROCEDURE — 6370000000 HC RX 637 (ALT 250 FOR IP): Performed by: INTERNAL MEDICINE

## 2023-12-19 PROCEDURE — 6360000004 HC RX CONTRAST MEDICATION: Performed by: NURSE PRACTITIONER

## 2023-12-19 PROCEDURE — 2100000000 HC CCU R&B

## 2023-12-19 PROCEDURE — 6370000000 HC RX 637 (ALT 250 FOR IP): Performed by: STUDENT IN AN ORGANIZED HEALTH CARE EDUCATION/TRAINING PROGRAM

## 2023-12-19 PROCEDURE — 4A03X5D MEASUREMENT OF ARTERIAL FLOW, INTRACRANIAL, EXTERNAL APPROACH: ICD-10-PCS | Performed by: STUDENT IN AN ORGANIZED HEALTH CARE EDUCATION/TRAINING PROGRAM

## 2023-12-19 RX ORDER — ASPIRIN 81 MG/1
81 TABLET, CHEWABLE ORAL DAILY
Qty: 30 TABLET | Refills: 3 | Status: SHIPPED | OUTPATIENT
Start: 2023-12-19

## 2023-12-19 RX ORDER — PRAMIPEXOLE DIHYDROCHLORIDE 1 MG/1
1 TABLET ORAL NIGHTLY PRN
Qty: 90 TABLET | Refills: 3 | Status: SHIPPED | OUTPATIENT
Start: 2023-12-19

## 2023-12-19 RX ORDER — ATORVASTATIN CALCIUM 80 MG/1
80 TABLET, FILM COATED ORAL NIGHTLY
Qty: 30 TABLET | Refills: 3 | Status: SHIPPED | OUTPATIENT
Start: 2023-12-19

## 2023-12-19 RX ORDER — CLOPIDOGREL BISULFATE 75 MG/1
75 TABLET ORAL DAILY
Qty: 20 TABLET | Refills: 0 | Status: SHIPPED | OUTPATIENT
Start: 2023-12-19 | End: 2024-01-08

## 2023-12-19 RX ORDER — CLOPIDOGREL BISULFATE 75 MG/1
75 TABLET ORAL DAILY
Status: DISCONTINUED | OUTPATIENT
Start: 2023-12-19 | End: 2023-12-20 | Stop reason: HOSPADM

## 2023-12-19 RX ADMIN — FERROUS SULFATE TAB 325 MG (65 MG ELEMENTAL FE) 325 MG: 325 (65 FE) TAB at 09:16

## 2023-12-19 RX ADMIN — Medication 10 ML: at 21:17

## 2023-12-19 RX ADMIN — CARVEDILOL 12.5 MG: 6.25 TABLET, FILM COATED ORAL at 09:16

## 2023-12-19 RX ADMIN — ASPIRIN 81 MG: 81 TABLET, CHEWABLE ORAL at 13:57

## 2023-12-19 RX ADMIN — MUPIROCIN: 20 OINTMENT TOPICAL at 21:17

## 2023-12-19 RX ADMIN — MUPIROCIN: 20 OINTMENT TOPICAL at 10:11

## 2023-12-19 RX ADMIN — FERROUS SULFATE TAB 325 MG (65 MG ELEMENTAL FE) 325 MG: 325 (65 FE) TAB at 18:33

## 2023-12-19 RX ADMIN — CLOPIDOGREL BISULFATE 75 MG: 75 TABLET ORAL at 16:09

## 2023-12-19 RX ADMIN — Medication 10 ML: at 10:11

## 2023-12-19 RX ADMIN — CARVEDILOL 12.5 MG: 6.25 TABLET, FILM COATED ORAL at 18:33

## 2023-12-19 RX ADMIN — PERFLUTREN 1.5 ML: 6.52 INJECTION, SUSPENSION INTRAVENOUS at 12:37

## 2023-12-19 RX ADMIN — SACUBITRIL AND VALSARTAN 1 TABLET: 24; 26 TABLET, FILM COATED ORAL at 21:15

## 2023-12-19 RX ADMIN — INSULIN LISPRO 1 UNITS: 100 INJECTION, SOLUTION INTRAVENOUS; SUBCUTANEOUS at 13:04

## 2023-12-19 RX ADMIN — ATORVASTATIN CALCIUM 80 MG: 80 TABLET, FILM COATED ORAL at 21:15

## 2023-12-19 RX ADMIN — MIRTAZAPINE 15 MG: 15 TABLET, FILM COATED ORAL at 21:15

## 2023-12-19 RX ADMIN — ALOGLIPTIN 6.25 MG: 6.25 TABLET, FILM COATED ORAL at 09:16

## 2023-12-19 NOTE — PROGRESS NOTES
Patient transferred to [facility] in preparation for open heart surgery on Thursday. Pre op teaching done with patient and wife, binder at bedside, questions answered.  Chris Horta RN

## 2023-12-19 NOTE — PROGRESS NOTES
---------------------------------------------------------------------  B. Risk of Treatment (any 1)   [] Drugs/treatments that require intensive monitoring for toxicity include:    [] Change in code status:    [] Decision to escalate care:    [] Major surgery/procedure with associated risk factors:    [] Prescription drug management  ----------------------------------------------------------------------  [] High (any 2)  [x] Moderate (any 1)    C.  Data (any 2 for high and any 1 for moderate)  [] Discussed management of the case with:    [x] Imaging personally reviewed and interpreted, includes:  MRI of brain  [x] Data Review (any 3)  [] Collateral history obtained from:    [x] All available Consultant notes from yesterday/today were reviewed  [x] All current labs were reviewed and interpreted for clinical significance   [x] Appropriate follow-up labs were ordered      Data  LABS:   Lab Results   Component Value Date/Time     12/19/2023 04:33 AM    K 4.1 12/19/2023 04:33 AM     12/19/2023 04:33 AM    CO2 25 12/19/2023 04:33 AM    BUN 21 12/19/2023 04:33 AM    CREATININE 1.2 12/19/2023 04:33 AM    GFRAA 57 05/30/2022 05:03 AM    LABGLOM 43 12/19/2023 04:33 AM    GLUCOSE 106 12/19/2023 04:33 AM    PHOS 2.6 05/28/2019 05:31 AM    MG 2.20 07/04/2021 05:29 AM    CALCIUM 9.3 12/19/2023 04:33 AM     Lab Results   Component Value Date/Time    WBC 5.2 12/19/2023 04:33 AM    RBC 3.18 12/19/2023 04:33 AM    HGB 10.3 12/19/2023 04:33 AM    HCT 31.0 12/19/2023 04:33 AM    MCV 97.3 12/19/2023 04:33 AM    RDW 13.3 12/19/2023 04:33 AM     12/19/2023 04:33 AM     Lab Results   Component Value Date    INR 1.04 12/17/2023    PROTIME 13.6 12/17/2023       Neuroimaging was independently reviewed by me and discussed results with the patient  I reviewed blood testing and other test results and discussed results with the patient      Impression:    Acute right-sided weakness, s/p TNK  - due to acute left internal capsule CVA, 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 50% left ICA stenosis  HTN  HLD  DM2, controlled. A1c 6.5       Recommendation    ECHO. Initiated on ASA and should continue at discharge. Continue statin. Continue BP medications. Goal BP < 140.90. SSI while inpatient. PT/OT/SLP  Good candidate for ARU. Prudence Goncalves CNP    Attending Supervising Physician's Attestation Statement   I reviewed and agree with the findings and plan as documented in NP consult note   Patient seen today and examined  Reviewed MRI of the brain as above  She is awake and alert  Fluent  CN IX to XII are normal  Right-sided weakness 4/5  No weakness in the left  Hyperreflexia on the right  Increased on the right    Agree with above note  PT, OT and speech  Blood pressure control  Aspirin  Statin  Sliding scale  Likely ARU  Echo  Follow-up with vascular for her ICA stenosis  Discussed risk of recurrence with the patient  Discussed with ICU nurse      Electronically signed by Priyanka Gonzalez MD on 12/19/23 at 12:20 PM EST       This dictation was generated by voice recognition computer software. Although all attempts are made to edit the dictation for accuracy, there may be errors in the transcription that are not intended.

## 2023-12-19 NOTE — PROGRESS NOTES
MRI of the brain resulted as being phoned by Radiology department. Requested a paged to Neurologist by [facility].

## 2023-12-19 NOTE — PLAN OF CARE
Problem: Chronic Conditions and Co-morbidities  Goal: Patient's chronic conditions and co-morbidity symptoms are monitored and maintained or improved  Outcome: Progressing  Flowsheets (Taken 12/19/2023 0800)  Care Plan - Patient's Chronic Conditions and Co-Morbidity Symptoms are Monitored and Maintained or Improved: Monitor and assess patient's chronic conditions and comorbid symptoms for stability, deterioration, or improvement       TODAY'S DATE:  12/19/2023      Current NIHSS 2      Discussed personal risk factors for Stroke/TIA with patient/family, and ways to reduce the risk for a recurrent stroke. Patient's personal risk factors which were identified are:   []   Alcohol Abuse: check with your physician before any alcohol consumption. []   Atrial fibrillation: may cause blood clots  []   Drug Abuse: Seek help, talk with your doctor  []   Clotting Disorder  [x]   Diabetes  [x]   Family history of stroke or heart disease  [x]   High Blood Pressure/Hypertension: work with your physician  [x]   High cholesterol: monitor cholesterol levels with your physician  []   Overweight/Obesity: work with your physician for your ideal body weight  []   Physical Inactivity: get regular exercise as directed by your physician  [x]   Personal history of previous TIA or stroke  []   Poor Diet: decrease salt (sodium) in your diet, follow diet directed by physician  []   Smoking: stop smoking      Reviewed the Following Education with Patient and/or Family:   - Signs and Symptoms of Stroke  - Personal risk factors   - How to activate EMS (911)   - Importance of Follow Up Appointments at Discharge   - Importance of Compliance with Medications Prescribed at Discharge  - Available community resources and stroke advocacy groups if needed    Patient and/or family member: verbalized understanding. Stroke Education booklet given to patient/family (or verified, if given already), which reviews above information.

## 2023-12-19 NOTE — DISCHARGE INSTRUCTIONS
Follow up with PCP within 1-2 weeks. Follow up with neurology per their instructions. Follow up with vascular surgery when possible, ideally within a few weeks.

## 2023-12-19 NOTE — PLAN OF CARE
Problem: Chronic Conditions and Co-morbidities  Goal: Patient's chronic conditions and co-morbidity symptoms are monitored and maintained or improved  Outcome: Progressing  Flowsheets (Taken 12/18/2023 0800)  Care Plan - Patient's Chronic Conditions and Co-Morbidity Symptoms are Monitored and Maintained or Improved: Monitor and assess patient's chronic conditions and comorbid symptoms for stability, deterioration, or improvement    TODAY'S DATE:  12/18/2023      Current NIHSS: 2      Discussed personal risk factors for Stroke/TIA with patient/family, and ways to reduce the risk for a recurrent stroke. Patient's personal risk factors which were identified are:   []   Alcohol Abuse: check with your physician before any alcohol consumption. []   Atrial fibrillation: may cause blood clots  []   Drug Abuse: Seek help, talk with your doctor  []   Clotting Disorder  [x]   Diabetes  [x]   Family history of stroke or heart disease  [x]   High Blood Pressure/Hypertension: work with your physician  [x]   High cholesterol: monitor cholesterol levels with your physician  []   Overweight/Obesity: work with your physician for your ideal body weight  []   Physical Inactivity: get regular exercise as directed by your physician  [x]   Personal history of previous TIA or stroke  []   Poor Diet: decrease salt (sodium) in your diet, follow diet directed by physician  []   Smoking: stop smoking      Reviewed the Following Education with Patient and/or Family:   - Signs and Symptoms of Stroke  - Personal risk factors   - How to activate EMS (911)   - Importance of Follow Up Appointments at Discharge   - Importance of Compliance with Medications Prescribed at Discharge  - Available community resources and stroke advocacy groups if needed    Patient and/or family member: verbalized understanding. Stroke Education booklet given to patient/family (or verified, if given already), which reviews above information.  yes   10 mins of

## 2023-12-19 NOTE — CARE COORDINATION
Writer placed call to Patient dtr to review ARU recs. Dtr agreeable to an ARU placement, updated [facility] is full but reviewed alt AR[facility] options. [facility], spoke with Patrice Veloz will review and call back. Per Patrice Veloz no COVID testing required . Doc Monson, LSW      [id number] Patient accepted at [facility]. Can't admit until the am. Requesting 1000 transport submitted request via round trip. [facility] accepted transport. Report [phone] fax [phone] Updated dtr in agreement. Update MD and Primary RN. Doc Monson, LSW

## 2023-12-19 NOTE — DISCHARGE SUMMARY
stenosis of the right common, internal, or external carotid arteries. 50% calcific atherosclerotic stenosis of the left internal carotid artery origin. No significant stenosis of the left common or external carotid arteries. No dissection. VERTEBRAL ARTERIES: No dissection, arterial injury, or significant stenosis. SOFT TISSUES: Small layering bilateral pleural effusions. No cervical or superior mediastinal lymphadenopathy. The larynx and pharynx are unremarkable. No acute abnormality of the salivary and thyroid glands. Multiple thyroid nodules, the largest of which arises from the posterior right thyroid gland extending into the retrotracheal and right paraesophageal space measuring 4 x 2.8 x 2.2 cm and causing leftward deviation of the esophagus. BONES: No acute osseous abnormality. Mild-to-moderate degenerative changes are present in the cervical spine. CTA HEAD: ANTERIOR CIRCULATION: No significant stenosis of the intracranial internal carotid, anterior cerebral, or middle cerebral arteries. No aneurysm. POSTERIOR CIRCULATION: No significant stenosis of the vertebral, basilar, or posterior cerebral arteries. No aneurysm. OTHER: No dural venous sinus thrombosis on this non-dedicated study. BRAIN: No mass effect or midline shift. No extra-axial fluid collection. The gray-white differentiation is maintained. 1. No large vessel occlusion or significant stenosis of the intracranial arteries. 2. 50% calcific atherosclerosis of the left internal carotid artery origin. No additional significant arterial stenosis in the neck. 3. Small layering bilateral pleural effusions. 4. 4 cm incidental right thyroid nodule. Recommend non-emergent thyroid ultrasound if clinically warranted given patient age. Reference: J Am Judson Radiol. 2015 Feb;12(2): 143-50     CT HEAD WO CONTRAST    Addendum Date: 12/17/2023    ADDENDUM: Findings were discussed with Dr. Tyshawn Pretty of the stroke service at 7:13 pm on 12/17/2023.      Result CO2 25 24 25   BUN 34* 28* 21*   CREATININE 1.4* 1.3* 1.2   GLUCOSE 108* 108* 106*     Hepatic:   Recent Labs     12/17/23  1846 12/18/23  0434   AST 26 22   ALT 14 14   BILITOT 0.4 0.4   ALKPHOS 59 51     Lipids:   Lab Results   Component Value Date/Time    CHOL 135 07/02/2021 05:26 AM    HDL 57 07/02/2021 05:26 AM    TRIG 61 07/02/2021 05:26 AM     Hemoglobin A1C:   Lab Results   Component Value Date/Time    LABA1C 6.5 12/18/2023 04:34 AM     TSH:   Lab Results   Component Value Date/Time    TSH 2.44 12/28/2018 04:00 PM     Troponin: No results found for: \"TROPONINT\"  Lactic Acid: No results for input(s): \"LACTA\" in the last 72 hours. BNP: No results for input(s): \"PROBNP\" in the last 72 hours. UA:  Lab Results   Component Value Date/Time    NITRU POSITIVE 05/26/2022 03:25 PM    COLORU Yellow 05/26/2022 03:25 PM    PHUR 5.5 05/26/2022 03:25 PM    LABCAST 0-1 Hyaline 02/13/2018 12:20 AM    WBCUA  05/26/2022 03:25 PM    RBCUA 0-2 05/26/2022 03:25 PM    MUCUS Rare 02/13/2018 12:20 AM    BACTERIA 3+ 05/26/2022 03:25 PM    CLARITYU Clear 05/26/2022 03:25 PM    SPECGRAV 1.010 05/26/2022 03:25 PM    LEUKOCYTESUR SMALL 05/26/2022 03:25 PM    UROBILINOGEN 0.2 05/26/2022 03:25 PM    BILIRUBINUR Negative 05/26/2022 03:25 PM    BLOODU SMALL 05/26/2022 03:25 PM    GLUCOSEU >=1000 05/26/2022 03:25 PM    KETUA Negative 05/26/2022 03:25 PM    AMORPHOUS 2+ 12/15/2017 11:30 AM     Urine Cultures:   Lab Results   Component Value Date/Time    LABURIN >100,000 CFU/ml 05/26/2022 03:15 PM     Blood Cultures:   Lab Results   Component Value Date/Time    BC No Growth after 4 days of incubation. 07/01/2021 03:00 PM     Lab Results   Component Value Date/Time    BLOODCULT2 No Growth after 4 days of incubation.  03/09/2021 06:50 AM     Organism:   Lab Results   Component Value Date/Time    ORG Escherichia coli 05/26/2022 03:15 PM         The patient expressed appropriate understanding of, and agreement with the discharge

## 2023-12-19 NOTE — DISCHARGE INSTR - COC
Continuity of Care Form    Patient Name: Lord Banks   :  1935  MRN:  [id number]    Admit date:  2023  Discharge date:  ***    Code Status Order: Full Code   Advance Directives:     Admitting Physician:  Prashanth Osborn DO  PCP: Nato Abernathy MD    Discharging Nurse: [facility] Unit/Room#: 0503/8562-21  Discharging Unit Phone Number: ***    Emergency Contact:   Extended Emergency Contact Information  Primary Emergency Contact: Dagmar Chow  Address: [address] [place] [address] Brian Vaz, [address] [place], [place] [address] of [address] Gregorio Vaz Phone: [phone]  Mobile Phone: [phone]  Relation: Child  Secondary Emergency Contact: Jorge Sosa  Address: Ray Sierra ([address] [place] [address])           [id number]  Home Phone: [phone]  Mobile Phone: [phone]  Relation: Grandchild    Past Surgical History:  Past Surgical History:   Procedure Laterality Date    APPENDECTOMY      COLONOSCOPY      polyps    COLONOSCOPY  7/15/15    normal colon    EYE SURGERY      right cataract    FOOT AMPUTATION Right 2019    PARTIAL RIGHT FOOT AMPUTATION    FOOT AMPUTATION Right 2019    PARTIAL RIGHT FOOT AMPUTATION performed by Candace Ramirez DPM at [facility] Left 2017    PARTIAL LEFT FOOT AMPUTATION              FOOT SURGERY Right 2017    HYSTERECTOMY      OTHER SURGICAL HISTORY Left     Macular Grid Left eye argon    TOE AMPUTATION      2nd toe 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    TOE AMPUTATION      5th toe right foot    TONSILLECTOMY      UPPER GASTROINTESTINAL ENDOSCOPY  2017    Dieulafoy lesion in duodenum       Immunization History:   Immunization History   Administered Date(s) Administered    Influenza Vaccine, unspecified formulation 2017    Influenza Virus Vaccine 10/03/2017, 10/18/2018    Pneumococcal, PPSV23, PNEUMOVAX 23, (age 2y+), SC/IM, 0.5mL 2017       Active Problems:  Patient Active Problem List   Diagnosis Code    DM2 (diabetes

## 2023-12-20 ENCOUNTER — HOSPITAL ENCOUNTER (INPATIENT)
Age: 88
LOS: 11 days | Discharge: HOME HEALTH | DRG: 056 | End: 2023-12-31
Attending: PHYSICAL MEDICINE & REHABILITATION | Admitting: PHYSICAL MEDICINE & REHABILITATION
Payer: MEDICARE

## 2023-12-20 VITALS
OXYGEN SATURATION: 93 % | SYSTOLIC BLOOD PRESSURE: 145 MMHG | HEART RATE: 75 BPM | DIASTOLIC BLOOD PRESSURE: 60 MMHG | WEIGHT: 134.26 LBS | RESPIRATION RATE: 16 BRPM | BODY MASS INDEX: 19.26 KG/M2 | TEMPERATURE: 98.2 F

## 2023-12-20 LAB
GLUCOSE BLD-MCNC: 150 MG/DL (ref 70–99)
GLUCOSE BLD-MCNC: 167 MG/DL (ref 70–99)
GLUCOSE BLD-MCNC: 206 MG/DL (ref 70–99)
GLUCOSE BLD-MCNC: 333 MG/DL (ref 70–99)
PERFORMED ON: ABNORMAL

## 2023-12-20 PROCEDURE — 94640 AIRWAY INHALATION TREATMENT: CPT

## 2023-12-20 PROCEDURE — 6370000000 HC RX 637 (ALT 250 FOR IP): Performed by: STUDENT IN AN ORGANIZED HEALTH CARE EDUCATION/TRAINING PROGRAM

## 2023-12-20 PROCEDURE — 6360000002 HC RX W HCPCS: Performed by: PHYSICAL MEDICINE & REHABILITATION

## 2023-12-20 PROCEDURE — 1280000000 HC REHAB R&B

## 2023-12-20 PROCEDURE — 94799 UNLISTED PULMONARY SVC/PX: CPT

## 2023-12-20 PROCEDURE — 6370000000 HC RX 637 (ALT 250 FOR IP): Performed by: PHYSICAL MEDICINE & REHABILITATION

## 2023-12-20 PROCEDURE — 94150 VITAL CAPACITY TEST: CPT

## 2023-12-20 PROCEDURE — 6370000000 HC RX 637 (ALT 250 FOR IP): Performed by: INTERNAL MEDICINE

## 2023-12-20 RX ORDER — ASPIRIN 300 MG/1
300 SUPPOSITORY RECTAL DAILY
Status: CANCELLED | OUTPATIENT
Start: 2023-12-21

## 2023-12-20 RX ORDER — INSULIN LISPRO 100 [IU]/ML
0-4 INJECTION, SOLUTION INTRAVENOUS; SUBCUTANEOUS NIGHTLY
Status: CANCELLED | OUTPATIENT
Start: 2023-12-20

## 2023-12-20 RX ORDER — INSULIN LISPRO 100 [IU]/ML
0-4 INJECTION, SOLUTION INTRAVENOUS; SUBCUTANEOUS
Status: CANCELLED | OUTPATIENT
Start: 2023-12-20

## 2023-12-20 RX ORDER — ATORVASTATIN CALCIUM 80 MG/1
80 TABLET, FILM COATED ORAL NIGHTLY
Status: CANCELLED | OUTPATIENT
Start: 2023-12-20

## 2023-12-20 RX ORDER — BISACODYL 5 MG/1
5 TABLET, DELAYED RELEASE ORAL DAILY
Status: CANCELLED | OUTPATIENT
Start: 2023-12-20

## 2023-12-20 RX ORDER — SODIUM CHLORIDE 0.9 % (FLUSH) 0.9 %
5-40 SYRINGE (ML) INJECTION PRN
Status: DISCONTINUED | OUTPATIENT
Start: 2023-12-20 | End: 2023-12-23

## 2023-12-20 RX ORDER — INSULIN LISPRO 100 [IU]/ML
0-4 INJECTION, SOLUTION INTRAVENOUS; SUBCUTANEOUS NIGHTLY
Status: DISCONTINUED | OUTPATIENT
Start: 2023-12-20 | End: 2023-12-28

## 2023-12-20 RX ORDER — PRAMIPEXOLE DIHYDROCHLORIDE 1 MG/1
1 TABLET ORAL NIGHTLY PRN
Status: DISCONTINUED | OUTPATIENT
Start: 2023-12-20 | End: 2023-12-31 | Stop reason: HOSPADM

## 2023-12-20 RX ORDER — ASPIRIN 300 MG/1
300 SUPPOSITORY RECTAL DAILY
Status: DISCONTINUED | OUTPATIENT
Start: 2023-12-21 | End: 2023-12-31 | Stop reason: HOSPADM

## 2023-12-20 RX ORDER — ACETAMINOPHEN 325 MG/1
650 TABLET ORAL EVERY 4 HOURS PRN
Status: CANCELLED | OUTPATIENT
Start: 2023-12-20

## 2023-12-20 RX ORDER — ENOXAPARIN SODIUM 100 MG/ML
40 INJECTION SUBCUTANEOUS DAILY
Status: DISCONTINUED | OUTPATIENT
Start: 2023-12-20 | End: 2023-12-22

## 2023-12-20 RX ORDER — ATORVASTATIN CALCIUM 80 MG/1
80 TABLET, FILM COATED ORAL NIGHTLY
Status: DISCONTINUED | OUTPATIENT
Start: 2023-12-20 | End: 2023-12-31 | Stop reason: HOSPADM

## 2023-12-20 RX ORDER — ENOXAPARIN SODIUM 100 MG/ML
40 INJECTION SUBCUTANEOUS DAILY
Status: CANCELLED | OUTPATIENT
Start: 2023-12-20

## 2023-12-20 RX ORDER — ALOGLIPTIN 6.25 MG/1
6.25 TABLET, FILM COATED ORAL DAILY
Status: DISCONTINUED | OUTPATIENT
Start: 2023-12-21 | End: 2023-12-31 | Stop reason: HOSPADM

## 2023-12-20 RX ORDER — POLYETHYLENE GLYCOL 3350 17 G/17G
17 POWDER, FOR SOLUTION ORAL DAILY PRN
Status: DISCONTINUED | OUTPATIENT
Start: 2023-12-20 | End: 2023-12-31 | Stop reason: HOSPADM

## 2023-12-20 RX ORDER — MIRTAZAPINE 15 MG/1
15 TABLET, FILM COATED ORAL NIGHTLY
Status: CANCELLED | OUTPATIENT
Start: 2023-12-20

## 2023-12-20 RX ORDER — INSULIN LISPRO 100 [IU]/ML
0-4 INJECTION, SOLUTION INTRAVENOUS; SUBCUTANEOUS
Status: DISCONTINUED | OUTPATIENT
Start: 2023-12-20 | End: 2023-12-28

## 2023-12-20 RX ORDER — ALOGLIPTIN 6.25 MG/1
6.25 TABLET, FILM COATED ORAL DAILY
Status: CANCELLED | OUTPATIENT
Start: 2023-12-21

## 2023-12-20 RX ORDER — ONDANSETRON 4 MG/1
4 TABLET, ORALLY DISINTEGRATING ORAL EVERY 8 HOURS PRN
Status: DISCONTINUED | OUTPATIENT
Start: 2023-12-20 | End: 2023-12-31 | Stop reason: HOSPADM

## 2023-12-20 RX ORDER — FERROUS SULFATE 325(65) MG
325 TABLET ORAL 2 TIMES DAILY WITH MEALS
Status: CANCELLED | OUTPATIENT
Start: 2023-12-20

## 2023-12-20 RX ORDER — POLYETHYLENE GLYCOL 3350 17 G/17G
17 POWDER, FOR SOLUTION ORAL DAILY PRN
Status: CANCELLED | OUTPATIENT
Start: 2023-12-20

## 2023-12-20 RX ORDER — BISACODYL 5 MG/1
5 TABLET, DELAYED RELEASE ORAL DAILY
Status: DISCONTINUED | OUTPATIENT
Start: 2023-12-20 | End: 2023-12-31 | Stop reason: HOSPADM

## 2023-12-20 RX ORDER — CARVEDILOL 6.25 MG/1
12.5 TABLET ORAL 2 TIMES DAILY WITH MEALS
Status: CANCELLED | OUTPATIENT
Start: 2023-12-20

## 2023-12-20 RX ORDER — ACETAMINOPHEN 325 MG/1
650 TABLET ORAL EVERY 4 HOURS PRN
Status: DISCONTINUED | OUTPATIENT
Start: 2023-12-20 | End: 2023-12-31 | Stop reason: HOSPADM

## 2023-12-20 RX ORDER — DEXTROSE MONOHYDRATE 100 MG/ML
INJECTION, SOLUTION INTRAVENOUS CONTINUOUS PRN
Status: DISCONTINUED | OUTPATIENT
Start: 2023-12-20 | End: 2023-12-31 | Stop reason: HOSPADM

## 2023-12-20 RX ORDER — ASPIRIN 81 MG/1
81 TABLET, CHEWABLE ORAL DAILY
Status: CANCELLED | OUTPATIENT
Start: 2023-12-21

## 2023-12-20 RX ORDER — CLOPIDOGREL BISULFATE 75 MG/1
75 TABLET ORAL DAILY
Status: DISCONTINUED | OUTPATIENT
Start: 2023-12-20 | End: 2023-12-31 | Stop reason: HOSPADM

## 2023-12-20 RX ORDER — CARVEDILOL 12.5 MG/1
12.5 TABLET ORAL 2 TIMES DAILY WITH MEALS
Status: DISCONTINUED | OUTPATIENT
Start: 2023-12-20 | End: 2023-12-21

## 2023-12-20 RX ORDER — SODIUM CHLORIDE 0.9 % (FLUSH) 0.9 %
5-40 SYRINGE (ML) INJECTION EVERY 12 HOURS SCHEDULED
Status: DISCONTINUED | OUTPATIENT
Start: 2023-12-20 | End: 2023-12-21 | Stop reason: ALTCHOICE

## 2023-12-20 RX ORDER — ONDANSETRON 4 MG/1
4 TABLET, ORALLY DISINTEGRATING ORAL EVERY 8 HOURS PRN
Status: CANCELLED | OUTPATIENT
Start: 2023-12-20

## 2023-12-20 RX ORDER — SODIUM CHLORIDE 0.9 % (FLUSH) 0.9 %
5-40 SYRINGE (ML) INJECTION PRN
Status: CANCELLED | OUTPATIENT
Start: 2023-12-20

## 2023-12-20 RX ORDER — CLOPIDOGREL BISULFATE 75 MG/1
75 TABLET ORAL DAILY
Status: CANCELLED | OUTPATIENT
Start: 2023-12-21 | End: 2024-01-09

## 2023-12-20 RX ORDER — PRAMIPEXOLE DIHYDROCHLORIDE 0.25 MG/1
1 TABLET ORAL NIGHTLY PRN
Status: CANCELLED | OUTPATIENT
Start: 2023-12-20

## 2023-12-20 RX ORDER — FLUORIDE TOOTHPASTE
15 TOOTHPASTE DENTAL 3 TIMES DAILY PRN
Status: DISCONTINUED | OUTPATIENT
Start: 2023-12-20 | End: 2023-12-31 | Stop reason: HOSPADM

## 2023-12-20 RX ORDER — SODIUM CHLORIDE 0.9 % (FLUSH) 0.9 %
5-40 SYRINGE (ML) INJECTION EVERY 12 HOURS SCHEDULED
Status: CANCELLED | OUTPATIENT
Start: 2023-12-20

## 2023-12-20 RX ORDER — FERROUS SULFATE 325(65) MG
325 TABLET ORAL 2 TIMES DAILY WITH MEALS
Status: DISCONTINUED | OUTPATIENT
Start: 2023-12-20 | End: 2023-12-21

## 2023-12-20 RX ORDER — ONDANSETRON 2 MG/ML
4 INJECTION INTRAMUSCULAR; INTRAVENOUS EVERY 6 HOURS PRN
Status: DISCONTINUED | OUTPATIENT
Start: 2023-12-20 | End: 2023-12-31 | Stop reason: HOSPADM

## 2023-12-20 RX ORDER — DEXTROSE MONOHYDRATE 100 MG/ML
INJECTION, SOLUTION INTRAVENOUS CONTINUOUS PRN
Status: CANCELLED | OUTPATIENT
Start: 2023-12-20

## 2023-12-20 RX ORDER — GLUCAGON 1 MG/ML
1 KIT INJECTION PRN
Status: DISCONTINUED | OUTPATIENT
Start: 2023-12-20 | End: 2023-12-31 | Stop reason: HOSPADM

## 2023-12-20 RX ORDER — ASPIRIN 81 MG/1
81 TABLET, CHEWABLE ORAL DAILY
Status: DISCONTINUED | OUTPATIENT
Start: 2023-12-21 | End: 2023-12-31 | Stop reason: HOSPADM

## 2023-12-20 RX ORDER — ONDANSETRON 2 MG/ML
4 INJECTION INTRAMUSCULAR; INTRAVENOUS EVERY 6 HOURS PRN
Status: CANCELLED | OUTPATIENT
Start: 2023-12-20

## 2023-12-20 RX ORDER — MIRTAZAPINE 15 MG/1
15 TABLET, FILM COATED ORAL NIGHTLY
Status: DISCONTINUED | OUTPATIENT
Start: 2023-12-20 | End: 2023-12-31 | Stop reason: HOSPADM

## 2023-12-20 RX ADMIN — ASPIRIN 81 MG: 81 TABLET, CHEWABLE ORAL at 08:27

## 2023-12-20 RX ADMIN — ENOXAPARIN SODIUM 40 MG: 100 INJECTION SUBCUTANEOUS at 14:54

## 2023-12-20 RX ADMIN — CARVEDILOL 12.5 MG: 12.5 TABLET, FILM COATED ORAL at 17:44

## 2023-12-20 RX ADMIN — CARVEDILOL 12.5 MG: 6.25 TABLET, FILM COATED ORAL at 08:26

## 2023-12-20 RX ADMIN — SACUBITRIL AND VALSARTAN 1 TABLET: 24; 26 TABLET, FILM COATED ORAL at 20:01

## 2023-12-20 RX ADMIN — MIRTAZAPINE 15 MG: 15 TABLET, FILM COATED ORAL at 20:01

## 2023-12-20 RX ADMIN — CLOPIDOGREL BISULFATE 75 MG: 75 TABLET ORAL at 14:54

## 2023-12-20 RX ADMIN — ATORVASTATIN CALCIUM 80 MG: 80 TABLET, FILM COATED ORAL at 20:01

## 2023-12-20 RX ADMIN — MUPIROCIN: 20 OINTMENT TOPICAL at 20:02

## 2023-12-20 RX ADMIN — FERROUS SULFATE TAB 325 MG (65 MG ELEMENTAL FE) 325 MG: 325 (65 FE) TAB at 17:44

## 2023-12-20 ASSESSMENT — PAIN SCALES - GENERAL: PAINLEVEL_OUTOF10: 0

## 2023-12-20 NOTE — PLAN OF CARE
Problem: Discharge Planning  Goal: Discharge to home or other facility with appropriate resources  Flowsheets  Taken 12/20/2023 1507  Discharge to home or other facility with appropriate resources:   Identify barriers to discharge with patient and caregiver   Identify discharge learning needs (meds, wound care, etc)   Refer to discharge planning if patient needs post-hospital services based on physician order or complex needs related to functional status, cognitive ability or social support system  Taken 12/20/2023 1300  Discharge to home or other facility with appropriate resources:   Identify barriers to discharge with patient and caregiver   Identify discharge learning needs (meds, wound care, etc)   Refer to discharge planning if patient needs post-hospital services based on physician order or complex needs related to functional status, cognitive ability or social support system   Arrange for needed discharge resources and transportation as appropriate   Arrange for interpreters to assist at discharge as needed  Taken 12/20/2023 1129  Discharge to home or other facility with appropriate resources:   Identify barriers to discharge with patient and caregiver   Arrange for needed discharge resources and transportation as appropriate   Identify discharge learning needs (meds, wound care, etc)   Refer to discharge planning if patient needs post-hospital services based on physician order or complex needs related to functional status, cognitive ability or social support system  Note: Patient informed that discharge planning begins on admission, states she lives in a home with her son and daughter     Problem: Safety - Adult  Goal: Free from fall injury  Note: No family is present at this time to receive PATH-s Form will initiate as soon as family is present     Problem: Pain  Goal: Verbalizes/displays adequate comfort level or baseline comfort level  Flowsheets (Taken 12/20/2023 1552)  Verbalizes/displays adequate   Problem: Neurosensory - Adult  Goal: Achieves maximal functionality and self care  Flowsheets (Taken 12/20/2023 1129)  Achieves maximal functionality and self care: Encourage and assist patient to increase activity and self care with guidance from physical therapy/occupational therapy  Note: Patient states she has glasses but does not use them much. Feels vision is adequate . Patient instructed in safety, use of call light and waiting for assistance, Stay with me policy and has yellow socks on feet.      Problem: Skin/Tissue Integrity - Adult  Goal: Skin integrity remains intact  Flowsheets (Taken 12/20/2023 1129)  Skin Integrity Remains Intact: Monitor for areas of redness and/or skin breakdown  Note: Bilateral heels are red, sacral area had mepilex pad over coccyx that was removed due to being soiled , will reapply     Problem: Musculoskeletal - Adult  Goal: Return mobility to safest level of function  Flowsheets  Taken 12/20/2023 1129  Return Mobility to Safest Level of Function:   Assess patient stability and activity tolerance for standing, transferring and ambulating with or without assistive devices   Assist with transfers and ambulation using safe patient handling equipment as needed   Ensure adequate protection for wounds/incisions during mobilization   Obtain physical therapy/occupational therapy consults as needed   Instruct patient/family in ordered activity level  Taken 12/20/2023 1100  Return Mobility to Safest Level of Function:   Assess patient stability and activity tolerance for standing, transferring and ambulating with or without assistive devices   Assist with transfers and ambulation using safe patient handling equipment as needed   Ensure adequate protection for wounds/incisions during mobilization   Obtain physical therapy/occupational therapy consults as needed  Note: Patient has some limitation with mobility at this time, weakness to right upper and lower extremities, requires gait belt

## 2023-12-20 NOTE — PLAN OF CARE
ARU Admission Assessment    Ethnicity  \"Are you of , /a, or Northern Irish origin? \"  Check all that apply:  [x] A. No, not of , /a, or [place] Origin  [] B.  Yes, [place], [place] American, Chicano/a  [] C.  Yes, [facility]  [] D.  Yes, [place]  [] E.  Yes, another , , or Northern Irish origin  [] X. Patient unable to respond  [] Y. Patient declines to respond    Race  \"What is your race? \"  Check all that apply:  [x] A. White  [] B. Black or   [] C. American [place] or [place] Native  [] D.  Chiquita  [] E. [place]  [] F. Cuban  [] G. [place]  [] Ivar Joellen  [] I. [place]  [] J.  Other   [] K.   [] L. South Sudanese or [place]  [] M. Cymro  [] N. Other -[address] [place] [address]  [] X. Patient unable to respond  [] Y. Patient declines to respond  [] Z. None of the above    Language  A. \"What is your preferred language? \"   ENGLISH    B. \"Do you need or want an  to communicate with a doctor or health care staff? \"  Check only one:  [x] 0. No  [] 1. Yes  [] 9. Unable to determine    Transportation  \"Has lack of transportation kept you from medical appointments, meetings, work, or from getting things needed for daily living? \"Check all that apply:  [] A.  Yes, it has kept me from medical appointments or from getting my medications  [] B.  Yes, it has kept me from non-medical meetings, appointments, work, or from getting things that I need  [x] C.  No  [] X. Patient unable to respond  [] Y. Patient declines to respond    Hearing  Ability to hear (with hearing aid or hearing appliances if normally used)  [x]  0. Adequate - no difficulty in normal conversation, social interaction, listening to TV  []  1. Minimal difficulty - difficulty in some environments (e.g. when person speaks softly or setting is noisy)  []  2. Moderate difficulty - speaker has to increase volume and speak distinctly   []  3.   Highly impaired - absence of

## 2023-12-20 NOTE — PLAN OF CARE
Problem: Discharge Planning  Goal: Discharge to home or other facility with appropriate resources  12/20/2023 1004 by Martha Magallon RN  Outcome: Completed  12/20/2023 0621 by Martha Gonzalez RN  Outcome: Progressing     Problem: Neurosensory - Adult  Goal: Achieves stable or improved neurological status  12/20/2023 1004 by Martha Magallon RN  Outcome: Completed  12/20/2023 0621 by Martha Gonzalez RN  Outcome: Progressing  Goal: Achieves maximal functionality and self care  12/20/2023 1004 by Martha Magallon RN  Outcome: Completed  12/20/2023 0621 by Martha Gonzalez RN  Outcome: Progressing     Problem: Respiratory - Adult  Goal: Achieves optimal ventilation and oxygenation  12/20/2023 1004 by Martha Magallon RN  Outcome: Completed  12/20/2023 0621 by Martha Gonzalez RN  Outcome: Progressing     Problem: Cardiovascular - Adult  Goal: Absence of cardiac dysrhythmias or at baseline  12/20/2023 1004 by Martha Magallon RN  Outcome: Completed  12/20/2023 0621 by Martha Gonzalez RN  Outcome: Progressing     Problem: Skin/Tissue Integrity - Adult  Goal: Skin integrity remains intact  12/20/2023 1004 by Martha Magallon RN  Outcome: Completed  12/20/2023 0621 by Martha Gonzalez RN  Outcome: Progressing  Goal: Oral mucous membranes remain intact  12/20/2023 1004 by Martha Magallon RN  Outcome: Completed  12/20/2023 0621 by Martha Gonzalez RN  Outcome: Progressing     Problem: Musculoskeletal - Adult  Goal: Return mobility to safest level of function  12/20/2023 1004 by Martha Magallon RN  Outcome: Completed  12/20/2023 0621 by Martha Gonzalez RN  Outcome: Progressing  Goal: Maintain proper alignment of affected body part  12/20/2023 1004 by Martha Magallon RN  Outcome: Completed  12/20/2023 0621 by Martha Gonzalez RN  Outcome: Progressing  Goal: Return ADL status to a safe level of function  12/20/2023 1004 by Martha Magallon RN  Outcome: Completed  12/20/2023 0621 by Martha Gonzalez RN  Outcome: Progressing Problem: Gastrointestinal - Adult  Goal: Minimal or absence of nausea and vomiting  12/20/2023 1004 by Soco Ford RN  Outcome: Completed  12/20/2023 0621 by Ana Aaron RN  Outcome: Progressing  Goal: Maintains or returns to baseline bowel function  12/20/2023 1004 by Soco Ford RN  Outcome: Completed  12/20/2023 0621 by Ana Aaron RN  Outcome: Progressing  Goal: Maintains adequate nutritional intake  12/20/2023 1004 by Soco Ford RN  Outcome: Completed  12/20/2023 0621 by Ana Aaron RN  Outcome: Progressing     Problem: Genitourinary - Adult  Goal: Absence of urinary retention  12/20/2023 1004 by Soco Ford RN  Outcome: Completed  12/20/2023 0621 by Ana Aaron RN  Outcome: Progressing     Problem: Infection - Adult  Goal: Absence of infection during hospitalization  12/20/2023 1004 by Soco Ford RN  Outcome: Completed  12/20/2023 0621 by Ana Aaron RN  Outcome: Progressing     Problem: Metabolic/Fluid and Electrolytes - Adult  Goal: Electrolytes maintained within normal limits  12/20/2023 1004 by Soco Ford RN  Outcome: Completed  12/20/2023 0621 by nAa Aaron RN  Outcome: Progressing  Goal: Hemodynamic stability and optimal renal function maintained  12/20/2023 1004 by Soco Ford RN  Outcome: Completed  12/20/2023 0621 by Ana Aaron RN  Outcome: Progressing  Goal: Glucose maintained within prescribed range  12/20/2023 1004 by Soco Ford RN  Outcome: Completed  12/20/2023 0621 by Ana Aaron RN  Outcome: Progressing     Problem: Hematologic - Adult  Goal: Maintains hematologic stability  12/20/2023 1004 by Soco Ford RN  Outcome: Completed  12/20/2023 0621 by Ana Aaron RN  Outcome: Progressing     Problem: Safety - Adult  Goal: Free from fall injury  12/20/2023 1004 by Soco Ford RN  Outcome: Completed  12/20/2023 0621 by Ana Aaron RN  Outcome: Progressing     Problem: Skin/Tissue Integrity  Goal: Absence

## 2023-12-20 NOTE — PROGRESS NOTES
Discharge instructions reviewed with patient and family member. Patient and family verbalized understanding. Transport picked up.  Report called, awaiting call back    1012: Report given to  at this time

## 2023-12-20 NOTE — PROGRESS NOTES
Speech Language Pathology  Attempt Note     Name: Mary Jones  : 1935  Medical Diagnosis: Acute cerebrovascular accident (CVA) ([address] ) [I63.9]      SLP attempted to see pt for cognitive-linguistic evaluation . Order acknowledged and chart reviewed for completion of eval. Pt on caseload for swallowing (BSE completed 23) and SLP had requested a cognitive / language evaluation due to pt s/p CVA. Pt with d/c in place with plans for IPR at [facility]. Per chart review, transport set up for 1000 today. Recommend speech therapy in IPR - formal cognitive / language eval to be completed then. No charges.     Thank you,    Janette Mcclain MA [facility]  Speech Language Pathologist

## 2023-12-20 NOTE — PLAN OF CARE
Problem: Discharge Planning  Goal: Discharge to home or other facility with appropriate resources  Outcome: Progressing     Problem: Neurosensory - Adult  Goal: Achieves stable or improved neurological status  Outcome: Progressing  Goal: Achieves maximal functionality and self care  Outcome: Progressing     Problem: Respiratory - Adult  Goal: Achieves optimal ventilation and oxygenation  Outcome: Progressing     Problem: Cardiovascular - Adult  Goal: Absence of cardiac dysrhythmias or at baseline  Outcome: Progressing     Problem: Skin/Tissue Integrity - Adult  Goal: Skin integrity remains intact  Outcome: Progressing  Goal: Oral mucous membranes remain intact  Outcome: Progressing     Problem: Musculoskeletal - Adult  Goal: Return mobility to safest level of function  Outcome: Progressing  Goal: Maintain proper alignment of affected body part  Outcome: Progressing  Goal: Return ADL status to a safe level of function  Outcome: Progressing     Problem: Gastrointestinal - Adult  Goal: Minimal or absence of nausea and vomiting  Outcome: Progressing  Goal: Maintains or returns to baseline bowel function  Outcome: Progressing  Goal: Maintains adequate nutritional intake  Outcome: Progressing     Problem: Genitourinary - Adult  Goal: Absence of urinary retention  Outcome: Progressing     Problem: Infection - Adult  Goal: Absence of infection during hospitalization  Outcome: Progressing     Problem: Metabolic/Fluid and Electrolytes - Adult  Goal: Electrolytes maintained within normal limits  Outcome: Progressing  Goal: Hemodynamic stability and optimal renal function maintained  Outcome: Progressing  Goal: Glucose maintained within prescribed range  Outcome: Progressing     Problem: Hematologic - Adult  Goal: Maintains hematologic stability  Outcome: Progressing     Problem: Safety - Adult  Goal: Free from fall injury  Outcome: Progressing     Problem: Skin/Tissue Integrity  Goal: Absence of new skin breakdown  Description: 1. Monitor for areas of redness and/or skin breakdown  2. Assess vascular access sites hourly  3. Every 4-6 hours minimum:  Change oxygen saturation probe site  4. Every 4-6 hours:  If on nasal continuous positive airway pressure, respiratory therapy assess nares and determine need for appliance change or resting period.   Outcome: Progressing     Problem: Chronic Conditions and Co-morbidities  Goal: Patient's chronic conditions and co-morbidity symptoms are monitored and maintained or improved  12/20/2023 0621 by Raphael Rocha RN  Outcome: Progressing  12/19/2023 1658 by Mikael Encinas, RN  Outcome: Progressing  Flowsheets (Taken 12/19/2023 0800)  Care Plan - Patient's Chronic Conditions and Co-Morbidity Symptoms are Monitored and Maintained or Improved: Monitor and assess patient's chronic conditions and comorbid symptoms for stability, deterioration, or improvement

## 2023-12-20 NOTE — PLAN OF CARE
Problem: Safety - Adult  Goal: Free from fall injury  12/20/2023 1700 by Corey Perera RN  Outcome: Progressing  Note: Pt educated to use her call light when she needs assistance. Pt also educated not to get up unassisted at this time. Bed alarm on when Pt in bed and chair alarm on when Pt up in chair. Non skid socks on and call light placed within reach. RN will continue to monitor Pt.    12/20/2023 1552 by Ammon Gann RN  Note: No family is present at this time to receive PATH-s Form will initiate as soon as family is present     Problem: Safety - Adult  Goal: Free from fall injury  12/20/2023 1700 by Corey Perera RN  Outcome: Progressing  Note: Pt educated to use her call light when she needs assistance. Pt also educated not to get up unassisted at this time. Bed alarm on when Pt in bed and chair alarm on when Pt up in chair. Non skid socks on and call light placed within reach. RN will continue to monitor Pt.

## 2023-12-20 NOTE — CARE COORDINATION
CASE MANAGEMENT DISCHARGE SUMMARY      Discharge to: [facility]    Precertification completed: [facility] Exemption Notification (HENS) completed: IMM given: (date) 12/19/23         Transportation:        Medical Transport explained to [employer]. Pt/family voice no agency preference. Agency used: [address] [place] [address] up time: 10am   Ambulance form completed: yes    Confirmed discharge plan with:     Patient: yes      Family:  yes   Name: Yary Chiang number:  [phone]     Facility/Agency, name:  [facility] faxed   Phone number for report to facility:  [phone]     RN, name: Claude Manns    Note: Discharging nurse to complete ANTHONY, reconcile AVS, and place final copy with patient's discharge packet. RN to ensure that written prescriptions for  Level II medications are sent with patient to the facility as per protocol.

## 2023-12-20 NOTE — PROGRESS NOTES
NURSING ASSESSMENT: ARU ADMISSION  The [address]     Rehab Dx/Hx: CVA (cerebral vascular accident) [facility]) [I63.9]  Acute cerebrovascular accident (CVA) [facility]) [I63.9]   :1935  SUNSHINE:[id number]  Date of Admit: 2023  Room #: 3101/3101-01     Subjective:   Patient admitted to room 3101 from [address] via EMS/Stretcher. Alert and oriented x4. Oriented to room and call light system. Oriented to rehab routine and therapy schedules. Informed about care conferences and ordering of meals. Is this a Stroke Patient? [x]  Yes. If yes, was the PATH-s assessment given to the patient/family? []  Yes     [x]   No family is not present at this time     []   No      Drug / Medication Review:   Medications were reviewed by RN at time of admission  [x]  No potential or actual clinically significant medication issues were noted.      []   Yes, a clinically significant medication issue was identified                 []  Adverse Drug Event:                  []  Allergy:                  []  Side Effect:                  []  Ineffective Therapy:                  []  Drug Interaction:                 []  Duplicated Therapy:                 []  Untreated Indication:                  []  Non-adherence:                 []  Other:                  Nursing/Pharmacy contacted the physician:     Date:              Time:                  Actions recommended by physician were completed:   Date :            Time:    4 Eyes Skin Assessment   The patient is being assessed for: Admission     I agree that 2 RN's have performed a thorough Head to Toe Skin Assessment on the patient. ALL assessment sites listed below have been assessed. Areas assessed by both nurses:   [x]   Head, Face, and Ears   [x]   Shoulders, Back, and Chest, Abdomen  [x]   Arms, Elbows, and Hands   [x]   Coccyx, Sacrum, and Ischium  [x]   Legs, Feet, and Heel     Does the Patient have Skin Breakdown?   No:  Right and left heels are soft and boggy, Pin and blanchable; patient denies pain, quick capillary refill, strong pulses, Skin is intact and blanchable. Skin to coccyx is intact, no redness noted,  patient presented with sacral heart mepilex that was soiled with stool and removed.          Shukri Prevention initiated:  Yes  Wound Care Orders initiated:  Not Applicable      [facility] nurse consulted for Pressure Injury (Stage 3,4, Unstageable, DTI, NWPT, Complex wounds)and New or Established Ostomies:  Not Applicable    Primary Nurse eSignature: Meeta Salas DNP, RN, CRRN   Co-signer eSignature:

## 2023-12-21 LAB
BASOPHILS # BLD: 0 K/UL (ref 0–0.2)
BASOPHILS NFR BLD: 0.5 %
DEPRECATED RDW RBC AUTO: 13.5 % (ref 12.4–15.4)
EOSINOPHIL # BLD: 0.4 K/UL (ref 0–0.6)
EOSINOPHIL NFR BLD: 4.3 %
GLUCOSE BLD-MCNC: 113 MG/DL (ref 70–99)
GLUCOSE BLD-MCNC: 197 MG/DL (ref 70–99)
GLUCOSE BLD-MCNC: 201 MG/DL (ref 70–99)
GLUCOSE BLD-MCNC: 243 MG/DL (ref 70–99)
HCT VFR BLD AUTO: 35.3 % (ref 36–48)
HGB BLD-MCNC: 12 G/DL (ref 12–16)
LYMPHOCYTES # BLD: 0.9 K/UL (ref 1–5.1)
LYMPHOCYTES NFR BLD: 9.4 %
MCH RBC QN AUTO: 32.2 PG (ref 26–34)
MCHC RBC AUTO-ENTMCNC: 33.8 G/DL (ref 31–36)
MCV RBC AUTO: 95.2 FL (ref 80–100)
MONOCYTES # BLD: 0.6 K/UL (ref 0–1.3)
MONOCYTES NFR BLD: 6.2 %
NEUTROPHILS # BLD: 7.5 K/UL (ref 1.7–7.7)
NEUTROPHILS NFR BLD: 79.6 %
PERFORMED ON: ABNORMAL
PLATELET # BLD AUTO: 172 K/UL (ref 135–450)
PMV BLD AUTO: 9.4 FL (ref 5–10.5)
RBC # BLD AUTO: 3.71 M/UL (ref 4–5.2)
WBC # BLD AUTO: 9.4 K/UL (ref 4–11)

## 2023-12-21 PROCEDURE — 92523 SPEECH SOUND LANG COMPREHEN: CPT

## 2023-12-21 PROCEDURE — 6370000000 HC RX 637 (ALT 250 FOR IP): Performed by: PHYSICAL MEDICINE & REHABILITATION

## 2023-12-21 PROCEDURE — 6360000002 HC RX W HCPCS: Performed by: PHYSICAL MEDICINE & REHABILITATION

## 2023-12-21 PROCEDURE — 1280000000 HC REHAB R&B

## 2023-12-21 PROCEDURE — 6370000000 HC RX 637 (ALT 250 FOR IP)

## 2023-12-21 PROCEDURE — 92610 EVALUATE SWALLOWING FUNCTION: CPT

## 2023-12-21 PROCEDURE — 36415 COLL VENOUS BLD VENIPUNCTURE: CPT

## 2023-12-21 PROCEDURE — 97166 OT EVAL MOD COMPLEX 45 MIN: CPT

## 2023-12-21 PROCEDURE — 97162 PT EVAL MOD COMPLEX 30 MIN: CPT

## 2023-12-21 PROCEDURE — 97535 SELF CARE MNGMENT TRAINING: CPT

## 2023-12-21 PROCEDURE — 85025 COMPLETE CBC W/AUTO DIFF WBC: CPT

## 2023-12-21 PROCEDURE — 97530 THERAPEUTIC ACTIVITIES: CPT

## 2023-12-21 PROCEDURE — 97116 GAIT TRAINING THERAPY: CPT

## 2023-12-21 RX ORDER — CARVEDILOL 6.25 MG/1
6.25 TABLET ORAL 2 TIMES DAILY WITH MEALS
Status: DISCONTINUED | OUTPATIENT
Start: 2023-12-21 | End: 2023-12-31 | Stop reason: HOSPADM

## 2023-12-21 RX ORDER — FERROUS SULFATE 325(65) MG
325 TABLET ORAL EVERY OTHER DAY
Status: DISCONTINUED | OUTPATIENT
Start: 2023-12-23 | End: 2023-12-31 | Stop reason: HOSPADM

## 2023-12-21 RX ORDER — SODIUM CHLORIDE, SODIUM LACTATE, POTASSIUM CHLORIDE, CALCIUM CHLORIDE 600; 310; 30; 20 MG/100ML; MG/100ML; MG/100ML; MG/100ML
INJECTION, SOLUTION INTRAVENOUS CONTINUOUS
Status: DISCONTINUED | OUTPATIENT
Start: 2023-12-21 | End: 2023-12-21

## 2023-12-21 RX ADMIN — SACUBITRIL AND VALSARTAN 0.5 TABLET: 24; 26 TABLET, FILM COATED ORAL at 21:06

## 2023-12-21 RX ADMIN — CARVEDILOL 6.25 MG: 6.25 TABLET, FILM COATED ORAL at 18:18

## 2023-12-21 RX ADMIN — BISACODYL 5 MG: 5 TABLET, COATED ORAL at 09:44

## 2023-12-21 RX ADMIN — CLOPIDOGREL BISULFATE 75 MG: 75 TABLET ORAL at 09:44

## 2023-12-21 RX ADMIN — FERROUS SULFATE TAB 325 MG (65 MG ELEMENTAL FE) 325 MG: 325 (65 FE) TAB at 09:44

## 2023-12-21 RX ADMIN — ALOGLIPTIN 6.25 MG: 6.25 TABLET, FILM COATED ORAL at 10:14

## 2023-12-21 RX ADMIN — ASPIRIN 81 MG: 81 TABLET, CHEWABLE ORAL at 09:44

## 2023-12-21 RX ADMIN — INSULIN LISPRO 1 UNITS: 100 INJECTION, SOLUTION INTRAVENOUS; SUBCUTANEOUS at 12:27

## 2023-12-21 RX ADMIN — ENOXAPARIN SODIUM 40 MG: 100 INJECTION SUBCUTANEOUS at 09:44

## 2023-12-21 RX ADMIN — MIRTAZAPINE 15 MG: 15 TABLET, FILM COATED ORAL at 21:05

## 2023-12-21 RX ADMIN — ATORVASTATIN CALCIUM 80 MG: 80 TABLET, FILM COATED ORAL at 21:05

## 2023-12-21 ASSESSMENT — PAIN SCALES - GENERAL
PAINLEVEL_OUTOF10: 0

## 2023-12-21 ASSESSMENT — 9 HOLE PEG TEST
TEST_RESULT: FUNCTIONAL
TESTTIME_SECONDS: 34
TEST_RESULT: IMPAIRED

## 2023-12-21 NOTE — PROGRESS NOTES
the above    High Risk Drug Classes:  Use and Indication    Is taking: Check if the pt is taking any medications by pharmacological classification, not how it is used, in the following classes  Indication noted: If column 1 is checked, check if there is an indication noted for all meds in the drug class Is taking  (check all that apply) Indication noted (check all that apply)   Antipsychotic [] []   Anticoagulant [x] [x]   Antibiotic [] []   Opioid [] []   Antiplatelet [x] [x]   Hypoglycemic (including insulin) [x] [x]   None of the above []     Special Treatments, Procedures, and Programs    Check all of the following treatments, procedures, and programs that apply on admission. On admission (check all that apply)   Cancer Treatments   A1. Chemotherapy []           A2. IV []           A3. Oral []           A10. Other []   B1. Radiation []   Respiratory Therapies   C1. Oxygen Therapy []           C2. Continuous (continuously for at least 14 hours per day) []           C3. Intermittent [x]           C4. High-concentration []   D1. Suctioning (Does not include oral suctioning) []           D2. Scheduled []           D3. As needed []   E1. Tracheostomy Care []   F1. Invasive Mechanical Ventilator (ventilator or respirator) []   G1. Non-invasive Mechanical Ventilator []           G2. BiPAP []           G3. CPAP []   Other   H1. IV Medications (Do not include sub Q pumps, flushes, Dextrose 50% or lactated ringers) []           H2. Vasoactive medications []           H3. Antibiotics []           H4. Anticoagulation []           H10. Other [x]   I1. Transfusions []   J1. Dialysis []           J2. Hemodialysis []           J3. Peritoneal dialysis []   O1. IV access (including a catheter in a vein) []           O2. Peripheral []           O3. Midline []           O4. Central (PICC, tunneled, port) []      None of the above (select if no Cancer, Respiratory, or Other boxes are checked) []     The above items have been  reviewed and updated as necessary, and are accurate for the admission assessment period.     Assessing/Reviewing RN:Farzad Garcia RN   12/21/2023    Assessing/Reviewing RN:

## 2023-12-21 NOTE — PLAN OF CARE
Problem: Discharge Planning  Goal: Discharge to home or other facility with appropriate resources  12/20/2023 2131 by Renetta Retana, RN  Outcome: Progressing  12/20/2023 1552 by Meeta Salas, RN  Flowsheets  Taken 12/20/2023 1507  Discharge to home or other facility with appropriate resources:   Identify barriers to discharge with patient and caregiver   Identify discharge learning needs (meds, wound care, etc)   Refer to discharge planning if patient needs post-hospital services based on physician order or complex needs related to functional status, cognitive ability or social support system  Taken 12/20/2023 1300  Discharge to home or other facility with appropriate resources:   Identify barriers to discharge with patient and caregiver   Identify discharge learning needs (meds, wound care, etc)   Refer to discharge planning if patient needs post-hospital services based on physician order or complex needs related to functional status, cognitive ability or social support system   Arrange for needed discharge resources and transportation as appropriate   Arrange for interpreters to assist at discharge as needed  Taken 12/20/2023 1129  Discharge to home or other facility with appropriate resources:   Identify barriers to discharge with patient and caregiver   Arrange for needed discharge resources and transportation as appropriate   Identify discharge learning needs (meds, wound care, etc)   Refer to discharge planning if patient needs post-hospital services based on physician order or complex needs related to functional status, cognitive ability or social support system  Note: Patient informed that discharge planning begins on admission, states she lives in a home with her son and daughter     Problem: Safety - Adult  Goal: Free from fall injury  12/20/2023 2131 by Renetta Retana, RN  Outcome: Progressing  12/20/2023 1700 by Sarah Britton RN  Outcome: Progressing  Note: Pt educated to use her call  report changes in neurological status   Monitor temperature, glucose, and sodium. Initiate appropriate interventions as ordered   Maintain blood pressure and fluid volume within ordered parameters to optimize cerebral perfusion and minimize risk of hemorrhage  Note: VSS at time of admission, BP low on admission will encourage hydration and review with next assessment, water provided. Goal: Achieves maximal functionality and self care  12/20/2023 2131 by Malorie Isabel RN  Outcome: Progressing  12/20/2023 1552 by Deborah Cosme RN  Flowsheets (Taken 12/20/2023 1129)  Achieves maximal functionality and self care: Encourage and assist patient to increase activity and self care with guidance from physical therapy/occupational therapy  Note: Patient states she has glasses but does not use them much. Feels vision is adequate . Patient instructed in safety, use of call light and waiting for assistance, Stay with me policy and has yellow socks on feet.       Problem: Skin/Tissue Integrity - Adult  Goal: Skin integrity remains intact  12/20/2023 2131 by Malorie Isabel RN  Outcome: Progressing  12/20/2023 1552 by Deborah Cosme RN  Flowsheets (Taken 12/20/2023 1129)  Skin Integrity Remains Intact: Monitor for areas of redness and/or skin breakdown  Note: Bilateral heels are red, sacral area had mepilex pad over coccyx that was removed due to being soiled , will reapply     Problem: Musculoskeletal - Adult  Goal: Return mobility to safest level of function  12/20/2023 2131 by Malorie Isabel RN  Outcome: Progressing  12/20/2023 1552 by Deborah Cosme RN  Flowsheets  Taken 12/20/2023 1129  Return Mobility to Safest Level of Function:   Assess patient stability and activity tolerance for standing, transferring and ambulating with or without assistive devices   Assist with transfers and ambulation using safe patient handling equipment as needed   Ensure adequate protection for wounds/incisions during mobilization

## 2023-12-21 NOTE — PROGRESS NOTES
Occupational Therapy  Facility/Department: [facility] ACUTE REHAB UNIT  Occupational Therapy Rehabilitation Initial Assessment / Treatment    Name: Whitney Meier  : 1935  MRN: [id number]  Date of Service: 2023    Discharge Recommendations:  24 hour supervision or assist, Home with Home health OT  OT Equipment Recommendations  Other: Pt already has 3-in-1 commode, cont to assess       Patient Diagnosis(es): There were no encounter diagnoses.  Past Medical History:  has a past medical history of Clostridium difficile infection, Dementia (HCC), Diabetes mellitus (HCC), History of blood transfusion, Hyperlipidemia, Hypertension, Infection, MDRO (multiple drug resistant organisms) resistance, MRSA infection, Osteomyelitis of spine (HCC), and Restless leg syndrome.  Past Surgical History:  has a past surgical history that includes Tonsillectomy; Hysterectomy; Appendectomy; Toe amputation (); Toe amputation (); eye surgery; Colonoscopy (); Colonoscopy (7/15/15); other surgical history (Left); Foot surgery (Left, 2017); Upper gastrointestinal endoscopy (2017); Foot surgery (Right, 2017); Foot Amputation (Right, 2019); and Foot Amputation (Right, 2019).    Treatment Diagnosis: impaired ADLs, activity tolerance, and R hand functional use d/t CVA      Assessment   Performance deficits / Impairments: Decreased functional mobility ;Decreased ADL status;Decreased safe awareness;Decreased balance;Decreased ROM;Decreased endurance;Decreased high-level IADLs;Decreased strength;Decreased cognition;Decreased coordination;Decreased posture;Decreased sensation;Decreased fine motor control    Assessment: Pt is an 88 y.o. F admitted to ARU s/p acute CVA. Pt lives w/ her daughter and son-in-law and is typically independent with all ADLs and mobility w/ RW and family completes IADLs. Pt presents below functional baseline and currently requires Min-Max A for bathing, dressing, and grooming  technique)  Sit to stand: Minimal assistance (Min A from w/c > standing, difficulty placing R hand on w/c to assist w/ pushing up to stand)  Stand to sit: Minimal assistance  Transfer Comments: verbalized feeling lightheaded w/ transfers, BP 97/52 after stand pivot transfer  Vision - Basic Assessment  Patient Visual Report: No visual complaint reported. Vision Comments: Denies vision changes since CVA  Vision  Vision: Impaired  Vision Exceptions: Wears glasses for reading (does not wear, uses a magnifying glass for reading)  Hearing  Hearing: Within functional limits  Cognition  Overall Cognitive Status: WFL  Orientation  Overall Orientation Status: Within Functional Limits  Orientation Level: Oriented X4                    Education Given To: Patient  Education Provided: Role of Therapy;Transfer Training;Plan of Care;Energy Conservation; ADL Adaptive Strategies; Fall Prevention Strategies  Education Provided Comments: safety w/ transfers, increasing fluid intake  Education Method: Verbal;Demonstration  Barriers to Learning: None  Education Outcome: Verbalized understanding;Demonstrated understanding             Hand Dominance  Hand Dominance: Right  Left 9-Hole Peg Test  Left 9-Hole Peg Test: Functional  Right 9-Hole Peg Test  Right 9-Hole Peg Test: Impaired (unable to complete formal assessment)  Fine Motor Skills  Left 9-Hole Peg Test: Functional  Left 9 Hole Peg Test Time (secs): 34  Right 9-Hole Peg Test: Impaired (unable to complete formal assessment)  Right 9 Hole Peg Test Time (secs):  (Pt w/ significant difficulty picking up pegs w/ R hand, only able to successfully  and place 2 pegs in 2 minutes, poor coordination and dexterity w/ difficulty achieving pincer grasp, unable to perform in-hand manipulation to rotate pegs)            Goals  Short Term Goals  Time Frame for Short Term Goals: 10 Days  Short Term Goal 1: Pt will complete full body dressing Mod I - ongoing  Short Term Goal 2: Pt will

## 2023-12-21 NOTE — PLAN OF CARE
In room rounding was completed today with RN, PT/OT/ST, LSW and ARU Supervisor present. LSW called patient's Daughter Irena Chang to allow them to be present and active during the conversation. Richard Kendall was educated on their discharge date, 1/2/2024, and the recommendation for Home with Home healthcare therapy and nursing after discharge. Physical Therapy informed the patient and family on their current assist level and plan of care. Occupation Therapy provided information to the patient and family on their current assist level for ADLs and the plan of care. Speech Language Pathology educated them on cognitive deficits, diet level, and plan of care that they are currently addressing. The RN staff provided education on medication management, current co-morbidities that are being addressed by the physicians and the patient's plan of care for their stay. The interdisciplinary team identified the following barriers to address prior to discharge:  Low BP  Need of assistance during transfers/gait  Assistance with lower body dressing and donning/doffing socks/shoes. Decreased strength and coordination of R hand    Education/recommendations to assist at discharge included:  Home with 24hr supervision initially  Physicians are currently adjusting her BP meds to assist in maintaining safe BP.     Rehab Team Members in attendance for in room rounding:  ARU Supervisor/PPS Coordinator:  Natalia Vivas, PT, DPT     Social Work:  Ildefonso Estrada     Nursing:  Ponciano Cheadle, RN     Therapy:  Kenisha Shah, PT, DPT  Perry Crandall, OTR/L  [facility], MA-CCC, SLP

## 2023-12-21 NOTE — PROGRESS NOTES
ORAL NUTRITION SUPPLEMENT; Breakfast; Diabetic Oral Supplement  ADULT ORAL NUTRITION SUPPLEMENT; Dinner; Diabetic Oral Supplement  PO Intake: 26-50%, %, 51-75%   PO Supplement Intake:None Ordered  Additional Sources of Calories/IVF:n/a     COMPARATIVE STANDARDS  Energy (kcal):  9305-2719 (30-35 kcal/CBW)     Protein (g):   (1.5-1.8 gm/CBW)       Fluid (ml/day):  1 ml/kcal or per MD    ANTHROPOMETRICS  Current Height: 179.1 cm (5' 10.5\")  Current Weight - Scale: 60.9 kg (134 lb 4.2 oz)    Admission weight: 60.9 kg (134 lb 4.2 oz)    The patient will be monitored per nutrition standards of care. Consult dietitian if additional nutrition interventions are needed prior to RD reassessment.      Priya Frank, [address]:  [phone]  Office:  [phone]

## 2023-12-21 NOTE — PLAN OF CARE
Problem: Neurosensory - Adult  Goal: Achieves maximal functionality and self care  Outcome: Not Progressing  Flowsheets (Taken 12/21/2023 1000)  Achieves maximal functionality and self care: Monitor swallowing and airway patency with patient fatigue and changes in neurological status     Problem: Musculoskeletal - Adult  Goal: Return mobility to safest level of function  Outcome: Not Progressing

## 2023-12-21 NOTE — CARE COORDINATION
Case Management Assessment  Initial Evaluation    Date/Time of Evaluation: 12/21/2023 4:15 PM  Assessment Completed by: ELLIS Kulkarni    If patient is discharged prior to next notation, then this note serves as note for discharge by case management. Patient Name: Lesley Ortiz                   YOB: 1935  Diagnosis: CVA (cerebral vascular accident) [facility]) [I63.9]  Acute cerebrovascular accident (CVA) [facility]) [I63.9]                   Date / Time: 12/20/2023 11:11 AM    Patient Admission Status: REHAB IP   Readmission Risk (Low < 19, Mod (19-27), High > 27): Readmission Risk Score: 17.2    Current PCP: Shanti Vogt MD  PCP verified by CM? Yes    Chart Reviewed: Yes      History Provided by: Patient  Patient Orientation: Alert and Oriented    Patient Cognition: Alert    Hospitalization in the last 30 days (Readmission):  Yes    If yes, Readmission Assessment in CM Navigator will be completed. Advance Directives:      Code Status: Full Code   Patient's Primary Decision Maker is: Legal Next of Kin    Primary Decision MakeCarol Rausch Child - [phone]    Secondary Decision Maker: Sabino Pascal - [phone]    Discharge Planning:    Patient lives with: Children Type of Home: House  Primary Care Giver: Self  Patient Support Systems include: Children   Current Financial resources: None  Current community resources: None  Current services prior to admission: None            Current DME:              Type of Home Care services:  None    ADLS  Prior functional level: Assistance with the following:, Mobility  Current functional level: Assistance with the following:, Mobility    PT AM-PAC:   /24  OT AM-PAC:   /24    Family can provide assistance at DC: Yes  Would you like Case Management to discuss the discharge plan with any other family members/significant others, and if so, who?  Yes  Plans to Return to Present Housing: Yes  Other Identified Issues/Barriers to

## 2023-12-21 NOTE — PROGRESS NOTES
SLP ALL NOTES  Facility/Department: [facility] ACUTE REHAB UNIT  Initial Speech/Language/Cognitive Assessment/Discharge    NAME: Whitney Meier  : 1935   MRN: [id number]  ADMISSION DATE: 2023  ADMITTING DIAGNOSIS: has DM2 (diabetes mellitus, type 2) (HCC); Acute blood loss anemia; Severe protein-calorie malnutrition (HCC); Encephalopathy; HTN (hypertension), benign; Altered mental status; Acute renal failure (HCC); Contusion of left hip; Acute UTI; Mild dehydration; Urinary incontinence; 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); Osteomyelitis of right foot (HCC); Chronic kidney disease; Generalized weakness; Stage 3b chronic kidney disease (HCC); Chest pain; Arterial ischemic stroke, ICA, left, acute (HCC); Carotid artery stenosis with cerebral infarction (HCC); Dyslipidemia; NSTEMI (non-ST elevated myocardial infarction) (HCC); Infection due to 2019 novel coronavirus; History of cerebrovascular accident; Acute cerebrovascular accident (CVA) (HCC); Elevated troponin level not due myocardial infarction; DM (diabetes mellitus), secondary, with complications (HCC); and Cardiomyopathy (HCC) on their problem list.  DATE ONSET: 23    Date of Eval: 2023   Evaluating Therapist: HUY White    RECENT RESULTS  CT OF HEAD/MRI: 23  No acute intracranial abnormality.  No intracranial hemorrhage or edema is  seen.    Primary Complaint: None stated    Pain:  None indicated    Vision/ Hearing  Vision  Vision Exceptions: Wears glasses for reading  Hearing  Hearing: Within functional limits    Assessment:  Cognitive Diagnosis: suspect pt at baseline   Diagnosis: Suspect pt presents near or at cognitive-linguistic baseline. Pt was alert and oriented to person, place, situation, and date. Pt able to complete daily living tasks with approrpriate communication skills. Demonstrated adequate problem-solving skills and ability to dial 9-1-1 in a simulated emergency situation (of note, pt was  Minutes: 0 Minutes  Total Treatment Time: [address] [facility]  Speech-Language Pathologist

## 2023-12-21 NOTE — PLAN OF CARE
Other: CASE MANAGEMENT:  Goals: Assist patient/family with discharge planning, patient/family counseling, and coordination with insurance during ARU stay. Malini Whitt will be seen a minimum of 3 hours of therapy per day, a minimum of 5 out of 7 days per week (please see above for specific treatment plan per PT/OT/SLP). [] In this rare instance due to the nature of this patient's medical involvement, this patient will be seen 15 hours per week (900 minutes within a 7day period). In addition, dietician/nutritionist may monitor calorie count as well as intake and collaboratively work with SLP on dietary upgrades. Neuropsychology/Psychology may evaluate and provide necessary support. Medical issues being managed closely and that require 24hour availability of a physician:  [x] Swallowing Precautions  [x] Bowel/Bladder Fx  [] Weight bearing precautions  [x] Wound Care    [x] Pain Mgmt   [] Infection Protection  [x] DVT Prophylaxis   [x] Fall Precautions  [x] Fluid/Electrolyte/Nutrition Balance  [x] Voice Protection   [] Respiratory  [] Other:    Medical Prognosis: [x] Good  [] Fair    [] Guarded   Total expected IRF days 10  Anticipated discharge destination:   [] Home Independently   [x] Home Modified Independent  [] Home with supervision    []SNF     [] Other                                           Physician anticipated functional outcomes: Pt will progress to a level of MOD I for all functional mobility and ADLs to allow for a safe return home with her family. IPOC brief synthesis:   Malini Whitt is a 81 yo female with past history of stroke, DM2, HLD, RLS, HTN, dementia and new cardiomyopathy who initially presented to [place] for SOB, during which presentation there was new right hand weakness. This initial ARU patient treatment plan of care, together with the IPOC & the Education plan, form the foundation for the patient's plan of care.   Weekly patient care conferences are held to evaluate progress towards the initial treatment plan & goals.    I have reviewed this initial plan of care and agree with its contents:    Title   Name    Date    Time    Physician: Jose Alfredo Weber D.O. M.P.H  PM&R  12/22/2023  12:58 PM      Case Mgmt:Gage Foss MSW 12/21/23 @ 0850     OT: Saundra Olivares, OTR/L 12/21/2023 1132    PT: Claire Kent PT, DPT 12/21/23 1424    RN: Vaibhav Garcia RN   12/21/2023    ARU Supervisor: Luis Arthur, PT, DPT 12/21/2023 @ 0933

## 2023-12-21 NOTE — PROGRESS NOTES
training;Neuromuscular re-education;Home exercise program;Safety education & training;Equipment evaluation, education, & procurement; Therapeutic activities; Co-Treatment;Stair training  Safety Devices  Type of Devices: Call light within reach; Patient at risk for falls; Chair alarm in place; Left in chair;Nurse notified; All fall risk precautions in place (RN present)    EDUCATION  Education  Education Given To: Patient  Education Provided: Role of Therapy;Transfer Training  Education Method: Demonstration  Barriers to Learning: Cognition  Education Outcome: Continued education needed;Verbalized understanding    ELOS: 10 days         Therapy Time   Individual Concurrent Group Co-treatment   Time In 0730         Time Out 0830         Minutes 60            Timed Code Treatment Minutes:   45 minutes    Total Treatment Minutes:  60 minutes         Hang Lopez, PT, 12/21/23 at 2:21 PM

## 2023-12-21 NOTE — PROGRESS NOTES
After TNK was administered, patient was transferred.\"  Acute ischemic CVA of the L internal capsule.  This was likely thromboembolic from her 50% LICA stenosis at the origin (will need to f/u with vascular as outpatient).  Given tenectaplase.  Symptoms improved.  Aspirin indefinitely, 21 days of clopidogrel (confirmed with neuro), increased statin.  PT/OT rec'd IPR, we will see whether she gets approved for ARU.  She lives at home with her daughter.  Elevated troponin.  Perhaps due to stroke.  Cardiology recommended conservative management.  TTE then showed a new cardiomyopathy with EF 35% (normal in 5/2022) and RWMAs.  I have asked nursing to confirm with cardiology prior to discharge that no further inpatient workup is planned.\"    Current Diet level:  Current Diet : Regular  Current Liquid Diet : Thin    Primary Complaint  None    Pain:  Pain Assessment  Pain Assessment: None - Denies Pain  Pain Level: 0    Reason for Referral  Whitney Meier was referred for a bedside swallow evaluation to assess the efficiency of her swallow function, identify signs and symptoms of aspiration and make recommendations regarding safe dietary consistencies, effective compensatory strategies, and safe eating environment.    Impression  Dysphagia Diagnosis: Swallow function appears WFL  Dysphagia Impression : Pt presents with swallow function which appears WFL. Pt seated upright in chair and readily self fed all trials of PO (pt currently on regular solid and thin liquid diet). Of note pt did report consumes \"softer\" solids at home 2/2 only wears top dentures and is edentulous on the bottom. Expressed usually does not consume \"tough meats\" because of this and demonstrated appropriate self awareness of softer item choices, per preference. Slight prolonged, yet complete, mastication of all solids with no residue remaining in oral cavity. Pt appeared to swallow all PO with no overt s/s associated with aspiration. No wet vocal quality  rationale for evaluation, results and recommendations for continuing current diet level. Patient Education Response: Verbalizes understanding;Demonstrated understanding  Safety Devices in place: Yes  Type of devices: All fall risk precautions in place; Left in chair;Call light within reach; Chair alarm in place       Therapy Time  SLP Individual Minutes  Time In: 0830  Time Out: 0900  Minutes: 27     SLP Total Treatment Time  Total Treatment Time: 30    Electronically signed by:  Ronda Hamilton M.A., [place]  Speech-Language Pathologist

## 2023-12-22 LAB
ANION GAP SERPL CALCULATED.3IONS-SCNC: 5 MMOL/L (ref 3–16)
BASOPHILS # BLD: 0 K/UL (ref 0–0.2)
BASOPHILS NFR BLD: 0.4 %
BUN SERPL-MCNC: 26 MG/DL (ref 7–20)
CALCIUM SERPL-MCNC: 9 MG/DL (ref 8.3–10.6)
CHLORIDE SERPL-SCNC: 104 MMOL/L (ref 99–110)
CO2 SERPL-SCNC: 29 MMOL/L (ref 21–32)
CREAT SERPL-MCNC: 1.4 MG/DL (ref 0.6–1.2)
DEPRECATED RDW RBC AUTO: 13.3 % (ref 12.4–15.4)
EOSINOPHIL # BLD: 0.3 K/UL (ref 0–0.6)
EOSINOPHIL NFR BLD: 4.7 %
GFR SERPLBLD CREATININE-BSD FMLA CKD-EPI: 36 ML/MIN/{1.73_M2}
GLUCOSE BLD-MCNC: 130 MG/DL (ref 70–99)
GLUCOSE BLD-MCNC: 161 MG/DL (ref 70–99)
GLUCOSE BLD-MCNC: 250 MG/DL (ref 70–99)
GLUCOSE BLD-MCNC: 277 MG/DL (ref 70–99)
GLUCOSE SERPL-MCNC: 132 MG/DL (ref 70–99)
HCT VFR BLD AUTO: 31.8 % (ref 36–48)
HGB BLD-MCNC: 10.7 G/DL (ref 12–16)
LYMPHOCYTES # BLD: 0.9 K/UL (ref 1–5.1)
LYMPHOCYTES NFR BLD: 13.7 %
MCH RBC QN AUTO: 32 PG (ref 26–34)
MCHC RBC AUTO-ENTMCNC: 33.6 G/DL (ref 31–36)
MCV RBC AUTO: 95.2 FL (ref 80–100)
MONOCYTES # BLD: 0.7 K/UL (ref 0–1.3)
MONOCYTES NFR BLD: 9.7 %
NEUTROPHILS # BLD: 4.9 K/UL (ref 1.7–7.7)
NEUTROPHILS NFR BLD: 71.5 %
PERFORMED ON: ABNORMAL
PLATELET # BLD AUTO: 147 K/UL (ref 135–450)
PMV BLD AUTO: 9.5 FL (ref 5–10.5)
POTASSIUM SERPL-SCNC: 4.7 MMOL/L (ref 3.5–5.1)
RBC # BLD AUTO: 3.34 M/UL (ref 4–5.2)
SODIUM SERPL-SCNC: 138 MMOL/L (ref 136–145)
WBC # BLD AUTO: 6.8 K/UL (ref 4–11)

## 2023-12-22 PROCEDURE — 6370000000 HC RX 637 (ALT 250 FOR IP)

## 2023-12-22 PROCEDURE — 97530 THERAPEUTIC ACTIVITIES: CPT

## 2023-12-22 PROCEDURE — 97535 SELF CARE MNGMENT TRAINING: CPT

## 2023-12-22 PROCEDURE — 36415 COLL VENOUS BLD VENIPUNCTURE: CPT

## 2023-12-22 PROCEDURE — 80048 BASIC METABOLIC PNL TOTAL CA: CPT

## 2023-12-22 PROCEDURE — 97116 GAIT TRAINING THERAPY: CPT

## 2023-12-22 PROCEDURE — 6360000002 HC RX W HCPCS: Performed by: PHYSICAL MEDICINE & REHABILITATION

## 2023-12-22 PROCEDURE — 85025 COMPLETE CBC W/AUTO DIFF WBC: CPT

## 2023-12-22 PROCEDURE — 6370000000 HC RX 637 (ALT 250 FOR IP): Performed by: PHYSICAL MEDICINE & REHABILITATION

## 2023-12-22 PROCEDURE — 97110 THERAPEUTIC EXERCISES: CPT

## 2023-12-22 PROCEDURE — 1280000000 HC REHAB R&B

## 2023-12-22 RX ORDER — ENOXAPARIN SODIUM 100 MG/ML
30 INJECTION SUBCUTANEOUS DAILY
Status: DISCONTINUED | OUTPATIENT
Start: 2023-12-23 | End: 2023-12-31 | Stop reason: HOSPADM

## 2023-12-22 RX ADMIN — ENOXAPARIN SODIUM 40 MG: 100 INJECTION SUBCUTANEOUS at 09:54

## 2023-12-22 RX ADMIN — INSULIN LISPRO 2 UNITS: 100 INJECTION, SOLUTION INTRAVENOUS; SUBCUTANEOUS at 12:34

## 2023-12-22 RX ADMIN — CARVEDILOL 6.25 MG: 6.25 TABLET, FILM COATED ORAL at 10:14

## 2023-12-22 RX ADMIN — CARVEDILOL 6.25 MG: 6.25 TABLET, FILM COATED ORAL at 16:29

## 2023-12-22 RX ADMIN — CLOPIDOGREL BISULFATE 75 MG: 75 TABLET ORAL at 09:54

## 2023-12-22 RX ADMIN — SACUBITRIL AND VALSARTAN 0.5 TABLET: 24; 26 TABLET, FILM COATED ORAL at 20:33

## 2023-12-22 RX ADMIN — ATORVASTATIN CALCIUM 80 MG: 80 TABLET, FILM COATED ORAL at 20:33

## 2023-12-22 RX ADMIN — ASPIRIN 81 MG: 81 TABLET, CHEWABLE ORAL at 09:54

## 2023-12-22 RX ADMIN — SACUBITRIL AND VALSARTAN 0.5 TABLET: 24; 26 TABLET, FILM COATED ORAL at 09:54

## 2023-12-22 RX ADMIN — MIRTAZAPINE 15 MG: 15 TABLET, FILM COATED ORAL at 20:33

## 2023-12-22 RX ADMIN — ALOGLIPTIN 6.25 MG: 6.25 TABLET, FILM COATED ORAL at 09:55

## 2023-12-22 ASSESSMENT — PAIN SCALES - GENERAL: PAINLEVEL_OUTOF10: 0

## 2023-12-22 ASSESSMENT — PAIN SCALES - WONG BAKER: WONGBAKER_NUMERICALRESPONSE: 0

## 2023-12-22 NOTE — PROGRESS NOTES
Pharmacist Review and Automatic Dose Adjustment of Prophylactic Enoxaparin    The reviewing pharmacist has made an adjustment to the ordered enoxaparin dose or converted to UFH per the approved [facility] protocol and table as defined below. Plan / Rationale: Based upon the patient's weight and renal function, the ordered dose of 40 mg daily has been converted to 30 mg daily. Thank you,  Sharita Siegel, [facility]  12/22/2023, 10:55 AM      Saúl Mosher is a 80 y.o. female. Recent Labs     12/22/23  0420   CREATININE 1.4*       Estimated Creatinine Clearance: 27 mL/min (A) (based on SCr of 1.4 mg/dL (H)). Recent Labs     12/21/23  1023 12/22/23  0420   HGB 12.0 10.7*   HCT 35.3* 31.8*    147     No results for input(s): \"INR\" in the last 72 hours.     Height:   Ht Readings from Last 1 Encounters:   12/20/23 1.791 m (5' 10.5\")     Weight:  Wt Readings from Last 1 Encounters:   12/20/23 60.9 kg (134 lb 4.2 oz)

## 2023-12-22 NOTE — PROGRESS NOTES
Patient has been resting quietly in bed. She is easily awakened. She is alert and oriented x 4. Vital signs stable. Right side weakness noted. Patient denies pain or discomfort. Instructed to call for assistance with any needs. Call light in reach.

## 2023-12-22 NOTE — PLAN OF CARE
Problem: Discharge Planning  Goal: Discharge to home or other facility with appropriate resources  Outcome: Progressing  Flowsheets (Taken 12/22/2023 0150)  Discharge to home or other facility with appropriate resources:   Identify barriers to discharge with patient and caregiver   Identify discharge learning needs (meds, wound care, etc)     Problem: Safety - Adult  Goal: Free from fall injury  Outcome: Progressing  Note: Safety precautions in place. Avasys camera in use. Bed is in low and locked position. Bed alarm set. Frequent visual checks made. Problem: Pain  Goal: Verbalizes/displays adequate comfort level or baseline comfort level  Outcome: Progressing  Flowsheets (Taken 12/22/2023 0150)  Verbalizes/displays adequate comfort level or baseline comfort level:   Encourage patient to monitor pain and request assistance   Assess pain using appropriate pain scale   Administer analgesics based on type and severity of pain and evaluate response   Consider cultural and social influences on pain and pain management   Implement non-pharmacological measures as appropriate and evaluate response     Problem: Skin/Tissue Integrity  Goal: Absence of new skin breakdown  Outcome: Progressing  Note: Patient has occasional incontinence episodes. She is able to turn and reposition self in bed. Heels elevated off bed.      Problem: Neurosensory - Adult  Goal: Achieves maximal functionality and self care  12/21/2023 1823 by Sarina Brown RN  Outcome: Not Progressing  Flowsheets (Taken 12/21/2023 1000)  Achieves maximal functionality and self care: Monitor swallowing and airway patency with patient fatigue and changes in neurological status     Problem: Musculoskeletal - Adult  Goal: Return mobility to safest level of function  12/21/2023 1823 by Sarina Brown, RN  Outcome: Not Progressing

## 2023-12-22 NOTE — PROGRESS NOTES
Physical Therapy  Facility/Department: [facility] ACUTE REHAB UNIT  Rehabilitation Physical Therapy Treatment Note    NAME: Arti Canseco  : 1935 (80 y.o.)  MRN: [id number]  CODE STATUS: Full Code    Date of Service: 23       Restrictions:  Restrictions/Precautions: Fall Risk, Up as Tolerated  Position Activity Restriction  Other position/activity restrictions: up as tolerated     SUBJECTIVE  Subjective  Subjective: Patient sitting in the recliner, reporting some fatigue from earlier session, still agreeable to work with therapy. Pain: No pain       OBJECTIVE  Cognition  Overall Cognitive Status: WFL  Orientation  Overall Orientation Status: Within Functional Limits  Orientation Level: Oriented X4    Functional Mobility  Transfers  Surface: To chair with arms;From chair with arms; Wheelchair  Additional Factors: Hand placement cues  Device: Walker (rolling)  Sit to Stand  Assistance Level: Minimal assistance;Stand by assist  Skilled Clinical Factors: Initally Min A to SBA  Stand to Sit  Assistance Level: Stand by assist  Skilled Clinical Factors: From RW to Chair      Environmental Mobility  Ambulation  Surface: Level surface  Device: Rolling walker  Distance: 150ft+100ft  Activity: Within Unit  Assistance Level: Stand by assist  Gait Deviations: Slow daniel  Skilled Clinical Factors: forward flexed posture, slowed daniel, decreased step length and height      PT Exercises  Exercise Treatment: Patient performed continuous sit<>stands 2x5 and then placing on the walker on the 8 inch curb step step 2x5    Group Activity: Patient ambulated to the dining room and sat in the chair. She was able to follow directions of putting the flour and salt in the bowl. She needed some assistance with mixing and flattening out the dough. Pt able to roll the dough into balls and PT assisted with flattening them out on the baking sheet. Pt then ambulated to her room and requested to rest in the bed.     Second Therapy education, & procurement;Therapeutic activities;Co-Treatment;Stair training  Safety Devices  Type of Devices: Patient at risk for falls;Nurse notified;All fall risk precautions in place;Bed alarm in place;Call light within reach;Left in bed    EDUCATION  Education  Education Given To: Patient  Education Provided: Role of Therapy;Transfer Training  Education Method: Demonstration  Barriers to Learning: Cognition  Education Outcome: Continued education needed;Verbalized understanding        Therapy Time   Individual Individual Group Co-treatment   Time In 1000 1300       Time Out 1105 1330       Minutes 65 30          Timed Code Treatment Minutes:   95 minutes    Total Treatment Minutes:  95 minutes         Claire Kent, PT, 12/22/23 at 2:46 PM

## 2023-12-22 NOTE — PROGRESS NOTES
stance at sink w/ CGA. Pt demonstrated kyphotic posture requiring VCs to stand erect. Toileting  Assistance Level: Contact guard assist;Increased time to complete  Skilled Clinical Factors: Pt was continent of bladder and completed kelley care I'ly and pants management w/ CGA. Pt required + time to manage clothing up/down. Toilet Transfers  Technique:  (ambulating)  Equipment: Standard toilet  Assistance Level: Contact guard assist;Increased time to complete  Skilled Clinical Factors: Pt required heavy reliance on GB to stand from toilet. OT educated pt about RTS and pt said she may be interested but not yet. Pt was very fatigued after going to the bathroom            Functional Mobility  Device: Rolling walker; Wheelchair  Activity: To/From bathroom; To/From therapy gym  Assistance Level: Contact guard assist  Skilled Clinical Factors: Pt ambulated w/ RW to/from the bathroom and to the therapy gym w/ CGA. Pt demonstrated severe kyphosis which pt states is her baseline. Due to urgent need to use the bathroom OT assisted pt in w/c back to the room. Pt was very fatigued w/ ambulation at conclusion of session  Sit to Supine  Assistance Level: Supervision  Skilled Clinical Factors: HOB elevated  Transfers  Surface: To chair with arms;From bed  Additional Factors: Verbal cues  Sit to Stand  Assistance Level: Contact guard assist  Skilled Clinical Factors: VCs needed for hand placement. Slow transition into stance  Stand to Sit  Assistance Level: Contact guard assist  Skilled Clinical Factors: Decreased eccentric control w/ VCs needed for slower descent       Fine Motor Coordination:  Pt engaged in various FM coordination activities to increase functional use of RUE. Pt completed the followin. Grasp/release- pt was instructed to grasp cup w/ RUE, bring cup to mouth, and release onto tabletop to improve beverage management w/ meals. Pt completed this 5x w/ RUE w/ VCs needed to full extend digits upon release of cup.  Pt the RW w/ CGA and transfers w/ CGA. Pt demonstrated improved functional use of RUE during eating and grooming tasks and she was able to hold items w/ RUE w/ improved control today. Pt is limited by numbness and weakness in RUE and she demonstrates decreased activity tolerance. Pt is motivated and continues to benefit from OT. Cont OT per POC  Activity Tolerance: Patient tolerated treatment well;Patient limited by fatigue (BP WFL today 133/70)  Discharge Recommendations: 24 hour supervision or assist;Home with Home health OT  OT Equipment Recommendations  Other: Pt already has 3-in-1 commode, cont to assess  Safety Devices  Safety Devices in place: Yes  Type of devices: Call light within reach;Chair alarm in place;Nurse notified;Left in chair    Patient Education  Education  Education Given To: Patient  Education Provided: Role of Therapy;Mobility Training;Transfer Training;Home Exercise Program;Precautions;Safety;ADL Function  Education Provided Comments: theraputty HEP  Education Method: Verbal;Demonstration;Printed Information/Hand-outs  Barriers to Learning: None  Education Outcome: Verbalized understanding;Continued education needed    Plan  Occupational Therapy Plan  Times Per Week: 5x weekly, 90 mins daily  Current Treatment Recommendations: Strengthening;Balance training;Functional mobility training;Endurance training;Safety education & training;Self-Care / ADL;Patient/Caregiver education & training;Home management training;Cognitive/Perceptual training;Neuromuscular re-education    Goals  Patient Goals   Patient goals : to return home with family  Short Term Goals  Time Frame for Short Term Goals: 10 Days  Short Term Goal 1: Pt will complete full body dressing Mod I - ongoing  Short Term Goal 2: Pt will complete full body bathing Mod I - ongoing  Short Term Goal 3: Pt will complete toileting, including toilet transfers, Mod I - ongoing  Short Term Goal 4: Pt will demo independence with HEP to increase

## 2023-12-22 NOTE — PLAN OF CARE
Problem: Pain  Goal: Verbalizes/displays adequate comfort level or baseline comfort level  Recent Flowsheet Documentation  Taken 12/22/2023 0830 by Marina Pompa RN  Verbalizes/displays adequate comfort level or baseline comfort level: Encourage patient to monitor pain and request assistance     Problem: Skin/Tissue Integrity  Goal: Absence of new skin breakdown  Description: 1. Monitor for areas of redness and/or skin breakdown  2. Assess vascular access sites hourly  3. Every 4-6 hours minimum:  Change oxygen saturation probe site  4. Every 4-6 hours:  If on nasal continuous positive airway pressure, respiratory therapy assess nares and determine need for appliance change or resting period.   Outcome: Progressing     Problem: Neurosensory - Adult  Goal: Achieves stable or improved neurological status  Outcome: Progressing  Flowsheets (Taken 12/22/2023 1215)  Achieves stable or improved neurological status: Assess for and report changes in neurological status  Goal: Achieves maximal functionality and self care  Outcome: Progressing  Flowsheets (Taken 12/22/2023 1215)  Achieves maximal functionality and self care: Monitor swallowing and airway patency with patient fatigue and changes in neurological status     Problem: Skin/Tissue Integrity - Adult  Goal: Skin integrity remains intact  Outcome: Progressing  Flowsheets (Taken 12/22/2023 1215)  Skin Integrity Remains Intact: Monitor for areas of redness and/or skin breakdown     Problem: Musculoskeletal - Adult  Goal: Return mobility to safest level of function  Outcome: Progressing  Goal: Maintain proper alignment of affected body part  Outcome: Progressing  Goal: Return ADL status to a safe level of function  Outcome: Progressing     Problem: Infection - Adult  Goal: Absence of infection at discharge  Outcome: Progressing     Problem: Metabolic/Fluid and Electrolytes - Adult  Goal: Electrolytes maintained within normal limits  Outcome: Progressing  Goal:

## 2023-12-23 LAB
GLUCOSE BLD-MCNC: 140 MG/DL (ref 70–99)
GLUCOSE BLD-MCNC: 158 MG/DL (ref 70–99)
GLUCOSE BLD-MCNC: 179 MG/DL (ref 70–99)
GLUCOSE BLD-MCNC: 244 MG/DL (ref 70–99)
HEMOCCULT STL QL: NORMAL
PERFORMED ON: ABNORMAL

## 2023-12-23 PROCEDURE — 1280000000 HC REHAB R&B

## 2023-12-23 PROCEDURE — 6370000000 HC RX 637 (ALT 250 FOR IP): Performed by: PHYSICAL MEDICINE & REHABILITATION

## 2023-12-23 PROCEDURE — 6360000002 HC RX W HCPCS: Performed by: PHYSICAL MEDICINE & REHABILITATION

## 2023-12-23 PROCEDURE — 82270 OCCULT BLOOD FECES: CPT

## 2023-12-23 PROCEDURE — 6370000000 HC RX 637 (ALT 250 FOR IP)

## 2023-12-23 RX ORDER — LOPERAMIDE HYDROCHLORIDE 2 MG/1
2 CAPSULE ORAL ONCE
Status: COMPLETED | OUTPATIENT
Start: 2023-12-23 | End: 2023-12-23

## 2023-12-23 RX ADMIN — CARVEDILOL 6.25 MG: 6.25 TABLET, FILM COATED ORAL at 08:19

## 2023-12-23 RX ADMIN — MIRTAZAPINE 15 MG: 15 TABLET, FILM COATED ORAL at 21:15

## 2023-12-23 RX ADMIN — ALOGLIPTIN 6.25 MG: 6.25 TABLET, FILM COATED ORAL at 08:19

## 2023-12-23 RX ADMIN — CARVEDILOL 6.25 MG: 6.25 TABLET, FILM COATED ORAL at 18:04

## 2023-12-23 RX ADMIN — ASPIRIN 81 MG: 81 TABLET, CHEWABLE ORAL at 08:19

## 2023-12-23 RX ADMIN — SACUBITRIL AND VALSARTAN 0.5 TABLET: 24; 26 TABLET, FILM COATED ORAL at 08:19

## 2023-12-23 RX ADMIN — ENOXAPARIN SODIUM 30 MG: 100 INJECTION SUBCUTANEOUS at 08:29

## 2023-12-23 RX ADMIN — LOPERAMIDE HYDROCHLORIDE 2 MG: 2 CAPSULE ORAL at 21:24

## 2023-12-23 RX ADMIN — CLOPIDOGREL BISULFATE 75 MG: 75 TABLET ORAL at 08:19

## 2023-12-23 RX ADMIN — SACUBITRIL AND VALSARTAN 0.5 TABLET: 24; 26 TABLET, FILM COATED ORAL at 21:14

## 2023-12-23 RX ADMIN — FERROUS SULFATE TAB 325 MG (65 MG ELEMENTAL FE) 325 MG: 325 (65 FE) TAB at 08:19

## 2023-12-23 RX ADMIN — ATORVASTATIN CALCIUM 80 MG: 80 TABLET, FILM COATED ORAL at 21:15

## 2023-12-23 ASSESSMENT — PAIN SCALES - GENERAL: PAINLEVEL_OUTOF10: 0

## 2023-12-23 NOTE — PLAN OF CARE
Problem: Discharge Planning  Goal: Discharge to home or other facility with appropriate resources  Flowsheets (Taken 12/23/2023 0247)  Discharge to home or other facility with appropriate resources:   Identify barriers to discharge with patient and caregiver   Identify discharge learning needs (meds, wound care, etc)     Problem: Safety - Adult  Goal: Free from fall injury  Outcome: Progressing  Note: Patient assisted to bathroom using gait belt and wheelchair. Patient has called appropriately for assistance before getting up. Safety precautions in place. Bed is in low and locked position. Call light in reach.  Avasys camera in use     Problem: Pain  Goal: Verbalizes/displays adequate comfort level or baseline comfort level  Outcome: Progressing  Flowsheets (Taken 12/23/2023 0247)  Verbalizes/displays adequate comfort level or baseline comfort level:   Encourage patient to monitor pain and request assistance   Assess pain using appropriate pain scale   Administer analgesics based on type and severity of pain and evaluate response   Implement non-pharmacological measures as appropriate and evaluate response     Problem: Skin/Tissue Integrity  Goal: Absence of new skin breakdown  12/23/2023 0247 by Damien Hudson RN  Outcome: Progressing

## 2023-12-23 NOTE — PROGRESS NOTES
Patient has slept at short intervals She denies pain. Assisted to the bathroom using gait belt and front wheeled walker. Right side weakness noted. Instructed to call with any needs. Call light in reach.

## 2023-12-23 NOTE — PLAN OF CARE
Problem: Discharge Planning  Goal: Discharge to home or other facility with appropriate resources  12/23/2023 0959 by Jose Ojeda RN  Outcome: Progressing  Flowsheets (Taken 12/23/2023 4336)  Discharge to home or other facility with appropriate resources: Identify barriers to discharge with patient and caregiver     Problem: Safety - Adult  Goal: Free from fall injury  12/23/2023 0959 by Jose Ojeda RN  Outcome: Progressing  Flowsheets (Taken 12/23/2023 0956)  Free From Fall Injury: Instruct family/caregiver on patient safety     Problem: Pain  Goal: Verbalizes/displays adequate comfort level or baseline comfort level  12/23/2023 0959 by Jose Ojeda RN  Outcome: Progressing  Flowsheets (Taken 12/23/2023 8271)  Verbalizes/displays adequate comfort level or baseline comfort level: Encourage patient to monitor pain and request assistance

## 2023-12-24 LAB
GLUCOSE BLD-MCNC: 167 MG/DL (ref 70–99)
GLUCOSE BLD-MCNC: 174 MG/DL (ref 70–99)
GLUCOSE BLD-MCNC: 177 MG/DL (ref 70–99)
GLUCOSE BLD-MCNC: 198 MG/DL (ref 70–99)
PERFORMED ON: ABNORMAL

## 2023-12-24 PROCEDURE — 97112 NEUROMUSCULAR REEDUCATION: CPT

## 2023-12-24 PROCEDURE — 6370000000 HC RX 637 (ALT 250 FOR IP)

## 2023-12-24 PROCEDURE — 1280000000 HC REHAB R&B

## 2023-12-24 PROCEDURE — 6370000000 HC RX 637 (ALT 250 FOR IP): Performed by: PHYSICAL MEDICINE & REHABILITATION

## 2023-12-24 PROCEDURE — 97110 THERAPEUTIC EXERCISES: CPT

## 2023-12-24 PROCEDURE — 97535 SELF CARE MNGMENT TRAINING: CPT

## 2023-12-24 PROCEDURE — 97116 GAIT TRAINING THERAPY: CPT

## 2023-12-24 PROCEDURE — 97530 THERAPEUTIC ACTIVITIES: CPT

## 2023-12-24 PROCEDURE — 6360000002 HC RX W HCPCS: Performed by: PHYSICAL MEDICINE & REHABILITATION

## 2023-12-24 RX ADMIN — ASPIRIN 81 MG: 81 TABLET, CHEWABLE ORAL at 09:25

## 2023-12-24 RX ADMIN — ACETAMINOPHEN 650 MG: 325 TABLET ORAL at 16:48

## 2023-12-24 RX ADMIN — SACUBITRIL AND VALSARTAN 0.5 TABLET: 24; 26 TABLET, FILM COATED ORAL at 20:45

## 2023-12-24 RX ADMIN — PRAMIPEXOLE DIHYDROCHLORIDE 1 MG: 1 TABLET ORAL at 00:09

## 2023-12-24 RX ADMIN — CARVEDILOL 6.25 MG: 6.25 TABLET, FILM COATED ORAL at 16:48

## 2023-12-24 RX ADMIN — CLOPIDOGREL BISULFATE 75 MG: 75 TABLET ORAL at 09:25

## 2023-12-24 RX ADMIN — ATORVASTATIN CALCIUM 80 MG: 80 TABLET, FILM COATED ORAL at 20:45

## 2023-12-24 RX ADMIN — ENOXAPARIN SODIUM 30 MG: 100 INJECTION SUBCUTANEOUS at 09:25

## 2023-12-24 RX ADMIN — ONDANSETRON 4 MG: 4 TABLET, ORALLY DISINTEGRATING ORAL at 08:56

## 2023-12-24 RX ADMIN — CARVEDILOL 6.25 MG: 6.25 TABLET, FILM COATED ORAL at 09:25

## 2023-12-24 RX ADMIN — ALOGLIPTIN 6.25 MG: 6.25 TABLET, FILM COATED ORAL at 09:26

## 2023-12-24 RX ADMIN — SACUBITRIL AND VALSARTAN 0.5 TABLET: 24; 26 TABLET, FILM COATED ORAL at 09:25

## 2023-12-24 RX ADMIN — MIRTAZAPINE 15 MG: 15 TABLET, FILM COATED ORAL at 20:45

## 2023-12-24 ASSESSMENT — PAIN DESCRIPTION - LOCATION: LOCATION: SHOULDER

## 2023-12-24 ASSESSMENT — PAIN DESCRIPTION - ORIENTATION: ORIENTATION: RIGHT;LEFT

## 2023-12-24 ASSESSMENT — PAIN SCALES - GENERAL: PAINLEVEL_OUTOF10: 2

## 2023-12-24 ASSESSMENT — PAIN DESCRIPTION - DESCRIPTORS: DESCRIPTORS: ACHING;DISCOMFORT

## 2023-12-24 NOTE — PROGRESS NOTES
Shift assessment complete. VSS. A&Ox4. Denies pain at this time. Patient reports feeling nauseous with therapy this morning and requesting to rest at this time. PRN Zofran administered, reported improvement but still requesting to rest. Fall precautions in place. Call light within reach.     Vitals:    12/24/23 0856   BP: 125/69   Pulse: 74   Resp: 16   Temp: 97.8 °F (36.6 °C)   SpO2: 95%

## 2023-12-24 NOTE — PLAN OF CARE
Problem: Discharge Planning  Goal: Discharge to home or other facility with appropriate resources  12/24/2023 1035 by Luciana Guevara RN  Outcome: Progressing  Flowsheets (Taken 12/24/2023 0856)  Discharge to home or other facility with appropriate resources: Identify barriers to discharge with patient and caregiver     Problem: Safety - Adult  Goal: Free from fall injury  12/24/2023 1035 by Luciana Guevara RN  Outcome: Progressing     Problem: Pain  Goal: Verbalizes/displays adequate comfort level or baseline comfort level  12/24/2023 1035 by Luciana Guevara RN  Outcome: Progressing  Flowsheets (Taken 12/24/2023 0856)  Verbalizes/displays adequate comfort level or baseline comfort level:   Encourage patient to monitor pain and request assistance   Assess pain using appropriate pain scale     Problem: Skin/Tissue Integrity  Goal: Absence of new skin breakdown  Description: 1.  Monitor for areas of redness and/or skin breakdown  2.  Assess vascular access sites hourly  3.  Every 4-6 hours minimum:  Change oxygen saturation probe site  4.  Every 4-6 hours:  If on nasal continuous positive airway pressure, respiratory therapy assess nares and determine need for appliance change or resting period.  Outcome: Progressing     Problem: Neurosensory - Adult  Goal: Achieves stable or improved neurological status  Outcome: Progressing  Flowsheets (Taken 12/24/2023 0856)  Achieves stable or improved neurological status: Assess for and report changes in neurological status     Problem: Skin/Tissue Integrity - Adult  Goal: Skin integrity remains intact  Outcome: Progressing  Flowsheets  Taken 12/24/2023 0856 by Luciana Guevara RN  Skin Integrity Remains Intact: Monitor for areas of redness and/or skin breakdown  Taken 12/24/2023 0336 by Nica Perez RN  Skin Integrity Remains Intact: Monitor for areas of redness and/or skin breakdown     Problem: Metabolic/Fluid and Electrolytes - Adult  Goal: Glucose maintained within

## 2023-12-24 NOTE — PLAN OF CARE
Problem: Discharge Planning  Goal: Discharge to home or other facility with appropriate resources  Outcome: Progressing   Will identify barriers to discharge with patient and caregiver; Identify discharge learning needs (medications, wound care, etc.)       Problem: Safety - Adult  Goal: Free from fall injury  Outcome: Progressing   Pt remains free from falls during this stay on the ARU. 1:1 and education provided on the importance of using call light to ask for assistance prior to transfers and ambulation. Pt voices understanding. Will continue to monitor and re-educate as needed.         Problem: Pain  Goal: Verbalizes/displays adequate comfort level or baseline comfort level  Outcome: Progressing  Pt denies pain this shift.

## 2023-12-24 NOTE — PROGRESS NOTES
Vitals:    12/23/23 2017   BP: 116/64   Pulse: 84   Resp: 18   Temp: 97.8 °F (36.6 °C)   SpO2: 96%       Shift assessment complete, VSS, pt alert and oriented x 4. Pt had  multiple loose dark stools this shift. Dr. Leland Chavez was called and a stool occult was ordered along with a one time dose of  imodium. Stool occult resulted negative. Pt denies abdominal cramping/pain. Pt complained of restless legs and was given PRN Mirapex.   Safety measures in place. Hourly rounding and visual checks in place. Call light within reach. Will continue to monitor.

## 2023-12-24 NOTE — PROGRESS NOTES
Occupational Therapy  Facility/Department: [facility] ACUTE REHAB UNIT  Rehabilitation Occupational Therapy Daily Treatment Note    Date: 23  Patient Name: Whitney Meier       Room: 3101/3101-01  MRN: [id number]  Account: [id number]   : 1935  (88 y.o.) Gender: female     Past Medical History:  has a past medical history of Clostridium difficile infection, Dementia (HCC), Diabetes mellitus (HCC), History of blood transfusion, Hyperlipidemia, Hypertension, Infection, MDRO (multiple drug resistant organisms) resistance, MRSA infection, Osteomyelitis of spine (HCC), and Restless leg syndrome.  Past Surgical History:   has a past surgical history that includes Tonsillectomy; Hysterectomy; Appendectomy; Toe amputation (); Toe amputation (); eye surgery; Colonoscopy (); Colonoscopy (7/15/15); other surgical history (Left); Foot surgery (Left, 2017); Upper gastrointestinal endoscopy (2017); Foot surgery (Right, 2017); Foot Amputation (Right, 2019); and Foot Amputation (Right, 2019).    Restrictions  Restrictions/Precautions: Fall Risk, Up as Tolerated  Other position/activity restrictions: up as tolerated    Subjective  Subjective: Pt semi-supine on approach and agreed to tx. Significant fatigue t/o \"I'm a night owl. Doing things not on my timing is difficult\". Spoke with pt regarding attempting to schedule therapies for later in day - and this therapist will relay to primary team.    Objective  Cognition  Overall Cognitive Status: WFL  Orientation  Overall Orientation Status: Within Functional Limits         ADL  Upper Extremity Dressing  Assistance Level: Supervision  Skilled Clinical Factors: donned UB clothing seated EOB, clothing charles to bedside for pt  Lower Extremity Dressing  Assistance Level: Stand by assist  Skilled Clinical Factors: donned hosp pants seated EOB after clothing brought to bedside for pt. threaded BLE, educ w/ pt regarding threading

## 2023-12-24 NOTE — PROGRESS NOTES
Physical Therapy  Facility/Department: [facility] ACUTE REHAB UNIT  Rehabilitation Physical Therapy Treatment Note    NAME: Whitney Meier  : 1935 (88 y.o.)  MRN: [id number]  CODE STATUS: Full Code    Date of Service: 23       Restrictions:  Restrictions/Precautions: Fall Risk, Up as Tolerated  Position Activity Restriction  Other position/activity restrictions: up as tolerated     SUBJECTIVE  Subjective  Subjective: Pt was sitting up willing to participate  Pain: denies pain      OBJECTIVE  Cognition  Overall Cognitive Status: WFL  Orientation  Overall Orientation Status: Within Functional Limits    Functional Mobility  Bed mobility  Supine to sit: supervision  Sit to supine: supervision     PT Exercises  APs x20 BLE  HS: x10 B  SLR x10 BLE  Hip Abd/Add x10 BLE   HS: x15 B  Sat EOB for approx 15 mins    ASSESSMENT/PROGRESS TOWARDS GOALS     Assessment: Session was limited by agitation and lack of motivation. Pt stating \"I just need a day; I don't feel well and I don't want to do this today.\" Pt was very pleasant and approachable when expressing her feelings. Writer communicated Rehab expectations, to no avail. Pt returned to the supine position and RN was notified.     2nd session: Pts participation improved by still requires encouragement. Supine <> sit requiring supervision due to decreased trunk control. Sit<>stand required CGA to min assist due to BLE weakness. Ambulated with a RW and CGA for approx 10ft+40ft+ 150ft; requiring cues for walker placement. Performed a static stand 2x30\" to edda/doff pants in order to use the restroom.       PT Equipment Recommendations  Equipment Needed: No  Other: TBD at rehab facility    Goals  Patient Goals   Patient Goals : \"To get stronger and to be able to walk by myself again\"  Short Term Goals  Time Frame for Short Term Goals: 10 days- on going  Short Term Goal 1: Patient will be able to perform all bed mobility with HOB flat and no handrails, IND  Short Term Goal

## 2023-12-25 LAB
ANION GAP SERPL CALCULATED.3IONS-SCNC: 9 MMOL/L (ref 3–16)
BASOPHILS # BLD: 0 K/UL (ref 0–0.2)
BASOPHILS NFR BLD: 0.2 %
BUN SERPL-MCNC: 32 MG/DL (ref 7–20)
CALCIUM SERPL-MCNC: 8.9 MG/DL (ref 8.3–10.6)
CHLORIDE SERPL-SCNC: 102 MMOL/L (ref 99–110)
CO2 SERPL-SCNC: 28 MMOL/L (ref 21–32)
CREAT SERPL-MCNC: 1.5 MG/DL (ref 0.6–1.2)
DEPRECATED RDW RBC AUTO: 13.5 % (ref 12.4–15.4)
EOSINOPHIL # BLD: 0.5 K/UL (ref 0–0.6)
EOSINOPHIL NFR BLD: 6.3 %
GFR SERPLBLD CREATININE-BSD FMLA CKD-EPI: 33 ML/MIN/{1.73_M2}
GLUCOSE BLD-MCNC: 131 MG/DL (ref 70–99)
GLUCOSE BLD-MCNC: 156 MG/DL (ref 70–99)
GLUCOSE BLD-MCNC: 193 MG/DL (ref 70–99)
GLUCOSE BLD-MCNC: 224 MG/DL (ref 70–99)
GLUCOSE SERPL-MCNC: 231 MG/DL (ref 70–99)
HCT VFR BLD AUTO: 29.8 % (ref 36–48)
HGB BLD-MCNC: 10.1 G/DL (ref 12–16)
LYMPHOCYTES # BLD: 0.7 K/UL (ref 1–5.1)
LYMPHOCYTES NFR BLD: 9.9 %
MCH RBC QN AUTO: 32.5 PG (ref 26–34)
MCHC RBC AUTO-ENTMCNC: 33.8 G/DL (ref 31–36)
MCV RBC AUTO: 96.1 FL (ref 80–100)
MONOCYTES # BLD: 0.5 K/UL (ref 0–1.3)
MONOCYTES NFR BLD: 6.5 %
NEUTROPHILS # BLD: 5.7 K/UL (ref 1.7–7.7)
NEUTROPHILS NFR BLD: 77.1 %
PERFORMED ON: ABNORMAL
PLATELET # BLD AUTO: 131 K/UL (ref 135–450)
PMV BLD AUTO: 10.1 FL (ref 5–10.5)
POTASSIUM SERPL-SCNC: 4.3 MMOL/L (ref 3.5–5.1)
RBC # BLD AUTO: 3.1 M/UL (ref 4–5.2)
SODIUM SERPL-SCNC: 139 MMOL/L (ref 136–145)
WBC # BLD AUTO: 7.4 K/UL (ref 4–11)

## 2023-12-25 PROCEDURE — 97535 SELF CARE MNGMENT TRAINING: CPT

## 2023-12-25 PROCEDURE — 97110 THERAPEUTIC EXERCISES: CPT

## 2023-12-25 PROCEDURE — 6360000002 HC RX W HCPCS: Performed by: PHYSICAL MEDICINE & REHABILITATION

## 2023-12-25 PROCEDURE — 36415 COLL VENOUS BLD VENIPUNCTURE: CPT

## 2023-12-25 PROCEDURE — 6370000000 HC RX 637 (ALT 250 FOR IP): Performed by: PHYSICAL MEDICINE & REHABILITATION

## 2023-12-25 PROCEDURE — 1280000000 HC REHAB R&B

## 2023-12-25 PROCEDURE — 6370000000 HC RX 637 (ALT 250 FOR IP)

## 2023-12-25 PROCEDURE — 97530 THERAPEUTIC ACTIVITIES: CPT

## 2023-12-25 PROCEDURE — 85025 COMPLETE CBC W/AUTO DIFF WBC: CPT

## 2023-12-25 PROCEDURE — 80048 BASIC METABOLIC PNL TOTAL CA: CPT

## 2023-12-25 PROCEDURE — 97116 GAIT TRAINING THERAPY: CPT

## 2023-12-25 RX ADMIN — CARVEDILOL 6.25 MG: 6.25 TABLET, FILM COATED ORAL at 17:07

## 2023-12-25 RX ADMIN — CARVEDILOL 6.25 MG: 6.25 TABLET, FILM COATED ORAL at 09:19

## 2023-12-25 RX ADMIN — SACUBITRIL AND VALSARTAN 0.5 TABLET: 24; 26 TABLET, FILM COATED ORAL at 20:21

## 2023-12-25 RX ADMIN — CLOPIDOGREL BISULFATE 75 MG: 75 TABLET ORAL at 09:19

## 2023-12-25 RX ADMIN — ATORVASTATIN CALCIUM 80 MG: 80 TABLET, FILM COATED ORAL at 20:21

## 2023-12-25 RX ADMIN — ASPIRIN 81 MG: 81 TABLET, CHEWABLE ORAL at 09:19

## 2023-12-25 RX ADMIN — SACUBITRIL AND VALSARTAN 0.5 TABLET: 24; 26 TABLET, FILM COATED ORAL at 09:19

## 2023-12-25 RX ADMIN — ALOGLIPTIN 6.25 MG: 6.25 TABLET, FILM COATED ORAL at 09:19

## 2023-12-25 RX ADMIN — FERROUS SULFATE TAB 325 MG (65 MG ELEMENTAL FE) 325 MG: 325 (65 FE) TAB at 09:19

## 2023-12-25 RX ADMIN — ENOXAPARIN SODIUM 30 MG: 100 INJECTION SUBCUTANEOUS at 09:19

## 2023-12-25 RX ADMIN — MIRTAZAPINE 15 MG: 15 TABLET, FILM COATED ORAL at 20:21

## 2023-12-25 NOTE — PLAN OF CARE
Problem: Skin/Tissue Integrity  Goal: Absence of new skin breakdown  Outcome: Progressing     Problem: Pain  Goal: Verbalizes/displays adequate comfort level or baseline comfort level  Outcome: Progressing  Flowsheets (Taken 12/25/2023 0110)  Verbalizes/displays adequate comfort level or baseline comfort level:   Encourage patient to monitor pain and request assistance   Assess pain using appropriate pain scale   Administer analgesics based on type and severity of pain and evaluate response   Implement non-pharmacological measures as appropriate and evaluate response     Problem: Discharge Planning  Goal: Discharge to home or other facility with appropriate resources  Outcome: Progressing  Flowsheets (Taken 12/25/2023 0110)  Discharge to home or other facility with appropriate resources:   Identify barriers to discharge with patient and caregiver   Identify discharge learning needs (meds, wound care, etc)   Arrange for needed discharge resources and transportation as appropriate   Arrange for interpreters to assist at discharge as needed   Refer to discharge planning if patient needs post-hospital services based on physician order or complex needs related to functional status, cognitive ability or social support system     Problem: Pain  Goal: Verbalizes/displays adequate comfort level or baseline comfort level  Outcome: Progressing  Flowsheets (Taken 12/25/2023 0110)  Verbalizes/displays adequate comfort level or baseline comfort level:   Encourage patient to monitor pain and request assistance   Assess pain using appropriate pain scale   Administer analgesics based on type and severity of pain and evaluate response   Implement non-pharmacological measures as appropriate and evaluate response     Problem: Skin/Tissue Integrity  Goal: Absence of new skin breakdown  Outcome: Progressing

## 2023-12-25 NOTE — PROGRESS NOTES
Physical Therapy  Facility/Department: [facility] ACUTE REHAB UNIT  Rehabilitation Physical Therapy Treatment Note    NAME: Whitney Meier  : 1935 (88 y.o.)  MRN: [id number]  CODE STATUS: Full Code    Date of Service: 23       Restrictions:  Restrictions/Precautions: Fall Risk, Up as Tolerated  Position Activity Restriction  Other position/activity restrictions: up as tolerated     SUBJECTIVE  Subjective  Subjective: Patient sitting in recliner, agreeable to therapy  Pain: denies pain       OBJECTIVE  Orientation  Overall Orientation Status: Within Functional Limits  Orientation Level: Oriented X4    Functional Mobility  Sit to Supine  Assistance Level: Modified independent  Skilled Clinical Factors: Using the HR at the end of the session  Balance  Sitting Balance: Independent  Standing Balance: Supervision  Transfers  Surface: To chair with arms;From chair with arms;To bed  Additional Factors: Hand placement cues  Device: Walker (rolling)  Sit to Stand  Assistance Level: Supervision  Skilled Clinical Factors: From recliner, SCIFIT to the RW  Stand to Sit  Assistance Level: Supervision  Skilled Clinical Factors: From RW to Chair, scifit      Environmental Mobility  Ambulation  Surface: Level surface  Device: Rolling walker  Distance: [id number]  Activity: Within Unit  Assistance Level: Supervision  Gait Deviations: Slow daniel  Skilled Clinical Factors: forward flexed posture, slowed daniel, decreased step length and height      PT Exercises  Circulation/Endurance Exercises: Patient on the SCIFIT LVL 1 1.5 minutes, 2 minutes x2 with long rest breaks    Second Treatment Session: Patient sleeping in her bed, easily arousable to voice and agreeable to therapy. Patient sat on the EOB with supervision and stood to ambulate to the gym. Once in the gym the patient laid supine and performed a series of LE strengthen exercises. 1lb weight was added to her ankles and she performed the following:  SLR 3x10  Abduction

## 2023-12-25 NOTE — PROGRESS NOTES
Term Goal 5: Pt will demo improved R hand strength and coordination as evidenced by using R hand to stabilize grooming items - ongoing          Therapy Time   Individual Concurrent Group Co-treatment   Time In 0730         Time Out 0900         Minutes 90             Timed Code Treatment Minutes:   90    Total Treatment Minutes:  90      Sarita Mendes, OT

## 2023-12-25 NOTE — PROGRESS NOTES
Shift assessment complete. VSS. A&Ox3. Denies pain at this time. Fall precautions in place. Patient up to chair for breakfast, chair alarm utilized, call light within reach. Patient with no new complaints at this time.     Vitals:    12/25/23 0918   BP: 126/70   Pulse: 84   Resp: 16   Temp: 98 °F (36.7 °C)   SpO2: 97%

## 2023-12-25 NOTE — PROGRESS NOTES
colace BID, PRN miralax and MoM. follow bowel movements. Enema or suppository if needed.      Safety - fall precautions     PPx  DVT: lovenox  GI: pantoprazole     FULL CODE    Rehab Progress: Improving  Anticipated Dispo: home  Services/DME: TBD  ELOS: 1/2          OMAR MauricioPCatrachitoH  PM&R  12/25/2023  10:53 AM

## 2023-12-25 NOTE — PROGRESS NOTES
Patient is alert and oriented. Vital signs stable. She has denied pain or discomfort. She reports that her right hand weakness is improving. She has been assisted to the bathroom using gait belt and wheelchair. Tolerated well. Instructed to call for any needs. Call light and and personal items within reach.

## 2023-12-25 NOTE — PROGRESS NOTES
Physician Progress Note      PATIENT:               Malini Whitt  CSN #:                  [id number]  :                       1935  ADMIT DATE:       2023 10:19 PM  [address] [facility] DATE:        2023 9:51 AM  RESPONDING  PROVIDER #:        Alfredo Almonte MD          QUERY TEXT:    Pt admitted with acute CVA. Pt noted to have elevated troponins 165/157. If   possible, please document in the progress notes and discharge summary if you   are evaluating and/or treating any of the following: The medical record reflects the following:  Risk Factors: CVA, age,  renal clearance  Clinical Indicators: Troponins 165/157  H&P -  \"Elevated Troponin  - 165 --> 157  -T wave inversions  -not endorsing chest pain  -tele, cards consult\"  Cardiology -  \"I suspect that her elevated troponin levels are likely due to her acute CVA   and and poor renal clearance. Due to her age and mental status, I would not recommend aggressive invasive   testing at this time. Will take a conservative approach to the patient's troponin evaluation. An echocardiogram seems to be a reasonable starting point. \"  PN -  \"Elevated troponin. Perhaps due to stroke. Cardiology recommended   conservative management. TTE as above. \"  Treatment: Cardiology consult, telemetry, serial labs, and supportive care    Thank you,  Ivone Fuller, RN, BSN, CRCR  Options provided:  -- Non-ischemic myocardial injury due to stroke and decreased renal clearance  -- Other - I will add my own diagnosis  -- Disagree - Not applicable / Not valid  -- Disagree - Clinically unable to determine / Unknown  -- Refer to Clinical Documentation Reviewer    PROVIDER RESPONSE TEXT:    Not myocardial anything.     Query created by: Ivone Fuller on 2023 7:28 AM      Electronically signed by:  Alfredo Almonte MD 2023 3:57 PM

## 2023-12-25 NOTE — PLAN OF CARE
Problem: Discharge Planning  Goal: Discharge to home or other facility with appropriate resources  12/25/2023 0742 by Luciana Guevara RN  Outcome: Progressing     Problem: Safety - Adult  Goal: Free from fall injury  Outcome: Progressing     Problem: Pain  Goal: Verbalizes/displays adequate comfort level or baseline comfort level  12/25/2023 0742 by Luciana Guevara RN  Outcome: Progressing     Problem: Skin/Tissue Integrity  Goal: Absence of new skin breakdown  Description: 1.  Monitor for areas of redness and/or skin breakdown  2.  Assess vascular access sites hourly  3.  Every 4-6 hours minimum:  Change oxygen saturation probe site  4.  Every 4-6 hours:  If on nasal continuous positive airway pressure, respiratory therapy assess nares and determine need for appliance change or resting period.  12/25/2023 0742 by Luciana Guevara RN  Outcome: Progressing     Problem: Skin/Tissue Integrity - Adult  Goal: Skin integrity remains intact  Outcome: Progressing     Problem: ABCDS Injury Assessment  Goal: Absence of physical injury  Outcome: Progressing     Problem: Nutrition Deficit:  Goal: Optimize nutritional status  Outcome: Progressing

## 2023-12-26 LAB
GLUCOSE BLD-MCNC: 143 MG/DL (ref 70–99)
GLUCOSE BLD-MCNC: 143 MG/DL (ref 70–99)
GLUCOSE BLD-MCNC: 216 MG/DL (ref 70–99)
GLUCOSE BLD-MCNC: 251 MG/DL (ref 70–99)
PERFORMED ON: ABNORMAL

## 2023-12-26 PROCEDURE — 97530 THERAPEUTIC ACTIVITIES: CPT

## 2023-12-26 PROCEDURE — 6370000000 HC RX 637 (ALT 250 FOR IP)

## 2023-12-26 PROCEDURE — 6360000002 HC RX W HCPCS: Performed by: PHYSICAL MEDICINE & REHABILITATION

## 2023-12-26 PROCEDURE — 6370000000 HC RX 637 (ALT 250 FOR IP): Performed by: PHYSICAL MEDICINE & REHABILITATION

## 2023-12-26 PROCEDURE — 97116 GAIT TRAINING THERAPY: CPT

## 2023-12-26 PROCEDURE — 1280000000 HC REHAB R&B

## 2023-12-26 PROCEDURE — 97110 THERAPEUTIC EXERCISES: CPT

## 2023-12-26 RX ADMIN — MIRTAZAPINE 15 MG: 15 TABLET, FILM COATED ORAL at 20:18

## 2023-12-26 RX ADMIN — INSULIN LISPRO 1 UNITS: 100 INJECTION, SOLUTION INTRAVENOUS; SUBCUTANEOUS at 11:59

## 2023-12-26 RX ADMIN — CLOPIDOGREL BISULFATE 75 MG: 75 TABLET ORAL at 09:09

## 2023-12-26 RX ADMIN — SACUBITRIL AND VALSARTAN 0.5 TABLET: 24; 26 TABLET, FILM COATED ORAL at 20:18

## 2023-12-26 RX ADMIN — ASPIRIN 81 MG: 81 TABLET, CHEWABLE ORAL at 09:10

## 2023-12-26 RX ADMIN — ATORVASTATIN CALCIUM 80 MG: 80 TABLET, FILM COATED ORAL at 20:18

## 2023-12-26 RX ADMIN — SACUBITRIL AND VALSARTAN 0.5 TABLET: 24; 26 TABLET, FILM COATED ORAL at 09:10

## 2023-12-26 RX ADMIN — ALOGLIPTIN 6.25 MG: 6.25 TABLET, FILM COATED ORAL at 09:10

## 2023-12-26 RX ADMIN — ENOXAPARIN SODIUM 30 MG: 100 INJECTION SUBCUTANEOUS at 09:14

## 2023-12-26 RX ADMIN — CARVEDILOL 6.25 MG: 6.25 TABLET, FILM COATED ORAL at 18:33

## 2023-12-26 RX ADMIN — CARVEDILOL 6.25 MG: 6.25 TABLET, FILM COATED ORAL at 09:09

## 2023-12-26 ASSESSMENT — PAIN SCALES - GENERAL
PAINLEVEL_OUTOF10: 0

## 2023-12-26 NOTE — PROGRESS NOTES
Physical Therapy  Facility/Department: [facility] ACUTE REHAB UNIT  Rehabilitation Physical Therapy Treatment Note    NAME: Whitney Meier  : 1935 (88 y.o.)  MRN: [id number]  CODE STATUS: Full Code    Date of Service: 23       Restrictions:  Restrictions/Precautions: Fall Risk, Up as Tolerated  Position Activity Restriction  Other position/activity restrictions: up as tolerated     SUBJECTIVE  Subjective  Subjective: Patient supine in bed, agreeable to therapy.  Pain: denies pain        OBJECTIVE  Cognition  Overall Cognitive Status: WFL  Orientation  Overall Orientation Status: Within Functional Limits  Orientation Level: Oriented X4    Functional Mobility  Supine to Sit  Assistance Level: Modified independent  Skilled Clinical Factors: using the , [facility] elevated  Balance  Sitting Balance: Independent  Standing Balance: Supervision  Transfers  Surface: To chair with arms;From chair with arms;From bed  Additional Factors: Hand placement cues;Verbal cues  Device: Walker (rolling)  Sit to Stand  Assistance Level: Supervision  Skilled Clinical Factors: From recliner, EOB to the RW  Stand to Sit  Assistance Level: Supervision  Skilled Clinical Factors: From RW to Chair      Environmental Mobility  Ambulation  Surface: Level surface  Device: Rolling walker  Distance: [id number]  Activity: Within Unit  Assistance Level: Supervision  Gait Deviations: Slow daniel  Skilled Clinical Factors: forward flexed posture, slowed daniel, decreased step length and height  Curb  Curb Height: 8''  Device: Rolling walker  Number of Curbs: 1x4 rounds  Additional Factors: Non-reciprocal going down;Non-reciprocal going up;Verbal cues;Hand placement cues  Assistance Level: Minimal assistance;Contact guard assist;Stand by assist  Skilled Clinical Factors: Patient able to ascend with less cueing, but on the descend pt significantly very afraid of falling and need constant reassurance for safety.      PT Exercises  Exercise

## 2023-12-26 NOTE — PROGRESS NOTES
Pt found awake in her recliner. Vital signs as charted. Pt currently denies experiencing any pain at this time. Chair alarm engaged. Call light placed within reach.

## 2023-12-26 NOTE — PLAN OF CARE
Problem: Discharge Planning  Goal: Discharge to home or other facility with appropriate resources    Discharge to home or other facility with appropriate resources:   Identify barriers to discharge with patient and caregiver   Identify discharge learning needs (meds, wound care, etc)   Arrange for needed discharge resources and transportation as appropriate   Arrange for interpreters to assist at discharge as needed   Refer to discharge planning if patient needs post-hospital services based on physician order or complex needs related to functional status, cognitive ability or social support system     Problem: Pain  Goal: Verbalizes/displays adequate comfort level or baseline comfort level  Flowsheets (Taken 12/26/2023 0154)  Verbalizes/displays adequate comfort level or baseline comfort level: Encourage patient to monitor pain and request assistance     Problem: Musculoskeletal - Adult  Goal: Return mobility to safest level of function    Return Mobility to Safest Level of Function:   Assess patient stability and activity tolerance for standing, transferring and ambulating with or without assistive devices   Assist with transfers and ambulation using safe patient handling equipment as needed

## 2023-12-26 NOTE — PROGRESS NOTES
finger into washer and lifted up off table. 7) Pt provided w/ a pen, lined paper, and a passage to copy. Pt copied 5 sentences from passage using static tripod grasp (also noted index finger w/ decreased pressure and mobility however pt reports this is how she holds pens at baseline). Pt rated her handwriting 7/10 compared to baseline 10/10. Pt provided w/ 3 handouts of small designs and cursive letters to trace and copy and short writing prompts to practice handwriting in free time. Pt provided w/ handout containing theraputty exercises and a list of activities to work on in free time and at home to improve R hand FM skills.     Pt left seated in bed at EOS w/ call light w/ in reach, bed alarm on, all needs met, daughter present.     Assessment  Assessment  Assessment: Pt tolerated ~8 minutes in stance for item retrieval task with SBA and use of RW but continues to require CGA and + time for sit to stand transfers. Pt easily fatigues but is motivated to return home. Pt functioning below baseline, cont per POC.  Activity Tolerance: Patient tolerated treatment well;Patient limited by fatigue  Discharge Recommendations: 24 hour supervision or assist;Home with Home health OT  OT Equipment Recommendations  Other: Pt already has 3-in-1 commode, cont to assess  Safety Devices  Safety Devices in place: Yes  Type of devices: Call light within reach;Bed alarm in place;Left in bed    Patient Education  Education  Education Given To: Patient  Education Provided: Role of Therapy;Mobility Training;Transfer Training;Safety;ADL Function  Education Method: Verbal;Demonstration  Barriers to Learning: None  Education Outcome: Verbalized understanding;Continued education needed    Plan  Occupational Therapy Plan  Times Per Week: 5x weekly, 90 mins daily  Current Treatment Recommendations: Strengthening;Balance training;Functional mobility training;Endurance training;Safety education & training;Self-Care / ADL;Patient/Caregiver

## 2023-12-26 NOTE — PROGRESS NOTES
Comprehensive Nutrition Assessment    RECOMMENDATIONS:  PO Diet: Continue Regular; 4 Carb Choices  ONS: Begin Glucerna BID  Nutrition Education: No recommendation at this time     NUTRITION ASSESSMENT:   Nutritional summary & status: Follow-up.  Pt w/ fair PO intake.  EMR shows PO and ONS intake ~50% for majority of meals.  Pt denies changes in appetite, NVCD. RD rec'd ONS between meals as able. No nutritional concerns at this time.   Admission // PMH: Acute CVA//dementia, HLD, HTN, T2DM    MALNUTRITION ASSESSMENT  Context of Malnutrition: Acute Illness   Malnutrition Status: At risk for malnutrition (Comment)  Findings of the 6 clinical characteristics of malnutrition (Minimum of 2 out of 6 clinical characteristics is required to make the diagnosis of moderate or severe Protein Calorie Malnutrition based on AND/ASPEN Guidelines):  Energy Intake:  Mild decrease in energy intake (Comment)  Weight Loss:  No significant weight loss     Body Fat Loss:  No significant body fat loss     Muscle Mass Loss:  No significant muscle mass loss    Fluid Accumulation:  No significant fluid accumulation     Strength:  Not Performed    NUTRITION DIAGNOSIS   Increased nutrient needs related to increase demand for energy/nutrients as evidenced by other (comment) (extended hospital stay/rehab)    Nutrition Monitoring and Evaluation:   Food/Nutrient Intake Outcomes:  Food and Nutrient Intake, Supplement Intake  Physical Signs/Symptoms Outcomes:  Biochemical Data, Nutrition Focused Physical Findings, Weight     OBJECTIVE DATA: Significant to nutrition assessment  Nutrition Related Findings: +bm 12/24 (loose); no edema; labs reviewed  Wounds: None  Nutrition Goals: PO intake 75% or greater, by next RD assessment     CURRENT NUTRITION THERAPIES  ADULT DIET; Regular; 4 carb choices (60 gm/meal)  ADULT ORAL NUTRITION SUPPLEMENT; Breakfast; Diabetic Oral Supplement  ADULT ORAL NUTRITION SUPPLEMENT; Dinner; Diabetic Oral Supplement  PO

## 2023-12-26 NOTE — PROGRESS NOTES
Shift assessment complete. VSS. A/Ox4. Fall precautions in place. Denies pain or discomforts. Call light in reach. Will continue to monitor.     Vitals:    12/26/23 0906   BP: 126/65   Pulse: 85   Resp: 18   Temp: 98.1 °F (36.7 °C)   SpO2: 93%

## 2023-12-26 NOTE — CARE COORDINATION
SW met with patient at bedside to provide updates and give anticipated discharge date of 12/29. Patient was agreeable with anticipated discharge date and very happy that she was getting to leave earlier than anticipated. Patient has no more questions at this time. SW following.    Electronically signed by ELLIS Gomez on 12/26/2023 at 4:23 PM

## 2023-12-26 NOTE — PROGRESS NOTES
Department of Physical Medicine & Rehabilitation  Progress Note    Patient Identification:  Whitney Meier  [id number]  : 1935  Admit date: 2023    Chief Complaint: Acute cerebrovascular accident (CVA) (HCC)    Subjective:   Seen in therapy this morning. She is doing exceedingly well. She likely will be able to discharge earlier than expected. She slept well last night. No new pain this morning. She is working hard in therapy and able to walk all the way down to the gym unassisted.     ROS: No f/c, n/v, cp     Objective:  Patient Vitals for the past 24 hrs:   BP Temp Temp src Pulse Resp SpO2   23 0906 126/65 98.1 °F (36.7 °C) Oral 85 18 93 %   23 119/63 97.9 °F (36.6 °C) Oral 74 16 95 %   23 1707 126/69 -- -- 74 -- --       Const: Alert. No distress, pleasant.   HEENT: Normocephalic, atraumatic. Normal sclera/conjunctiva. MMM.   CV: Regular rate and rhythm.   Resp: No respiratory distress. Lungs CTAB.   Abd: Soft, nontender, nondistended, NABS+   Ext: No edema.   Neuro: Alert, oriented, appropriately interactive. 4/5 strength RUE, 3/5 hand squeeze, slight decrease of sensation to light touch on RUE, otherwise intact motor and sensory function elsewhere. CN II-XII intact  Psych: Cooperative, appropriate mood and affect    Laboratory data: Available via EMR.   Last 24 hour lab  Recent Results (from the past 24 hour(s))   Basic Metabolic Panel w/ Reflex to MG    Collection Time: 23 10:48 AM   Result Value Ref Range    Sodium 139 136 - 145 mmol/L    Potassium reflex Magnesium 4.3 3.5 - 5.1 mmol/L    Chloride 102 99 - 110 mmol/L    CO2 28 21 - 32 mmol/L    Anion Gap 9 3 - 16    Glucose 231 (H) 70 - 99 mg/dL    BUN 32 (H) 7 - 20 mg/dL    Creatinine 1.5 (H) 0.6 - 1.2 mg/dL    Est, Glom Filt Rate 33 (A) >60    Calcium 8.9 8.3 - 10.6 mg/dL   CBC auto differential    Collection Time: 23 10:48 AM   Result Value Ref Range    WBC 7.4 4.0 - 11.0 K/uL    RBC 3.10 (L) 4.00 - 5.20

## 2023-12-27 ENCOUNTER — TELEPHONE (OUTPATIENT)
Dept: CASE MANAGEMENT | Age: 88
End: 2023-12-27

## 2023-12-27 LAB
ANION GAP SERPL CALCULATED.3IONS-SCNC: 14 MMOL/L (ref 3–16)
BASOPHILS # BLD: 0 K/UL (ref 0–0.2)
BASOPHILS NFR BLD: 0.3 %
BUN SERPL-MCNC: 29 MG/DL (ref 7–20)
CALCIUM SERPL-MCNC: 9.1 MG/DL (ref 8.3–10.6)
CHLORIDE SERPL-SCNC: 102 MMOL/L (ref 99–110)
CO2 SERPL-SCNC: 17 MMOL/L (ref 21–32)
CREAT SERPL-MCNC: 1.4 MG/DL (ref 0.6–1.2)
DEPRECATED RDW RBC AUTO: 13.3 % (ref 12.4–15.4)
EOSINOPHIL # BLD: 0.5 K/UL (ref 0–0.6)
EOSINOPHIL NFR BLD: 6.2 %
GFR SERPLBLD CREATININE-BSD FMLA CKD-EPI: 36 ML/MIN/{1.73_M2}
GLUCOSE BLD-MCNC: 174 MG/DL (ref 70–99)
GLUCOSE BLD-MCNC: 221 MG/DL (ref 70–99)
GLUCOSE BLD-MCNC: 281 MG/DL (ref 70–99)
GLUCOSE SERPL-MCNC: 159 MG/DL (ref 70–99)
HCT VFR BLD AUTO: 31.5 % (ref 36–48)
HGB BLD-MCNC: 11 G/DL (ref 12–16)
LYMPHOCYTES # BLD: 0.8 K/UL (ref 1–5.1)
LYMPHOCYTES NFR BLD: 10.8 %
MCH RBC QN AUTO: 32.8 PG (ref 26–34)
MCHC RBC AUTO-ENTMCNC: 35 G/DL (ref 31–36)
MCV RBC AUTO: 93.8 FL (ref 80–100)
MONOCYTES # BLD: 0.6 K/UL (ref 0–1.3)
MONOCYTES NFR BLD: 8.5 %
NEUTROPHILS # BLD: 5.6 K/UL (ref 1.7–7.7)
NEUTROPHILS NFR BLD: 74.2 %
PERFORMED ON: ABNORMAL
PLATELET # BLD AUTO: 145 K/UL (ref 135–450)
PMV BLD AUTO: 10 FL (ref 5–10.5)
POTASSIUM SERPL-SCNC: 4.8 MMOL/L (ref 3.5–5.1)
RBC # BLD AUTO: 3.36 M/UL (ref 4–5.2)
SODIUM SERPL-SCNC: 133 MMOL/L (ref 136–145)
WBC # BLD AUTO: 7.6 K/UL (ref 4–11)

## 2023-12-27 PROCEDURE — 6370000000 HC RX 637 (ALT 250 FOR IP): Performed by: PHYSICAL MEDICINE & REHABILITATION

## 2023-12-27 PROCEDURE — 1280000000 HC REHAB R&B

## 2023-12-27 PROCEDURE — 85025 COMPLETE CBC W/AUTO DIFF WBC: CPT

## 2023-12-27 PROCEDURE — 6370000000 HC RX 637 (ALT 250 FOR IP)

## 2023-12-27 PROCEDURE — 97530 THERAPEUTIC ACTIVITIES: CPT

## 2023-12-27 PROCEDURE — 80048 BASIC METABOLIC PNL TOTAL CA: CPT

## 2023-12-27 PROCEDURE — 6360000002 HC RX W HCPCS: Performed by: PHYSICAL MEDICINE & REHABILITATION

## 2023-12-27 PROCEDURE — 97110 THERAPEUTIC EXERCISES: CPT

## 2023-12-27 PROCEDURE — 97116 GAIT TRAINING THERAPY: CPT

## 2023-12-27 PROCEDURE — 36415 COLL VENOUS BLD VENIPUNCTURE: CPT

## 2023-12-27 RX ADMIN — ATORVASTATIN CALCIUM 80 MG: 80 TABLET, FILM COATED ORAL at 19:51

## 2023-12-27 RX ADMIN — CLOPIDOGREL BISULFATE 75 MG: 75 TABLET ORAL at 08:35

## 2023-12-27 RX ADMIN — BISACODYL 5 MG: 5 TABLET, COATED ORAL at 08:35

## 2023-12-27 RX ADMIN — PRAMIPEXOLE DIHYDROCHLORIDE 1 MG: 1 TABLET ORAL at 19:52

## 2023-12-27 RX ADMIN — ACETAMINOPHEN 650 MG: 325 TABLET ORAL at 19:52

## 2023-12-27 RX ADMIN — MIRTAZAPINE 15 MG: 15 TABLET, FILM COATED ORAL at 19:52

## 2023-12-27 RX ADMIN — SACUBITRIL AND VALSARTAN 0.5 TABLET: 24; 26 TABLET, FILM COATED ORAL at 08:35

## 2023-12-27 RX ADMIN — INSULIN LISPRO 1 UNITS: 100 INJECTION, SOLUTION INTRAVENOUS; SUBCUTANEOUS at 17:01

## 2023-12-27 RX ADMIN — FERROUS SULFATE TAB 325 MG (65 MG ELEMENTAL FE) 325 MG: 325 (65 FE) TAB at 08:35

## 2023-12-27 RX ADMIN — CARVEDILOL 6.25 MG: 6.25 TABLET, FILM COATED ORAL at 08:35

## 2023-12-27 RX ADMIN — ASPIRIN 81 MG: 81 TABLET, CHEWABLE ORAL at 08:35

## 2023-12-27 RX ADMIN — SACUBITRIL AND VALSARTAN 0.5 TABLET: 24; 26 TABLET, FILM COATED ORAL at 19:53

## 2023-12-27 RX ADMIN — ENOXAPARIN SODIUM 30 MG: 100 INJECTION SUBCUTANEOUS at 08:37

## 2023-12-27 RX ADMIN — ALOGLIPTIN 6.25 MG: 6.25 TABLET, FILM COATED ORAL at 08:35

## 2023-12-27 ASSESSMENT — PAIN SCALES - GENERAL
PAINLEVEL_OUTOF10: 3
PAINLEVEL_OUTOF10: 0
PAINLEVEL_OUTOF10: 1
PAINLEVEL_OUTOF10: 0

## 2023-12-27 ASSESSMENT — PAIN SCALES - WONG BAKER
WONGBAKER_NUMERICALRESPONSE: 0
WONGBAKER_NUMERICALRESPONSE: 4

## 2023-12-27 ASSESSMENT — PAIN DESCRIPTION - DESCRIPTORS: DESCRIPTORS: ACHING;DISCOMFORT

## 2023-12-27 ASSESSMENT — PAIN DESCRIPTION - FREQUENCY: FREQUENCY: INTERMITTENT

## 2023-12-27 ASSESSMENT — PAIN DESCRIPTION - PAIN TYPE: TYPE: CHRONIC PAIN;ACUTE PAIN

## 2023-12-27 ASSESSMENT — PAIN DESCRIPTION - ONSET: ONSET: GRADUAL

## 2023-12-27 ASSESSMENT — PAIN DESCRIPTION - LOCATION: LOCATION: LEG

## 2023-12-27 ASSESSMENT — PAIN - FUNCTIONAL ASSESSMENT: PAIN_FUNCTIONAL_ASSESSMENT: ACTIVITIES ARE NOT PREVENTED

## 2023-12-27 ASSESSMENT — PAIN DESCRIPTION - DIRECTION: RADIATING_TOWARDS: BLES

## 2023-12-27 ASSESSMENT — PAIN DESCRIPTION - ORIENTATION: ORIENTATION: RIGHT;LEFT

## 2023-12-27 NOTE — PROGRESS NOTES
Pt in bed, awake doing working her puzzle book. Alert & oriented x 3. VSS. Assessment completed. No complaints at this time. Nighttime medications given, pt tolerated well. Reminded pt to call for assistance with any needs. Call light within reach. Safety measures in place.

## 2023-12-27 NOTE — TELEPHONE ENCOUNTER
Imaging report CTA Neck 12/17/23 with f/u imaging recommendations sent to Juan A Martinez MD    [facility] [place]  [email or web address]

## 2023-12-27 NOTE — PROGRESS NOTES
Department of Physical Medicine & Rehabilitation  Progress Note    Patient Identification:  Whitney Meier  [id number]  : 1935  Admit date: 2023    Chief Complaint: Acute cerebrovascular accident (CVA) (HCC)    Subjective:   Seen in therapy this morning. No new complaints overnight. She is happy that she is getting out of the hospital a bit earlier than expected. She slept well last night and has no new pain overnight. Working hard today in therapy.    ROS: No f/c, n/v, cp     Objective:  Patient Vitals for the past 24 hrs:   BP Temp Temp src Pulse Resp SpO2   23 0815 (!) 108/54 98.2 °F (36.8 °C) Oral 79 16 95 %   23 1945 124/63 97.6 °F (36.4 °C) Oral 84 18 97 %   23 1833 120/61 -- -- 89 -- --       Const: Alert. No distress, pleasant.   HEENT: Normocephalic, atraumatic. Normal sclera/conjunctiva. MMM.   CV: Regular rate and rhythm.   Resp: No respiratory distress. Lungs CTAB.   Abd: Soft, nontender, nondistended, NABS+   Ext: No edema.   Neuro: Alert, oriented, appropriately interactive. 4/5 strength RUE, 3/5 hand squeeze, slight decrease of sensation to light touch on RUE, otherwise intact motor and sensory function elsewhere. CN II-XII intact  Psych: Cooperative, appropriate mood and affect    Laboratory data: Available via EMR.   Last 24 hour lab  Recent Results (from the past 24 hour(s))   POCT Glucose    Collection Time: 23 11:43 AM   Result Value Ref Range    POC Glucose 216 (H) 70 - 99 mg/dl    Performed on ACCU-CHEK    POCT Glucose    Collection Time: 23  4:03 PM   Result Value Ref Range    POC Glucose 143 (H) 70 - 99 mg/dl    Performed on ACCU-CHEK    POCT Glucose    Collection Time: 23  8:16 PM   Result Value Ref Range    POC Glucose 251 (H) 70 - 99 mg/dl    Performed on ACCU-CHEK    Basic Metabolic Panel w/ Reflex to MG    Collection Time: 23  7:11 AM   Result Value Ref Range    Sodium 133 (L) 136 - 145 mmol/L    Potassium reflex Magnesium 4.8 3.5

## 2023-12-27 NOTE — PLAN OF CARE
maintaining nutritional needs:   Assess nutritional status and recommend course of action   Monitor oral intake, labs, and treatment plans     Absence of infection at discharge:   Assess and monitor for signs and symptoms of infection   Monitor lab/diagnostic results   Monitor all insertion sites i.e., indwelling lines, tubes and drains     Achieves stable or improved neurological status: Assess for and report changes in neurological status

## 2023-12-27 NOTE — PROGRESS NOTES
pulling cards off deck on table during game and frequently dropped cards onto tabletop d/t decreased digit dexterity and difficulty sliding thumb under cards, pt ultimately slid cards to edge of table for increased surface area and easier grasp between pads of first three digits. Pt verbalized enjoying engaging in card game although demo'd frustration at times w/ difficulty using R hand, reminded to work on RUE FM coordination HEP in free time to continue improving RUE functional use.     Pt sit to supine independent with HOB flat. Pt left semi-supine in bed at EOS w/ call light w/ in reach, bed alarm on, all needs met.     Assessment  Assessment  Assessment: Pt tolerates session fairly well completing laundry task with SBA and use of reacher but req + time for success with reacher due to RUE/ deficits/weakness. Pt improved to SBA for sit to stand transfers. Pt making steady progress, cont per OT POC.  Activity Tolerance: Patient tolerated treatment well;Patient limited by endurance  Discharge Recommendations: 24 hour supervision or assist;Home with Home health OT  OT Equipment Recommendations  Other: Pt already has 3-in-1 commode, cont to assess  Safety Devices  Safety Devices in place: Yes  Type of devices: Nurse notified;Call light within reach;Chair alarm in place;Left in chair    Patient Education  Education  Education Given To: Patient  Education Provided: Role of Therapy;Mobility Training;Transfer Training;Safety;ADL Function;IADL Function  Education Method: Verbal;Demonstration  Barriers to Learning: None  Education Outcome: Verbalized understanding;Continued education needed    Plan  Occupational Therapy Plan  Times Per Week: 5x weekly, 90 mins daily  Current Treatment Recommendations: Strengthening;Balance training;Functional mobility training;Endurance training;Safety education & training;Self-Care / ADL;Patient/Caregiver education & training;Home management training;Cognitive/Perceptual

## 2023-12-27 NOTE — PATIENT CARE CONFERENCE
Inpatient Rehabilitation  Weekly Team Conference Note  The [address] [place] [address]  [phone]  Patient Name: Jg Oshea        MRN: [id number]    : 1935  (80 y.o.)  Gender: female   Referring Practitioner: Duke Garay DO  Diagnosis: Acute Cerebrovascular Accident right sided weakness  The team conference for this patient was held on 2023 at 11:00am by:  Geovanna Gómez. DO Gus    Current/Goal QM SCORES  QM Current/Goal Score   Eating   /     Oral Hygiene   /     Shower/Bathing   /     UB Dressing   /     LB Dressing   /     Putting on/off Footwear   /     [address]   /     Bladder Continence      Bowel Continence      Toilet Transfers   /     Shower/Bathe Self    /     Rolling Left and Right CARE Score: 4 /     Sit to Lying   /     Lying to Sitting on Bedside CARE Score: 3 /     Sit to Stand CARE Score: 2 /     Chair/Bed to Chair Transfer CARE Score: 2 /     Car Transfers   /     Walk 10 Feet   /     Walk 50 Feet with Two Turns   /     Walk 150 Feet   /     Walk 10 Feet on Uneven Surfaces   /     1 Step (Curb)   /     4 Steps   /     12 Steps   /     Picking up Object from Floor   /     Wheel 50 Feet with 2 Turns   /     Type         [] Manual        [] Motorized        [] N/A   Wheel 150 Feet   /     Type         [] Manual        [] Motorized        [] N/A     NURSING:  A&Ox: Level of Consciousness: Alert (0)  Orientation Level: Oriented X4  Groves Fall Risk Score:  Groves Total Score: 70  Admission BIMS: 14   [] Unable to complete BIMS on Admission, Reasoning:   Wounds/Incisions/Ulcers: Redness BL heels  Medication Review: with patient  Pain: 0/10  Consultations:   Imaging:    No orders to display     Active Comorbid Conditions:Dimentia, diabetes, hypertension, restless leg syndrome, osteomyelitis of spine  Systems Review:   Renal: WDL, Dialysis:  Type: , Frequency:   Neurological: aox4, forgetful, dimentia  Musculoskeletal: weakness
Other;_______     TEAM SUMMARY: Will continue with current poc & goals until anticipated d/c date of 12/29/2023.    MD:   Stroke Risk Factors:   [] N/A for this patient [x] HTN  [x]  Diabetes  [x] Hyperlipidemia  []Obesity BMI >25  [] Atrial Fibrillation [] Smoker (current)  [] Smoker (quit in last 12 months)  [] Sleep Apnea [] Other:     Risk for Readmission: Moderate (10-19)    Justification for Continued Stay:   Criteria for continued IRF stay:  Based on my medical assessment of the patient and review of information from the interdisciplinary team, as part of this weekly team conference, the patient continues to meet the following criteria for IRF level of care:  [x] The patient requires 24-hour rehabilitation nursing care   [x] The patient requires an intensive rehabilitation therapy program  [x] The patient requires active and ongoing intervention of multiple therapy disciplines  [x] The patient requires continued physician supervision by a rehabilitation physician  [x] The patient requires an intensive and coordinated interdisciplinary team approach to the delivery of rehabilitative care    Medical Necessity-continued close physician medical management is required for:   [] Cardiac/Circulatory dysfunction  [] Respiratory/Pulmonary dysfunction  [] Integumentary complications  [] Peripheral Vascular dysfunction  [] Musculoskeletal dysfunction  [x] Neurological dysfunction d/t:  [x] CVA  [] SCI  [] TBI  [] Other: __________  [] Renal dysfunction  [] Hematologic dysfunction    [] Endocrine disorders  [] GI disorders     [] Genito-Urinary dysfunction    Assessment/Plan:  [x] The patient is making good progression towards their LTG's, is actively participating in, and has a reasonable expectation to continue to benefit from the intensive rehabilitation program.  [] The estimated discharge date has been changed from initial team conference due to:   [] The estimated discharge destination has been changed from initial

## 2023-12-27 NOTE — PROGRESS NOTES
Term Goal 3: Patient will be able to ambulate >200ft with Mod I  Short Term Goal 4: Patient will be able to ascend/descend 1 8 inch curb step with RW, Mod I    PLAN OF CARE/SAFETY  Physical Therapy Plan  Days Per Week: 5 Days  Hours Per Day: 1.5 hours  Therapy Duration: 10 Days  Current Treatment Recommendations: Strengthening;Balance training;Functional mobility training;Transfer training;Endurance training;Gait training;Neuromuscular re-education;Home exercise program;Safety education & training;Equipment evaluation, education, & procurement;Therapeutic activities;Co-Treatment;Stair training  Safety Devices  Type of Devices: Patient at risk for falls;Nurse notified;All fall risk precautions in place;Call light within reach;Chair alarm in place;Left in chair    EDUCATION  Education  Education Given To: Patient  Education Provided: Role of Therapy;Transfer Training  Education Method: Demonstration  Barriers to Learning: Cognition  Education Outcome: Continued education needed;Verbalized understanding        Therapy Time   Individual Individual Group Co-treatment   Time In 0830 1125       Time Out 1000 1155       Minutes 90 30          Timed Code Treatment Minutes:   120 minutes    Total Treatment Minutes:  120 minutes         Claire Kent PT, 12/27/23 at 2:29 PM

## 2023-12-27 NOTE — PROGRESS NOTES
Patient alert & oriented, denies pain/discomfort this shift. Shift assessments completed, vitals obtained BP (!) 98/55   Pulse 75   Temp 98.2 °F (36.8 °C) (Oral)   Resp 16   Ht 1.791 m (5' 10.5\")   Wt 62.3 kg (137 lb 5.6 oz)   SpO2 95%   BMI 19.43 kg/m²  Patient participating in therapy, tolerating well. Call light and personal belongings within reach, safety measures in place.

## 2023-12-27 NOTE — PLAN OF CARE
Problem: Discharge Planning  Goal: Discharge to home or other facility with appropriate resources  12/27/2023 0914 by Allison Alvarez RN  Outcome: Progressing     Problem: Safety - Adult  Goal: Free from fall injury  12/27/2023 0914 by Allison Alvarez RN  Outcome: Progressing     Problem: Pain  Goal: Verbalizes/displays adequate comfort level or baseline comfort level  12/27/2023 0914 by Allison Alvarez RN  Outcome: Progressing  Flowsheets (Taken 12/27/2023 0815)  Verbalizes/displays adequate comfort level or baseline comfort level: Encourage patient to monitor pain and request assistance     Problem: Skin/Tissue Integrity  Goal: Absence of new skin breakdown  Description: 1.  Monitor for areas of redness and/or skin breakdown  2.  Assess vascular access sites hourly  3.  Every 4-6 hours minimum:  Change oxygen saturation probe site  4.  Every 4-6 hours:  If on nasal continuous positive airway pressure, respiratory therapy assess nares and determine need for appliance change or resting period.  12/27/2023 0914 by Allison Alvarez RN  Outcome: Progressing     Problem: Neurosensory - Adult  Goal: Achieves stable or improved neurological status  Outcome: Progressing     Problem: Neurosensory - Adult  Goal: Achieves maximal functionality and self care  Outcome: Progressing  Flowsheets (Taken 12/26/2023 1000 by Sarita Barrera, RN)  Achieves maximal functionality and self care: Monitor swallowing and airway patency with patient fatigue and changes in neurological status     Problem: Skin/Tissue Integrity - Adult  Goal: Skin integrity remains intact  Outcome: Progressing  Flowsheets (Taken 12/26/2023 1000 by Sarita Barrera, RN)  Skin Integrity Remains Intact: Monitor for areas of redness and/or skin breakdown     Problem: Musculoskeletal - Adult  Goal: Return mobility to safest level of function  Outcome: Progressing  Flowsheets (Taken 12/26/2023 1000 by Sarita Barrera, RN)  Return Mobility to Safest

## 2023-12-28 LAB
CRP SERPL-MCNC: <3 MG/L (ref 0–5.1)
ERYTHROCYTE [SEDIMENTATION RATE] IN BLOOD BY WESTERGREN METHOD: 6 MM/HR (ref 0–30)
GLUCOSE BLD-MCNC: 126 MG/DL (ref 70–99)
GLUCOSE BLD-MCNC: 164 MG/DL (ref 70–99)
GLUCOSE BLD-MCNC: 237 MG/DL (ref 70–99)
PERFORMED ON: ABNORMAL

## 2023-12-28 PROCEDURE — 6360000002 HC RX W HCPCS: Performed by: PHYSICAL MEDICINE & REHABILITATION

## 2023-12-28 PROCEDURE — 6370000000 HC RX 637 (ALT 250 FOR IP)

## 2023-12-28 PROCEDURE — 6370000000 HC RX 637 (ALT 250 FOR IP): Performed by: PHYSICAL MEDICINE & REHABILITATION

## 2023-12-28 PROCEDURE — 97535 SELF CARE MNGMENT TRAINING: CPT

## 2023-12-28 PROCEDURE — 97530 THERAPEUTIC ACTIVITIES: CPT

## 2023-12-28 PROCEDURE — 36415 COLL VENOUS BLD VENIPUNCTURE: CPT

## 2023-12-28 PROCEDURE — 97110 THERAPEUTIC EXERCISES: CPT

## 2023-12-28 PROCEDURE — 97116 GAIT TRAINING THERAPY: CPT

## 2023-12-28 PROCEDURE — 85652 RBC SED RATE AUTOMATED: CPT

## 2023-12-28 PROCEDURE — 86140 C-REACTIVE PROTEIN: CPT

## 2023-12-28 PROCEDURE — 1280000000 HC REHAB R&B

## 2023-12-28 RX ORDER — CEPHALEXIN 250 MG/1
250 CAPSULE ORAL 2 TIMES DAILY
Qty: 10 CAPSULE | Refills: 0 | Status: SHIPPED | OUTPATIENT
Start: 2023-12-28 | End: 2024-01-02

## 2023-12-28 RX ORDER — CEPHALEXIN 250 MG/1
250 CAPSULE ORAL EVERY 12 HOURS SCHEDULED
Status: DISCONTINUED | OUTPATIENT
Start: 2023-12-28 | End: 2023-12-31 | Stop reason: HOSPADM

## 2023-12-28 RX ORDER — POLYETHYLENE GLYCOL 3350 17 G/17G
17 POWDER, FOR SOLUTION ORAL DAILY PRN
Qty: 527 G | Refills: 1 | Status: SHIPPED | OUTPATIENT
Start: 2023-12-28 | End: 2024-02-28

## 2023-12-28 RX ORDER — CARVEDILOL 6.25 MG/1
6.25 TABLET ORAL 2 TIMES DAILY WITH MEALS
Qty: 60 TABLET | Refills: 3 | Status: SHIPPED | OUTPATIENT
Start: 2023-12-28

## 2023-12-28 RX ORDER — CEPHALEXIN 250 MG/1
250 CAPSULE ORAL EVERY 6 HOURS SCHEDULED
Status: DISCONTINUED | OUTPATIENT
Start: 2023-12-28 | End: 2023-12-28

## 2023-12-28 RX ORDER — IBUPROFEN 200 MG
TABLET ORAL DAILY
Status: DISCONTINUED | OUTPATIENT
Start: 2023-12-28 | End: 2023-12-31 | Stop reason: HOSPADM

## 2023-12-28 RX ORDER — CEPHALEXIN 250 MG/1
250 CAPSULE ORAL 4 TIMES DAILY
Qty: 20 CAPSULE | Refills: 0 | Status: SHIPPED | OUTPATIENT
Start: 2023-12-28 | End: 2023-12-28

## 2023-12-28 RX ADMIN — PRAMIPEXOLE DIHYDROCHLORIDE 1 MG: 1 TABLET ORAL at 20:27

## 2023-12-28 RX ADMIN — CLOPIDOGREL BISULFATE 75 MG: 75 TABLET ORAL at 09:11

## 2023-12-28 RX ADMIN — ACETAMINOPHEN 650 MG: 325 TABLET ORAL at 20:27

## 2023-12-28 RX ADMIN — CARVEDILOL 6.25 MG: 6.25 TABLET, FILM COATED ORAL at 09:11

## 2023-12-28 RX ADMIN — ALOGLIPTIN 6.25 MG: 6.25 TABLET, FILM COATED ORAL at 09:10

## 2023-12-28 RX ADMIN — ASPIRIN 81 MG: 81 TABLET, CHEWABLE ORAL at 09:11

## 2023-12-28 RX ADMIN — CEPHALEXIN 250 MG: 250 CAPSULE ORAL at 20:27

## 2023-12-28 RX ADMIN — SACUBITRIL AND VALSARTAN 0.5 TABLET: 24; 26 TABLET, FILM COATED ORAL at 09:11

## 2023-12-28 RX ADMIN — ENOXAPARIN SODIUM 30 MG: 100 INJECTION SUBCUTANEOUS at 09:11

## 2023-12-28 RX ADMIN — SACUBITRIL AND VALSARTAN 0.5 TABLET: 24; 26 TABLET, FILM COATED ORAL at 20:27

## 2023-12-28 RX ADMIN — ATORVASTATIN CALCIUM 80 MG: 80 TABLET, FILM COATED ORAL at 20:27

## 2023-12-28 RX ADMIN — INSULIN LISPRO 1 UNITS: 100 INJECTION, SOLUTION INTRAVENOUS; SUBCUTANEOUS at 11:53

## 2023-12-28 RX ADMIN — BISACODYL 5 MG: 5 TABLET, COATED ORAL at 09:11

## 2023-12-28 RX ADMIN — MIRTAZAPINE 15 MG: 15 TABLET, FILM COATED ORAL at 20:27

## 2023-12-28 RX ADMIN — CARVEDILOL 6.25 MG: 6.25 TABLET, FILM COATED ORAL at 17:06

## 2023-12-28 NOTE — PLAN OF CARE
In room rounding was completed today with RN, PT/OT, LSW and ARU Supervisor present. LSW called patient's daughter, Dagmar, to allow them to be present and active during the conversation. Whitney Meier was educated on their discharge date, 12/29/2023, and the recommendation for Home with Home therapies, home health aide, and nursing after discharge. Physical Therapy informed the patient and family on their current assist level and plan of care. Occupation Therapy provided information to the patient and family on their current assist level for ADLs and the plan of care. The RN staff provided education on medication management, current co-morbidities that are being addressed by the physicians and the patient's plan of care for their stay.    The interdisciplinary team identified the following barriers to address prior to discharge:  Fear of falling  Continues to require set up/spvn for some ADLs  Open blister on the side of her L great toe    Education/recommendations to assist at discharge included:  Needs assistance and encouragement for completing stairs  Having family present throughout the day to assist with safety during mobility and ADLs, and having family perform IADLs for the time being to conserve pt's energy for mobility and ADLs.  Pt is being placed on oral antibiotics for open blister on her toe.    Rehab Team Members in attendance for in room rounding:  ARU Supervisor/PPS Coordinator:  Luis Arthur, PT, DPT     Social Work:  Gage Foss     RN:  Allison Alvarez RN    Therapy:  Claire Kent, PT, DPT  Saundra Olivares OTR/L

## 2023-12-28 NOTE — PROGRESS NOTES
Shift assessment completed. VSS. Patient A/O x4 with intermittent confusion. Pain is being managed with scheduled and PRN medications. Call light and over bed table within reach, hourly rounding and visual checks in place. Safety measures in place, will continue to monitor

## 2023-12-28 NOTE — DISCHARGE INSTRUCTIONS
PODIATRY DISCHARGE INSTRUCTIONS:  -Please follow up at [facility] outpatient podiatry clinic so diabetic shoes and inserts can be prescribed. We would rather patient have diabetic shoes than wear surgical shoes  -Watch for signs and symptoms of infection including but not limited to fever, chills, feeling ill, redness around the wounds, redness streaking up the legs, purulent drainage. Call the clinic or present to the nearest emergency department if they notice these signs or symptoms   -Diabetic care is extremely important and consists of daily foot inspections, wearing supportive shoes and inserts at all times, applying lotion to feet while sparing webspaces, and routinely checking blood sugar.

## 2023-12-28 NOTE — PROGRESS NOTES
Occupational Therapy  Facility/Department: [facility] ACUTE REHAB UNIT  Rehabilitation Occupational Therapy Daily Treatment Note  Possible Discharge Summary  Date: 23  Patient Name: Whitney Meier       Room: 3101/3101-01  MRN: [id number]  Account: [id number]   : 1935  (88 y.o.) Gender: female                    Past Medical History:  has a past medical history of Clostridium difficile infection, Dementia ([facility]), Diabetes mellitus ([facility]), History of blood transfusion, Hyperlipidemia, Hypertension, Infection, MDRO (multiple drug resistant organisms) resistance, MRSA infection, Osteomyelitis of spine (HCC), and Restless leg syndrome.  Past Surgical History:   has a past surgical history that includes Tonsillectomy; Hysterectomy; Appendectomy; Toe amputation (); Toe amputation (); eye surgery; Colonoscopy (); Colonoscopy (7/15/15); other surgical history (Left); Foot surgery (Left, 2017); Upper gastrointestinal endoscopy (2017); Foot surgery (Right, 2017); Foot Amputation (Right, 2019); and Foot Amputation (Right, 2019).    Restrictions  Restrictions/Precautions: Fall Risk, Up as Tolerated  Other position/activity restrictions: up as tolerated    Subjective  Subjective: Pt asleep in chair upon arrival, easily aroused w/ VCs and agreeable to therapy. Pt denied pain  Restrictions/Precautions: Fall Risk;Up as Tolerated             Objective     Cognition  Overall Cognitive Status: WFL  Orientation  Overall Orientation Status: Within Functional Limits         ADL  Feeding  Assistance Level: Modified Independent; Increased time to complete  Skilled Clinical Factors: Independent to open and drink diet pepsi; encouragement to complete herself prior to asking for assistance; used R hand to stabilize and L hand to open w/ + time and multiple attempts  Grooming/Oral Hygiene  Assistance Level: Independent  Skilled Clinical Factors: Independent to complete oral care seated in w/c

## 2023-12-28 NOTE — DISCHARGE INSTR - COC
Continuity of Care Form    Patient Name: Whitney Meier   :  1935  MRN:  [id number]    Admit date:  2023  Discharge date:  23    Code Status Order: Full Code   Advance Directives:     Admitting Physician:  Jose Alfredo Weber DO  PCP: Leilani Mantilla MD    Discharging Nurse: DESHAUN Alvarez RN  Discharging Hospital Unit/Room#: 3101/3101-01  Discharging Unit Phone Number: [phone]    Emergency Contact:   Extended Emergency Contact Information  Primary Emergency Contact: Dagmar Adrian  Address: [address] [place] [address] of [place]  Home Phone: [phone]  Mobile Phone: [phone]  Relation: Child  Secondary Emergency Contact: Lexy Adrian  Address: [place] ([place])           [id number]  Home Phone: [phone]  Mobile Phone: [phone]  Relation: Grandchild    Past Surgical History:  Past Surgical History:   Procedure Laterality Date    APPENDECTOMY      COLONOSCOPY      polyps    COLONOSCOPY  7/15/15    normal colon    EYE SURGERY      right cataract    FOOT AMPUTATION Right 2019    PARTIAL RIGHT FOOT AMPUTATION    FOOT AMPUTATION Right 2019    PARTIAL RIGHT FOOT AMPUTATION performed by Abdoul Pressley DPM at [facility] OR    FOOT SURGERY Left 2017    PARTIAL LEFT FOOT AMPUTATION              FOOT SURGERY Right 2017    HYSTERECTOMY      OTHER SURGICAL HISTORY Left     Macular Grid Left eye argon    TOE AMPUTATION      2nd toe 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    TOE AMPUTATION      5th toe right foot    TONSILLECTOMY      UPPER GASTROINTESTINAL ENDOSCOPY  2017    Dieulafoy lesion in duodenum       Immunization History:   Immunization History   Administered Date(s) Administered    Influenza Vaccine, unspecified formulation 2017    Influenza Virus Vaccine 10/03/2017, 10/18/2018    Pneumococcal, PPSV23, PNEUMOVAX 23, (age 2y+), SC/IM, 0.5mL 2017       Active Problems:  Patient Active Problem List   Diagnosis

## 2023-12-28 NOTE — CONSULTS
Department of Podiatry Consult Note  Resident       Reason for Consult: Open blister left great toe, some bleeding, small brownish, black spot on tip.  H/O amputations bilateral toes  Requesting Physician: Dr. Jose Alfredo Weber DO    CHIEF COMPLAINT: Left big toe blister    HISTORY OF PRESENT ILLNESS:                The patient is a 88 y.o. female with significant past medical history as listed below. Podiatry was consulted for open blister left great toe.  Patient states that she currently does not follow with the podiatrist.  She has a history of right 2-3 toe amputations along with left 3-5 toe and partial 5th ray amputations.  Patient states that she believes she hit her left great toe causing the blister which then popped.  Patient states that there is minimal pain, however expresses that she does not have the best feeling in her foot.  Patient states that she wears 2 surgical shoes as her every day foot wear.  She states that she does not have diabetic shoes and inserts. Patient denies fever, chills, nausea, vomiting, shortness of breath, chest pain.  Patient has no other pedal complaints at this time.    Past Medical History:        Diagnosis Date    Clostridium difficile infection 06/06/2017    antigen +    Dementia (HCC)     Diabetes mellitus (HCC)     History of blood transfusion     Hyperlipidemia     Hypertension     Infection     foot    MDRO (multiple drug resistant organisms) resistance 04/09/2019    urine    MRSA infection 12/07/2017    foot    Osteomyelitis of spine (HCC) 2010    thoraculumbar spine    Restless leg syndrome        Past Surgical History:        Procedure Laterality Date    APPENDECTOMY      COLONOSCOPY  1997    polyps    COLONOSCOPY  7/15/15    normal colon    EYE SURGERY      right cataract    FOOT AMPUTATION Right 05/26/2019    PARTIAL RIGHT FOOT AMPUTATION    FOOT AMPUTATION Right 5/26/2019    PARTIAL RIGHT FOOT AMPUTATION performed by Abdoul Pressley DPM at [facility] [place]    FOOT SURGERY Left

## 2023-12-28 NOTE — CARE COORDINATION
Patient now will be discharging on Sunday 12/31.    Discharge date extended due to family concern over patient not having bed (hospital bed to be delivered to home on 12/29/23).     Family is agreeable with new anticipated discharge date of Sunday 12/31.    SW has arranged HHC through [facility].    Daughter to transport home at discharge.     Electronically signed by ELLIS Gomez on 12/28/2023 at 2:59 PM

## 2023-12-28 NOTE — PLAN OF CARE
breakdown     Problem: Musculoskeletal - Adult  Goal: Return mobility to safest level of function  12/28/2023 0956 by Allison Alvarez RN  Outcome: Progressing  Flowsheets (Taken 12/27/2023 2000 by Gypsy Berry RN)  Return Mobility to Safest Level of Function: Assess patient stability and activity tolerance for standing, transferring and ambulating with or without assistive devices  12/27/2023 2215 by Gypsy Berry RN  Outcome: Progressing  Flowsheets (Taken 12/27/2023 2000)  Return Mobility to Safest Level of Function: Assess patient stability and activity tolerance for standing, transferring and ambulating with or without assistive devices  Goal: Maintain proper alignment of affected body part  12/28/2023 0956 by Allison Alvarez RN  Outcome: Progressing  Flowsheets (Taken 12/27/2023 2000 by Gypsy Berry RN)  Maintain proper alignment of affected body part: Support and protect limb and body alignment per provider's orders  12/27/2023 2215 by Gypsy Berry RN  Outcome: Progressing  Flowsheets (Taken 12/27/2023 2000)  Maintain proper alignment of affected body part: Support and protect limb and body alignment per provider's orders  Goal: Return ADL status to a safe level of function  12/28/2023 0956 by Allison Alvarez RN  Outcome: Progressing  Flowsheets (Taken 12/27/2023 2000 by Gypsy Berry RN)  Return ADL Status to a Safe Level of Function: Administer medication as ordered  12/27/2023 2215 by Gypsy Berry RN  Outcome: Progressing  Flowsheets (Taken 12/27/2023 2000)  Return ADL Status to a Safe Level of Function: Administer medication as ordered     Problem: Infection - Adult  Goal: Absence of infection at discharge  12/28/2023 0956 by Allison Alvarez RN  Outcome: Progressing  Flowsheets (Taken 12/27/2023 2000 by Gypsy Berry RN)  Absence of infection at discharge: Assess and monitor for signs and symptoms of infection  12/27/2023 2215 by Gypsy Berry RN  Outcome:

## 2023-12-28 NOTE — PROGRESS NOTES
Safety - fall precautions     PPx  DVT: lovenox  GI: pantoprazole     FULL CODE    Rehab Progress: Improving  Anticipated Dispo: home  Services/DME: TBD  ELOS:     12/29- prep DC    Team conference was held today on the patient and discussed directly with the patient utilizing their entire treatment team. Please see separate team note for details. Total treatment time for today's care >50 min. >50% of time spent counseling with patient and coordinating care.         Jose Alfredo Weber D.O. M.P.H  PM&R  12/28/2023  10:19 AM

## 2023-12-28 NOTE — PROGRESS NOTES
Tolerance: Patient tolerated treatment well;Patient limited by endurance  Discharge Recommendations: 24 hour supervision or assist;Home with Home health PT  PT Equipment Recommendations  Equipment Needed: No    Goals  Patient Goals   Patient Goals : \"To get stronger and to be able to walk by myself again\"  Short Term Goals  Time Frame for Short Term Goals: 10 days  Short Term Goal 1: Patient will be able to perform all bed mobility with HOB flat and no handrails, IND-goal met 12/28  Short Term Goal 2: Patient will be able to perform all transfer, exluding floor, mod I with RW-goal met 12/28  Short Term Goal 3: Patient will be able to ambulate >200ft with Mod I- goal met 12/28  Short Term Goal 4: Patient will be able to ascend/descend 1 8 inch curb step with RW, Mod I- goal not met    PLAN OF CARE/SAFETY  Physical Therapy Plan  Days Per Week: 5 Days  Hours Per Day: 1.5 hours  Therapy Duration: 10 Days  Current Treatment Recommendations: Strengthening;Balance training;Functional mobility training;Transfer training;Endurance training;Gait training;Neuromuscular re-education;Home exercise program;Safety education & training;Equipment evaluation, education, & procurement;Therapeutic activities;Co-Treatment;Stair training  Safety Devices  Type of Devices: Patient at risk for falls;Nurse notified;All fall risk precautions in place;Call light within reach;Chair alarm in place;Left in chair    EDUCATION  Education  Education Given To: Patient  Education Provided: Role of Therapy;Transfer Training  Education Method: Demonstration  Barriers to Learning: Cognition  Education Outcome: Continued education needed;Verbalized understanding        Therapy Time   Individual Concurrent Group Co-treatment   Time In 0730         Time Out 0900         Minutes 90            Timed Code Treatment Minutes:   90 minutes    Total Treatment Minutes:  90 minutes         Claire Kent PT, 12/28/23 at 9:25 AM

## 2023-12-28 NOTE — CARE COORDINATION
[phone] are following.     Melisa Rowley RN

## 2023-12-28 NOTE — PLAN OF CARE
Problem: Discharge Planning  Goal: Discharge to home or other facility with appropriate resources  Outcome: Progressing  Discharge to home or other facility with appropriate resources: Identify barriers to discharge with patient and caregiver     Problem: Safety - Adult  Goal: Free from fall injury  Outcome: Progressing     Problem: Pain  Goal: Verbalizes/displays adequate comfort level or baseline comfort level  Outcome: Progressing  Verbalizes/displays adequate comfort level or baseline comfort level: Encourage patient to monitor pain and request assistance     Problem: Skin/Tissue Integrity  Goal: Absence of new skin breakdown  Description: 1.  Monitor for areas of redness and/or skin breakdown  2.  Assess vascular access sites hourly  3.  Every 4-6 hours minimum:  Change oxygen saturation probe site  4.  Every 4-6 hours:  If on nasal continuous positive airway pressure, respiratory therapy assess nares and determine need for appliance change or resting period.  Outcome: Progressing     Problem: Neurosensory - Adult  Goal: Achieves stable or improved neurological status  Outcome: Progressing  Achieves stable or improved neurological status: Assess for and report changes in neurological status  Goal: Achieves maximal functionality and self care  Outcome: Progressing  Achieves maximal functionality and self care: Monitor swallowing and airway patency with patient fatigue and changes in neurological status     Problem: Skin/Tissue Integrity - Adult  Goal: Skin integrity remains intact  Outcome: Progressing  Skin Integrity Remains Intact: Monitor for areas of redness and/or skin breakdown     Problem: Musculoskeletal - Adult  Goal: Return mobility to safest level of function  Outcome: Progressing  Return Mobility to Safest Level of Function: Assess patient stability and activity tolerance for standing, transferring and ambulating with or without assistive devices    Goal: Maintain proper alignment of affected body

## 2023-12-28 NOTE — PROGRESS NOTES
This nurse called to therapy gym to evaluate patient Left great toe. Upon assessment patient toe bleeding with open blistered noted. Wound bed appeared pink and moist, Surrounding skin appears warm and red. Podiatry consult placed, spoke with Italo who will see patient today.

## 2023-12-29 LAB
ANION GAP SERPL CALCULATED.3IONS-SCNC: 8 MMOL/L (ref 3–16)
BASOPHILS # BLD: 0 K/UL (ref 0–0.2)
BASOPHILS NFR BLD: 0.3 %
BUN SERPL-MCNC: 31 MG/DL (ref 7–20)
CALCIUM SERPL-MCNC: 9.5 MG/DL (ref 8.3–10.6)
CHLORIDE SERPL-SCNC: 104 MMOL/L (ref 99–110)
CO2 SERPL-SCNC: 26 MMOL/L (ref 21–32)
CREAT SERPL-MCNC: 1.4 MG/DL (ref 0.6–1.2)
DEPRECATED RDW RBC AUTO: 13.2 % (ref 12.4–15.4)
EOSINOPHIL # BLD: 0.3 K/UL (ref 0–0.6)
EOSINOPHIL NFR BLD: 5.8 %
GFR SERPLBLD CREATININE-BSD FMLA CKD-EPI: 36 ML/MIN/{1.73_M2}
GLUCOSE SERPL-MCNC: 118 MG/DL (ref 70–99)
HCT VFR BLD AUTO: 30.4 % (ref 36–48)
HGB BLD-MCNC: 10.3 G/DL (ref 12–16)
LYMPHOCYTES # BLD: 0.8 K/UL (ref 1–5.1)
LYMPHOCYTES NFR BLD: 12.7 %
MCH RBC QN AUTO: 32.1 PG (ref 26–34)
MCHC RBC AUTO-ENTMCNC: 33.7 G/DL (ref 31–36)
MCV RBC AUTO: 95.3 FL (ref 80–100)
MONOCYTES # BLD: 0.5 K/UL (ref 0–1.3)
MONOCYTES NFR BLD: 7.8 %
NEUTROPHILS # BLD: 4.4 K/UL (ref 1.7–7.7)
NEUTROPHILS NFR BLD: 73.4 %
PLATELET # BLD AUTO: 147 K/UL (ref 135–450)
PMV BLD AUTO: 9.9 FL (ref 5–10.5)
POTASSIUM SERPL-SCNC: 5 MMOL/L (ref 3.5–5.1)
RBC # BLD AUTO: 3.19 M/UL (ref 4–5.2)
SODIUM SERPL-SCNC: 138 MMOL/L (ref 136–145)
WBC # BLD AUTO: 6 K/UL (ref 4–11)

## 2023-12-29 PROCEDURE — 6360000002 HC RX W HCPCS: Performed by: PHYSICAL MEDICINE & REHABILITATION

## 2023-12-29 PROCEDURE — 6370000000 HC RX 637 (ALT 250 FOR IP)

## 2023-12-29 PROCEDURE — 97530 THERAPEUTIC ACTIVITIES: CPT

## 2023-12-29 PROCEDURE — 85025 COMPLETE CBC W/AUTO DIFF WBC: CPT

## 2023-12-29 PROCEDURE — 6370000000 HC RX 637 (ALT 250 FOR IP): Performed by: PHYSICAL MEDICINE & REHABILITATION

## 2023-12-29 PROCEDURE — 97116 GAIT TRAINING THERAPY: CPT

## 2023-12-29 PROCEDURE — 80048 BASIC METABOLIC PNL TOTAL CA: CPT

## 2023-12-29 PROCEDURE — 97110 THERAPEUTIC EXERCISES: CPT

## 2023-12-29 PROCEDURE — 1280000000 HC REHAB R&B

## 2023-12-29 PROCEDURE — 97535 SELF CARE MNGMENT TRAINING: CPT

## 2023-12-29 PROCEDURE — 36415 COLL VENOUS BLD VENIPUNCTURE: CPT

## 2023-12-29 RX ADMIN — CEPHALEXIN 250 MG: 250 CAPSULE ORAL at 08:27

## 2023-12-29 RX ADMIN — SACUBITRIL AND VALSARTAN 0.5 TABLET: 24; 26 TABLET, FILM COATED ORAL at 20:23

## 2023-12-29 RX ADMIN — CARVEDILOL 6.25 MG: 6.25 TABLET, FILM COATED ORAL at 17:15

## 2023-12-29 RX ADMIN — ONDANSETRON 4 MG: 4 TABLET, ORALLY DISINTEGRATING ORAL at 20:20

## 2023-12-29 RX ADMIN — ALOGLIPTIN 6.25 MG: 6.25 TABLET, FILM COATED ORAL at 08:28

## 2023-12-29 RX ADMIN — BACITRACIN ZINC, NEOMYCIN SULFATE, POLYMYXIN B SULFATE: 3.5; 5000; 4 OINTMENT TOPICAL at 09:27

## 2023-12-29 RX ADMIN — FERROUS SULFATE TAB 325 MG (65 MG ELEMENTAL FE) 325 MG: 325 (65 FE) TAB at 08:28

## 2023-12-29 RX ADMIN — CARVEDILOL 6.25 MG: 6.25 TABLET, FILM COATED ORAL at 08:27

## 2023-12-29 RX ADMIN — CLOPIDOGREL BISULFATE 75 MG: 75 TABLET ORAL at 08:28

## 2023-12-29 RX ADMIN — ATORVASTATIN CALCIUM 80 MG: 80 TABLET, FILM COATED ORAL at 20:20

## 2023-12-29 RX ADMIN — ENOXAPARIN SODIUM 30 MG: 100 INJECTION SUBCUTANEOUS at 08:27

## 2023-12-29 RX ADMIN — SACUBITRIL AND VALSARTAN 0.5 TABLET: 24; 26 TABLET, FILM COATED ORAL at 08:28

## 2023-12-29 RX ADMIN — ASPIRIN 81 MG: 81 TABLET, CHEWABLE ORAL at 08:27

## 2023-12-29 RX ADMIN — CEPHALEXIN 250 MG: 250 CAPSULE ORAL at 20:20

## 2023-12-29 RX ADMIN — MIRTAZAPINE 15 MG: 15 TABLET, FILM COATED ORAL at 20:20

## 2023-12-29 RX ADMIN — PRAMIPEXOLE DIHYDROCHLORIDE 1 MG: 1 TABLET ORAL at 20:20

## 2023-12-29 NOTE — PLAN OF CARE
Problem: Discharge Planning  Goal: Discharge to home or other facility with appropriate resources    Discharge to home or other facility with appropriate resources: Identify barriers to discharge with patient and caregiver     Problem: Pain  Goal: Verbalizes/displays adequate comfort level or baseline comfort level    Verbalizes/displays adequate comfort level or baseline comfort level:   Encourage patient to monitor pain and request assistance   Assess pain using appropriate pain scale   Implement non-pharmacological measures as appropriate and evaluate response     Problem: Musculoskeletal - Adult  Goal: Return mobility to safest level of function    Return Mobility to Safest Level of Function: Assess patient stability and activity tolerance for standing, transferring and ambulating with or without assistive devices     Problem: Nutrition Deficit:  Goal: Optimize nutritional status    Nutrient intake appropriate for improving, restoring, or maintaining nutritional needs:   Assess nutritional status and recommend course of action   Monitor oral intake, labs, and treatment plans

## 2023-12-29 NOTE — PROGRESS NOTES
Department of Physical Medicine & Rehabilitation  Progress Note    Patient Identification:  Whitney Meier  [id number]  : 1935  Admit date: 2023    Chief Complaint: Acute cerebrovascular accident (CVA) (HCC)    Subjective:   Seen in her room this morning. Discussed yesterday with her daughter about discharge. She is upset that they will not have a hospital bed delivered. WE can delay discharge until . Will plan to continue therapy today and tomorrow with discharge  to home with daughter. She is overall doing well.    ROS: No f/c, n/v, cp     Objective:  Patient Vitals for the past 24 hrs:   BP Temp Temp src Pulse Resp SpO2   23 0826 (!) 124/57 97.7 °F (36.5 °C) Oral 74 16 95 %   23 (!) 115/59 98.1 °F (36.7 °C) Oral 65 16 97 %   23 1706 115/62 -- -- 80 -- --       Const: Alert. No distress, pleasant.   HEENT: Normocephalic, atraumatic. Normal sclera/conjunctiva. MMM.   CV: Regular rate and rhythm.   Resp: No respiratory distress. Lungs CTAB.   Abd: Soft, nontender, nondistended, NABS+   Ext: No edema.   Neuro: Alert, oriented, appropriately interactive. 4/5 strength RUE, 3/5 hand squeeze, slight decrease of sensation to light touch on RUE, otherwise intact motor and sensory function elsewhere. CN II-XII intact  Psych: Cooperative, appropriate mood and affect    Laboratory data: Available via EMR.   Last 24 hour lab  Recent Results (from the past 24 hour(s))   Sedimentation Rate    Collection Time: 23 10:05 AM   Result Value Ref Range    Sed Rate 6 0 - 30 mm/Hr   POCT Glucose    Collection Time: 23 11:41 AM   Result Value Ref Range    POC Glucose 237 (H) 70 - 99 mg/dl    Performed on ACCU-CHEK    POCT Glucose    Collection Time: 23  8:16 PM   Result Value Ref Range    POC Glucose 164 (H) 70 - 99 mg/dl    Performed on ACCU-CHEK    Basic Metabolic Panel w/ Reflex to MG    Collection Time: 23  5:30 AM   Result Value Ref Range    Sodium 138 136 -

## 2023-12-29 NOTE — PROGRESS NOTES
Podiatric Surgery Daily Progress Note  Whitney Meier      Subjective :   Patient seen and examined this am at the bedside. Patient denies any acute overnight events. Patient denies N/V/F/C/SOB. Patient denies calf pain, thigh pain, chest pain.     Review of Systems: A 12 point review of symptoms is unremarkable with the exception of the chief complaint. Patient specifically denies nausea, fever, vomiting, chills, shortness of breath, chest pain, abdominal pain, constipation or difficulty urinating.       Objective     BP (!) 124/57   Pulse 74   Temp 97.7 °F (36.5 °C) (Oral)   Resp 16   Ht 1.791 m (5' 10.5\")   Wt 62.3 kg (137 lb 5.6 oz)   SpO2 95%   BMI 19.43 kg/m²      I/O:  Intake/Output Summary (Last 24 hours) at 12/29/2023 0848  Last data filed at 12/28/2023 1305  Gross per 24 hour   Intake 462 ml   Output --   Net 462 ml              Wt Readings from Last 3 Encounters:   12/25/23 62.3 kg (137 lb 5.6 oz)   12/19/23 60.9 kg (134 lb 4.2 oz)   12/17/23 60.8 kg (134 lb)       LABS:    Recent Labs     12/27/23  0711 12/29/23  0530   WBC 7.6 6.0   HGB 11.0* 10.3*   HCT 31.5* 30.4*    147        Recent Labs     12/29/23  0530      K 5.0      CO2 26   BUN 31*   CREATININE 1.4*      No results for input(s): \"PROT\", \"INR\", \"APTT\" in the last 72 hours.        LOWER EXTREMITY EXAMINATION    Band-Aid to left hallux intact with no strikethrough.  Scant sanguinous drainage noted to the internal layers of the Band-Aid.    VASCULAR: DP and PT pulses are palpable +2/4.  Upon hand-held Doppler examination, DP and PT pulses are biphasic. CFT is brisk to the digits of the foot b/l. Skin temperature is warm to cool from proximal to distal with mild focal calor to left hallux; resolving. No edema noted. No pain with calf compression b/l.     NEUROLOGIC: Gross and epicritic sensation is diminished b/l. Protective sensation is diminished at all pedal sites b/l.     DERMATOLOGIC:   Right lower extremity:  Is a

## 2023-12-29 NOTE — PROGRESS NOTES
Physical Therapy  Facility/Department: [facility] ACUTE REHAB UNIT  Rehabilitation Physical Therapy Treatment Note/Discharge    NAME: Whitney Meier  : 1935 (88 y.o.)  MRN: [id number]  CODE STATUS: Full Code    Date of Service: 23       Restrictions:  Restrictions/Precautions: Fall Risk, Up as Tolerated  Position Activity Restriction  Other position/activity restrictions: up as tolerated     SUBJECTIVE  Subjective  Subjective: Pt seated in chair & agreeable to PT.  Pain: denies pain          OBJECTIVE  Cognition  Overall Cognitive Status: WFL  Orientation  Overall Orientation Status: Within Functional Limits  Orientation Level: Oriented X4    Functional Mobility  Sit to Supine  Assistance Level: Independent  Scooting  Assistance Level: Independent  Balance  Sitting Balance: Independent  Standing Balance:  (independent with RW)  Standing Balance  Comments: pt picked object off ground independently (standing with RW)  Sit to Stand  Assistance Level: Modified independent  Skilled Clinical Factors: from bs chair, arm chair in gym, w/c  Stand to Sit  Assistance Level: Modified independent      Environmental Mobility  Ambulation  Surface: Level surface  Device: Rolling walker  Distance: 200 ft x 2, short distances in gym  Activity: Within Room;Within Unit  Assistance Level: Modified independent  Gait Deviations: Slow daniel  Skilled Clinical Factors: forward flexed posture, slowed daniel, decreased step length and height. no LOB. pt fatigued.  Curb  Curb Height: 6''  Device: Rolling walker  Number of Curbs: 4 (2 sets of 2 with prolonged seated rest between sets)  Assistance Level: Contact guard assist;Minimal assistance;Moderate assistance  Skilled Clinical Factors: pt required CGA for ascents. during first descent, required mod A (pt fearful & anxious, had RW 1/2 way off step & froze, required pyisical assist to place RW on floor as well as up to mod A for balance). during subsequent descents, pt was able to

## 2023-12-29 NOTE — PLAN OF CARE
Problem: Discharge Planning  Goal: Discharge to home or other facility with appropriate resources  12/29/2023 1307 by Luciana Guevara RN  Outcome: Progressing     Problem: Safety - Adult  Goal: Free from fall injury  Outcome: Progressing     Problem: Pain  Goal: Verbalizes/displays adequate comfort level or baseline comfort level  12/29/2023 1307 by Luciana Guevara, RN  Outcome: Progressing  Flowsheets (Taken 12/29/2023 0826)  Verbalizes/displays adequate comfort level or baseline comfort level:   Encourage patient to monitor pain and request assistance   Assess pain using appropriate pain scale     Problem: Skin/Tissue Integrity  Goal: Absence of new skin breakdown  Description: 1.  Monitor for areas of redness and/or skin breakdown  2.  Assess vascular access sites hourly  3.  Every 4-6 hours minimum:  Change oxygen saturation probe site  4.  Every 4-6 hours:  If on nasal continuous positive airway pressure, respiratory therapy assess nares and determine need for appliance change or resting period.  Outcome: Progressing     Problem: Skin/Tissue Integrity - Adult  Goal: Skin integrity remains intact  Outcome: Progressing     Problem: Musculoskeletal - Adult  Goal: Return mobility to safest level of function  12/29/2023 1307 by Luciana Guevara, RN  Outcome: Progressing     Problem: Infection - Adult  Goal: Absence of infection at discharge  Outcome: Progressing     Problem: Metabolic/Fluid and Electrolytes - Adult  Goal: Electrolytes maintained within normal limits  Outcome: Progressing     Problem: Nutrition Deficit:  Goal: Optimize nutritional status  12/29/2023 1307 by Luciana Guevara, RN  Outcome: Progressing

## 2023-12-29 NOTE — PROGRESS NOTES
Shift assessment complete. VSS. A&Ox4. Denies pain at this time. Fall precautions in place. Patient up to chair for breakfast, bed/chair alarm utilized throughout shift. Patient reports no new complaints at this time. Dressing changed to L great toe.     Vitals:    12/29/23 0826   BP: (!) 124/57   Pulse: 74   Resp: 16   Temp: 97.7 °F (36.5 °C)   SpO2: 95%

## 2023-12-29 NOTE — PROGRESS NOTES
Occupational Therapy  Facility/Department: [facility] ACUTE REHAB UNIT  Rehabilitation Occupational Therapy Daily Treatment Note  Discharge Summary  Date: 23  Patient Name: Whitney Meier       Room: 3101/3101-01  MRN: [id number]  Account: [id number]   : 1935  (88 y.o.) Gender: female                    Past Medical History:  has a past medical history of Clostridium difficile infection, Dementia ([facility]), Diabetes mellitus ([facility]), History of blood transfusion, Hyperlipidemia, Hypertension, Infection, MDRO (multiple drug resistant organisms) resistance, MRSA infection, Osteomyelitis of spine (HCC), and Restless leg syndrome.  Past Surgical History:   has a past surgical history that includes Tonsillectomy; Hysterectomy; Appendectomy; Toe amputation (); Toe amputation (); eye surgery; Colonoscopy (); Colonoscopy (7/15/15); other surgical history (Left); Foot surgery (Left, 2017); Upper gastrointestinal endoscopy (2017); Foot surgery (Right, 2017); Foot Amputation (Right, 2019); and Foot Amputation (Right, 2019).    Restrictions  Restrictions/Precautions: Fall Risk, Up as Tolerated  Other position/activity restrictions: up as tolerated    Subjective  Subjective: Pt seated in bed upon arrival w/ RN present, agreeable to therapy and denied pain but reported fatigue.  Restrictions/Precautions: Fall Risk;Up as Tolerated             Objective     Cognition  Overall Cognitive Status: WFL  Orientation  Overall Orientation Status: Within Functional Limits  Orientation Level: Oriented X4         ADL  Feeding  Assistance Level: Independent  Skilled Clinical Factors: To drink water  Grooming/Oral Hygiene  Assistance Level: Modified independent  Skilled Clinical Factors: Pt rinsed partial denture in stance at sink. Pt sat in w/c to wash face and comb hair d/t fatigue, states she sits for grooming at baseline and has a chair in the bathroom  Upper Extremity Bathing  Assistance Level:

## 2023-12-29 NOTE — CARE COORDINATION
CTN contacted [facility] [phone]. They have accepted this patient and will pull referral from [facility]. They will contact patient and make arrangements for SOC by 12/31  Electronically signed by Christina Bentley LPN on 12/29/2023 at 10:07 AM

## 2023-12-30 PROCEDURE — 6370000000 HC RX 637 (ALT 250 FOR IP)

## 2023-12-30 PROCEDURE — 6370000000 HC RX 637 (ALT 250 FOR IP): Performed by: PHYSICAL MEDICINE & REHABILITATION

## 2023-12-30 PROCEDURE — 6360000002 HC RX W HCPCS: Performed by: PHYSICAL MEDICINE & REHABILITATION

## 2023-12-30 PROCEDURE — 1280000000 HC REHAB R&B

## 2023-12-30 RX ADMIN — BISACODYL 5 MG: 5 TABLET, COATED ORAL at 08:10

## 2023-12-30 RX ADMIN — SACUBITRIL AND VALSARTAN 0.5 TABLET: 24; 26 TABLET, FILM COATED ORAL at 20:27

## 2023-12-30 RX ADMIN — ASPIRIN 81 MG: 81 TABLET, CHEWABLE ORAL at 08:09

## 2023-12-30 RX ADMIN — MIRTAZAPINE 15 MG: 15 TABLET, FILM COATED ORAL at 20:27

## 2023-12-30 RX ADMIN — ATORVASTATIN CALCIUM 80 MG: 80 TABLET, FILM COATED ORAL at 20:27

## 2023-12-30 RX ADMIN — CEPHALEXIN 250 MG: 250 CAPSULE ORAL at 08:09

## 2023-12-30 RX ADMIN — CARVEDILOL 6.25 MG: 6.25 TABLET, FILM COATED ORAL at 17:43

## 2023-12-30 RX ADMIN — ALOGLIPTIN 6.25 MG: 6.25 TABLET, FILM COATED ORAL at 08:14

## 2023-12-30 RX ADMIN — ENOXAPARIN SODIUM 30 MG: 100 INJECTION SUBCUTANEOUS at 08:10

## 2023-12-30 RX ADMIN — CLOPIDOGREL BISULFATE 75 MG: 75 TABLET ORAL at 08:09

## 2023-12-30 RX ADMIN — SACUBITRIL AND VALSARTAN 0.5 TABLET: 24; 26 TABLET, FILM COATED ORAL at 08:14

## 2023-12-30 RX ADMIN — ACETAMINOPHEN 650 MG: 325 TABLET ORAL at 17:43

## 2023-12-30 RX ADMIN — ONDANSETRON 4 MG: 4 TABLET, ORALLY DISINTEGRATING ORAL at 14:23

## 2023-12-30 RX ADMIN — BACITRACIN ZINC, NEOMYCIN SULFATE, POLYMYXIN B SULFATE: 3.5; 5000; 4 OINTMENT TOPICAL at 08:10

## 2023-12-30 RX ADMIN — CARVEDILOL 6.25 MG: 6.25 TABLET, FILM COATED ORAL at 08:10

## 2023-12-30 RX ADMIN — CEPHALEXIN 250 MG: 250 CAPSULE ORAL at 20:27

## 2023-12-30 ASSESSMENT — PAIN DESCRIPTION - LOCATION: LOCATION: FLANK

## 2023-12-30 ASSESSMENT — PAIN SCALES - GENERAL
PAINLEVEL_OUTOF10: 0
PAINLEVEL_OUTOF10: 8
PAINLEVEL_OUTOF10: 0

## 2023-12-30 ASSESSMENT — PAIN DESCRIPTION - PAIN TYPE: TYPE: ACUTE PAIN

## 2023-12-30 ASSESSMENT — PAIN DESCRIPTION - FREQUENCY: FREQUENCY: INTERMITTENT

## 2023-12-30 ASSESSMENT — PAIN DESCRIPTION - ORIENTATION: ORIENTATION: RIGHT;LEFT

## 2023-12-30 ASSESSMENT — PAIN DESCRIPTION - ONSET: ONSET: GRADUAL

## 2023-12-30 ASSESSMENT — PAIN - FUNCTIONAL ASSESSMENT: PAIN_FUNCTIONAL_ASSESSMENT: ACTIVITIES ARE NOT PREVENTED

## 2023-12-30 ASSESSMENT — PAIN DESCRIPTION - DESCRIPTORS: DESCRIPTORS: ACHING

## 2023-12-30 NOTE — PROGRESS NOTES
/71   Pulse 85   Temp 98.4 °F (36.9 °C) (Oral)   Resp 16   Ht 1.791 m (5' 10.5\")   Wt 62.3 kg (137 lb 5.6 oz)   SpO2 94%   BMI 19.43 kg/m²   Shift assessment completed, patient denies pain thus far. Patient ambulating to bath x 1 with gait belt and walker. Patient up in bed working on puzzles at this time. Call light and personal items within reach, safety measures in place.

## 2023-12-30 NOTE — PLAN OF CARE
Problem: Discharge Planning  Goal: Discharge to home or other facility with appropriate resources  Outcome: Progressing  Discharge to home or other facility with appropriate resources: Identify barriers to discharge with patient and caregiver     Problem: Safety - Adult  Goal: Free from fall injury  Outcome: Progressing     Problem: Pain  Goal: Verbalizes/displays adequate comfort level or baseline comfort level  Outcome: Progressing   Encourage patient to monitor pain and request assistance   Assess pain using appropriate pain scale     Problem: Skin/Tissue Integrity  Goal: Absence of new skin breakdown  Description: 1.  Monitor for areas of redness and/or skin breakdown  2.  Assess vascular access sites hourly  3.  Every 4-6 hours minimum:  Change oxygen saturation probe site  4.  Every 4-6 hours:  If on nasal continuous positive airway pressure, respiratory therapy assess nares and determine need for appliance change or resting period.  Outcome: Progressing     Problem: Neurosensory - Adult  Goal: Achieves stable or improved neurological status  Outcome: Progressing  Goal: Achieves maximal functionality and self care  Outcome: Progressing     Problem: Skin/Tissue Integrity - Adult  Goal: Skin integrity remains intact  Outcome: Progressing  Skin Integrity Remains Intact: Monitor for areas of redness and/or skin breakdown     Problem: Musculoskeletal - Adult  Goal: Return mobility to safest level of function  Outcome: Progressing  Return Mobility to Safest Level of Function: Assess patient stability and activity tolerance for standing, transferring and ambulating with or without assistive devices     Problem: Infection - Adult  Goal: Absence of infection at discharge  Outcome: Progressing     Problem: Metabolic/Fluid and Electrolytes - Adult  Goal: Electrolytes maintained within normal limits  Outcome: Progressing     Problem: Nutrition Deficit:  Goal: Optimize nutritional status  Outcome: Progressing

## 2023-12-30 NOTE — PROGRESS NOTES
Department of Physical Medicine & Rehabilitation  Progress Note    Patient Identification:  Whitney Meier  [id number]  : 1935  Admit date: 2023    Chief Complaint: Acute cerebrovascular accident (CVA) (HCC)    Subjective:   Resting in bed this morning on exam. No new complaints overnight. She is sleeping well and wants to get out of the hospital tomorrow. She has not heard from her daughter if she has a hospital bed at home yet but is expecting to hear news today. Plan for DC tomorrow.      ROS: No f/c, n/v, cp     Objective:  Patient Vitals for the past 24 hrs:   BP Temp Temp src Pulse Resp SpO2   23 08 126/69 98.4 °F (36.9 °C) Oral 68 16 94 %   23 117/60 98 °F (36.7 °C) Oral 72 16 98 %   23 1700 (!) 136/58 -- -- 74 -- --       Const: Alert. No distress, pleasant.   HEENT: Normocephalic, atraumatic. Normal sclera/conjunctiva. MMM.   CV: Regular rate and rhythm.   Resp: No respiratory distress. Lungs CTAB.   Abd: Soft, nontender, nondistended, NABS+   Ext: No edema.   Neuro: Alert, oriented, appropriately interactive. 4/5 strength RUE, 3/5 hand squeeze, slight decrease of sensation to light touch on RUE, otherwise intact motor and sensory function elsewhere. CN II-XII intact  Psych: Cooperative, appropriate mood and affect    Laboratory data: Available via EMR.   Last 24 hour lab  No results found for this or any previous visit (from the past 24 hour(s)).      Therapy progress:  PT  Position Activity Restriction  Other position/activity restrictions: up as tolerated  Objective     Sit to Stand: Minimal Assistance (From EOB, standard toilet, wheelchair, car to the RW)  Stand to Sit: Minimal Assistance (To wheelchair)  Bed to Chair: Minimal assistance (With the use of the RW)  Device: Rolling Walker  Assistance: Minimal assistance  Distance: 150ft+20ft  OT  PT Equipment Recommendations  Equipment Needed: No  Other: TBD at rehab facility     Assessment        SLP          Body

## 2023-12-30 NOTE — PROGRESS NOTES
Shift assessment completed. VSS. Patient A/O x4 with intermittent confusion. Pain is being managed with PRN and scheduled medications. Call light and over bed table within reach, hourly rounding and visual checks in place, safety measures in place, will continue to monitor.

## 2023-12-30 NOTE — PROGRESS NOTES
Opioid [] []   Antiplatelet [x] [x]   Hypoglycemic (including insulin) [x] [x]   None of the above []     Special Treatments, Procedures, and Programs (THROUGHOUT LAST 3 DAYS OF STAY)    Check all of the following treatments, procedures, and programs that apply at discharge. At Discharge (check all that apply)   Cancer Treatments   A1. Chemotherapy []           A2. IV []           A3. Oral []           A10. Other []   B1. Radiation []   Respiratory Therapies   C1. Oxygen Therapy [x]           C2. Continuous (continuously for at least 14 hours per day) []           C3. Intermittent [x]           C4. High-concentration []   D1. Suctioning (Does not include oral suctioning) []           D2. Scheduled []           D3. As needed []   E1. Tracheostomy Care []   F1. Invasive Mechanical Ventilator (ventilator or respirator) []   G1. Non-invasive Mechanical Ventilator []           G2. BiPAP []           G3. CPAP []   Other   H1. IV Medications (Do not include sub Q pumps, flushes, Dextrose 50% or lactated ringers) [x]           H2. Vasoactive medications []           H3. Antibiotics []           H4. Anticoagulation []           H10. Other [x]   I1. Transfusions []   J1. Dialysis []           J2. Hemodialysis []           J3. Peritoneal dialysis []   O1. IV access (including a catheter in a vein) [x]           O2. Peripheral [x]           O3. Midline []           O4. Central (PICC, tunneled, port) []      None of the above (select if no Cancer, Respiratory, or Other boxes are checked) []

## 2023-12-30 NOTE — PLAN OF CARE
Problem: Discharge Planning  Goal: Discharge to home or other facility with appropriate resources  12/30/2023 1100 by Allison Alvarez RN  Outcome: Progressing  Flowsheets (Taken 12/29/2023 2000 by Gypsy Berry RN)  Discharge to home or other facility with appropriate resources: Identify barriers to discharge with patient and caregiver     Problem: Safety - Adult  Goal: Free from fall injury  12/30/2023 1100 by Allison Alvarez RN  Outcome: Progressing  Flowsheets (Taken 12/26/2023 2023 by Irina Loo RN)  Free From Fall Injury: Instruct family/caregiver on patient safety     Problem: Pain  Goal: Verbalizes/displays adequate comfort level or baseline comfort level  12/30/2023 1100 by Allison Alvarez RN  Outcome: Progressing  Flowsheets  Taken 12/30/2023 1100  Verbalizes/displays adequate comfort level or baseline comfort level:   Encourage patient to monitor pain and request assistance   Assess pain using appropriate pain scale   Administer analgesics based on type and severity of pain and evaluate response   Implement non-pharmacological measures as appropriate and evaluate response  Taken 12/30/2023 0805  Verbalizes/displays adequate comfort level or baseline comfort level: Encourage patient to monitor pain and request assistance     Problem: Skin/Tissue Integrity  Goal: Absence of new skin breakdown  Description: 1.  Monitor for areas of redness and/or skin breakdown  2.  Assess vascular access sites hourly  3.  Every 4-6 hours minimum:  Change oxygen saturation probe site  4.  Every 4-6 hours:  If on nasal continuous positive airway pressure, respiratory therapy assess nares and determine need for appliance change or resting period.  12/30/2023 0111 by Gypsy Berry, RN  Outcome: Progressing     Problem: Neurosensory - Adult  Goal: Achieves stable or improved neurological status  12/30/2023 1100 by Allison Alvarez RN  Outcome: Progressing  Flowsheets (Taken 12/29/2023 2000 by Gypsy Berry

## 2023-12-30 NOTE — CARE COORDINATION
Case Management Assessment            Discharge Note                    Date / Time of Note: 12/30/2023 9:15 AM                  Discharge Note Completed by: ELLIS Gomez    Patient Name: Whitney Meier   YOB: 1935  Diagnosis: CVA (cerebral vascular accident) (HCC) [I63.9]  Acute cerebrovascular accident (CVA) (HCC) [I63.9]   Date / Time: 12/20/2023 11:11 AM    Current PCP: Leilani Mantilla MD  Clinic patient: No    Hospitalization in the last 30 days: Yes       Advance Directives:  Code Status: Full Code  [place] DNR form completed and on chart: Not Indicated    Financial:  Payor: MEDICARE / Plan: MEDICARE PART A AND B / Product Type: *No Product type* /      Pharmacy:    [facility] #[id number] - [place], [place] - [address] [phone] - F [phone]  [address] [address]  Phone: [phone] Fax: [phone]      Assistance purchasing medications?: Potential Assistance Purchasing Medications: No  Assistance provided by Case Management: None at this time    Does patient want to participate in local refill/ meds to beds program?:      Meds To Beds General Rules:  1. Can ONLY be done Monday- Friday between 8:30am-5pm  2. Prescription(s) must be in pharmacy by 3pm to be filled same day  3.Copy of patient's insurance/ prescription drug card and patient face sheet must be sent along with the prescription(s)  4. Cost of Rx cannot be added to hospital bill. If financial assistance is needed, please contact unit  or ;  or  CANNOT provide pharmacy voucher for patients co-pays  5. Patients can then  the prescription on their way out of the hospital at discharge, or pharmacy can deliver to the bedside if staff is available. (payment due at time of pick-up or delivery - cash, check, or card accepted)     Able to afford home medications/ co-pay costs: Yes    ADLS:  Current PT AM-PAC Score:   /24  Current OT

## 2023-12-31 VITALS
OXYGEN SATURATION: 95 % | SYSTOLIC BLOOD PRESSURE: 98 MMHG | DIASTOLIC BLOOD PRESSURE: 54 MMHG | HEART RATE: 88 BPM | RESPIRATION RATE: 16 BRPM | WEIGHT: 137.35 LBS | HEIGHT: 71 IN | BODY MASS INDEX: 19.23 KG/M2 | TEMPERATURE: 98.4 F

## 2023-12-31 PROCEDURE — 6360000002 HC RX W HCPCS: Performed by: PHYSICAL MEDICINE & REHABILITATION

## 2023-12-31 PROCEDURE — 6370000000 HC RX 637 (ALT 250 FOR IP)

## 2023-12-31 PROCEDURE — 6370000000 HC RX 637 (ALT 250 FOR IP): Performed by: PHYSICAL MEDICINE & REHABILITATION

## 2023-12-31 RX ADMIN — ENOXAPARIN SODIUM 30 MG: 100 INJECTION SUBCUTANEOUS at 08:40

## 2023-12-31 RX ADMIN — CEPHALEXIN 250 MG: 250 CAPSULE ORAL at 08:39

## 2023-12-31 RX ADMIN — BISACODYL 5 MG: 5 TABLET, COATED ORAL at 08:39

## 2023-12-31 RX ADMIN — FERROUS SULFATE TAB 325 MG (65 MG ELEMENTAL FE) 325 MG: 325 (65 FE) TAB at 08:39

## 2023-12-31 RX ADMIN — SACUBITRIL AND VALSARTAN 0.5 TABLET: 24; 26 TABLET, FILM COATED ORAL at 08:41

## 2023-12-31 RX ADMIN — ALOGLIPTIN 6.25 MG: 6.25 TABLET, FILM COATED ORAL at 08:39

## 2023-12-31 RX ADMIN — BACITRACIN ZINC, NEOMYCIN SULFATE, POLYMYXIN B SULFATE: 3.5; 5000; 4 OINTMENT TOPICAL at 08:39

## 2023-12-31 RX ADMIN — ASPIRIN 81 MG: 81 TABLET, CHEWABLE ORAL at 08:39

## 2023-12-31 RX ADMIN — CLOPIDOGREL BISULFATE 75 MG: 75 TABLET ORAL at 08:39

## 2023-12-31 NOTE — PLAN OF CARE
Problem: Discharge Planning  Goal: Discharge to home or other facility with appropriate resources  12/31/2023 0736 by Allison Alvarez RN  Outcome: Completed     Problem: Safety - Adult  Goal: Free from fall injury  12/31/2023 0736 by Allison Alvarez RN  Outcome: Completed     Problem: Pain  Goal: Verbalizes/displays adequate comfort level or baseline comfort level  12/31/2023 0736 by Allison Alvarez RN  Outcome: Completed     Problem: Skin/Tissue Integrity  Goal: Absence of new skin breakdown  Description: 1.  Monitor for areas of redness and/or skin breakdown  2.  Assess vascular access sites hourly  3.  Every 4-6 hours minimum:  Change oxygen saturation probe site  4.  Every 4-6 hours:  If on nasal continuous positive airway pressure, respiratory therapy assess nares and determine need for appliance change or resting period.  12/31/2023 0736 by Allison Alvarez RN  Outcome: Completed     Problem: Neurosensory - Adult  Goal: Achieves stable or improved neurological status  Outcome: Completed  Flowsheets (Taken 12/30/2023 2026 by Kassy Crawford RN)  Achieves stable or improved neurological status: Assess for and report changes in neurological status     Problem: Neurosensory - Adult  Goal: Achieves maximal functionality and self care  Outcome: Completed  Flowsheets (Taken 12/30/2023 2026 by Kassy Crawford RN)  Achieves maximal functionality and self care: Monitor swallowing and airway patency with patient fatigue and changes in neurological status     Problem: Skin/Tissue Integrity - Adult  Goal: Skin integrity remains intact  12/31/2023 0736 by Allison Alvarez RN  Outcome: Completed  Flowsheets (Taken 12/31/2023 0735)  Skin Integrity Remains Intact: Monitor for areas of redness and/or skin breakdown     Problem: Musculoskeletal - Adult  Goal: Return mobility to safest level of function  Outcome: Completed  Flowsheets (Taken 12/30/2023 2026 by Kassy Crawford RN)  Return Mobility to Safest Level of

## 2023-12-31 NOTE — PROGRESS NOTES
Shift assessment complete, VSS   Vitals:    12/30/23 2026   BP: 123/64   Pulse: 69   Resp: 16   Temp: 97.5 °F (36.4 °C)   SpO2: 95%   Afebrile, pt. Denies pain thus far. Remains A & O X 4, voiced fatigue after day of therapy. Pt. Slept well during this shift,. Awakened for toileting by RN, ambulates CGA X1 with walker and GB, no s/sx of distress noted. Call light and over bed table within reach, bed wheels locked and in lowest position. Fall prevention measures remains in place. Will continue to monitor.

## 2023-12-31 NOTE — PLAN OF CARE
Problem: Discharge Planning  Goal: Discharge to home or other facility with appropriate resources  12/31/2023 0034 by Kassy Crawford RN  Outcome: Adequate for Discharge    Problem: Safety - Adult  Goal: Free from fall injury  12/31/2023 0034 by Kassy Crawford RN  Outcome: Adequate for Discharge  No falls occurred thus far this shift.    Problem: Pain  Goal: Verbalizes/displays adequate comfort level or baseline comfort level  12/31/2023 0034 by Kassy Crawford RN  Outcome: Adequate for Discharge    Problem: Skin/Tissue Integrity  Goal: Absence of new skin breakdown  Description: 1.  Monitor for areas of redness and/or skin breakdown  2.  Assess vascular access sites hourly  3.  Every 4-6 hours minimum:  Change oxygen saturation probe site  4.  Every 4-6 hours:  If on nasal continuous positive airway pressure, respiratory therapy assess nares and determine need for appliance change or resting period.  12/31/2023 0034 by Kassy Crawford, RN  Outcome: Adequate for Discharge

## 2023-12-31 NOTE — DISCHARGE SUMMARY
Physical Medicine & Rehabilitation  Discharge Summary     Patient Identification:  Whitney Meier  : 1935  Admit date: 2023  Discharge date: 23  Attending provider: Jose Alfredo Weber DO        Primary care provider: Leilani Mantilla MD     Discharge Diagnoses:   Patient Active Problem List   Diagnosis    DM2 (diabetes mellitus, type 2) (HCC)    Acute blood loss anemia    Severe protein-calorie malnutrition (HCC)    Encephalopathy    HTN (hypertension), benign    Altered mental status    Acute renal failure (HCC)    Contusion of left hip    Acute UTI    Mild dehydration    Urinary incontinence    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)    Osteomyelitis of right foot (HCC)    Chronic kidney disease    Generalized weakness    Stage 3b chronic kidney disease (HCC)    Chest pain    Arterial ischemic stroke, ICA, left, acute (HCC)    Carotid artery stenosis with cerebral infarction (HCC)    Dyslipidemia    NSTEMI (non-ST elevated myocardial infarction) (HCC)    Infection due to 2019 novel coronavirus    History of cerebrovascular accident    Acute cerebrovascular accident (CVA) (HCC)    Elevated troponin level not due myocardial infarction    DM (diabetes mellitus), secondary, with complications (HCC)    Cardiomyopathy (HCC)       Discharge Functional Status:      Physical therapy:  Supine to Sit: Independent  Sit to Supine: Independent      Sit to Stand: Independent  Chair/Bed to Chair Transfer: Independent  Car Transfer: Independent     Ambulation 10 ft: Independent  Ambulation 50 ft: Independent  Ambulation 150 ft: Independent  Stairs - 1 Step: Supervision or touching assistance  Stairs - 4 Step: Independent  Stairs - 12 Step: Independent    Occupational therapy:  Eating: Independent  Oral Hygiene: Independent  Bathing: Independent (sponge bath)  Upper Body Dressing: Independent  Lower Body Dressing: Independent (based on performance yesterday and clinical reasoning)     Toilet

## 2023-12-31 NOTE — PROGRESS NOTES
Shift assessment completed, BP (!) 98/54   Pulse 88   Temp 98.4 °F (36.9 °C) (Oral)   Resp 16   Ht 1.791 m (5' 10.5\")   Wt 62.3 kg (137 lb 5.6 oz)   SpO2 95%   BMI 19.43 kg/m²   Patient discharging to home today, awaiting family arrival. Call light and personal items within reach, safety measures in place.

## 2023-12-31 NOTE — PROGRESS NOTES
Patient discharged to home via vehicle per daughter Dagmar. Discharge instructions given to both daughter and patient. All questions answered and verbalized understanding. Patient transported off unit via wheelchair without complication.

## 2023-12-31 NOTE — PROGRESS NOTES
Podiatric Surgery Daily Progress Note  Whitney Meier      Subjective :   Patient seen and examined this am at the bedside. Patient denies any acute overnight events. Patient denies N/V/F/C/SOB. Patient denies calf pain, thigh pain, chest pain.     Review of Systems: A 12 point review of symptoms is unremarkable with the exception of the chief complaint. Patient specifically denies nausea, fever, vomiting, chills, shortness of breath, chest pain, abdominal pain, constipation or difficulty urinating.       Objective     BP (!) 98/54   Pulse 88   Temp 98.4 °F (36.9 °C) (Oral)   Resp 16   Ht 1.791 m (5' 10.5\")   Wt 62.3 kg (137 lb 5.6 oz)   SpO2 95%   BMI 19.43 kg/m²      I/O:  Intake/Output Summary (Last 24 hours) at 12/31/2023 1051  Last data filed at 12/31/2023 0830  Gross per 24 hour   Intake 820 ml   Output --   Net 820 ml              Wt Readings from Last 3 Encounters:   12/25/23 62.3 kg (137 lb 5.6 oz)   12/19/23 60.9 kg (134 lb 4.2 oz)   12/17/23 60.8 kg (134 lb)       LABS:    Recent Labs     12/29/23  0530   WBC 6.0   HGB 10.3*   HCT 30.4*           Recent Labs     12/29/23  0530      K 5.0      CO2 26   BUN 31*   CREATININE 1.4*      No results for input(s): \"PROT\", \"INR\", \"APTT\" in the last 72 hours.        LOWER EXTREMITY EXAMINATION    Band-Aid to left hallux intact with no strikethrough.  Scant sanguinous drainage noted to the internal layers of the Band-Aid.    VASCULAR: DP and PT pulses are palpable +2/4.  Upon hand-held Doppler examination, DP and PT pulses are biphasic. CFT is brisk to the digits of the foot b/l. Skin temperature is warm to cool from proximal to distal with mild focal calor to left hallux; resolving. No edema noted. No pain with calf compression b/l.     NEUROLOGIC: Gross and epicritic sensation is diminished b/l. Protective sensation is diminished at all pedal sites b/l.     DERMATOLOGIC:   Right lower extremity:  Is a closed soft tissue envelope with no

## 2024-01-10 NOTE — PROGRESS NOTES
DM2 (diabetes mellitus, type 2) (HCC)    Acute blood loss anemia    Severe protein-calorie malnutrition (HCC)    Encephalopathy    HTN (hypertension), benign    Altered mental status    Acute renal failure (HCC)    Contusion of left hip    Acute UTI    Mild dehydration    Urinary incontinence    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)    Osteomyelitis of right foot (HCC)    Chronic kidney disease    Generalized weakness    Stage 3b chronic kidney disease (HCC)    Chest pain    Arterial ischemic stroke, ICA, left, acute (HCC)    Carotid artery stenosis with cerebral infarction (HCC)    Dyslipidemia    NSTEMI (non-ST elevated myocardial infarction) (HCC)    Infection due to 2019 novel coronavirus    History of cerebrovascular accident    Acute cerebrovascular accident (CVA) (HCC)    Elevated troponin level not due myocardial infarction    DM (diabetes mellitus), secondary, with complications (HCC)    Cardiomyopathy (HCC)       PLAN:  2 week Cardiac Monitor to rule out atrial arrhythmia/atrial fibrillation as it relates to her CVA  Continue aspirin, Clopidogrel (Plavix), BB and Pravastatin for Coronary artery disease/NSTEMI mngt. Plan x 1 yr from event minimum if tolerating   Continue Coreg and Entresto BID for cardiomyopathy /GDMT. No titration today, wide pulse pressure with low DBP. Plan repeat echo 3-6 months.   No lasix at this time indicated   Will recheck Fasting Lipids and Complete metabolic profile lab test on 1/16/24   Please have the results faxed to our office       We will continue best medical management with no plans for invasive study given age and comorbidities. She and her daughter voiced understanding today.     We will with the results of the monitor ; Follow up in [place] 3-5 weeks.     Scribe's attestation:  This note was scribed in the presence of Dr. Gerard Youssef MD by Elizabeth Kennedy, CASIMIRO      The scribe’s documentation has been prepared under my direction and personally reviewed

## 2024-01-11 ENCOUNTER — OFFICE VISIT (OUTPATIENT)
Dept: CARDIOLOGY CLINIC | Age: 89
End: 2024-01-11
Payer: MEDICARE

## 2024-01-11 ENCOUNTER — TELEPHONE (OUTPATIENT)
Dept: CARDIOLOGY CLINIC | Age: 89
End: 2024-01-11

## 2024-01-11 VITALS
OXYGEN SATURATION: 97 % | WEIGHT: 137 LBS | HEART RATE: 76 BPM | DIASTOLIC BLOOD PRESSURE: 50 MMHG | SYSTOLIC BLOOD PRESSURE: 132 MMHG | BODY MASS INDEX: 19.38 KG/M2 | RESPIRATION RATE: 14 BRPM

## 2024-01-11 DIAGNOSIS — I63.232 ARTERIAL ISCHEMIC STROKE, ICA, LEFT, ACUTE (HCC): ICD-10-CM

## 2024-01-11 DIAGNOSIS — I63.9 CEREBROVASCULAR ACCIDENT (CVA), UNSPECIFIED MECHANISM (HCC): ICD-10-CM

## 2024-01-11 DIAGNOSIS — I63.9 ACUTE CEREBROVASCULAR ACCIDENT (CVA) (HCC): ICD-10-CM

## 2024-01-11 DIAGNOSIS — R42 DIZZINESS AND GIDDINESS: ICD-10-CM

## 2024-01-11 DIAGNOSIS — I42.8 OTHER CARDIOMYOPATHY (HCC): ICD-10-CM

## 2024-01-11 DIAGNOSIS — E78.5 DYSLIPIDEMIA: Primary | ICD-10-CM

## 2024-01-11 PROCEDURE — 1036F TOBACCO NON-USER: CPT | Performed by: INTERNAL MEDICINE

## 2024-01-11 PROCEDURE — 1090F PRES/ABSN URINE INCON ASSESS: CPT | Performed by: INTERNAL MEDICINE

## 2024-01-11 PROCEDURE — G8420 CALC BMI NORM PARAMETERS: HCPCS | Performed by: INTERNAL MEDICINE

## 2024-01-11 PROCEDURE — 99215 OFFICE O/P EST HI 40 MIN: CPT | Performed by: INTERNAL MEDICINE

## 2024-01-11 PROCEDURE — 1123F ACP DISCUSS/DSCN MKR DOCD: CPT | Performed by: INTERNAL MEDICINE

## 2024-01-11 PROCEDURE — 1111F DSCHRG MED/CURRENT MED MERGE: CPT | Performed by: INTERNAL MEDICINE

## 2024-01-11 PROCEDURE — G8484 FLU IMMUNIZE NO ADMIN: HCPCS | Performed by: INTERNAL MEDICINE

## 2024-01-11 PROCEDURE — G8427 DOCREV CUR MEDS BY ELIG CLIN: HCPCS | Performed by: INTERNAL MEDICINE

## 2024-01-11 RX ORDER — PRAVASTATIN SODIUM 20 MG
20 TABLET ORAL DAILY
Qty: 30 TABLET | Refills: 1 | Status: SHIPPED | OUTPATIENT
Start: 2024-01-11

## 2024-01-11 RX ORDER — CLOPIDOGREL BISULFATE 75 MG/1
75 TABLET ORAL DAILY
Qty: 90 TABLET | Refills: 3 | Status: SHIPPED | OUTPATIENT
Start: 2024-01-11

## 2024-01-11 RX ORDER — PRAVASTATIN SODIUM 20 MG
20 TABLET ORAL DAILY
Qty: 90 TABLET | Refills: 1 | Status: SHIPPED | OUTPATIENT
Start: 2024-01-11 | End: 2024-01-11 | Stop reason: SDUPTHER

## 2024-01-11 RX ORDER — CLOPIDOGREL BISULFATE 75 MG/1
75 TABLET ORAL DAILY
Qty: 20 TABLET | Refills: 0 | Status: SHIPPED | OUTPATIENT
Start: 2024-01-11 | End: 2024-01-11

## 2024-01-11 NOTE — TELEPHONE ENCOUNTER
Spoke with the patient's daughter. Per conversation with DANIELLE, patient is being covered with Plavix, ASA and statin therapy. With history of UTI, he is okay with holding off on starting the patient on any new medications. Daughter VU.

## 2024-01-11 NOTE — PATIENT INSTRUCTIONS
PLAN:  2 week Cardiac Monitor  Continue taking the Clopidogrel (Plavix) and Pravastatin for now   Continue all other medications at this time   Will recheck Fasting Lipids and Complete metabolic profile lab test on 1/16/24   Please have the results faxed to our office   We will call you with the results of the monitor

## 2024-01-11 NOTE — TELEPHONE ENCOUNTER
Agree, Jardiance/Farxiga will predispose to recurrent UTI in this patient and likely cause more harm than good is at this age/with her comorbidities-hold off on retrial.  Continue current therapies as outlined below.  I will see in follow-up as

## 2024-01-11 NOTE — TELEPHONE ENCOUNTER
Monitor placed by CASIMIRO Stoner  Monitor company [employer]  Length of monitor 2 week  Monitor ordered by [facility]  Device Name [id number]  Device ID [id number]  Activation successful prior to pt leaving office? Yes

## 2024-01-11 NOTE — TELEPHONE ENCOUNTER
Patient's daughter called with a question she forgot to ask during appointment. She said that when the patient was seen at [facility], Dr. Eldridge was insisting the patient take Jardiance. The patient had been on this medication before but developed Kidney issues, and was put on Tragenta in it's place. That was back in 2022. The other medication mentioned was Farxiga.     The patient's daughter was wanting to know if the patient needs to be on one of these medications, as she was told by Leilani Mantilla MD to ask at her Cardiology appointment.

## 2024-01-16 LAB
CHOLESTEROL, TOTAL: 118 MG/DL
CHOLESTEROL/HDL RATIO: NORMAL
HDLC SERPL-MCNC: 58 MG/DL (ref 35–70)
LDL CHOLESTEROL CALCULATED: 52 MG/DL (ref 0–160)
NONHDLC SERPL-MCNC: NORMAL MG/DL
TRIGL SERPL-MCNC: 42 MG/DL
VLDLC SERPL CALC-MCNC: NORMAL MG/DL

## 2024-01-22 ENCOUNTER — TELEPHONE (OUTPATIENT)
Dept: CARDIOLOGY CLINIC | Age: 89
End: 2024-01-22

## 2024-01-22 NOTE — TELEPHONE ENCOUNTER
Medication Samples    Medication:Carvedilol    Dosage of the medication:6.25mg     in the office or Mail to your home?. Pts daughter stated manufacture contacted her to complete information that was missing from their part of the paperwork. Pts daughter stated she is faxing the paperwork to [phone] on 01/22/24.Pts daughter stated pt has 8 days of medication left.

## 2024-01-22 NOTE — TELEPHONE ENCOUNTER
Called and spoke with pt daughter, I explained to her that carvedilol is not a medication we have samples of or do patient assistance forms with. She is going to check and see what medication she meant to say and will call back

## 2024-01-22 NOTE — TELEPHONE ENCOUNTER
Spoke with daughter medication is Entrestro 24-26 mg.  Also, [employer] RN has the ordinally forms. She is off today, but will bring to give to Anny tomorrow.   [employer]-did send fax stating info was missing from from.   Daughter faxed back to office and ask to fax to [employer].   Explained, that I would but did not know if [employer] would accept without the completed application.

## 2024-01-25 ENCOUNTER — TELEPHONE (OUTPATIENT)
Dept: CARDIOLOGY CLINIC | Age: 89
End: 2024-01-25

## 2024-01-25 NOTE — TELEPHONE ENCOUNTER
Pts daughter called and stated pts pcp called and advised pt to take 40mg Pravastatin. Pts daughter stated rjm wants pt on 20mg Pravastatin. Pts daughter is callling to find out which dosage pt should take. Pts daughter stated she feels more comfortable going with rjm opinion. Please advise. Pt ph# [phone]

## 2024-01-25 NOTE — TELEPHONE ENCOUNTER
Following review of her outside cholesterol profile which appears well-controlled, I do think the 20 mg of pravastatin is a fine start.  We will titrate as necessary in the future.  Lets ensure she tolerates low-dose first.

## 2024-01-25 NOTE — TELEPHONE ENCOUNTER
Received fax from [facility] with lipid panel results. Results abstracted and scanned into media for review

## 2024-01-25 NOTE — TELEPHONE ENCOUNTER
Pts daughter called and stated the manufacture called and stated they received all paperwork but have not received the pt application page. Pts daughter is requesting paperwork be faxed over.

## 2024-01-26 ENCOUNTER — TELEPHONE (OUTPATIENT)
Dept: CARDIOLOGY CLINIC | Age: 89
End: 2024-01-26

## 2024-01-26 NOTE — TELEPHONE ENCOUNTER
Printed all forms and reviewed, the patient application form is missing. Called and spoke with pt daughter, she has the paper and will fax over to company herself

## 2024-01-26 NOTE — TELEPHONE ENCOUNTER
Pt daughter Ciarra stated she got approved for patient assistance at [employer]. RJM would have to write up a new prescription and fax it to [employer], [phone]. Ciarra stated it can be a prescription that would cover the whole year so they wouldn`t have to keep calling to have the prescription sent everything 3-6 mons. Ciarra also asked if they could reduce the tab size to half of what it was, 24-26 mg, to not have to cut the pills in half.     Original prescription was for Entresto 24-26 mg, .5 tab BID    Please advise

## 2024-01-29 RX ORDER — CARVEDILOL 6.25 MG/1
6.25 TABLET ORAL 2 TIMES DAILY WITH MEALS
Qty: 60 TABLET | Refills: 3 | Status: SHIPPED | OUTPATIENT
Start: 2024-01-29

## 2024-01-29 NOTE — TELEPHONE ENCOUNTER
Pts daughter Dagmar called and stated pt was admitted to the [facility] for high blood pressure. When pt was discharged the doctor informed her that pt had low blood pressure. Pts daughter wants to know if pt should continue to take Coreg 6.25mg? If so, Dagmar stated pt has 2 days of medication left and needs a refill sent to:    [facility]/pharmacy #[id number] - [place], [place] - [address] [phone] - F [phone]  [address] [address]  Phone: [phone]  Fax: [phone]

## 2024-01-29 NOTE — TELEPHONE ENCOUNTER
If systolic blood pressures above 95 and she is not feeling dizzy on her feet or like she may pass out, we should continue carvedilol and Entresto medications at present dosing.  These are good for heart strength and I would like to continue these in the long run if tolerating.     DANIELLE

## 2024-02-01 NOTE — TELEPHONE ENCOUNTER
Rx has been faxed and patients daughter informed that the 24/26 is the lowest dose and will have to continue to split them.  She VU

## 2024-02-09 PROCEDURE — 93228 REMOTE 30 DAY ECG REV/REPORT: CPT | Performed by: INTERNAL MEDICINE

## 2024-02-19 DIAGNOSIS — I42.8 OTHER CARDIOMYOPATHY (HCC): ICD-10-CM

## 2024-02-19 DIAGNOSIS — I63.9 ACUTE CEREBROVASCULAR ACCIDENT (CVA) (HCC): ICD-10-CM

## 2024-02-19 DIAGNOSIS — R42 DIZZINESS AND GIDDINESS: ICD-10-CM

## 2024-02-19 DIAGNOSIS — I63.9 CEREBROVASCULAR ACCIDENT (CVA), UNSPECIFIED MECHANISM (HCC): ICD-10-CM

## 2024-02-19 DIAGNOSIS — I63.232 ARTERIAL ISCHEMIC STROKE, ICA, LEFT, ACUTE (HCC): ICD-10-CM

## 2024-02-20 ENCOUNTER — TELEPHONE (OUTPATIENT)
Dept: CARDIOLOGY CLINIC | Age: 89
End: 2024-02-20

## 2024-02-20 NOTE — TELEPHONE ENCOUNTER
Spoke with Dagmar who is on HIPAA form. Relayed monitor results per DANIELLE. Pt daughter v/u and confirmed appt in [place] tomorrow.

## 2024-02-20 NOTE — TELEPHONE ENCOUNTER
----- Message from Gerard Youssef MD sent at 2/19/2024  4:37 PM EST -----  Please let Whitney know there was no significant abnormal heart rhythms by her heart monitor study.  Continue to monitor for palpitations.  Call me if concerns with irregular heartbeat or heart racing.  Continue medications with no changes today.  Follow-up as planned

## 2024-02-21 ENCOUNTER — OFFICE VISIT (OUTPATIENT)
Dept: CARDIOLOGY CLINIC | Age: 89
End: 2024-02-21
Payer: MEDICARE

## 2024-02-21 VITALS
DIASTOLIC BLOOD PRESSURE: 44 MMHG | HEART RATE: 77 BPM | OXYGEN SATURATION: 97 % | BODY MASS INDEX: 19.32 KG/M2 | HEIGHT: 71 IN | WEIGHT: 138 LBS | SYSTOLIC BLOOD PRESSURE: 100 MMHG

## 2024-02-21 DIAGNOSIS — I25.2 HISTORY OF NON-ST ELEVATION MYOCARDIAL INFARCTION (NSTEMI): ICD-10-CM

## 2024-02-21 DIAGNOSIS — I35.1 NONRHEUMATIC AORTIC VALVE INSUFFICIENCY: ICD-10-CM

## 2024-02-21 DIAGNOSIS — I25.5 ISCHEMIC CARDIOMYOPATHY: Primary | ICD-10-CM

## 2024-02-21 PROCEDURE — 1036F TOBACCO NON-USER: CPT | Performed by: INTERNAL MEDICINE

## 2024-02-21 PROCEDURE — 1090F PRES/ABSN URINE INCON ASSESS: CPT | Performed by: INTERNAL MEDICINE

## 2024-02-21 PROCEDURE — 99214 OFFICE O/P EST MOD 30 MIN: CPT | Performed by: INTERNAL MEDICINE

## 2024-02-21 PROCEDURE — G8420 CALC BMI NORM PARAMETERS: HCPCS | Performed by: INTERNAL MEDICINE

## 2024-02-21 PROCEDURE — 1123F ACP DISCUSS/DSCN MKR DOCD: CPT | Performed by: INTERNAL MEDICINE

## 2024-02-21 PROCEDURE — G8484 FLU IMMUNIZE NO ADMIN: HCPCS | Performed by: INTERNAL MEDICINE

## 2024-02-21 PROCEDURE — G8427 DOCREV CUR MEDS BY ELIG CLIN: HCPCS | Performed by: INTERNAL MEDICINE

## 2024-02-21 NOTE — PATIENT INSTRUCTIONS
PLAN:  Echocardiogram to view size and strength of the heart mid to late March   We will call you with the results   Continue the Aspirin, carvedilol, pravastatin, entresto and clopidogrel.      Your provider has ordered testing for further evaluation.  An order/prescription has been included in your paper work.   To schedule outpatient testing, contact Central Scheduling by calling [address] [place] [address] ([phone]).

## 2024-02-21 NOTE — PROGRESS NOTES
CARDIOLOGY FOLLOW  UP        Patient Name: Whitney Meier  Primary Care physician: Leilani Mantilla MD    Reason for Referral/Chief Complaint: Whitney Meier is a 88 y.o. patient who presents to cardiology clinic today for evaluation and treatment of cardiomyopathy.     History of Present Illness:   Whitney Meier 88 y.o. female with a medical history notable for Arterial ischemic stroke ICA/left, cardiomyopathy, carotid artery stenosis with cerebral infarction, Stage 3b CKD, Diabetes mellitus type 2, dyslipidemia, hypertension, cardiomyopathy, and coronary artery disease with NSTEMI.    Patient presented to [facility] ED on 12/17/23 with right arm weakness.  Code stroke was called.  TNK was administered.  She was then transferred to [facility].  Found to have an acute ischemic left internal capsule CVA.  Cardiology was consulted due to elevated troponin levels.  Echocardiogram showed new cardiomyopathy with EF 35% (55% May 2022), mild/mod AR, mild MR.   Medical management given anemia, CVA and advanced age.      LOV 1/11/24 at which time Daughter stated pt initial presentation was chest pain, shortness of breath and right hand weakness.  No recurrent chest pain/pressure/heaviness. Appeared compensated. Decision made to continue med mngt for cardiomyopathy/NSTEMI. Monitor placed to r/o atrial fibrillation.     In the interim cardiac monitor worn showed predominately NSR, no significant arrhythmias.     Today, she presents in a wheelchair with her daughter, Candy.   She report feeling ok.  She denies dizziness or falls.  Denies chest pain, shortness of breath, palpitations, dizziness, near-syncope or syncope. Denies paroxysmal nocturnal dyspnea, orthopnea, increasing lower extremity edema or weight gain.  She  is compliant with medications.  Cost of medications is affordable.  No endorsed side effects.  She's really doing well states her dtr. PCP saw in the past week and was very happy

## 2024-03-20 ENCOUNTER — HOSPITAL ENCOUNTER (OUTPATIENT)
Dept: CARDIOLOGY | Age: 89
Discharge: HOME | End: 2024-03-20
Attending: INTERNAL MEDICINE
Payer: MEDICARE

## 2024-03-20 DIAGNOSIS — I25.5 ISCHEMIC CARDIOMYOPATHY: ICD-10-CM

## 2024-03-20 DIAGNOSIS — I35.1 NONRHEUMATIC AORTIC VALVE INSUFFICIENCY: ICD-10-CM

## 2024-03-20 PROCEDURE — 93306 TTE W/DOPPLER COMPLETE: CPT

## 2024-03-21 ENCOUNTER — TELEPHONE (OUTPATIENT)
Dept: CARDIOLOGY CLINIC | Age: 89
End: 2024-03-21

## 2024-03-21 NOTE — TELEPHONE ENCOUNTER
----- Message from Basilio Urena MD sent at 3/21/2024 11:39 AM EDT -----  Please notify patient that their echo shows heart fxn moderately reduced but stable   Cont current tx plan

## 2024-04-30 RX ORDER — CARVEDILOL 6.25 MG/1
6.25 TABLET ORAL 2 TIMES DAILY WITH MEALS
Qty: 180 TABLET | Refills: 1 | Status: SHIPPED | OUTPATIENT
Start: 2024-04-30

## 2024-07-02 NOTE — ED NOTES
[facility]    Please call [phone] (Anna Canas) or [phone] (Charanjit) to schedule your physical therapy appointments.     Report given by Marco Veloz RN  07/01/21 2000

## 2024-08-20 DIAGNOSIS — I63.9 CEREBROVASCULAR ACCIDENT (CVA), UNSPECIFIED MECHANISM (HCC): ICD-10-CM

## 2024-08-20 NOTE — PROGRESS NOTES
by Elizabeth Kennedy RN      The scribe’s documentation has been prepared under my direction and personally reviewed by me in its entirety.  I confirm that the note above accurately reflects all work, treatment, procedures, and medical decision making performed by me.  I, Gerard Youssef MD, personally performed the services described in this documentation as scribed by Elizabeth Kennedy RN in my presence, and it is both accurate and complete to the best of our ability.         We will continue best medical management with no plans for invasive study given age and comorbidities. She and her daughter voiced understanding today.       >65 min spent in chart review of course, history from patient and her daughter, discussion of evaluation to date and potential conservative treatment options, and documentation. High MDM.    I will address the patient's cardiac risk factors and adjusted pharmacologic treatment as needed. In addition, I have reinforced the need for patient directed risk factor modification.  All questions and concerns were addressed to the patient/family. Alternatives to my treatment were discussed.     Thank you for allowing us to participate in the care of Whitney Meier. Please call me with any questions [phone].    Gerard Youssef MD, [facility]  Cardiovascular Disease  [facility]  [phone] [place] Office  [phone] [place] Office  8/20/2024 10:20 AM

## 2024-08-21 ENCOUNTER — OFFICE VISIT (OUTPATIENT)
Dept: CARDIOLOGY CLINIC | Age: 89
End: 2024-08-21
Payer: MEDICARE

## 2024-08-21 VITALS
BODY MASS INDEX: 19.63 KG/M2 | DIASTOLIC BLOOD PRESSURE: 50 MMHG | HEART RATE: 79 BPM | OXYGEN SATURATION: 97 % | WEIGHT: 140.2 LBS | SYSTOLIC BLOOD PRESSURE: 124 MMHG | HEIGHT: 71 IN

## 2024-08-21 DIAGNOSIS — E78.5 DYSLIPIDEMIA: ICD-10-CM

## 2024-08-21 DIAGNOSIS — I25.5 ISCHEMIC CARDIOMYOPATHY: ICD-10-CM

## 2024-08-21 DIAGNOSIS — Z86.73 HISTORY OF CEREBROVASCULAR ACCIDENT: ICD-10-CM

## 2024-08-21 DIAGNOSIS — I25.10 CORONARY ARTERY DISEASE INVOLVING NATIVE CORONARY ARTERY OF NATIVE HEART WITHOUT ANGINA PECTORIS: Primary | ICD-10-CM

## 2024-08-21 PROCEDURE — 1036F TOBACCO NON-USER: CPT | Performed by: INTERNAL MEDICINE

## 2024-08-21 PROCEDURE — 99214 OFFICE O/P EST MOD 30 MIN: CPT | Performed by: INTERNAL MEDICINE

## 2024-08-21 PROCEDURE — G8427 DOCREV CUR MEDS BY ELIG CLIN: HCPCS | Performed by: INTERNAL MEDICINE

## 2024-08-21 PROCEDURE — 1123F ACP DISCUSS/DSCN MKR DOCD: CPT | Performed by: INTERNAL MEDICINE

## 2024-08-21 PROCEDURE — G8420 CALC BMI NORM PARAMETERS: HCPCS | Performed by: INTERNAL MEDICINE

## 2024-08-21 PROCEDURE — 1090F PRES/ABSN URINE INCON ASSESS: CPT | Performed by: INTERNAL MEDICINE

## 2024-08-21 RX ORDER — CARVEDILOL 6.25 MG/1
6.25 TABLET ORAL 2 TIMES DAILY WITH MEALS
Qty: 180 TABLET | Refills: 3 | Status: SHIPPED | OUTPATIENT
Start: 2024-08-21

## 2024-08-21 RX ORDER — PRAVASTATIN SODIUM 20 MG
20 TABLET ORAL DAILY
Qty: 90 TABLET | Refills: 3 | Status: SHIPPED | OUTPATIENT
Start: 2024-08-21

## 2024-08-21 RX ORDER — CLOPIDOGREL BISULFATE 75 MG/1
75 TABLET ORAL DAILY
Qty: 90 TABLET | Refills: 3 | Status: SHIPPED | OUTPATIENT
Start: 2024-08-21

## 2024-08-21 NOTE — PATIENT INSTRUCTIONS
No change in medications today  No cardiac testing warranted at this time   In December need to see if PCP feels she needs to continue to Plavix from a stroke standpoint and stop the Aspirin

## 2024-10-10 ENCOUNTER — HOSPITAL ENCOUNTER (INPATIENT)
Age: 89
LOS: 6 days | Discharge: HOME | DRG: 389 | End: 2024-10-16
Attending: EMERGENCY MEDICINE | Admitting: INTERNAL MEDICINE
Payer: MEDICARE

## 2024-10-10 ENCOUNTER — APPOINTMENT (OUTPATIENT)
Dept: GENERAL RADIOLOGY | Age: 89
DRG: 389 | End: 2024-10-10
Payer: MEDICARE

## 2024-10-10 ENCOUNTER — APPOINTMENT (OUTPATIENT)
Dept: CT IMAGING | Age: 89
DRG: 389 | End: 2024-10-10
Payer: MEDICARE

## 2024-10-10 DIAGNOSIS — D72.829 LEUKOCYTOSIS, UNSPECIFIED TYPE: ICD-10-CM

## 2024-10-10 DIAGNOSIS — K56.609 SBO (SMALL BOWEL OBSTRUCTION) (HCC): Primary | ICD-10-CM

## 2024-10-10 DIAGNOSIS — R10.9 ABDOMINAL PAIN, UNSPECIFIED ABDOMINAL LOCATION: ICD-10-CM

## 2024-10-10 DIAGNOSIS — R11.2 NAUSEA AND VOMITING, UNSPECIFIED VOMITING TYPE: ICD-10-CM

## 2024-10-10 PROBLEM — R65.10 SIRS (SYSTEMIC INFLAMMATORY RESPONSE SYNDROME) (HCC): Status: ACTIVE | Noted: 2024-10-10

## 2024-10-10 LAB
ALBUMIN SERPL-MCNC: 4.4 G/DL (ref 3.4–5)
ALP SERPL-CCNC: 61 U/L (ref 40–129)
ALT SERPL-CCNC: 19 U/L (ref 10–40)
ANION GAP SERPL CALCULATED.3IONS-SCNC: 13 MMOL/L (ref 3–16)
AST SERPL-CCNC: 25 U/L (ref 15–37)
BASOPHILS # BLD: 0 K/UL (ref 0–0.2)
BASOPHILS NFR BLD: 0.1 %
BILIRUB DIRECT SERPL-MCNC: 0.2 MG/DL (ref 0–0.3)
BILIRUB INDIRECT SERPL-MCNC: 0.4 MG/DL (ref 0–1)
BILIRUB SERPL-MCNC: 0.6 MG/DL (ref 0–1)
BILIRUB UR QL STRIP.AUTO: NEGATIVE
BUN SERPL-MCNC: 34 MG/DL (ref 7–20)
CALCIUM SERPL-MCNC: 9.9 MG/DL (ref 8.3–10.6)
CHLORIDE SERPL-SCNC: 99 MMOL/L (ref 99–110)
CLARITY UR: CLEAR
CO2 SERPL-SCNC: 27 MMOL/L (ref 21–32)
COLOR UR: YELLOW
CREAT SERPL-MCNC: 1.1 MG/DL (ref 0.6–1.2)
DEPRECATED RDW RBC AUTO: 13.1 % (ref 12.4–15.4)
EKG ATRIAL RATE: 91 BPM
EKG DIAGNOSIS: NORMAL
EKG P AXIS: 63 DEGREES
EKG P-R INTERVAL: 200 MS
EKG Q-T INTERVAL: 376 MS
EKG QRS DURATION: 124 MS
EKG QTC CALCULATION (BAZETT): 462 MS
EKG R AXIS: -12 DEGREES
EKG T AXIS: 125 DEGREES
EKG VENTRICULAR RATE: 91 BPM
EOSINOPHIL # BLD: 0 K/UL (ref 0–0.6)
EOSINOPHIL NFR BLD: 0.1 %
EPI CELLS #/AREA URNS HPF: NORMAL /HPF (ref 0–5)
GFR SERPLBLD CREATININE-BSD FMLA CKD-EPI: 48 ML/MIN/{1.73_M2}
GLUCOSE BLD-MCNC: 138 MG/DL (ref 70–99)
GLUCOSE BLD-MCNC: 144 MG/DL (ref 70–99)
GLUCOSE BLD-MCNC: 147 MG/DL (ref 70–99)
GLUCOSE BLD-MCNC: 186 MG/DL (ref 70–99)
GLUCOSE SERPL-MCNC: 235 MG/DL (ref 70–99)
GLUCOSE UR STRIP.AUTO-MCNC: NEGATIVE MG/DL
HCT VFR BLD AUTO: 33.6 % (ref 36–48)
HGB BLD-MCNC: 11.3 G/DL (ref 12–16)
HGB UR QL STRIP.AUTO: ABNORMAL
KETONES UR STRIP.AUTO-MCNC: ABNORMAL MG/DL
LACTATE BLDV-SCNC: 0.9 MMOL/L (ref 0.4–2)
LEUKOCYTE ESTERASE UR QL STRIP.AUTO: NEGATIVE
LIPASE SERPL-CCNC: 42 U/L (ref 13–60)
LYMPHOCYTES # BLD: 0.4 K/UL (ref 1–5.1)
LYMPHOCYTES NFR BLD: 2 %
MCH RBC QN AUTO: 33.1 PG (ref 26–34)
MCHC RBC AUTO-ENTMCNC: 33.7 G/DL (ref 31–36)
MCV RBC AUTO: 98 FL (ref 80–100)
MONOCYTES # BLD: 0.6 K/UL (ref 0–1.3)
MONOCYTES NFR BLD: 3.3 %
NEUTROPHILS # BLD: 16.8 K/UL (ref 1.7–7.7)
NEUTROPHILS NFR BLD: 94.5 %
NITRITE UR QL STRIP.AUTO: NEGATIVE
PERFORMED ON: ABNORMAL
PH UR STRIP.AUTO: 6 [PH] (ref 5–8)
PLATELET # BLD AUTO: 176 K/UL (ref 135–450)
PMV BLD AUTO: 9.5 FL (ref 5–10.5)
POTASSIUM SERPL-SCNC: 4 MMOL/L (ref 3.5–5.1)
PROCALCITONIN SERPL IA-MCNC: 0.07 NG/ML (ref 0–0.15)
PROT SERPL-MCNC: 6.8 G/DL (ref 6.4–8.2)
PROT UR STRIP.AUTO-MCNC: 30 MG/DL
RBC # BLD AUTO: 3.43 M/UL (ref 4–5.2)
RBC #/AREA URNS HPF: NORMAL /HPF (ref 0–4)
SODIUM SERPL-SCNC: 139 MMOL/L (ref 136–145)
SP GR UR STRIP.AUTO: 1.01 (ref 1–1.03)
TROPONIN, HIGH SENSITIVITY: 59 NG/L (ref 0–14)
TROPONIN, HIGH SENSITIVITY: 61 NG/L (ref 0–14)
UA COMPLETE W REFLEX CULTURE PNL UR: ABNORMAL
UA DIPSTICK W REFLEX MICRO PNL UR: YES
URN SPEC COLLECT METH UR: ABNORMAL
UROBILINOGEN UR STRIP-ACNC: 0.2 E.U./DL
WBC # BLD AUTO: 17.8 K/UL (ref 4–11)
WBC #/AREA URNS HPF: NORMAL /HPF (ref 0–5)

## 2024-10-10 PROCEDURE — 2580000003 HC RX 258: Performed by: INTERNAL MEDICINE

## 2024-10-10 PROCEDURE — 6360000002 HC RX W HCPCS

## 2024-10-10 PROCEDURE — 99223 1ST HOSP IP/OBS HIGH 75: CPT

## 2024-10-10 PROCEDURE — 96374 THER/PROPH/DIAG INJ IV PUSH: CPT

## 2024-10-10 PROCEDURE — 83690 ASSAY OF LIPASE: CPT

## 2024-10-10 PROCEDURE — 6370000000 HC RX 637 (ALT 250 FOR IP): Performed by: EMERGENCY MEDICINE

## 2024-10-10 PROCEDURE — 97530 THERAPEUTIC ACTIVITIES: CPT

## 2024-10-10 PROCEDURE — 84145 PROCALCITONIN (PCT): CPT

## 2024-10-10 PROCEDURE — 93005 ELECTROCARDIOGRAM TRACING: CPT | Performed by: INTERNAL MEDICINE

## 2024-10-10 PROCEDURE — 96375 TX/PRO/DX INJ NEW DRUG ADDON: CPT

## 2024-10-10 PROCEDURE — 93010 ELECTROCARDIOGRAM REPORT: CPT | Performed by: INTERNAL MEDICINE

## 2024-10-10 PROCEDURE — 97162 PT EVAL MOD COMPLEX 30 MIN: CPT

## 2024-10-10 PROCEDURE — 6360000002 HC RX W HCPCS: Performed by: EMERGENCY MEDICINE

## 2024-10-10 PROCEDURE — 1200000000 HC SEMI PRIVATE

## 2024-10-10 PROCEDURE — 74018 RADEX ABDOMEN 1 VIEW: CPT

## 2024-10-10 PROCEDURE — 99285 EMERGENCY DEPT VISIT HI MDM: CPT

## 2024-10-10 PROCEDURE — 83605 ASSAY OF LACTIC ACID: CPT

## 2024-10-10 PROCEDURE — 6370000000 HC RX 637 (ALT 250 FOR IP)

## 2024-10-10 PROCEDURE — 80076 HEPATIC FUNCTION PANEL: CPT

## 2024-10-10 PROCEDURE — 99222 1ST HOSP IP/OBS MODERATE 55: CPT | Performed by: SURGERY

## 2024-10-10 PROCEDURE — 71045 X-RAY EXAM CHEST 1 VIEW: CPT

## 2024-10-10 PROCEDURE — 97166 OT EVAL MOD COMPLEX 45 MIN: CPT

## 2024-10-10 PROCEDURE — 85025 COMPLETE CBC W/AUTO DIFF WBC: CPT

## 2024-10-10 PROCEDURE — 81001 URINALYSIS AUTO W/SCOPE: CPT

## 2024-10-10 PROCEDURE — 84484 ASSAY OF TROPONIN QUANT: CPT

## 2024-10-10 PROCEDURE — 2580000003 HC RX 258: Performed by: EMERGENCY MEDICINE

## 2024-10-10 PROCEDURE — 6360000004 HC RX CONTRAST MEDICATION: Performed by: EMERGENCY MEDICINE

## 2024-10-10 PROCEDURE — 80048 BASIC METABOLIC PNL TOTAL CA: CPT

## 2024-10-10 PROCEDURE — 6360000002 HC RX W HCPCS: Performed by: INTERNAL MEDICINE

## 2024-10-10 PROCEDURE — 74177 CT ABD & PELVIS W/CONTRAST: CPT

## 2024-10-10 PROCEDURE — 2580000003 HC RX 258

## 2024-10-10 PROCEDURE — APPSS30 APP SPLIT SHARED TIME 16-30 MINUTES

## 2024-10-10 PROCEDURE — 87040 BLOOD CULTURE FOR BACTERIA: CPT

## 2024-10-10 PROCEDURE — 0D9670Z DRAINAGE OF STOMACH WITH DRAINAGE DEVICE, VIA NATURAL OR ARTIFICIAL OPENING: ICD-10-PCS | Performed by: INTERNAL MEDICINE

## 2024-10-10 RX ORDER — CLOPIDOGREL BISULFATE 75 MG/1
75 TABLET ORAL DAILY
Status: DISCONTINUED | OUTPATIENT
Start: 2024-10-10 | End: 2024-10-16 | Stop reason: HOSPADM

## 2024-10-10 RX ORDER — ASPIRIN 81 MG/1
81 TABLET, CHEWABLE ORAL DAILY
Status: DISCONTINUED | OUTPATIENT
Start: 2024-10-10 | End: 2024-10-16 | Stop reason: HOSPADM

## 2024-10-10 RX ORDER — ENOXAPARIN SODIUM 100 MG/ML
40 INJECTION SUBCUTANEOUS DAILY
Status: DISCONTINUED | OUTPATIENT
Start: 2024-10-10 | End: 2024-10-16 | Stop reason: HOSPADM

## 2024-10-10 RX ORDER — PRAMIPEXOLE DIHYDROCHLORIDE 1 MG/1
1 TABLET ORAL NIGHTLY PRN
Status: DISCONTINUED | OUTPATIENT
Start: 2024-10-10 | End: 2024-10-16 | Stop reason: HOSPADM

## 2024-10-10 RX ORDER — ACETAMINOPHEN 650 MG/1
650 SUPPOSITORY RECTAL EVERY 6 HOURS PRN
Status: DISCONTINUED | OUTPATIENT
Start: 2024-10-10 | End: 2024-10-16 | Stop reason: HOSPADM

## 2024-10-10 RX ORDER — FERROUS SULFATE 325(65) MG
325 TABLET ORAL 2 TIMES DAILY
Status: DISCONTINUED | OUTPATIENT
Start: 2024-10-10 | End: 2024-10-16 | Stop reason: HOSPADM

## 2024-10-10 RX ORDER — FENTANYL CITRATE 50 UG/ML
25 INJECTION, SOLUTION INTRAMUSCULAR; INTRAVENOUS ONCE
Status: COMPLETED | OUTPATIENT
Start: 2024-10-10 | End: 2024-10-10

## 2024-10-10 RX ORDER — METRONIDAZOLE 500 MG/100ML
500 INJECTION, SOLUTION INTRAVENOUS EVERY 8 HOURS
Status: DISCONTINUED | OUTPATIENT
Start: 2024-10-10 | End: 2024-10-10

## 2024-10-10 RX ORDER — ONDANSETRON 2 MG/ML
4 INJECTION INTRAMUSCULAR; INTRAVENOUS ONCE
Status: COMPLETED | OUTPATIENT
Start: 2024-10-10 | End: 2024-10-10

## 2024-10-10 RX ORDER — CARVEDILOL 6.25 MG/1
6.25 TABLET ORAL 2 TIMES DAILY WITH MEALS
Status: DISCONTINUED | OUTPATIENT
Start: 2024-10-10 | End: 2024-10-16 | Stop reason: HOSPADM

## 2024-10-10 RX ORDER — PROMETHAZINE HYDROCHLORIDE 25 MG/1
12.5 TABLET ORAL EVERY 6 HOURS PRN
Status: DISCONTINUED | OUTPATIENT
Start: 2024-10-10 | End: 2024-10-16 | Stop reason: HOSPADM

## 2024-10-10 RX ORDER — SODIUM CHLORIDE 0.9 % (FLUSH) 0.9 %
10 SYRINGE (ML) INJECTION PRN
Status: DISCONTINUED | OUTPATIENT
Start: 2024-10-10 | End: 2024-10-16 | Stop reason: HOSPADM

## 2024-10-10 RX ORDER — ACETAMINOPHEN 325 MG/1
650 TABLET ORAL EVERY 6 HOURS PRN
Status: DISCONTINUED | OUTPATIENT
Start: 2024-10-10 | End: 2024-10-16 | Stop reason: HOSPADM

## 2024-10-10 RX ORDER — NICOTINE 21 MG/24HR
1 PATCH, TRANSDERMAL 24 HOURS TRANSDERMAL DAILY PRN
Status: DISCONTINUED | OUTPATIENT
Start: 2024-10-10 | End: 2024-10-16 | Stop reason: HOSPADM

## 2024-10-10 RX ORDER — SODIUM CHLORIDE, SODIUM LACTATE, POTASSIUM CHLORIDE, CALCIUM CHLORIDE 600; 310; 30; 20 MG/100ML; MG/100ML; MG/100ML; MG/100ML
1000 INJECTION, SOLUTION INTRAVENOUS CONTINUOUS
Status: DISCONTINUED | OUTPATIENT
Start: 2024-10-10 | End: 2024-10-10

## 2024-10-10 RX ORDER — GLUCAGON 1 MG/ML
1 KIT INJECTION PRN
Status: DISCONTINUED | OUTPATIENT
Start: 2024-10-10 | End: 2024-10-16 | Stop reason: HOSPADM

## 2024-10-10 RX ORDER — SODIUM CHLORIDE 9 MG/ML
INJECTION, SOLUTION INTRAVENOUS PRN
Status: DISCONTINUED | OUTPATIENT
Start: 2024-10-10 | End: 2024-10-16 | Stop reason: HOSPADM

## 2024-10-10 RX ORDER — SODIUM CHLORIDE 0.9 % (FLUSH) 0.9 %
10 SYRINGE (ML) INJECTION EVERY 12 HOURS SCHEDULED
Status: DISCONTINUED | OUTPATIENT
Start: 2024-10-10 | End: 2024-10-16 | Stop reason: HOSPADM

## 2024-10-10 RX ORDER — PRAVASTATIN SODIUM 10 MG
20 TABLET ORAL DAILY
Status: DISCONTINUED | OUTPATIENT
Start: 2024-10-10 | End: 2024-10-16 | Stop reason: HOSPADM

## 2024-10-10 RX ORDER — IOPAMIDOL 755 MG/ML
75 INJECTION, SOLUTION INTRAVASCULAR
Status: COMPLETED | OUTPATIENT
Start: 2024-10-10 | End: 2024-10-10

## 2024-10-10 RX ORDER — LANOLIN ALCOHOL/MO/W.PET/CERES
3 CREAM (GRAM) TOPICAL NIGHTLY PRN
Status: DISCONTINUED | OUTPATIENT
Start: 2024-10-10 | End: 2024-10-16 | Stop reason: HOSPADM

## 2024-10-10 RX ORDER — SODIUM CHLORIDE, SODIUM LACTATE, POTASSIUM CHLORIDE, CALCIUM CHLORIDE 600; 310; 30; 20 MG/100ML; MG/100ML; MG/100ML; MG/100ML
1000 INJECTION, SOLUTION INTRAVENOUS CONTINUOUS
Status: ACTIVE | OUTPATIENT
Start: 2024-10-10 | End: 2024-10-10

## 2024-10-10 RX ORDER — INSULIN LISPRO 100 [IU]/ML
0-4 INJECTION, SOLUTION INTRAVENOUS; SUBCUTANEOUS EVERY 6 HOURS SCHEDULED
Status: DISCONTINUED | OUTPATIENT
Start: 2024-10-10 | End: 2024-10-16 | Stop reason: HOSPADM

## 2024-10-10 RX ORDER — ONDANSETRON 2 MG/ML
4 INJECTION INTRAMUSCULAR; INTRAVENOUS EVERY 6 HOURS PRN
Status: DISCONTINUED | OUTPATIENT
Start: 2024-10-10 | End: 2024-10-16 | Stop reason: HOSPADM

## 2024-10-10 RX ORDER — DEXTROSE MONOHYDRATE 100 MG/ML
INJECTION, SOLUTION INTRAVENOUS CONTINUOUS PRN
Status: DISCONTINUED | OUTPATIENT
Start: 2024-10-10 | End: 2024-10-16 | Stop reason: HOSPADM

## 2024-10-10 RX ADMIN — PRAVASTATIN SODIUM 20 MG: 10 TABLET ORAL at 17:31

## 2024-10-10 RX ADMIN — IOPAMIDOL 75 ML: 755 INJECTION, SOLUTION INTRAVENOUS at 05:06

## 2024-10-10 RX ADMIN — CARVEDILOL 6.25 MG: 6.25 TABLET, FILM COATED ORAL at 17:31

## 2024-10-10 RX ADMIN — FENTANYL CITRATE 25 MCG: 50 INJECTION, SOLUTION INTRAMUSCULAR; INTRAVENOUS at 06:35

## 2024-10-10 RX ADMIN — INSULIN LISPRO 1 UNITS: 100 INJECTION, SOLUTION INTRAVENOUS; SUBCUTANEOUS at 08:50

## 2024-10-10 RX ADMIN — BENZOCAINE: 200 SPRAY DENTAL; ORAL; PERIODONTAL at 08:21

## 2024-10-10 RX ADMIN — SODIUM CHLORIDE, PRESERVATIVE FREE 10 ML: 5 INJECTION INTRAVENOUS at 08:49

## 2024-10-10 RX ADMIN — ONDANSETRON 4 MG: 2 INJECTION INTRAMUSCULAR; INTRAVENOUS at 02:34

## 2024-10-10 RX ADMIN — FERROUS SULFATE TAB 325 MG (65 MG ELEMENTAL FE) 325 MG: 325 (65 FE) TAB at 21:32

## 2024-10-10 RX ADMIN — ENOXAPARIN SODIUM 40 MG: 100 INJECTION SUBCUTANEOUS at 08:48

## 2024-10-10 RX ADMIN — SODIUM CHLORIDE, POTASSIUM CHLORIDE, SODIUM LACTATE AND CALCIUM CHLORIDE 1000 ML: 600; 310; 30; 20 INJECTION, SOLUTION INTRAVENOUS at 06:40

## 2024-10-10 RX ADMIN — SODIUM CHLORIDE 1000 MG: 900 INJECTION INTRAVENOUS at 07:44

## 2024-10-10 RX ADMIN — BENZOCAINE: 200 SPRAY DENTAL; ORAL; PERIODONTAL at 06:18

## 2024-10-10 RX ADMIN — SODIUM CHLORIDE, POTASSIUM CHLORIDE, SODIUM LACTATE AND CALCIUM CHLORIDE 1000 ML: 600; 310; 30; 20 INJECTION, SOLUTION INTRAVENOUS at 08:21

## 2024-10-10 ASSESSMENT — PAIN - FUNCTIONAL ASSESSMENT: PAIN_FUNCTIONAL_ASSESSMENT: NONE - DENIES PAIN

## 2024-10-10 ASSESSMENT — LIFESTYLE VARIABLES
HOW OFTEN DO YOU HAVE A DRINK CONTAINING ALCOHOL: NEVER
HOW MANY STANDARD DRINKS CONTAINING ALCOHOL DO YOU HAVE ON A TYPICAL DAY: PATIENT DOES NOT DRINK

## 2024-10-10 NOTE — ED NOTES
Blood Culture set # 2 drawn from R wrist at 0720.  Site cleaned with [employer] for 30 seconds and allowed to fully dry. Utilized waste tube. Pt tolerated well. Specimens labeled and sent to lab.

## 2024-10-10 NOTE — H&P
Pending)       ASSESSMENT/PLAN:  SBO  - CT abd/pelvis shows high-grade SBO which appears to be located in the central lower abdomen   - NG tube placed in ED  - NPO, IVF, prn pain control and antiemetics   - general surgery consulted, goal for conservative management     SIRS - possibly reactive   - Criteria: +HR, +WBC  - UA w/o infection   - Blood cx and CXR ordered  - monitor vitals and labs  - mgmt as above, will hold off on abx for now     Elevated troponin   - 61->repeat ordered  - EKG ordered  - no CP     Hx of ischemic CVA of left ICA s/p TNK 12/2023  - on statin  - holding asa and Plavix for possible need of surgery     CAD w/hx of NSTEMI 12/2023  - on statin and BB   - holding asa and Plavix for possible need of surgery     Chronic HFrEF  Ischemic cardiomyopathy   - last echo on 3/20/24 with EF of 35-40%  - on Coreg, holding entresto while on IVF  - monitor daily weights and I/Os    DM type 2  - SSI  - carb control diet  - monitor BG     HTN  - on Coreg  - monitor BP     CKD stage 3b  - appears improved compared to baseline Cr ~1.2-1.4  - monitor BMP     Note SBO and SIRS makes the patient higher risk for morbidity and mortality requiring testing and treatment.    DVT Prophylaxis: Lovenox   Diet: Diet NPO Exceptions are: Ice Chips, Sips of Water with Meds  Code Status: Full Code    Sarahy Warner PA-C  10/10/24

## 2024-10-10 NOTE — ED NOTES
Dr. Rose Marie ruiz served at 0582.   If you are a smoker, it is important for your health to stop smoking. Please be aware that second hand smoke is also harmful.

## 2024-10-10 NOTE — ED NOTES
Dr. Nicholson looked at x-ray and gave the ok to hook put up to intermittent suction at this time. Pt hooked up to intermittent suction, green liquid coming out into suction canister at this time.

## 2024-10-10 NOTE — ED TRIAGE NOTES
Pt arrives via EMS for c/o N/V since waking up this morning. Vomiting times 3, denies any diarrhea.

## 2024-10-10 NOTE — ED PROVIDER NOTES
normal range or not returned as of this dictation.    EMERGENCY DEPARTMENT COURSE and DIFFERENTIAL DIAGNOSIS/MDM:   Vitals:    Vitals:    10/10/24 0207 10/10/24 0300 10/10/24 0401 10/10/24 0510   BP: (!) 141/65 (!) 152/66 (!) 142/71    Pulse: 91 96 90    Resp: 16 16 16    Temp: 98.7 °F (37.1 °C)      TempSrc: Oral      SpO2: 95% 92% 91% 96%   Weight: 63 kg (139 lb)      Height: 1.778 m (5' 10\")          Patient was given thefollowing medications:  Medications   lactated ringers IV soln infusion 1,000 mL (1,000 mLs IntraVENous New Bag 10/10/24 0640)   ondansetron (ZOFRAN) injection 4 mg (4 mg IntraVENous Given 10/10/24 0234)   iopamidol (ISOVUE-370) 76 % injection 75 mL (75 mLs IntraVENous Given 10/10/24 0506)   benzocaine (HURRICAINE) 20 % oral spray ( Mouth/Throat Given 10/10/24 0618)   fentaNYL (SUBLIMAZE) injection 25 mcg (25 mcg IntraVENous Given 10/10/24 0635)       ED COURSE & MEDICAL DECISION MAKING    Pertinent Labs & Imaging studies reviewed. (See chart for details)   -  Patient seen and evaluated in the emergency department.  -  Triage and nursing notes reviewed and incorporated.  -  Old chart records reviewed and incorporated.  Echocardiogram on 3/20/2024 showed LVEF of 35 to 40%.  -  Differential diagnosis includes: Gastritis, bowel obstruction, appendicitis  -  Work-up included:  See above  -  ED treatment included: See above.  Patient given Zofran.  She was started on maintenance LR.  -  Results discussed with patient.  Labs show WBC is 17.  Glucose 235.. Imaging studies show small bowel obstruction.  Patient feels improved after Zofran.  NG was placed.  Patient was having significant discomfort with the NG so given fentanyl.  I did speak to Dr. Carlson who states he will see the patient later this morning.  He recommends admission to hospitalist.  Hospitalist accepted admission.  The patient is agreeable with plan of care and disposition.  Patient admitted in stable condition.      REASSESSMENT

## 2024-10-10 NOTE — PLAN OF CARE
Pending admission        88-year-old chief complaint of abdominal pain    CT abdomen showing high-grade obstruction  Surgery consulted in ED recommended NG and will be seen in a.m.  EKG, troponin ordered on admission to rule out atypical ACS      Sepsis on arrival to the ED: Heart rate>90, leukocytosis  Blood cultures, UA, lactic acid, chest x-ray ordered on admission

## 2024-10-10 NOTE — CONSULTS
General Surgery   Consult Note      Pt Name: Whitney Meier  MRN: [id number]  YOB: 1935  Primary Care Physician: Leilani Mantilla MD    Patient evaluated at the request of Dr. Nicholson  Reason for evaluation: SBO  Date of evaluation: 10/10/2024  Admit date: 10/10/2024  LOS: Day 0    SUBJECTIVE:   History of Chief Complaint:    Whitney Meier is a 88 y.o. female who presented with nausea and vomiting. Patient is very pleasant, alert and oriented for the most part but she is a questionable historian. She lives with her daughter who is not currently at bedside. Per patient, she is not quite sure why she is here but states she was vomiting at home which is why her daughter felt she should be evaluated. She denies abdominal pain for me but per ED provider note, she reported some lower abdominal pain at home which quickly improved. She cannot recall her last flatus or stool but feels it happened recently.   Denies fever, chills, chest pain, sob, issues with urination.   Denies prior hx of SBO  Prior abdominal surgical history includes appendectomy and TIMO      Past Medical History   has a past medical history of Clostridium difficile infection, Dementia (HCC), Diabetes mellitus (HCC), History of blood transfusion, Hyperlipidemia, Hypertension, Infection, MDRO (multiple drug resistant organisms) resistance, MRSA infection, Osteomyelitis of spine, and Restless leg syndrome.    Past Surgical History   has a past surgical history that includes Tonsillectomy; Hysterectomy; Appendectomy; Toe amputation (2003); Toe amputation (2011); eye surgery; Colonoscopy (1997); Colonoscopy (7/15/15); other surgical history (Left); Foot surgery (Left, 05/30/2017); Upper gastrointestinal endoscopy (06/05/2017); Foot surgery (Right, 12/11/2017); Foot Amputation (Right, 05/26/2019); and Foot Amputation (Right, 5/26/2019).    Medications  Prior to Admission medications    Medication Sig Start Date End Date Taking?

## 2024-10-11 ENCOUNTER — APPOINTMENT (OUTPATIENT)
Dept: GENERAL RADIOLOGY | Age: 89
DRG: 389 | End: 2024-10-11
Payer: MEDICARE

## 2024-10-11 PROBLEM — D72.829 LEUKOCYTOSIS: Status: ACTIVE | Noted: 2024-10-11

## 2024-10-11 LAB
ANION GAP SERPL CALCULATED.3IONS-SCNC: 11 MMOL/L (ref 3–16)
BASOPHILS # BLD: 0 K/UL (ref 0–0.2)
BASOPHILS NFR BLD: 0.2 %
BUN SERPL-MCNC: 33 MG/DL (ref 7–20)
CALCIUM SERPL-MCNC: 9.5 MG/DL (ref 8.3–10.6)
CHLORIDE SERPL-SCNC: 100 MMOL/L (ref 99–110)
CO2 SERPL-SCNC: 30 MMOL/L (ref 21–32)
CREAT SERPL-MCNC: 1.3 MG/DL (ref 0.6–1.2)
DEPRECATED RDW RBC AUTO: 13 % (ref 12.4–15.4)
EOSINOPHIL # BLD: 0.2 K/UL (ref 0–0.6)
EOSINOPHIL NFR BLD: 2 %
GFR SERPLBLD CREATININE-BSD FMLA CKD-EPI: 39 ML/MIN/{1.73_M2}
GLUCOSE BLD-MCNC: 100 MG/DL (ref 70–99)
GLUCOSE BLD-MCNC: 112 MG/DL (ref 70–99)
GLUCOSE BLD-MCNC: 130 MG/DL (ref 70–99)
GLUCOSE BLD-MCNC: 76 MG/DL (ref 70–99)
GLUCOSE BLD-MCNC: 89 MG/DL (ref 70–99)
GLUCOSE BLD-MCNC: 91 MG/DL (ref 70–99)
GLUCOSE BLD-MCNC: 97 MG/DL (ref 70–99)
GLUCOSE SERPL-MCNC: 115 MG/DL (ref 70–99)
HCT VFR BLD AUTO: 30.2 % (ref 36–48)
HGB BLD-MCNC: 10.3 G/DL (ref 12–16)
LYMPHOCYTES # BLD: 0.5 K/UL (ref 1–5.1)
LYMPHOCYTES NFR BLD: 6.2 %
MCH RBC QN AUTO: 33.7 PG (ref 26–34)
MCHC RBC AUTO-ENTMCNC: 34.1 G/DL (ref 31–36)
MCV RBC AUTO: 98.7 FL (ref 80–100)
MONOCYTES # BLD: 0.7 K/UL (ref 0–1.3)
MONOCYTES NFR BLD: 8.9 %
NEUTROPHILS # BLD: 6.6 K/UL (ref 1.7–7.7)
NEUTROPHILS NFR BLD: 82.7 %
PERFORMED ON: ABNORMAL
PERFORMED ON: NORMAL
PLATELET # BLD AUTO: 143 K/UL (ref 135–450)
PMV BLD AUTO: 9.3 FL (ref 5–10.5)
POTASSIUM SERPL-SCNC: 3.7 MMOL/L (ref 3.5–5.1)
RBC # BLD AUTO: 3.06 M/UL (ref 4–5.2)
SODIUM SERPL-SCNC: 141 MMOL/L (ref 136–145)
WBC # BLD AUTO: 7.9 K/UL (ref 4–11)

## 2024-10-11 PROCEDURE — 36415 COLL VENOUS BLD VENIPUNCTURE: CPT

## 2024-10-11 PROCEDURE — 74019 RADEX ABDOMEN 2 VIEWS: CPT

## 2024-10-11 PROCEDURE — APPSS15 APP SPLIT SHARED TIME 0-15 MINUTES

## 2024-10-11 PROCEDURE — 80048 BASIC METABOLIC PNL TOTAL CA: CPT

## 2024-10-11 PROCEDURE — 99232 SBSQ HOSP IP/OBS MODERATE 35: CPT | Performed by: INTERNAL MEDICINE

## 2024-10-11 PROCEDURE — 6370000000 HC RX 637 (ALT 250 FOR IP)

## 2024-10-11 PROCEDURE — 1200000000 HC SEMI PRIVATE

## 2024-10-11 PROCEDURE — 83036 HEMOGLOBIN GLYCOSYLATED A1C: CPT

## 2024-10-11 PROCEDURE — 85025 COMPLETE CBC W/AUTO DIFF WBC: CPT

## 2024-10-11 PROCEDURE — 2580000003 HC RX 258: Performed by: INTERNAL MEDICINE

## 2024-10-11 PROCEDURE — 6360000002 HC RX W HCPCS

## 2024-10-11 PROCEDURE — 2580000003 HC RX 258

## 2024-10-11 PROCEDURE — 99232 SBSQ HOSP IP/OBS MODERATE 35: CPT | Performed by: SURGERY

## 2024-10-11 RX ORDER — SODIUM CHLORIDE 9 MG/ML
INJECTION, SOLUTION INTRAVENOUS CONTINUOUS
Status: DISCONTINUED | OUTPATIENT
Start: 2024-10-11 | End: 2024-10-12

## 2024-10-11 RX ADMIN — SODIUM CHLORIDE: 9 INJECTION, SOLUTION INTRAVENOUS at 23:03

## 2024-10-11 RX ADMIN — CARVEDILOL 6.25 MG: 6.25 TABLET, FILM COATED ORAL at 16:28

## 2024-10-11 RX ADMIN — CARVEDILOL 6.25 MG: 6.25 TABLET, FILM COATED ORAL at 09:34

## 2024-10-11 RX ADMIN — SODIUM CHLORIDE: 9 INJECTION, SOLUTION INTRAVENOUS at 09:33

## 2024-10-11 RX ADMIN — PRAVASTATIN SODIUM 20 MG: 10 TABLET ORAL at 09:34

## 2024-10-11 RX ADMIN — SODIUM CHLORIDE, PRESERVATIVE FREE 10 ML: 5 INJECTION INTRAVENOUS at 20:28

## 2024-10-11 RX ADMIN — ENOXAPARIN SODIUM 40 MG: 100 INJECTION SUBCUTANEOUS at 09:34

## 2024-10-11 RX ADMIN — SODIUM CHLORIDE, PRESERVATIVE FREE 10 ML: 5 INJECTION INTRAVENOUS at 09:33

## 2024-10-11 NOTE — PLAN OF CARE
Problem: Chronic Conditions and Co-morbidities  Goal: Patient's chronic conditions and co-morbidity symptoms are monitored and maintained or improved  Outcome: Progressing     Problem: Discharge Planning  Goal: Discharge to home or other facility with appropriate resources  Outcome: Progressing     Problem: ABCDS Injury Assessment  Goal: Absence of physical injury  Outcome: Progressing     Problem: Skin/Tissue Integrity - Adult  Goal: Skin integrity remains intact  Outcome: Progressing     Problem: Gastrointestinal - Adult  Goal: Minimal or absence of nausea and vomiting  Outcome: Progressing  Goal: Maintains or returns to baseline bowel function  Outcome: Progressing     Problem: Metabolic/Fluid and Electrolytes - Adult  Goal: Electrolytes maintained within normal limits  Outcome: Progressing     Problem: Safety - Adult  Goal: Free from fall injury  Outcome: Progressing

## 2024-10-11 NOTE — FLOWSHEET NOTE
10/11/24 0800   Vital Signs   Temp 97 °F (36.1 °C)   Temp Source Oral   Pulse 78   Heart Rate Source Monitor   Respirations 16   BP (!) 154/62   MAP (Calculated) 93   BP Location Left upper arm   BP Method Automatic   Patient Position Semi fowlers   Pain Assessment   Pain Assessment None - Denies Pain   Oxygen Therapy   SpO2 91 %   O2 Device None (Room air)     AM assessment complete. Pt a&o x4. Denies any pain or discomfort. Reports that she has passed some flatulence but not much. Iron held d/t pt currently with SBO. Preventing further issues. No edema noted. BEN. STEFANY to ALESSIO corley. Currently clamped for pt to absorb meds. Call light and bedside table within reach.

## 2024-10-11 NOTE — FLOWSHEET NOTE
10/10/24 1045   Vital Signs   Temp 97.7 °F (36.5 °C)   Temp Source Oral   Pulse 83   Heart Rate Source Monitor   Respirations 16   /66   MAP (Calculated) 89   BP Location Right upper arm   BP Method Automatic   Patient Position Semi fowlers   Oxygen Therapy   SpO2 96 %   O2 Device None (Room air)     Patient arrived to room 322-2. Vitals are stable. Patient oriented to room and denies further needs. Call light within reach.

## 2024-10-11 NOTE — FLOWSHEET NOTE
10/10/24 1430   Vital Signs   Temp 97 °F (36.1 °C)   Temp Source Oral   Pulse 82   Heart Rate Source Monitor   Respirations 16   BP (!) 118/58   MAP (Calculated) 78   BP Location Right upper arm   BP Method Automatic   Patient Position Semi fowlers   NG/OG/NJ/NE Tube Nasogastric 16 fr Right nostril   Placement Date/Time: 10/10/24 0630   Present on Admission/Arrival: No  Inserted by: CASIMIRO Arnold  Insertion attempts: 1  Tube Type: Nasogastric  Tube Size (fr): 16 fr  Tube Location: Right nostril  External Catheter Length (cm): 54 cm   Surrounding Skin Clean, dry & intact   Securement device Adhesive based smith   Status Suction-low continuous     Vitals remain stable Patient is awake and alert and denies further needs. NG secured and draining on CLWS. Call light within reach.

## 2024-10-11 NOTE — FLOWSHEET NOTE
10/11/24 0015   Vital Signs   Temp 97.2 °F (36.2 °C)   Temp Source Oral   Pulse 76   Heart Rate Source Monitor   Respirations 16   BP (!) 130/58   MAP (Calculated) 82   BP Location Right upper arm   BP Method Automatic   Patient Position Semi fowlers     Pt in bed resting on/off FSBS 130 denies any pain or discomfort. NG was clamped off earlier d/t medication administration. [facility]

## 2024-10-11 NOTE — FLOWSHEET NOTE
10/10/24 2000   Vital Signs   Temp 97.2 °F (36.2 °C)   Temp Source Oral   Pulse 78   Heart Rate Source Monitor   BP (!) 129/58   MAP (Calculated) 82   BP Location Right upper arm   BP Method Automatic   Patient Position Semi fowlers   Oxygen Therapy   SpO2 96 %   O2 Device None (Room air)     Pt in bed doing puzzles. Meds given will clamp off NG. Denies any distress or discomfort. [facility]

## 2024-10-12 ENCOUNTER — APPOINTMENT (OUTPATIENT)
Dept: GENERAL RADIOLOGY | Age: 89
DRG: 389 | End: 2024-10-12
Payer: MEDICARE

## 2024-10-12 LAB
ANION GAP SERPL CALCULATED.3IONS-SCNC: 16 MMOL/L (ref 3–16)
BASOPHILS # BLD: 0 K/UL (ref 0–0.2)
BASOPHILS NFR BLD: 0.1 %
BUN SERPL-MCNC: 37 MG/DL (ref 7–20)
CALCIUM SERPL-MCNC: 9 MG/DL (ref 8.3–10.6)
CHLORIDE SERPL-SCNC: 101 MMOL/L (ref 99–110)
CO2 SERPL-SCNC: 26 MMOL/L (ref 21–32)
CREAT SERPL-MCNC: 1.3 MG/DL (ref 0.6–1.2)
DEPRECATED RDW RBC AUTO: 12.8 % (ref 12.4–15.4)
EOSINOPHIL # BLD: 0.1 K/UL (ref 0–0.6)
EOSINOPHIL NFR BLD: 1.2 %
EST. AVERAGE GLUCOSE BLD GHB EST-MCNC: 145.6 MG/DL
GFR SERPLBLD CREATININE-BSD FMLA CKD-EPI: 39 ML/MIN/{1.73_M2}
GLUCOSE BLD-MCNC: 71 MG/DL (ref 70–99)
GLUCOSE BLD-MCNC: 73 MG/DL (ref 70–99)
GLUCOSE BLD-MCNC: 73 MG/DL (ref 70–99)
GLUCOSE BLD-MCNC: 97 MG/DL (ref 70–99)
GLUCOSE BLD-MCNC: 99 MG/DL (ref 70–99)
GLUCOSE SERPL-MCNC: 68 MG/DL (ref 70–99)
HBA1C MFR BLD: 6.7 %
HCT VFR BLD AUTO: 28.2 % (ref 36–48)
HGB BLD-MCNC: 9.8 G/DL (ref 12–16)
LYMPHOCYTES # BLD: 0.6 K/UL (ref 1–5.1)
LYMPHOCYTES NFR BLD: 7.9 %
MCH RBC QN AUTO: 34.2 PG (ref 26–34)
MCHC RBC AUTO-ENTMCNC: 34.6 G/DL (ref 31–36)
MCV RBC AUTO: 98.8 FL (ref 80–100)
MONOCYTES # BLD: 0.6 K/UL (ref 0–1.3)
MONOCYTES NFR BLD: 8.1 %
NEUTROPHILS # BLD: 5.8 K/UL (ref 1.7–7.7)
NEUTROPHILS NFR BLD: 82.7 %
PERFORMED ON: NORMAL
PLATELET # BLD AUTO: 139 K/UL (ref 135–450)
PMV BLD AUTO: 9.2 FL (ref 5–10.5)
POTASSIUM SERPL-SCNC: 3.7 MMOL/L (ref 3.5–5.1)
RBC # BLD AUTO: 2.86 M/UL (ref 4–5.2)
SODIUM SERPL-SCNC: 143 MMOL/L (ref 136–145)
WBC # BLD AUTO: 7 K/UL (ref 4–11)

## 2024-10-12 PROCEDURE — 99231 SBSQ HOSP IP/OBS SF/LOW 25: CPT | Performed by: SURGERY

## 2024-10-12 PROCEDURE — 6360000002 HC RX W HCPCS

## 2024-10-12 PROCEDURE — 85025 COMPLETE CBC W/AUTO DIFF WBC: CPT

## 2024-10-12 PROCEDURE — 1200000000 HC SEMI PRIVATE

## 2024-10-12 PROCEDURE — 2580000003 HC RX 258: Performed by: INTERNAL MEDICINE

## 2024-10-12 PROCEDURE — 36415 COLL VENOUS BLD VENIPUNCTURE: CPT

## 2024-10-12 PROCEDURE — 99232 SBSQ HOSP IP/OBS MODERATE 35: CPT | Performed by: INTERNAL MEDICINE

## 2024-10-12 PROCEDURE — 6370000000 HC RX 637 (ALT 250 FOR IP)

## 2024-10-12 PROCEDURE — 74019 RADEX ABDOMEN 2 VIEWS: CPT

## 2024-10-12 PROCEDURE — 80048 BASIC METABOLIC PNL TOTAL CA: CPT

## 2024-10-12 RX ORDER — DEXTROSE MONOHYDRATE AND SODIUM CHLORIDE 5; .45 G/100ML; G/100ML
INJECTION, SOLUTION INTRAVENOUS CONTINUOUS
Status: DISCONTINUED | OUTPATIENT
Start: 2024-10-12 | End: 2024-10-16 | Stop reason: HOSPADM

## 2024-10-12 RX ADMIN — DEXTROSE AND SODIUM CHLORIDE: 5; 450 INJECTION, SOLUTION INTRAVENOUS at 11:55

## 2024-10-12 RX ADMIN — CARVEDILOL 6.25 MG: 6.25 TABLET, FILM COATED ORAL at 17:37

## 2024-10-12 RX ADMIN — CARVEDILOL 6.25 MG: 6.25 TABLET, FILM COATED ORAL at 09:51

## 2024-10-12 RX ADMIN — PRAMIPEXOLE DIHYDROCHLORIDE 1 MG: 1 TABLET ORAL at 01:36

## 2024-10-12 RX ADMIN — PRAVASTATIN SODIUM 20 MG: 10 TABLET ORAL at 09:51

## 2024-10-12 RX ADMIN — ENOXAPARIN SODIUM 40 MG: 100 INJECTION SUBCUTANEOUS at 09:55

## 2024-10-12 RX ADMIN — SODIUM CHLORIDE, PRESERVATIVE FREE 10 ML: 5 INJECTION INTRAVENOUS at 20:40

## 2024-10-12 NOTE — FLOWSHEET NOTE
10/11/24 1941   Vital Signs   Temp 97.8 °F (36.6 °C)   Temp Source Oral   Pulse 68   Heart Rate Source Monitor   Respirations 20   BP (!) 146/57   MAP (Calculated) 87   BP Location Left upper arm   BP Method Automatic   Patient Position Sitting   NG/OG/NJ/NE Tube Nasogastric 16 fr Right nostril   Placement Date/Time: 10/10/24 0630   Present on Admission/Arrival: No  Inserted by: CASIMIRO Arnold  Insertion attempts: 1  Tube Type: Nasogastric  Tube Size (fr): 16 fr  Tube Location: Right nostril  External Catheter Length (cm): 54 cm   Surrounding Skin Clean, dry & intact   Securement device Adhesive based smith   Status Suction-low continuous     Shift asssessment complete- see flow sheet  Pt alert and oriented x4 resting in chair  No complaints of N/V or pain.  NG to low continuous suction.  Vitals stable  Call light and bedside table within reach  Care continues

## 2024-10-12 NOTE — FLOWSHEET NOTE
10/12/24 0950   Vital Signs   Temp 97.3 °F (36.3 °C)   Temp Source Oral   Pulse 78   Heart Rate Source Monitor   Respirations 18   BP (!) 142/61   MAP (Calculated) 88   BP Location Left upper arm   BP Method Automatic   Patient Position Semi fowlers   Pain Assessment   Pain Assessment None - Denies Pain   Oxygen Therapy   SpO2 96 %   O2 Device None (Room air)     AM assessment complete. Pt a&o x4. Denies any pain or discomfort. Denies any n/v. Tolerating NG well and with suction. NG to R nare. No edema noted. LCTA. Awaiting KUB results. Spoke with Dr Taylor regarding hypoglycemia. New orders in place. Call light and bedside table within reach.   Patient called in requesting return call back to know when she can return call to work and if she need another Covid 19 test. Please return call

## 2024-10-13 LAB
ANION GAP SERPL CALCULATED.3IONS-SCNC: 12 MMOL/L (ref 3–16)
BASOPHILS # BLD: 0 K/UL (ref 0–0.2)
BASOPHILS NFR BLD: 0.1 %
BUN SERPL-MCNC: 26 MG/DL (ref 7–20)
CALCIUM SERPL-MCNC: 8.4 MG/DL (ref 8.3–10.6)
CHLORIDE SERPL-SCNC: 104 MMOL/L (ref 99–110)
CO2 SERPL-SCNC: 25 MMOL/L (ref 21–32)
CREAT SERPL-MCNC: 1 MG/DL (ref 0.6–1.2)
DEPRECATED RDW RBC AUTO: 12.7 % (ref 12.4–15.4)
EOSINOPHIL # BLD: 0.2 K/UL (ref 0–0.6)
EOSINOPHIL NFR BLD: 2.7 %
GFR SERPLBLD CREATININE-BSD FMLA CKD-EPI: 54 ML/MIN/{1.73_M2}
GLUCOSE BLD-MCNC: 113 MG/DL (ref 70–99)
GLUCOSE BLD-MCNC: 128 MG/DL (ref 70–99)
GLUCOSE BLD-MCNC: 128 MG/DL (ref 70–99)
GLUCOSE BLD-MCNC: 130 MG/DL (ref 70–99)
GLUCOSE SERPL-MCNC: 104 MG/DL (ref 70–99)
HCT VFR BLD AUTO: 26.5 % (ref 36–48)
HGB BLD-MCNC: 9.1 G/DL (ref 12–16)
LYMPHOCYTES # BLD: 0.5 K/UL (ref 1–5.1)
LYMPHOCYTES NFR BLD: 9.5 %
MAGNESIUM SERPL-MCNC: 1.8 MG/DL (ref 1.8–2.4)
MCH RBC QN AUTO: 33.7 PG (ref 26–34)
MCHC RBC AUTO-ENTMCNC: 34.3 G/DL (ref 31–36)
MCV RBC AUTO: 98.1 FL (ref 80–100)
MONOCYTES # BLD: 0.5 K/UL (ref 0–1.3)
MONOCYTES NFR BLD: 9.1 %
NEUTROPHILS # BLD: 4.4 K/UL (ref 1.7–7.7)
NEUTROPHILS NFR BLD: 78.6 %
PERFORMED ON: ABNORMAL
PLATELET # BLD AUTO: 130 K/UL (ref 135–450)
PMV BLD AUTO: 8.9 FL (ref 5–10.5)
POTASSIUM SERPL-SCNC: 3.3 MMOL/L (ref 3.5–5.1)
RBC # BLD AUTO: 2.7 M/UL (ref 4–5.2)
SODIUM SERPL-SCNC: 141 MMOL/L (ref 136–145)
WBC # BLD AUTO: 5.6 K/UL (ref 4–11)

## 2024-10-13 PROCEDURE — 6370000000 HC RX 637 (ALT 250 FOR IP): Performed by: INTERNAL MEDICINE

## 2024-10-13 PROCEDURE — 6360000002 HC RX W HCPCS: Performed by: INTERNAL MEDICINE

## 2024-10-13 PROCEDURE — 97110 THERAPEUTIC EXERCISES: CPT

## 2024-10-13 PROCEDURE — 83735 ASSAY OF MAGNESIUM: CPT

## 2024-10-13 PROCEDURE — 85025 COMPLETE CBC W/AUTO DIFF WBC: CPT

## 2024-10-13 PROCEDURE — 99232 SBSQ HOSP IP/OBS MODERATE 35: CPT | Performed by: SURGERY

## 2024-10-13 PROCEDURE — 99232 SBSQ HOSP IP/OBS MODERATE 35: CPT | Performed by: INTERNAL MEDICINE

## 2024-10-13 PROCEDURE — 2580000003 HC RX 258: Performed by: INTERNAL MEDICINE

## 2024-10-13 PROCEDURE — 97530 THERAPEUTIC ACTIVITIES: CPT

## 2024-10-13 PROCEDURE — 36415 COLL VENOUS BLD VENIPUNCTURE: CPT

## 2024-10-13 PROCEDURE — 80048 BASIC METABOLIC PNL TOTAL CA: CPT

## 2024-10-13 PROCEDURE — 6370000000 HC RX 637 (ALT 250 FOR IP)

## 2024-10-13 PROCEDURE — 6360000002 HC RX W HCPCS

## 2024-10-13 PROCEDURE — 1200000000 HC SEMI PRIVATE

## 2024-10-13 RX ORDER — POTASSIUM CHLORIDE 1500 MG/1
40 TABLET, EXTENDED RELEASE ORAL PRN
Status: DISCONTINUED | OUTPATIENT
Start: 2024-10-13 | End: 2024-10-16 | Stop reason: HOSPADM

## 2024-10-13 RX ORDER — POTASSIUM CHLORIDE 7.45 MG/ML
10 INJECTION INTRAVENOUS
Status: DISCONTINUED | OUTPATIENT
Start: 2024-10-13 | End: 2024-10-13

## 2024-10-13 RX ORDER — POTASSIUM CHLORIDE 7.45 MG/ML
10 INJECTION INTRAVENOUS PRN
Status: DISCONTINUED | OUTPATIENT
Start: 2024-10-13 | End: 2024-10-16 | Stop reason: HOSPADM

## 2024-10-13 RX ADMIN — CARVEDILOL 6.25 MG: 6.25 TABLET, FILM COATED ORAL at 11:13

## 2024-10-13 RX ADMIN — POTASSIUM CHLORIDE 20 MEQ: 149 INJECTION, SOLUTION, CONCENTRATE INTRAVENOUS at 12:24

## 2024-10-13 RX ADMIN — CARVEDILOL 6.25 MG: 6.25 TABLET, FILM COATED ORAL at 17:33

## 2024-10-13 RX ADMIN — POTASSIUM CHLORIDE 40 MEQ: 1500 TABLET, EXTENDED RELEASE ORAL at 07:23

## 2024-10-13 RX ADMIN — PRAVASTATIN SODIUM 20 MG: 10 TABLET ORAL at 11:14

## 2024-10-13 RX ADMIN — SACUBITRIL AND VALSARTAN 0.5 TABLET: 24; 26 TABLET, FILM COATED ORAL at 11:15

## 2024-10-13 RX ADMIN — SACUBITRIL AND VALSARTAN 0.5 TABLET: 24; 26 TABLET, FILM COATED ORAL at 21:06

## 2024-10-13 RX ADMIN — SODIUM CHLORIDE, PRESERVATIVE FREE 10 ML: 5 INJECTION INTRAVENOUS at 21:47

## 2024-10-13 RX ADMIN — DEXTROSE AND SODIUM CHLORIDE: 5; 450 INJECTION, SOLUTION INTRAVENOUS at 16:42

## 2024-10-13 RX ADMIN — DEXTROSE AND SODIUM CHLORIDE: 5; 450 INJECTION, SOLUTION INTRAVENOUS at 02:18

## 2024-10-13 RX ADMIN — ENOXAPARIN SODIUM 40 MG: 100 INJECTION SUBCUTANEOUS at 11:12

## 2024-10-13 NOTE — FLOWSHEET NOTE
10/13/24 0737   Vital Signs   Temp 97.3 °F (36.3 °C)   Temp Source Oral   Pulse 74   Heart Rate Source Monitor   Respirations 16   BP (!) 155/60   MAP (Calculated) 92   BP Location Left upper arm   BP Method Automatic   Patient Position High fowlers   Pain Assessment   Pain Assessment None - Denies Pain   Oxygen Therapy   SpO2 93 %   O2 Device None (Room air)     AM assessment complete. Pt a&o x4. Denies any pain or discomfort. Denies any sob. IVF infusing per orders. Denies any n/v. NG to R nare. NO bm but pt states that she has been passing some gas. No edema noted. Call light and bedside table within reach.

## 2024-10-13 NOTE — FLOWSHEET NOTE
10/12/24 1943   Vital Signs   Temp 98.2 °F (36.8 °C)   Temp Source Oral   Pulse 72   Heart Rate Source Monitor   Respirations 20   BP (!) 150/58   MAP (Calculated) 89   BP Location Left upper arm   BP Method Automatic   Patient Position Semi fowlers   Pain Assessment   Pain Assessment None - Denies Pain   Oxygen Therapy   SpO2 95 %   O2 Device None (Room air)     Shift assessment completed- see flow sheet.  Pt A&O x4 resting in chair.  NG to low continuous suction, in right nare.  No complaints from patient  Call light and bedside table within reach  Care continues

## 2024-10-14 ENCOUNTER — APPOINTMENT (OUTPATIENT)
Dept: GENERAL RADIOLOGY | Age: 89
DRG: 389 | End: 2024-10-14
Payer: MEDICARE

## 2024-10-14 PROBLEM — R11.2 NAUSEA AND VOMITING: Status: ACTIVE | Noted: 2024-10-14

## 2024-10-14 PROBLEM — R10.9 ABDOMINAL PAIN: Status: ACTIVE | Noted: 2024-10-14

## 2024-10-14 LAB
ANION GAP SERPL CALCULATED.3IONS-SCNC: 9 MMOL/L (ref 3–16)
ANION GAP SERPL CALCULATED.3IONS-SCNC: 9 MMOL/L (ref 3–16)
BACTERIA BLD CULT ORG #2: NORMAL
BACTERIA BLD CULT: NORMAL
BUN SERPL-MCNC: 16 MG/DL (ref 7–20)
BUN SERPL-MCNC: 16 MG/DL (ref 7–20)
CALCIUM SERPL-MCNC: 7.3 MG/DL (ref 8.3–10.6)
CALCIUM SERPL-MCNC: 8.4 MG/DL (ref 8.3–10.6)
CHLORIDE SERPL-SCNC: 100 MMOL/L (ref 99–110)
CHLORIDE SERPL-SCNC: 100 MMOL/L (ref 99–110)
CO2 SERPL-SCNC: 24 MMOL/L (ref 21–32)
CO2 SERPL-SCNC: 28 MMOL/L (ref 21–32)
CREAT SERPL-MCNC: 0.9 MG/DL (ref 0.6–1.2)
CREAT SERPL-MCNC: 0.9 MG/DL (ref 0.6–1.2)
GFR SERPLBLD CREATININE-BSD FMLA CKD-EPI: 61 ML/MIN/{1.73_M2}
GFR SERPLBLD CREATININE-BSD FMLA CKD-EPI: 61 ML/MIN/{1.73_M2}
GLUCOSE BLD-MCNC: 148 MG/DL (ref 70–99)
GLUCOSE BLD-MCNC: 150 MG/DL (ref 70–99)
GLUCOSE BLD-MCNC: 153 MG/DL (ref 70–99)
GLUCOSE BLD-MCNC: 167 MG/DL (ref 70–99)
GLUCOSE BLD-MCNC: 183 MG/DL (ref 70–99)
GLUCOSE SERPL-MCNC: 271 MG/DL (ref 70–99)
GLUCOSE SERPL-MCNC: 649 MG/DL (ref 70–99)
MAGNESIUM SERPL-MCNC: 1.4 MG/DL (ref 1.8–2.4)
MAGNESIUM SERPL-MCNC: 1.7 MG/DL (ref 1.8–2.4)
PERFORMED ON: ABNORMAL
POTASSIUM SERPL-SCNC: 3 MMOL/L (ref 3.5–5.1)
POTASSIUM SERPL-SCNC: 3.3 MMOL/L (ref 3.5–5.1)
SODIUM SERPL-SCNC: 133 MMOL/L (ref 136–145)
SODIUM SERPL-SCNC: 137 MMOL/L (ref 136–145)

## 2024-10-14 PROCEDURE — 6360000002 HC RX W HCPCS: Performed by: INTERNAL MEDICINE

## 2024-10-14 PROCEDURE — 80048 BASIC METABOLIC PNL TOTAL CA: CPT

## 2024-10-14 PROCEDURE — 2580000003 HC RX 258: Performed by: INTERNAL MEDICINE

## 2024-10-14 PROCEDURE — 76937 US GUIDE VASCULAR ACCESS: CPT

## 2024-10-14 PROCEDURE — 99232 SBSQ HOSP IP/OBS MODERATE 35: CPT | Performed by: SURGERY

## 2024-10-14 PROCEDURE — 74019 RADEX ABDOMEN 2 VIEWS: CPT

## 2024-10-14 PROCEDURE — 6370000000 HC RX 637 (ALT 250 FOR IP)

## 2024-10-14 PROCEDURE — 74018 RADEX ABDOMEN 1 VIEW: CPT

## 2024-10-14 PROCEDURE — 83735 ASSAY OF MAGNESIUM: CPT

## 2024-10-14 PROCEDURE — 6370000000 HC RX 637 (ALT 250 FOR IP): Performed by: INTERNAL MEDICINE

## 2024-10-14 PROCEDURE — 1200000000 HC SEMI PRIVATE

## 2024-10-14 PROCEDURE — 6360000002 HC RX W HCPCS

## 2024-10-14 PROCEDURE — APPSS15 APP SPLIT SHARED TIME 0-15 MINUTES

## 2024-10-14 PROCEDURE — 99232 SBSQ HOSP IP/OBS MODERATE 35: CPT | Performed by: INTERNAL MEDICINE

## 2024-10-14 PROCEDURE — 36415 COLL VENOUS BLD VENIPUNCTURE: CPT

## 2024-10-14 RX ORDER — HYDRALAZINE HYDROCHLORIDE 20 MG/ML
10 INJECTION INTRAMUSCULAR; INTRAVENOUS EVERY 6 HOURS PRN
Status: DISCONTINUED | OUTPATIENT
Start: 2024-10-14 | End: 2024-10-16 | Stop reason: HOSPADM

## 2024-10-14 RX ORDER — MAGNESIUM SULFATE 1 G/100ML
1000 INJECTION INTRAVENOUS ONCE
Status: COMPLETED | OUTPATIENT
Start: 2024-10-14 | End: 2024-10-14

## 2024-10-14 RX ADMIN — SACUBITRIL AND VALSARTAN 0.5 TABLET: 24; 26 TABLET, FILM COATED ORAL at 21:22

## 2024-10-14 RX ADMIN — CARVEDILOL 6.25 MG: 6.25 TABLET, FILM COATED ORAL at 08:55

## 2024-10-14 RX ADMIN — PANTOPRAZOLE SODIUM 40 MG: 40 INJECTION, POWDER, FOR SOLUTION INTRAVENOUS at 17:07

## 2024-10-14 RX ADMIN — SACUBITRIL AND VALSARTAN 0.5 TABLET: 24; 26 TABLET, FILM COATED ORAL at 08:56

## 2024-10-14 RX ADMIN — MAGNESIUM SULFATE IN DEXTROSE 1000 MG: 10 INJECTION, SOLUTION INTRAVENOUS at 11:46

## 2024-10-14 RX ADMIN — ENOXAPARIN SODIUM 40 MG: 100 INJECTION SUBCUTANEOUS at 09:20

## 2024-10-14 RX ADMIN — DEXTROSE AND SODIUM CHLORIDE: 5; 450 INJECTION, SOLUTION INTRAVENOUS at 18:37

## 2024-10-14 RX ADMIN — PROMETHAZINE HYDROCHLORIDE 12.5 MG: 25 TABLET ORAL at 21:22

## 2024-10-14 RX ADMIN — HYDRALAZINE HYDROCHLORIDE 10 MG: 20 INJECTION, SOLUTION INTRAMUSCULAR; INTRAVENOUS at 18:33

## 2024-10-14 RX ADMIN — FERROUS SULFATE TAB 325 MG (65 MG ELEMENTAL FE) 325 MG: 325 (65 FE) TAB at 21:22

## 2024-10-14 RX ADMIN — FERROUS SULFATE TAB 325 MG (65 MG ELEMENTAL FE) 325 MG: 325 (65 FE) TAB at 08:56

## 2024-10-14 RX ADMIN — POTASSIUM BICARBONATE 40 MEQ: 782 TABLET, EFFERVESCENT ORAL at 11:36

## 2024-10-14 RX ADMIN — DEXTROSE AND SODIUM CHLORIDE: 5; 450 INJECTION, SOLUTION INTRAVENOUS at 05:00

## 2024-10-14 RX ADMIN — PRAVASTATIN SODIUM 20 MG: 10 TABLET ORAL at 08:56

## 2024-10-14 RX ADMIN — ONDANSETRON 4 MG: 2 INJECTION INTRAMUSCULAR; INTRAVENOUS at 17:07

## 2024-10-14 NOTE — PLAN OF CARE
Problem: Chronic Conditions and Co-morbidities  Goal: Patient's chronic conditions and co-morbidity symptoms are monitored and maintained or improved  Outcome: Progressing     Problem: Discharge Planning  Goal: Discharge to home or other facility with appropriate resources  Outcome: Progressing     Problem: ABCDS Injury Assessment  Goal: Absence of physical injury  Outcome: Progressing     Problem: Skin/Tissue Integrity - Adult  Goal: Skin integrity remains intact  Outcome: Progressing  Flowsheets (Taken 10/13/2024 0919 by Ludy Beintez RN)  Skin Integrity Remains Intact: Monitor for areas of redness and/or skin breakdown     Problem: Gastrointestinal - Adult  Goal: Minimal or absence of nausea and vomiting  Outcome: Progressing  Goal: Maintains or returns to baseline bowel function  Outcome: Progressing     Problem: Metabolic/Fluid and Electrolytes - Adult  Goal: Electrolytes maintained within normal limits  Outcome: Progressing     Problem: Safety - Adult  Goal: Free from fall injury  Outcome: Progressing     Problem: Skin/Tissue Integrity  Goal: Absence of new skin breakdown  Description: 1.  Monitor for areas of redness and/or skin breakdown  2.  Assess vascular access sites hourly  3.  Every 4-6 hours minimum:  Change oxygen saturation probe site  4.  Every 4-6 hours:  If on nasal continuous positive airway pressure, respiratory therapy assess nares and determine need for appliance change or resting period.  Outcome: Progressing

## 2024-10-15 ENCOUNTER — APPOINTMENT (OUTPATIENT)
Dept: GENERAL RADIOLOGY | Age: 89
DRG: 389 | End: 2024-10-15
Payer: MEDICARE

## 2024-10-15 LAB
ANION GAP SERPL CALCULATED.3IONS-SCNC: 11 MMOL/L (ref 3–16)
BUN SERPL-MCNC: 13 MG/DL (ref 7–20)
CALCIUM SERPL-MCNC: 8.3 MG/DL (ref 8.3–10.6)
CHLORIDE SERPL-SCNC: 104 MMOL/L (ref 99–110)
CO2 SERPL-SCNC: 25 MMOL/L (ref 21–32)
CREAT SERPL-MCNC: 1 MG/DL (ref 0.6–1.2)
GFR SERPLBLD CREATININE-BSD FMLA CKD-EPI: 54 ML/MIN/{1.73_M2}
GLUCOSE BLD-MCNC: 166 MG/DL (ref 70–99)
GLUCOSE BLD-MCNC: 179 MG/DL (ref 70–99)
GLUCOSE BLD-MCNC: 183 MG/DL (ref 70–99)
GLUCOSE BLD-MCNC: 188 MG/DL (ref 70–99)
GLUCOSE BLD-MCNC: 257 MG/DL (ref 70–99)
GLUCOSE BLD-MCNC: 281 MG/DL (ref 70–99)
GLUCOSE SERPL-MCNC: 185 MG/DL (ref 70–99)
MAGNESIUM SERPL-MCNC: 1.8 MG/DL (ref 1.8–2.4)
PERFORMED ON: ABNORMAL
POTASSIUM SERPL-SCNC: 3.3 MMOL/L (ref 3.5–5.1)
SODIUM SERPL-SCNC: 140 MMOL/L (ref 136–145)

## 2024-10-15 PROCEDURE — 6360000002 HC RX W HCPCS

## 2024-10-15 PROCEDURE — 99232 SBSQ HOSP IP/OBS MODERATE 35: CPT | Performed by: INTERNAL MEDICINE

## 2024-10-15 PROCEDURE — 83735 ASSAY OF MAGNESIUM: CPT

## 2024-10-15 PROCEDURE — 2580000003 HC RX 258: Performed by: INTERNAL MEDICINE

## 2024-10-15 PROCEDURE — 74250 X-RAY XM SM INT 1CNTRST STD: CPT

## 2024-10-15 PROCEDURE — 36415 COLL VENOUS BLD VENIPUNCTURE: CPT

## 2024-10-15 PROCEDURE — 6370000000 HC RX 637 (ALT 250 FOR IP): Performed by: INTERNAL MEDICINE

## 2024-10-15 PROCEDURE — 99232 SBSQ HOSP IP/OBS MODERATE 35: CPT | Performed by: SURGERY

## 2024-10-15 PROCEDURE — APPSS15 APP SPLIT SHARED TIME 0-15 MINUTES

## 2024-10-15 PROCEDURE — 80048 BASIC METABOLIC PNL TOTAL CA: CPT

## 2024-10-15 PROCEDURE — 1200000000 HC SEMI PRIVATE

## 2024-10-15 PROCEDURE — 6370000000 HC RX 637 (ALT 250 FOR IP)

## 2024-10-15 PROCEDURE — 6360000002 HC RX W HCPCS: Performed by: INTERNAL MEDICINE

## 2024-10-15 RX ADMIN — DEXTROSE AND SODIUM CHLORIDE: 5; 450 INJECTION, SOLUTION INTRAVENOUS at 06:15

## 2024-10-15 RX ADMIN — CARVEDILOL 6.25 MG: 6.25 TABLET, FILM COATED ORAL at 13:29

## 2024-10-15 RX ADMIN — Medication 3 MG: at 21:02

## 2024-10-15 RX ADMIN — PANTOPRAZOLE SODIUM 40 MG: 40 INJECTION, POWDER, FOR SOLUTION INTRAVENOUS at 08:31

## 2024-10-15 RX ADMIN — INSULIN LISPRO 2 UNITS: 100 INJECTION, SOLUTION INTRAVENOUS; SUBCUTANEOUS at 17:15

## 2024-10-15 RX ADMIN — POTASSIUM CHLORIDE 40 MEQ: 1500 TABLET, EXTENDED RELEASE ORAL at 13:28

## 2024-10-15 RX ADMIN — DEXTROSE AND SODIUM CHLORIDE: 5; 450 INJECTION, SOLUTION INTRAVENOUS at 17:23

## 2024-10-15 RX ADMIN — FERROUS SULFATE TAB 325 MG (65 MG ELEMENTAL FE) 325 MG: 325 (65 FE) TAB at 21:02

## 2024-10-15 RX ADMIN — SACUBITRIL AND VALSARTAN 0.5 TABLET: 24; 26 TABLET, FILM COATED ORAL at 13:27

## 2024-10-15 RX ADMIN — CARVEDILOL 6.25 MG: 6.25 TABLET, FILM COATED ORAL at 17:16

## 2024-10-15 RX ADMIN — ENOXAPARIN SODIUM 40 MG: 100 INJECTION SUBCUTANEOUS at 13:30

## 2024-10-15 RX ADMIN — PRAVASTATIN SODIUM 20 MG: 10 TABLET ORAL at 13:27

## 2024-10-15 RX ADMIN — FERROUS SULFATE TAB 325 MG (65 MG ELEMENTAL FE) 325 MG: 325 (65 FE) TAB at 13:29

## 2024-10-15 NOTE — PLAN OF CARE
SBFT was negative for SBO - contrast opacifies entire small bowel and seen in the colon at 2 hours    NG removed  Full liquids  Continue ambulation      Dylon Davis PA-C  10/15/2024 1:11 PM

## 2024-10-15 NOTE — FLOWSHEET NOTE
10/14/24 2117   Vital Signs   Temp 97.7 °F (36.5 °C)   Temp Source Oral   Pulse 98   Heart Rate Source Monitor   Respirations 16   BP (!) 143/65   MAP (Calculated) 91   BP Location Right upper arm   BP Method Automatic   Patient Position High fowlers   Pain Assessment   Pain Assessment None - Denies Pain   Opioid-Induced Sedation   POSS Score 1   Oxygen Therapy   SpO2 92 %   O2 Device None (Room air)     Pt A/Ox4, awake in bed on RA, NG in place right nares at 55 cm clamped at this time Pt c/o nausea gave 12.5 mg phenergan po, if nausea persists will place NG to suction per order. Pt assessment completed, vss, pt's NG drsg loose, placed new NG smith. Bed locked, bed alarm on, call light in reach.

## 2024-10-15 NOTE — PLAN OF CARE
Problem: Chronic Conditions and Co-morbidities  Goal: Patient's chronic conditions and co-morbidity symptoms are monitored and maintained or improved  Outcome: Progressing     Problem: Gastrointestinal - Adult  Goal: Minimal or absence of nausea and vomiting  Outcome: Progressing     Problem: Metabolic/Fluid and Electrolytes - Adult  Goal: Electrolytes maintained within normal limits  Outcome: Progressing

## 2024-10-15 NOTE — PLAN OF CARE
Problem: Chronic Conditions and Co-morbidities  Goal: Patient's chronic conditions and co-morbidity symptoms are monitored and maintained or improved  10/15/2024 0922 by Terese Rasmussen RN  Outcome: Progressing  10/15/2024 0041 by Angella Rees RN  Outcome: Progressing     Problem: Discharge Planning  Goal: Discharge to home or other facility with appropriate resources  Outcome: Progressing  Flowsheets (Taken 10/14/2024 2125 by Angella Rees RN)  Discharge to home or other facility with appropriate resources: Identify barriers to discharge with patient and caregiver     Problem: ABCDS Injury Assessment  Goal: Absence of physical injury  Outcome: Progressing     Problem: Skin/Tissue Integrity - Adult  Goal: Skin integrity remains intact  Outcome: Progressing  Flowsheets  Taken 10/15/2024 0042 by Angella Rees RN  Skin Integrity Remains Intact: Monitor for areas of redness and/or skin breakdown  Taken 10/14/2024 2125 by Angella Rees RN  Skin Integrity Remains Intact: Monitor for areas of redness and/or skin breakdown     Problem: Gastrointestinal - Adult  Goal: Minimal or absence of nausea and vomiting  10/15/2024 0922 by Terese Rasmussen RN  Outcome: Progressing  10/15/2024 0041 by Agnella Rees RN  Outcome: Progressing  Goal: Maintains or returns to baseline bowel function  Outcome: Progressing     Problem: Metabolic/Fluid and Electrolytes - Adult  Goal: Electrolytes maintained within normal limits  10/15/2024 0922 by Terese Rasmussen RN  Outcome: Progressing  10/15/2024 0041 by Angella Rees RN  Outcome: Progressing     Problem: Safety - Adult  Goal: Free from fall injury  Outcome: Progressing  Flowsheets (Taken 10/15/2024 0042 by Angella Rees RN)  Free From Fall Injury: Instruct family/caregiver on patient safety     Problem: Skin/Tissue Integrity  Goal: Absence of new skin breakdown  Description: 1.  Monitor for areas of redness and/or skin breakdown  2.  Assess vascular access sites hourly  3.  Every

## 2024-10-15 NOTE — FLOWSHEET NOTE
10/15/24 0815   Vital Signs   Temp 99.1 °F (37.3 °C)   Temp Source Oral   Pulse 99   Heart Rate Source Monitor   Respirations 18   BP (!) 130/51   MAP (Calculated) 77   BP Location Right upper arm   BP Method Automatic   Patient Position Semi fowlers   Pain Assessment   Pain Assessment None - Denies Pain   Opioid-Induced Sedation   POSS Score 1   RASS   [place] Agitation Sedation Scale (RASS) 0   Oxygen Therapy   SpO2 91 %   O2 Device None (Room air)     NPO and NG at 54 cm  Pt a/o. Am assessment completed see flow sheet. Pt denies any pain/ needs at this time. Call light within reach. Will continue to monitor.

## 2024-10-16 VITALS
HEART RATE: 68 BPM | SYSTOLIC BLOOD PRESSURE: 125 MMHG | BODY MASS INDEX: 20.94 KG/M2 | WEIGHT: 146.3 LBS | OXYGEN SATURATION: 99 % | RESPIRATION RATE: 14 BRPM | TEMPERATURE: 98.8 F | HEIGHT: 70 IN | DIASTOLIC BLOOD PRESSURE: 47 MMHG

## 2024-10-16 LAB
ANION GAP SERPL CALCULATED.3IONS-SCNC: 9 MMOL/L (ref 3–16)
BUN SERPL-MCNC: 12 MG/DL (ref 7–20)
CALCIUM SERPL-MCNC: 8 MG/DL (ref 8.3–10.6)
CHLORIDE SERPL-SCNC: 105 MMOL/L (ref 99–110)
CO2 SERPL-SCNC: 24 MMOL/L (ref 21–32)
CREAT SERPL-MCNC: 1.2 MG/DL (ref 0.6–1.2)
GFR SERPLBLD CREATININE-BSD FMLA CKD-EPI: 43 ML/MIN/{1.73_M2}
GLUCOSE BLD-MCNC: 152 MG/DL (ref 70–99)
GLUCOSE BLD-MCNC: 172 MG/DL (ref 70–99)
GLUCOSE BLD-MCNC: 196 MG/DL (ref 70–99)
GLUCOSE BLD-MCNC: 299 MG/DL (ref 70–99)
GLUCOSE SERPL-MCNC: 170 MG/DL (ref 70–99)
PERFORMED ON: ABNORMAL
POTASSIUM SERPL-SCNC: 3.6 MMOL/L (ref 3.5–5.1)
SODIUM SERPL-SCNC: 138 MMOL/L (ref 136–145)

## 2024-10-16 PROCEDURE — 6360000002 HC RX W HCPCS

## 2024-10-16 PROCEDURE — 6360000002 HC RX W HCPCS: Performed by: INTERNAL MEDICINE

## 2024-10-16 PROCEDURE — 36415 COLL VENOUS BLD VENIPUNCTURE: CPT

## 2024-10-16 PROCEDURE — 6370000000 HC RX 637 (ALT 250 FOR IP)

## 2024-10-16 PROCEDURE — 2580000003 HC RX 258: Performed by: INTERNAL MEDICINE

## 2024-10-16 PROCEDURE — 99232 SBSQ HOSP IP/OBS MODERATE 35: CPT

## 2024-10-16 PROCEDURE — 80048 BASIC METABOLIC PNL TOTAL CA: CPT

## 2024-10-16 RX ADMIN — FERROUS SULFATE TAB 325 MG (65 MG ELEMENTAL FE) 325 MG: 325 (65 FE) TAB at 09:11

## 2024-10-16 RX ADMIN — PRAVASTATIN SODIUM 20 MG: 10 TABLET ORAL at 09:11

## 2024-10-16 RX ADMIN — DEXTROSE AND SODIUM CHLORIDE: 5; 450 INJECTION, SOLUTION INTRAVENOUS at 05:49

## 2024-10-16 RX ADMIN — ENOXAPARIN SODIUM 40 MG: 100 INJECTION SUBCUTANEOUS at 09:12

## 2024-10-16 RX ADMIN — INSULIN LISPRO 2 UNITS: 100 INJECTION, SOLUTION INTRAVENOUS; SUBCUTANEOUS at 12:36

## 2024-10-16 RX ADMIN — PANTOPRAZOLE SODIUM 40 MG: 40 INJECTION, POWDER, FOR SOLUTION INTRAVENOUS at 09:12

## 2024-10-16 NOTE — PROGRESS NOTES
[facility]    PATIENT NAME: Whitney Meier     TODAY'S DATE: 10/12/2024    CHIEF COMPLAINT: none    INTERVAL HISTORY/HPI:    Pt continues to report small amounts of flatus but no BM.  NGT output decreasing.  Denies nausea, abdominal pain.     OBJECTIVE:  VITALS:  BP (!) 142/61   Pulse 78   Temp 97.3 °F (36.3 °C) (Oral)   Resp 18   Ht 1.778 m (5' 10\")   Wt 61.2 kg (134 lb 14.7 oz)   SpO2 96%   BMI 19.36 kg/m²     INTAKE/OUTPUT:    I/O last 3 completed shifts:  In: 270 [P.O.:270]  Out: 1900 [Urine:800; Emesis/NG output:1100]  No intake/output data recorded.    CONSTITUTIONAL:  awake and alert  LUNGS:  clear to auscultation  ABDOMEN:  hypoactive bowel sounds, soft, non-distended, non-tender     Data:  CBC:   Recent Labs     10/10/24  0234 10/11/24  0514 10/12/24  0518   WBC 17.8* 7.9 7.0   HGB 11.3* 10.3* 9.8*   HCT 33.6* 30.2* 28.2*    143 139     BMP:    Recent Labs     10/10/24  0234 10/11/24  0514 10/12/24  0518    141 143   K 4.0 3.7 3.7   CL 99 100 101   CO2 27 30 26   BUN 34* 33* 37*   CREATININE 1.1 1.3* 1.3*   GLUCOSE 235* 115* 68*     Hepatic:   Recent Labs     10/10/24  0234   AST 25   ALT 19   BILITOT 0.6   ALKPHOS 61     Mag:    No results for input(s): \"MG\" in the last 72 hours.   Phos:   No results for input(s): \"PHOS\" in the last 72 hours.   INR: No results for input(s): \"INR\" in the last 72 hours.    Radiology Review:  AXR pending      ASSESSMENT AND PLAN:  SBO, awaiting resolution/improvement with conservative therapy   - cont NGT   - check AXR   - if bowel function increases, should be able to d/c NGT and advance diet; if no improvement or worsening, then will consider surgical intervention    Electronically signed by JOHNNY ABDULLAHI MD     
About an hour ago, Pt started to c/o constant nausea again. I unclapmed her tube for now. Hospitalist and Surgery have been notified.  
Ambulated patient with gait belt and walker in hallway. Tolerated well.  
Bedside Mobility Assessment Tool (BMAT):     Assessment Level 1- Sit and Shake    1. From a semi-reclined position, ask patient to sit up and rotate to a seated position at the side of the bed. Can use the bedrail.    2. Ask patient to reach out and grab your hand and shake making sure patient reaches across his/her midline.   Pass- Patient is able to come to a seated position, maintain core strength. Maintains seated balance while reaching across midline. Move on to Assessment Level 2.     Assessment Level 2- Stretch and Point   1. With patient in seated position at the side of the bed, have patient place both feet on the floor (or stool) with knees no higher than hips.    2. Ask patient to stretch one leg and straighten the knee, then bend the ankle/flex and point the toes. If appropriate, repeat with the other leg.   Pass- Patient is able to demonstrate appropriate quad strength on intended weight bearing limb(s). Move onto Assessment Level 3.     Assessment Level 3- Stand   1. Ask patient to elevate off the bed or chair (seated to standing) using an assistive device (cane, bedrail).    2. Patient should be able to raise buttocks off be and hold for a count of five. May repeat once.   Pass- Patient maintains standing stability for at least 5 seconds, proceed to assessment level 4.    Assessment Level 4- Walk   1. Ask patient to march in place at bedside.    2. Then ask patient to advance step and return each foot. Some medical conditions may render a patient from stepping backwards, use your best clinical judgement.   Fail- Patient not able to complete tasks OR requires use of assistive device. Patient is MOBILITY LEVEL 3.       Mobility Level- 3   
Bedside report and transfer of care given to CASIMIRO Thomas. Pt currently resting in bed with the call light within reach. Pt denies any other care needs at this time. Pt stable at this time.  CASIMIRO Brower  
Dr Taylor aware of k+ 3.3, nocturnist ordered po k+ but that pt is unable to take. She did attempt 1/2 of a 20meq tab and was unable to take any more. Dr Taylor aware. New order for IV k+ replacement 20meq x1 dose.  
General Surgery  Daily Progress Note    Pt Name: Whitney Meier  Medical Record Number: [id number]  Date of Birth 1935   Today's Date: 10/15/2024  Admit date: 10/10/2024  LOS: Day 5    SUBJECTIVE  Feels well overall. Reports a few episodes of nausea yesterday afternoon and overnight and had to be given antiemetics and placed back to suction.       OBJECTIVE  Vitals:    10/14/24 1855 10/14/24 2117 10/15/24 0125 10/15/24 0610   BP: (!) 132/51 (!) 143/65 (!) 143/67    Pulse:  98 100    Resp:  16 16    Temp:  97.7 °F (36.5 °C) 98.9 °F (37.2 °C)    TempSrc:  Oral Oral    SpO2:  92% 91%    Weight:    66.1 kg (145 lb 11.2 oz)   Height:           Gen: No distress. Alert.   Resp: Normal rate. Easy and unlabored. No accessory muscle use.   CV: Regular rate. Regular rhythm.   GI: Non-tender. Soft, Non-distended. +bowel sounds.  Skin: Warm and dry. No nodule or rash on exposed extremities.       I/O last 3 completed shifts:  In: 312 [P.O.:312]  Out: 500 [Urine:350; Emesis/NG output:150]  No intake/output data recorded.    LABS  CBC:   Recent Labs     10/13/24  0457   WBC 5.6   HGB 9.1*   HCT 26.5*   MCV 98.1   *     BMP:   Recent Labs     10/14/24  0732 10/14/24  0929 10/15/24  0610   * 137 140   K 3.0* 3.3* 3.3*    100 104   CO2 24 28 25   BUN 16 16 13   CREATININE 0.9 0.9 1.0     LIVER PROFILE:   No results for input(s): \"AST\", \"ALT\", \"LIPASE\", \"AMYLASE\", \"BILIDIR\", \"BILITOT\", \"ALKPHOS\" in the last 72 hours.    Invalid input(s): \"ALB\"    PT/INR: No results for input(s): \"PROTIME\", \"INR\" in the last 72 hours.  APTT: No results for input(s): \"APTT\" in the last 72 hours.  UA:  No results for input(s): \"NITRITE\", \"COLORU\", \"PHUR\", \"LABCAST\", \"WBCUA\", \"RBCUA\", \"MUCUS\", \"TRICHOMONAS\", \"YEAST\", \"BACTERIA\", \"CLARITYU\", \"SPECGRAV\", \"LEUKOCYTESUR\", \"UROBILINOGEN\", \"BILIRUBINUR\", \"BLOODU\", \"GLUCOSEU\", \"AMORPHOUS\" in the last 72 hours.    Invalid input(s): \"KETONESU\"        IMAGING  XR ABDOMEN FOR NG/OG/NE 
General Surgery  Daily Progress Note    Pt Name: Whitney Meier  Medical Record Number: [id number]  Date of Birth 1935   Today's Date: 10/14/2024  Admit date: 10/10/2024  LOS: Day 4    SUBJECTIVE  Passing gas and stool. No abdominal pain.       OBJECTIVE  Vitals:    10/13/24 1602 10/13/24 2000 10/13/24 2330 10/14/24 0423   BP: (!) 150/61 (!) 159/66 (!) 159/66 (!) 157/65   Pulse: 63 75 69 74   Resp: 16 16 16 16   Temp: 97.1 °F (36.2 °C) 96.9 °F (36.1 °C) 98.5 °F (36.9 °C) 98.2 °F (36.8 °C)   TempSrc: Oral Oral Oral Oral   SpO2: 96% 94% 98% 97%   Weight:    64.7 kg (142 lb 11.2 oz)   Height:           Gen: No distress. Alert.   Resp: Normal rate. Easy and unlabored. No accessory muscle use.   CV: Regular rate. Regular rhythm.   GI: Non-tender. Soft, Non-distended. +Active bowel sounds.  Skin: Warm and dry. No nodule or rash on exposed extremities.       I/O last 3 completed shifts:  In: 2261 [P.O.:150; I.V.:1915; IV Piggyback:195.9]  Out: 1250 [Urine:600; Emesis/NG output:650]  No intake/output data recorded.    LABS  CBC:   Recent Labs     10/12/24  0518 10/13/24  0457   WBC 7.0 5.6   HGB 9.8* 9.1*   HCT 28.2* 26.5*   MCV 98.8 98.1    130*     BMP:   Recent Labs     10/12/24  0518 10/13/24  0457 10/14/24  0732    141 133*   K 3.7 3.3* 3.0*    104 100   CO2 26 25 24   BUN 37* 26* 16   CREATININE 1.3* 1.0 0.9     LIVER PROFILE:   No results for input(s): \"AST\", \"ALT\", \"LIPASE\", \"AMYLASE\", \"BILIDIR\", \"BILITOT\", \"ALKPHOS\" in the last 72 hours.    Invalid input(s): \"ALB\"    PT/INR: No results for input(s): \"PROTIME\", \"INR\" in the last 72 hours.  APTT: No results for input(s): \"APTT\" in the last 72 hours.  UA:  No results for input(s): \"NITRITE\", \"COLORU\", \"PHUR\", \"LABCAST\", \"WBCUA\", \"RBCUA\", \"MUCUS\", \"TRICHOMONAS\", \"YEAST\", \"BACTERIA\", \"CLARITYU\", \"SPECGRAV\", \"LEUKOCYTESUR\", \"UROBILINOGEN\", \"BILIRUBINUR\", \"BLOODU\", \"GLUCOSEU\", \"AMORPHOUS\" in the last 72 hours.    Invalid input(s): 
Handoff report and transfer of care given at bedside to ziyad .  Patient in stable condition, denies needs/concerns at this time.  Call light within reach.     
Handoff report given to 2 West RN.  Patient is in stable condition and has no needs at this time. Call light is in reach and bed is in the lowest position.  Care is transferred at this time.    
Handoff report given to CASIMIRO Scales. Care transferred.   
IM Progress Note    Admit Date:  10/10/2024    Patient admitted with small bowel obstruction  Seen by general surgery  NG removed.  SBFT yesterday, 10/15, negative.  Tolerating liquid diet.    Subjective:  Ms. Meier appears thin and frail.  Feeling good, no nausea or vomiting.  Ate pudding and cereal for breakfast without any issues.  Ready to go home.      Objective:   BP (!) 109/49   Pulse 83   Temp 98.7 °F (37.1 °C) (Oral)   Resp 16   Ht 1.778 m (5' 10\")   Wt 66.4 kg (146 lb 4.8 oz)   SpO2 92%   BMI 20.99 kg/m²     Intake/Output Summary (Last 24 hours) at 10/16/2024 0945  Last data filed at 10/16/2024 0747  Gross per 24 hour   Intake 492 ml   Output --   Net 492 ml         Physical Exam:  General:  Awake, alert, NAD  Thin and frail  NG in place.  Skin:  Warm and dry  Neck:  JVD absent. Neck supple  Chest:  Clear to auscultation, respiration easy. No wheezes, rales or rhonchi.   Cardiovascular:  RRR ,S1S2 normal  Abdomen:  Soft, non tender, non distended.  Extremities:  No edema.  Intact peripheral pulses. Brisk cap refill, < 2 secs  Neuro: non focal      Medications:   Scheduled Meds:   pantoprazole (PROTONIX) 40 mg in sodium chloride (PF) 0.9 % 10 mL injection  40 mg IntraVENous Daily    sodium chloride flush  10 mL IntraVENous 2 times per day    enoxaparin  40 mg SubCUTAneous Daily    insulin lispro  0-4 Units SubCUTAneous 4 times per day    sacubitril-valsartan  0.5 tablet Oral BID    pravastatin  20 mg Oral Daily    ferrous sulfate  325 mg Oral BID    carvedilol  6.25 mg Oral BID WC    [Held by provider] clopidogrel  75 mg Oral Daily    [Held by provider] aspirin  81 mg Oral Daily       Continuous Infusions:   dextrose 5 % and 0.45 % NaCl 75 mL/hr at 10/16/24 0549    sodium chloride      dextrose         Data:  CBC:   No results for input(s): \"WBC\", \"RBC\", \"HGB\", \"HCT\", \"MCV\", \"RDW\", \"PLT\" in the last 72 hours.    BMP:   Recent Labs     10/14/24  0929 10/15/24  0610 10/16/24  0516    140 138 
IV removed and discharge instructions completed. All questions were answered to patients satisfaction.   Request for transport placed, all personal belongs packed. No further needs noted.    
Inpatient Occupational Therapy Evaluation and Treatment    Unit: PCU  Date:  10/10/2024  Patient Name:    Whitney Meier  Admitting diagnosis:  SBO (small bowel obstruction) ([facility]) [K56.609]  Abdominal pain, unspecified abdominal location [R10.9]  Leukocytosis, unspecified type [D72.829]  Nausea and vomiting, unspecified vomiting type [R11.2]  Admit Date:  10/10/2024  Precautions/Restrictions/WB Status/ Lines/ Wounds/ Oxygen: Fall risk, Bed/chair alarm, Lines (IV, Supplemental O2 (1L), external catheter, and NG tube), and Telemetry, wears B postop shoes (hx several foot partial amputations)    O2 discontinued during session with RN approval, SpO2 96% on RA    Pt seen for cotreatment this date due to limited functional status information    Treatment Time:  1425-1510  Treatment Number:  1  Timed Code Treatment Minutes: 35 minutes  Total Treatment Minutes:  45  minutes    Patient Goals for Therapy: \"go home \"          Discharge Recommendations: Home with 24/7 assist  and Home OT  DME needs for discharge: Needs Met       Therapy recommendations for staff:   Assist of 1 for transfers with use of rolling walker (RW) and gait belt to/from BSC  to/from chair, encourage OOB and marching in place as tolerated    History of Present Illness: per Dr Graff Consult (Surgery) 10/10/24:  \"Whitney Meier is a 88 y.o. female who presented with nausea and vomiting. Patient is very pleasant, alert and oriented for the most part but she is a questionable historian. She lives with her daughter who is not currently at bedside. Per patient, she is not quite sure why she is here but states she was vomiting at home which is why her daughter felt she should be evaluated. She denies abdominal pain for me but per ED provider note, she reported some lower abdominal pain at home which quickly improved. She cannot recall her last flatus or stool but feels it happened recently.   Denies fever, chills, chest pain, sob, issues with urination. 
Inpatient Physical Therapy Treatment    Unit: PCU  Date:  10/13/2024  Patient Name:    Whitney Meier  Admitting diagnosis:  SBO (small bowel obstruction) ([facility]) [K56.609]  Abdominal pain, unspecified abdominal location [R10.9]  Leukocytosis, unspecified type [D72.829]  Nausea and vomiting, unspecified vomiting type [R11.2]  Admit Date:  10/10/2024  Precautions/Restrictions/WB Status/ Lines/ Wounds/ Oxygen: Fall risk, Bed/chair alarm, Lines (IV, external catheter, and NG tube), and Telemetry      Treatment Time:  1654 - 1718  Treatment Number:  2  Timed Code Treatment Minutes: 24 minutes  Total Treatment Minutes:  24  minutes    Patient Stated Goals for Therapy: not stated.            Discharge Recommendations: Home with 24hr supervision (initially)  DME needs for discharge: Needs Met         Therapy recommendation for EMS Transport: can transport by wheelchair     Therapy recommendations for staff:   Assist of 1 for ambulation with use of rolling walker (RW) and gait belt within room     History of Present Illness:   88 y.o. female with a pmhx of CAD with previous NSTEMI 12/2023, ICM EF 35%, CVA in 12/2023, DM, HTN, CKD and carotid artery disease who presented with nausea and vomiting.  +SBO, GI consult suggests conservative management     Social/Functional History (copied from 2023 PT note from [facility] and confirmed today)  Lives With: Daughter (Son in law)  Type of Home: House  Home Layout: Two level, Able to Live on Main level with bedroom/bathroom  Home Access: Stairs to enter without rails  Entrance Stairs - Number of Steps: 3 HAZEL- wider steps  Bathroom Shower/Tub: None (sponge bathes at baseline)  Bathroom Toilet: Standard  Bathroom Equipment: 3-in-1 commode  Home Equipment: Walker, rolling, Wheelchair-manual  Has the patient had two or more falls in the past year or any fall with injury in the past year?: No  Receives Help From: Family   ADL Assistance: Independent  Homemaking Responsibilities: No (daughter 
NG clamped to give am meds.   
Occupational Therapy  Refused therapy per PT prior to DC home today. Will continue to follow if patient remains hospitalized.  Mounika Pena, OTR/L 0606    
Occupational Therapy Attempt  Attempted to see patient this date. Patient on hold AM per RN, between Xrays and fatigued/resting in bed. Will continue to follow as patient status allows.    Corrina Biswas, OTR/L    
Occupational Therapy/Physical Therapy Attempt  Attempted to see patient this date. Patient cleared for treatment by RN. Pt stated that she was tired after this morning's procedures and wanted to rest and not do anything this afternoon. Will continue to follow as patient status allows.    Corrina Biswas, OTR/L  Will Harris, PT, DPT       
PM assessment completed. Scheduled medications given per MAR. VSS room air, A/O x4. Patient does not appear in distress and denies any needs at this time. Call light in reach, will monitor, bed alarmon.     
Patient admitted to room 322-2 from ED. Patient oriented to room, call light, bed rails, phone, lights and bathroom. Patient instructed about the schedule of the day including: vital sign frequency, lab draws, possible tests, frequency of MD and staff rounds, daily weights, I &O's and prescribed diet.  Telemetry box in place, patient aware of placement and reason. Bed locked, in lowest position, side rails up 2/4, call light within reach.        Recliner Assessment  Patient is able to demonstrate the ability to move from a reclining position to an upright position within the recliner.       4 Eyes Skin Assessment     NAME:  Whitney Meier  YOB: 1935  MEDICAL RECORD NUMBER:  [id number]    The patient is being assessed for  Admission    I agree that at least one RN has performed a thorough Head to Toe Skin Assessment on the patient. ALL assessment sites listed below have been assessed.      Areas assessed by both nurses:    Head, Face, Ears, Shoulders, Back, Chest, Arms, Elbows, Hands, Sacrum. Buttock, Coccyx, Ischium, Legs. Feet and Heels, Under Medical Devices , and Other          Does the Patient have a Wound? No noted wound(s)       Shukri Prevention initiated by RN: No  Wound Care Orders initiated by RN: No    Pressure Injury (Stage 3,4, Unstageable, DTI, NWPT, and Complex wounds) if present, place Wound referral order by RN under : No    New Ostomies, if present place, Ostomy referral order under : No     Nurse 1 eSignature: Electronically signed by Juhi Stanton RN on 10/10/24 at 8:39 PM EDT    **SHARE this note so that the co-signing nurse can place an eSignature**    Nurse 2 eSignature: {Esignature:[id number]}     
Patient transferred from PCU to room 217. Tele in place. Vitals obtained. BP elevated at 174/67, Dr. Plascencia notified via perfect serve. NG clamped due to no nausea at this time. Instructed patient to call for nausea, as NG can be placed to suction PRN. Patient verbalized understanding. Call light within reach. Bed alarm active.   
Physical Therapy Attempt    Pt declining skilled PT at this time, stating she is going home soon and does not want to get up. Will continue to follow. Re-attempt as appropriate and schedule permits.    Shana Bryant, PT, DPT    
Pt A/Ox4, awake in bed, denies any pain or nausea. Pt's /40 held pt's Entresto,will monitor BP. PM assessment completed, PM medications given, bed locked, bed alarm on, call light in reach.  
Pt able to get up out of bed and walk to hallway. Turned around and walked back to the chair. Reports that she does not feel she can walk further. Up in chair. 800ml of green bile emptied from NG canister. Pt continues to denies any n/v. Currently clamped d/t receiving po medications.  
Pt reconnected to suction as she is requesting to ambulate later.   
Pt up and ambulated in hallway. Did well. No passing gas this am nor bm this shift. 125ml of green bile to NG suction canister. Up to chair currently. Call light and bedside table within reach.  
Pt up to walk with therapy. NG clamped d/t giving of medications and to allow pt to ambulate. IVF reconnected. UP in chair. 175ml of green bile with some dark green flecks noted. Continues to deny any n/v or pain. Call light and bedside table within reach.  
Red sweater and purple flowered pj pants sent down to laundry to be washed.   
Shift assessment completed. Vital signs stable. Call light in reach and standard safety measures in place. Pt resting in bed; no needs or complaints at this time.    
Shift reassessment completed; no change in Pt status noted. Vital signs stable. No needs or complaints at this time.    
[facility] Vascular Access  Ultrasound Guided Peripheral Insertion Procedure Note.     Whitney Meier   Admitted- 10/10/2024  2:05 AM  Admission diagnosis- SBO (small bowel obstruction) ([facility]) [K56.609]  Abdominal pain, unspecified abdominal location [R10.9]  Leukocytosis, unspecified type [D72.829]  Nausea and vomiting, unspecified vomiting type [R11.2]      Attending Physician- Bren Plascencia,*  Ordering Physician- Dr. Bren Plascencia  Indication for Insertion: Limited Access          USG PIV placed to left upper arm using sterile technique. Brisk blood return noted, line flushes with ease. Patient tolerated well. Bed returned to lowest position, call light within reach. See US images below.              
  Nasogastric tube projects at the abdominal left upper quadrant, at the   expected location of stomach.         CT ABDOMEN PELVIS W IV CONTRAST Additional Contrast? None   Final Result   High-grade small bowel obstruction which appears to be located in the central   lower abdomen.  Stomach is significantly distended and nasogastric tube   placement should be considered.      Other nonacute findings as above.         XR ABDOMEN (2 VIEWS)    (Results Pending)         ASSESSMENT:  Whitney Meier is a 88 y.o. female with a pmhx of CAD with previous NSTEMI 12/2023, ICM EF 35%, CVA in 12/2023, DM, HTN, CKD and carotid artery disease who presented with nausea and vomiting.     CT 10/10 reviewed. Shows dilated and fluid filled loops of SB with transition to decompressed SB in the right mid-low abdomen. No pneumoperitoneum, pneumatosis or portal venous gas.      Small bowel obstruction likely 2/2 adhesive disease      PLAN:  Awaiting AXR this morning - However, although she is passing flatus it is only a small amount and NG output is quite high. Unless AXR is completely normal will likely continue current care - Will reevaluate later today  Goal for conservative management - if she fails to improve or clinically worsens will need to consider further imaging or more urgent surgical intervention  NG to CLWS - okay to clamp with activity and meds  Diet NPO - can have a few ice chips  IV hydration  Serial abdominal exams and AXR  Encourage ambulation and up in chair as tolerated - PTOT consulted   Does not need antibiotics from our perspective   PRN pain control and antiemetics  DVT prophylaxis with Lx      Dispo: keagan Davis PA-C  10/11/2024 8:03 AM    I have personally performed a face to face diagnostic evaluation on this patient and agree with the progress note and care plan of HOWIE Jade. More than half of the time spent on this encounter was completed by me including the history, physical examination 
\"BNP\" in the last 72 hours.  PT/INR: No results for input(s): \"PROTIME\", \"INR\" in the last 72 hours.  APTT: No results for input(s): \"APTT\" in the last 72 hours.  CARDIAC ENZYMES: No results for input(s): \"CKMB\", \"CKMBINDEX\", \"TROPONINI\" in the last 72 hours.    Invalid input(s): \"CKTOTAL;3\"  FASTING LIPID PANEL:  Lab Results   Component Value Date    CHOL 118 01/16/2024    HDL 58 01/16/2024    TRIG 42 01/16/2024     LIVER PROFILE:   Recent Labs     10/10/24  0234   AST 25   ALT 19   BILIDIR 0.2   BILITOT 0.6   ALKPHOS 61          Cultures  Results       Procedure Component Value Units Date/Time    Culture, Blood 2 [[id number]] Collected: 10/10/24 0725    Order Status: Completed Specimen: Blood Updated: 10/11/24 0815     Culture, Blood 2 No Growth to date.  Any change in status will be called.    Narrative:      ORDER#: [id number]                          ORDERED BY: JIGAR SOLIMAN  SOURCE: Blood R wrist                      COLLECTED:  10/10/24 07:25  ANTIBIOTICS AT VENICE.:                      RECEIVED :  10/10/24 15:58  If child <=2 yrs old please draw pediatric bottle.~Blood Culture #2    Culture, Blood 1 [[id number]] Collected: 10/10/24 0702    Order Status: Completed Specimen: Blood Updated: 10/11/24 0715     Blood Culture, Routine No Growth to date.  Any change in status will be called.    Narrative:      ORDER#: [id number]                          ORDERED BY: JIGAR SOLIMAN  SOURCE: Blood right ac                     COLLECTED:  10/10/24 07:02  ANTIBIOTICS AT VENICE.:                      RECEIVED :  10/10/24 07:05  If child <=2 yrs old please draw pediatric bottle.~Blood Culture 1              Radiology  XR ABDOMEN (2 VIEWS)   Final Result   1. NG tube in place with tip and side port overlying the left upper quadrant,   likely lying within the stomach.   2. Persistent but improving small bowel obstruction.   3. No evidence of free air.         XR CHEST PORTABLE   Final Result   No acute cardiopulmonary process.    
exam still not improved, will have to consider possible surgical intervention to clear the obstruction   - patient appears to understand these issues, asks appropriate questions, and agrees with the plan.    Electronically signed by JOHNNY ABDULLAHI MD     
results for input(s): \"BNP\" in the last 72 hours.  PT/INR: No results for input(s): \"PROTIME\", \"INR\" in the last 72 hours.  APTT: No results for input(s): \"APTT\" in the last 72 hours.  CARDIAC ENZYMES: No results for input(s): \"CKMB\", \"CKMBINDEX\", \"TROPONINI\" in the last 72 hours.    Invalid input(s): \"CKTOTAL;3\"  FASTING LIPID PANEL:  Lab Results   Component Value Date    CHOL 118 01/16/2024    HDL 58 01/16/2024    TRIG 42 01/16/2024     LIVER PROFILE:   No results for input(s): \"AST\", \"ALT\", \"BILIDIR\", \"BILITOT\", \"ALKPHOS\" in the last 72 hours.    Invalid input(s): \"ALB\"         Cultures  Results       Procedure Component Value Units Date/Time    Culture, Blood 2 [[id number]] Collected: 10/10/24 0725    Order Status: Completed Specimen: Blood Updated: 10/14/24 0815     Culture, Blood 2 No Growth after 4 days of incubation.    Narrative:      ORDER#: [id number]                          ORDERED BY: JIGAR SOLIMAN  SOURCE: Blood R wrist                      COLLECTED:  10/10/24 07:25  ANTIBIOTICS AT VENICE.:                      RECEIVED :  10/10/24 15:58  If child <=2 yrs old please draw pediatric bottle.~Blood Culture #2    Culture, Blood 1 [[id number]] Collected: 10/10/24 0702    Order Status: Completed Specimen: Blood Updated: 10/14/24 0715     Blood Culture, Routine No Growth after 4 days of incubation.    Narrative:      ORDER#: [id number]                          ORDERED BY: JIGAR SOLIMAN  SOURCE: Blood right ac                     COLLECTED:  10/10/24 07:02  ANTIBIOTICS AT VENICE.:                      RECEIVED :  10/10/24 07:05  If child <=2 yrs old please draw pediatric bottle.~Blood Culture 1              Radiology  XR ABDOMEN (2 VIEWS)   Final Result   Nonspecific bowel gas pattern with improvement from prior exam.         XR ABDOMEN (2 VIEWS)   Final Result   New moderate distension of several loops of small bowel in the mid abdomen in   keeping with small bowel obstruction or ileus.         XR ABDOMEN (2 
small-bowel obstruction.         XR ABDOMEN FOR NG/OG/NE TUBE PLACEMENT   Final Result   Nasogastric tube tip and side port seen along the proximal and mid stomach   with questionable moderate gaseous distention the stomach.      Nonspecific gas pattern.         XR ABDOMEN (2 VIEWS)   Final Result   Nonspecific bowel gas pattern with improvement from prior exam.         XR ABDOMEN (2 VIEWS)   Final Result   New moderate distension of several loops of small bowel in the mid abdomen in   keeping with small bowel obstruction or ileus.         XR ABDOMEN (2 VIEWS)   Final Result   1. NG tube in place with tip and side port overlying the left upper quadrant,   likely lying within the stomach.   2. Persistent but improving small bowel obstruction.   3. No evidence of free air.         XR CHEST PORTABLE   Final Result   No acute cardiopulmonary process.         XR ABDOMEN (KUB) (SINGLE AP VIEW)   Final Result   Nasogastric tube projects at the abdominal left upper quadrant, at the   expected location of stomach.         CT ABDOMEN PELVIS W IV CONTRAST Additional Contrast? None   Final Result   High-grade small bowel obstruction which appears to be located in the central   lower abdomen.  Stomach is significantly distended and nasogastric tube   placement should be considered.      Other nonacute findings as above.               ASSESSMENT:  Whitney Meier is a 88 y.o. female with a pmhx of CAD with previous NSTEMI 12/2023, ICM EF 35%, CVA in 12/2023, DM, HTN, CKD and carotid artery disease who presented with nausea and vomiting.     Small bowel obstruction likely 2/2 adhesive disease - clinically and radiographically resolved    SBFT negative yesterday  NG was removed   No further nausea  Tolerating liquid diet   Passing gas and stool      PLAN:  Can follow up with us PRN  Advance to soft diet and continue for a few days - ADAT once home  Continue to encourage activity as tolerated      Dispo: okay to discharge from 
  Supine to Sit:    SBA  Sit to Supine:   Not Tested  Rolling:   Not Tested   Scooting in sitting: SBA   Scooting in supine:  Not Tested   Bridging:  Not Tested  Comments: slow     Transfer Training     Sit to stand:   Min A  from elevated EOB     CGA from recliner  Stand to sit:   SBA   Bed to/from Chair:  SBA with use of rolling walker (RW)  Comments:     Gait gait deferred due to dizziness and patient concern for fatigue; pt ambulated 0 ft.     Stair Training deferred, pt unsafe/ not appropriate to complete stairs at this time    Therapeutic Exercises Initiated  all completed bilaterally unless indicated  Supine:  N/A    Seated:  N/A    Standing:  Marching: 2 x 10 reps    Positioning Needs   Pt up in chair, alarm set, positioned in proper neutral alignment and pressure relief provided.   Call light provided and all needs within reach  RN aware of pt position/status    Other Activities  Pt donned shoes while in bed.     Patient/Family Education   Pt educated on role of inpatient PT, POC, importance of continued activity, calling for assist with mobility.    Assessment  Pt seen today for physical therapy Evaluation & Treatment. Pt lives with family and is normally indep with household mobility and ADLs. She has 24/7 sup (dtr works from home). Today tolerated a few bouts of 2-3 minute standing activity but declined ambulation. She moves generally bradykinetic. Will benefit from cont skilled PT to progress activity and promote full functional return.     Recommending Home with initial 24//7 sup upon discharge as patient functioning close to baseline level.    Goals :   To be met in 3 visits:  1). Independent with LE Ex x 10 reps  2). Sit to/from stand: SBA  3). Bed to chair: Supervision    To be met in 6 visits:  1).  Supine to/from sit: Independent  2).  Sit to/from stand: Modified Independent  3).  Bed to chair: Modified Independent  4).  Gait: Ambulate 50 ft.  with Supervision and use of LRAD (least restrictive 
Trachea midline. Normal thyroid.   Resp: No accessory muscle use. No crackles. No wheezes. No rhonchi. No dullness on percussion.  CV: Regular rate. Regular rhythm. No murmur or rub. No edema.   GI: Non-tender. Non-distended. No masses. No organomegaly. Normal bowel sounds. No hernia             Assessment:  Principal Problem:    SBO (small bowel obstruction) (HCC)  Active Problems:    HTN (hypertension), benign    DM2 (diabetes mellitus, type 2) (HCC)    Chronic kidney disease    History of CVA (cerebrovascular accident)    Cardiomyopathy (HCC)    SIRS (systemic inflammatory response syndrome) (HCC)    Leukocytosis    Abdominal pain    Nausea and vomiting  Resolved Problems:    * No resolved hospital problems. *    Plan:    SBO  - CT abd/pelvis shows high-grade SBO which appears to be located in the central lower abdomen   - NG tube placed in ED  - IVF, prn pain control and antiemetics   - general surgery consulted, goal for conservative management   -NG clammed, liquid trial yesterday 10/14, nausea overnight, placed back to suction.  -plan for SBFT today, 10/15.  -downgrade to NPO until SBFT results.      SIRS - likely reactive   - Criteria: +HR, +WBC  - UA w/o infection   - Blood cx neg  - CXR - negative  - monitor vitals and labs  - mgmt as above, will hold off on abx for now     Hypokalemia.  -K 3.3.  -PRN replacement.     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 
and BB   - holding asa and Plavix for possible need of surgery      Chronic HFrEF  Ischemic cardiomyopathy   - last echo on 3/20/24 with EF of 35-40%  - on Coreg,  entresto   - monitor daily weights and I/Os     DM type 2  - SSI  - carb control diet  - monitor BG      HTN  - on Coreg  - monitor BP      CKD stage 3b  - appears improved compared to baseline Cr ~1.2-1.4  - monitor BMP       DVT Prophylaxis: Lovenox   Diet: Diet NPO Exceptions are: Ice Chips, Sips of Water with Meds  Code Status: Full Code       ALISON HAMM MD   10/13/2024 9:50 AM            
while on IVF  - monitor daily weights and I/Os     DM type 2  - SSI  - carb control diet  - monitor BG      HTN  - on Coreg  - monitor BP      CKD stage 3b  - appears improved compared to baseline Cr ~1.2-1.4  - monitor BMP         DVT Prophylaxis: Lovenox   Diet: Diet NPO Exceptions are: Ice Chips, Sips of Water with Meds  Code Status: Full Code         ALISON HAMM MD   10/12/2024 10:32 AM

## 2024-10-16 NOTE — FLOWSHEET NOTE
10/16/24 0900   Vital Signs   Pulse 83   Heart Rate Source Monitor   Respirations 16   BP (!) 109/49   MAP (Calculated) 69   Patient Position Semi fowlers   Pain Assessment   Pain Assessment None - Denies Pain   Oxygen Therapy   SpO2 92 %   O2 Device None (Room air)       Shift assessment complete. See flow sheet. Scheduled meds given. See MAR.  Patients head-toe complete, VS are logged, and active bowel sound noted in all four quadrants.    Pt sitting up in bed respirations easy and unlabored. No s/s of distress.Alert and oriented denies pain or SOB. Pt stable. No NV     No further needs  noted at this time. Call light and bedside table are within reach. The bed is locked and is in the lowest position.        Cali Murray RN

## 2024-10-16 NOTE — CARE COORDINATION
Met with pt at bedside. Pt plans to DC home with her family. Pt daughter to transport pt home. No further DC or DME needs identified.     IMM- 10/16    O2- 92% RA  
Met with pt who has NG tube. It was clamped today. Pt will DC home with family. Pt has DME. Likely NN.     Pt remains on RA  
other family members/significant others, and if so, who? Yes (Daughter.)  Plans to Return to Present Housing: Yes  Other Identified Issues/Barriers to RETURNING to current housing: Medical complications.  Potential Assistance needed at discharge: N/A            Potential DME:    Patient expects to discharge to: House  Plan for transportation at discharge: Pt stated that her family will be able to transport.      Financial    Payor: MEDICARE / Plan: MEDICARE PART A AND B / Product Type: *No Product type* /     Does insurance require precert for SNF: No    Potential assistance Purchasing Medications: No  Meds-to-Beds request:        [id number] - [place], [place] - [address] [phone] - F [phone]  [address] [address]  Phone: [phone] Fax: [phone]      Notes:    Factors facilitating achievement of predicted outcomes: Family support, Cooperative, and Pleasant    Barriers to discharge: Medical complications    Additional Case Management Notes: RYAN met with pt at bedside. Pt stated that she lives at home with her daughter and son-in-law. Pt stated that she uses a Rolator at home. Pt stated that she has no home health services and is independent with ADLs.  SWK discussed DC plans with pt. Pt stated that she plans to DC back home. Pt stated that her family will be able to transport.   SWK discussed whether pt had any further CM needs at this time to which pt stated they did not have any further needs.     The Plan for Transition of Care is related to the following treatment goals of SBO (small bowel obstruction) (HCC) [K56.609]  Abdominal pain, unspecified abdominal location [R10.9]  Leukocytosis, unspecified type [D72.829]  Nausea and vomiting, unspecified vomiting type [R11.2]    IF APPLICABLE: The Patient and/or patient representative Whitney and her family were provided with a choice of provider and agrees with the discharge plan. Freedom of choice list with basic

## 2024-10-16 NOTE — PLAN OF CARE
Problem: Chronic Conditions and Co-morbidities  Goal: Patient's chronic conditions and co-morbidity symptoms are monitored and maintained or improved  10/15/2024 2138 by Angella Rees RN  Outcome: Progressing    Problem: Gastrointestinal - Adult  Goal: Minimal or absence of nausea and vomiting  10/15/2024 2138 by Angella Rees RN  Outcome: Progressing    Problem: Gastrointestinal - Adult  Goal: Maintains or returns to baseline bowel function  10/15/2024 2138 by Angella Rees RN  Outcome: Progressing

## 2024-10-16 NOTE — DISCHARGE SUMMARY
Name:  Whitney Meier  Room:  0217/0217-02  MRN:    [id number]    Discharge Summary      This discharge summary is in conjunction with a complete physical exam done on the day of discharge.      Discharging Physician: Dr. Plascencia      Admit: 10/10/2024  Discharge:  10/16/2024    Diagnoses this Admission    Principal Problem:    SBO (small bowel obstruction) (HCC)  Active Problems:    HTN (hypertension), benign    DM2 (diabetes mellitus, type 2) (HCC)    Chronic kidney disease    History of CVA (cerebrovascular accident)    Cardiomyopathy (HCC)    SIRS (systemic inflammatory response syndrome) (HCC)    Leukocytosis    Abdominal pain    Nausea and vomiting  Resolved Problems:    * No resolved hospital problems. *      Procedures (Please Review Full Report for Details)  none    Consults    IP CONSULT TO GENERAL SURGERY  IP CONSULT TO VASCULAR ACCESS TEAM      HPI:    The patient is a 88 y.o. female with PMH of CVA of left ICA, CAD w/hx of NSTEMI, ischemic cardiomyopathy, DM, HTN, and CKD stage 3 who presented to [facility] ED with complaint of emesis. Pt is a poor historian. She states she was brought in for multiple episodes of vomiting. She is unsure when this started. She denies any abd pain. She states she has hx of an appendectomy and hysterectomy in the past. She states her only discomfort is with the NG tube. She denies CP, SOB, fever, chills, abd pain, dysuria, or dizziness.      Per the ED note, sx have been going on for about 12 day. Pt is unsure of her last BM. She states she was having some lower abd pain earlier but is it gone now and she cannot describe how the pain felt.     Physical Exam at Discharge:   BP (!) 109/49   Pulse 83   Temp 98.7 °F (37.1 °C) (Oral)   Resp 16   Ht 1.778 m (5' 10\")   Wt 66.4 kg (146 lb 4.8 oz)   SpO2 92%   BMI 20.99 kg/m²   General:  Awake, alert, NAD  Thin and frail  NG in place.  Skin:  Warm and dry  Neck:  JVD absent. Neck supple  Chest:  Clear to auscultation,

## 2024-10-16 NOTE — PLAN OF CARE
Problem: Chronic Conditions and Co-morbidities  Goal: Patient's chronic conditions and co-morbidity symptoms are monitored and maintained or improved  Outcome: Progressing     Problem: Discharge Planning  Goal: Discharge to home or other facility with appropriate resources  Outcome: Progressing     Problem: ABCDS Injury Assessment  Goal: Absence of physical injury  Outcome: Progressing     Problem: Skin/Tissue Integrity - Adult  Goal: Skin integrity remains intact  Outcome: Progressing     Problem: Gastrointestinal - Adult  Goal: Minimal or absence of nausea and vomiting  Outcome: Progressing  Goal: Maintains or returns to baseline bowel function  Outcome: Progressing

## 2024-10-20 ENCOUNTER — APPOINTMENT (OUTPATIENT)
Dept: CT IMAGING | Age: 89
DRG: 291 | End: 2024-10-20
Payer: MEDICARE

## 2024-10-20 ENCOUNTER — HOSPITAL ENCOUNTER (INPATIENT)
Age: 89
LOS: 3 days | Discharge: HOME | DRG: 291 | End: 2024-10-23
Attending: EMERGENCY MEDICINE | Admitting: FAMILY MEDICINE
Payer: MEDICARE

## 2024-10-20 ENCOUNTER — APPOINTMENT (OUTPATIENT)
Dept: GENERAL RADIOLOGY | Age: 89
DRG: 291 | End: 2024-10-20
Payer: MEDICARE

## 2024-10-20 DIAGNOSIS — R09.02 HYPOXIA: Primary | ICD-10-CM

## 2024-10-20 DIAGNOSIS — I50.9 ACUTE CONGESTIVE HEART FAILURE, UNSPECIFIED HEART FAILURE TYPE (HCC): ICD-10-CM

## 2024-10-20 PROBLEM — I50.21 HEART FAILURE, ACUTE SYSTOLIC (HCC): Status: ACTIVE | Noted: 2024-10-20

## 2024-10-20 LAB
ANION GAP SERPL CALCULATED.3IONS-SCNC: 9 MMOL/L (ref 3–16)
ANTI-XA UNFRAC HEPARIN: <0.1 IU/ML (ref 0.3–0.7)
APTT BLD: 26.2 SEC (ref 22.1–36.4)
BASE EXCESS BLDV CALC-SCNC: 1.9 MMOL/L (ref -3–3)
BASOPHILS # BLD: 0 K/UL (ref 0–0.2)
BASOPHILS NFR BLD: 0.3 %
BUN SERPL-MCNC: 27 MG/DL (ref 7–20)
CALCIUM SERPL-MCNC: 9.4 MG/DL (ref 8.3–10.6)
CHLORIDE SERPL-SCNC: 104 MMOL/L (ref 99–110)
CO2 BLDV-SCNC: 28 MMOL/L
CO2 SERPL-SCNC: 29 MMOL/L (ref 21–32)
COHGB MFR BLDV: 0.6 % (ref 0–1.5)
CREAT SERPL-MCNC: 1.3 MG/DL (ref 0.6–1.2)
D-DIMER QUANTITATIVE: 1.26 UG/ML FEU (ref 0–0.6)
DEPRECATED RDW RBC AUTO: 13 % (ref 12.4–15.4)
EOSINOPHIL # BLD: 0.3 K/UL (ref 0–0.6)
EOSINOPHIL NFR BLD: 4.2 %
FLUAV RNA RESP QL NAA+PROBE: NOT DETECTED
FLUBV RNA RESP QL NAA+PROBE: NOT DETECTED
GFR SERPLBLD CREATININE-BSD FMLA CKD-EPI: 39 ML/MIN/{1.73_M2}
GLUCOSE BLD-MCNC: 152 MG/DL (ref 70–99)
GLUCOSE SERPL-MCNC: 208 MG/DL (ref 70–99)
HCO3 BLDV-SCNC: 26.7 MMOL/L (ref 23–29)
HCT VFR BLD AUTO: 28 % (ref 36–48)
HGB BLD-MCNC: 9.5 G/DL (ref 12–16)
INR PPP: 1.11 (ref 0.85–1.15)
LACTATE BLDV-SCNC: 0.9 MMOL/L (ref 0.4–2)
LYMPHOCYTES # BLD: 0.5 K/UL (ref 1–5.1)
LYMPHOCYTES NFR BLD: 5.9 %
MCH RBC QN AUTO: 33.2 PG (ref 26–34)
MCHC RBC AUTO-ENTMCNC: 34 G/DL (ref 31–36)
MCV RBC AUTO: 97.6 FL (ref 80–100)
METHGB MFR BLDV: 0.3 %
MONOCYTES # BLD: 0.7 K/UL (ref 0–1.3)
MONOCYTES NFR BLD: 8.7 %
NEUTROPHILS # BLD: 6.4 K/UL (ref 1.7–7.7)
NEUTROPHILS NFR BLD: 80.9 %
NT-PROBNP SERPL-MCNC: ABNORMAL PG/ML (ref 0–449)
O2 THERAPY: ABNORMAL
PCO2 BLDV: 42.4 MMHG (ref 40–50)
PERFORMED ON: ABNORMAL
PH BLDV: 7.42 [PH] (ref 7.35–7.45)
PLATELET # BLD AUTO: 218 K/UL (ref 135–450)
PMV BLD AUTO: 9.1 FL (ref 5–10.5)
PO2 BLDV: 48 MMHG (ref 25–40)
POTASSIUM SERPL-SCNC: 4.1 MMOL/L (ref 3.5–5.1)
PROTHROMBIN TIME: 14.5 SEC (ref 11.9–14.9)
RBC # BLD AUTO: 2.87 M/UL (ref 4–5.2)
SAO2 % BLDV: 84 %
SARS-COV-2 RNA RESP QL NAA+PROBE: NOT DETECTED
SODIUM SERPL-SCNC: 142 MMOL/L (ref 136–145)
TROPONIN, HIGH SENSITIVITY: 192 NG/L (ref 0–14)
TROPONIN, HIGH SENSITIVITY: 204 NG/L (ref 0–14)
TROPONIN, HIGH SENSITIVITY: 220 NG/L (ref 0–14)
TSH SERPL DL<=0.005 MIU/L-ACNC: 2.96 UIU/ML (ref 0.27–4.2)
WBC # BLD AUTO: 7.9 K/UL (ref 4–11)

## 2024-10-20 PROCEDURE — 85025 COMPLETE CBC W/AUTO DIFF WBC: CPT

## 2024-10-20 PROCEDURE — 2700000000 HC OXYGEN THERAPY PER DAY

## 2024-10-20 PROCEDURE — 82728 ASSAY OF FERRITIN: CPT

## 2024-10-20 PROCEDURE — 87636 SARSCOV2 & INF A&B AMP PRB: CPT

## 2024-10-20 PROCEDURE — 82803 BLOOD GASES ANY COMBINATION: CPT

## 2024-10-20 PROCEDURE — 6360000002 HC RX W HCPCS: Performed by: PHYSICIAN ASSISTANT

## 2024-10-20 PROCEDURE — 84443 ASSAY THYROID STIM HORMONE: CPT

## 2024-10-20 PROCEDURE — 94761 N-INVAS EAR/PLS OXIMETRY MLT: CPT

## 2024-10-20 PROCEDURE — 93005 ELECTROCARDIOGRAM TRACING: CPT | Performed by: PHYSICIAN ASSISTANT

## 2024-10-20 PROCEDURE — 85610 PROTHROMBIN TIME: CPT

## 2024-10-20 PROCEDURE — 36415 COLL VENOUS BLD VENIPUNCTURE: CPT

## 2024-10-20 PROCEDURE — 2060000000 HC ICU INTERMEDIATE R&B

## 2024-10-20 PROCEDURE — 83605 ASSAY OF LACTIC ACID: CPT

## 2024-10-20 PROCEDURE — 84484 ASSAY OF TROPONIN QUANT: CPT

## 2024-10-20 PROCEDURE — 83540 ASSAY OF IRON: CPT

## 2024-10-20 PROCEDURE — 83880 ASSAY OF NATRIURETIC PEPTIDE: CPT

## 2024-10-20 PROCEDURE — 87040 BLOOD CULTURE FOR BACTERIA: CPT

## 2024-10-20 PROCEDURE — 85520 HEPARIN ASSAY: CPT

## 2024-10-20 PROCEDURE — 83550 IRON BINDING TEST: CPT

## 2024-10-20 PROCEDURE — 99285 EMERGENCY DEPT VISIT HI MDM: CPT

## 2024-10-20 PROCEDURE — 85379 FIBRIN DEGRADATION QUANT: CPT

## 2024-10-20 PROCEDURE — 71045 X-RAY EXAM CHEST 1 VIEW: CPT

## 2024-10-20 PROCEDURE — 96374 THER/PROPH/DIAG INJ IV PUSH: CPT

## 2024-10-20 PROCEDURE — 6370000000 HC RX 637 (ALT 250 FOR IP): Performed by: PHYSICIAN ASSISTANT

## 2024-10-20 PROCEDURE — 85730 THROMBOPLASTIN TIME PARTIAL: CPT

## 2024-10-20 PROCEDURE — 80048 BASIC METABOLIC PNL TOTAL CA: CPT

## 2024-10-20 RX ORDER — ASPIRIN 81 MG/1
81 TABLET, CHEWABLE ORAL DAILY
Status: DISCONTINUED | OUTPATIENT
Start: 2024-10-21 | End: 2024-10-23 | Stop reason: HOSPADM

## 2024-10-20 RX ORDER — NITROGLYCERIN 0.4 MG/1
0.4 TABLET SUBLINGUAL ONCE
Status: COMPLETED | OUTPATIENT
Start: 2024-10-20 | End: 2024-10-20

## 2024-10-20 RX ORDER — SODIUM CHLORIDE 0.9 % (FLUSH) 0.9 %
5-40 SYRINGE (ML) INJECTION EVERY 12 HOURS SCHEDULED
Status: DISCONTINUED | OUTPATIENT
Start: 2024-10-20 | End: 2024-10-23 | Stop reason: HOSPADM

## 2024-10-20 RX ORDER — FUROSEMIDE 10 MG/ML
40 INJECTION INTRAMUSCULAR; INTRAVENOUS 2 TIMES DAILY
Status: DISCONTINUED | OUTPATIENT
Start: 2024-10-21 | End: 2024-10-21

## 2024-10-20 RX ORDER — PRAVASTATIN SODIUM 10 MG
20 TABLET ORAL DAILY
Status: DISCONTINUED | OUTPATIENT
Start: 2024-10-21 | End: 2024-10-23 | Stop reason: HOSPADM

## 2024-10-20 RX ORDER — POLYETHYLENE GLYCOL 3350 17 G/17G
17 POWDER, FOR SOLUTION ORAL DAILY PRN
Status: DISCONTINUED | OUTPATIENT
Start: 2024-10-20 | End: 2024-10-23 | Stop reason: HOSPADM

## 2024-10-20 RX ORDER — ACETAMINOPHEN 325 MG/1
650 TABLET ORAL EVERY 6 HOURS PRN
Status: DISCONTINUED | OUTPATIENT
Start: 2024-10-20 | End: 2024-10-23 | Stop reason: HOSPADM

## 2024-10-20 RX ORDER — HEPARIN SODIUM 10000 [USP'U]/100ML
22 INJECTION, SOLUTION INTRAVENOUS CONTINUOUS
Status: DISCONTINUED | OUTPATIENT
Start: 2024-10-21 | End: 2024-10-21

## 2024-10-20 RX ORDER — SODIUM CHLORIDE 9 MG/ML
INJECTION, SOLUTION INTRAVENOUS PRN
Status: DISCONTINUED | OUTPATIENT
Start: 2024-10-20 | End: 2024-10-23 | Stop reason: HOSPADM

## 2024-10-20 RX ORDER — SODIUM CHLORIDE 0.9 % (FLUSH) 0.9 %
5-40 SYRINGE (ML) INJECTION PRN
Status: DISCONTINUED | OUTPATIENT
Start: 2024-10-20 | End: 2024-10-23 | Stop reason: HOSPADM

## 2024-10-20 RX ORDER — ACETAMINOPHEN 650 MG/1
650 SUPPOSITORY RECTAL EVERY 6 HOURS PRN
Status: DISCONTINUED | OUTPATIENT
Start: 2024-10-20 | End: 2024-10-23 | Stop reason: HOSPADM

## 2024-10-20 RX ORDER — ONDANSETRON 4 MG/1
4 TABLET, ORALLY DISINTEGRATING ORAL EVERY 8 HOURS PRN
Status: DISCONTINUED | OUTPATIENT
Start: 2024-10-20 | End: 2024-10-23 | Stop reason: HOSPADM

## 2024-10-20 RX ORDER — HEPARIN SODIUM 10000 [USP'U]/100ML
5-30 INJECTION, SOLUTION INTRAVENOUS CONTINUOUS
Status: DISCONTINUED | OUTPATIENT
Start: 2024-10-20 | End: 2024-10-20 | Stop reason: SDUPTHER

## 2024-10-20 RX ORDER — ONDANSETRON 2 MG/ML
4 INJECTION INTRAMUSCULAR; INTRAVENOUS EVERY 6 HOURS PRN
Status: DISCONTINUED | OUTPATIENT
Start: 2024-10-20 | End: 2024-10-23 | Stop reason: HOSPADM

## 2024-10-20 RX ORDER — ENOXAPARIN SODIUM 100 MG/ML
40 INJECTION SUBCUTANEOUS DAILY
Status: DISCONTINUED | OUTPATIENT
Start: 2024-10-21 | End: 2024-10-20 | Stop reason: ALTCHOICE

## 2024-10-20 RX ORDER — CLOPIDOGREL BISULFATE 75 MG/1
75 TABLET ORAL DAILY
Status: DISCONTINUED | OUTPATIENT
Start: 2024-10-21 | End: 2024-10-23 | Stop reason: HOSPADM

## 2024-10-20 RX ORDER — FUROSEMIDE 10 MG/ML
20 INJECTION INTRAMUSCULAR; INTRAVENOUS ONCE
Status: COMPLETED | OUTPATIENT
Start: 2024-10-20 | End: 2024-10-20

## 2024-10-20 RX ORDER — CARVEDILOL 6.25 MG/1
6.25 TABLET ORAL 2 TIMES DAILY WITH MEALS
Status: DISCONTINUED | OUTPATIENT
Start: 2024-10-21 | End: 2024-10-23 | Stop reason: HOSPADM

## 2024-10-20 RX ORDER — HEPARIN SODIUM 1000 [USP'U]/ML
40 INJECTION, SOLUTION INTRAVENOUS; SUBCUTANEOUS PRN
Status: DISCONTINUED | OUTPATIENT
Start: 2024-10-20 | End: 2024-10-21

## 2024-10-20 RX ORDER — HEPARIN SODIUM 1000 [USP'U]/ML
80 INJECTION, SOLUTION INTRAVENOUS; SUBCUTANEOUS PRN
Status: DISCONTINUED | OUTPATIENT
Start: 2024-10-20 | End: 2024-10-21

## 2024-10-20 RX ADMIN — FUROSEMIDE 20 MG: 10 INJECTION, SOLUTION INTRAMUSCULAR; INTRAVENOUS at 17:02

## 2024-10-20 RX ADMIN — NITROGLYCERIN 0.4 MG: 0.4 TABLET SUBLINGUAL at 17:02

## 2024-10-20 ASSESSMENT — PAIN - FUNCTIONAL ASSESSMENT: PAIN_FUNCTIONAL_ASSESSMENT: NONE - DENIES PAIN

## 2024-10-20 ASSESSMENT — PAIN SCALES - GENERAL: PAINLEVEL_OUTOF10: 0

## 2024-10-20 NOTE — ED NOTES
1806 - Call placed to Cardiology for consult. Dr. Ramirez to call back.    1809 - Dr. Ramirez called back and spoke with Toni French PA-C at this time.

## 2024-10-20 NOTE — ED PROVIDER NOTES
[facility] ED  EMERGENCY DEPARTMENT ENCOUNTER        Pt Name: Whitney Meier  MRN: [id number]  Birthdate 1935  Date of evaluation: 10/20/2024  Provider: Jeison French PA-C  PCP: Leilani Mantilla MD  Note Started: 3:16 PM EDT 10/20/24       I have seen and evaluated this patient with my supervising physician Paolo Polk DO.      CHIEF COMPLAINT       Chief Complaint   Patient presents with    Shortness of Breath     Sob today.  Inpatient last week for bowel obstruction.        HISTORY OF PRESENT ILLNESS: 1 or more Elements     History From: Patient    Limitations to history : None    Social Determinants Significantly Affecting Health : None    Chief Complaint: Shortness of breath and hypoxia    Whitney Meier is a 89 y.o. female who presents to the ED complaining of shortness of breath and hypoxia.  Sats were down around 80% today.  She denies any cardiac history but admits to history of diabetes and was just recently discharged from the hospital where she was being seen for bowel obstruction.  She denies any additional complaints.  She denies fever, rash, nausea, vomiting, weakness, dizziness, chest pain, palpitations, abdominal pain, back pain, paresthesias, change in bowels or urine, or any additional systemic or neurologic complaints.  Patient is not on oxygen at home.    Nursing Notes were all reviewed and agreed with or any disagreements were addressed in the HPI.    REVIEW OF SYSTEMS :      Review of Systems    Positives and Pertinent negatives as per HPI.     SURGICAL HISTORY     Past Surgical History:   Procedure Laterality Date    APPENDECTOMY      COLONOSCOPY  1997    polyps    COLONOSCOPY  7/15/15    normal colon    EYE SURGERY      right cataract    FOOT AMPUTATION Right 05/26/2019    PARTIAL RIGHT FOOT AMPUTATION    FOOT AMPUTATION Right 5/26/2019    PARTIAL RIGHT FOOT AMPUTATION performed by Abdoul Pressley DPM [place] [facility] [place]    FOOT SURGERY Left 05/30/2017

## 2024-10-20 NOTE — PLAN OF CARE
Admission for CHF  Troponin elevated from prior  Cardiology contacted from the ER, ok to remain at [facility]

## 2024-10-21 PROBLEM — R09.02 HYPOXIA: Status: ACTIVE | Noted: 2024-10-21

## 2024-10-21 PROBLEM — I50.9 ACUTE CONGESTIVE HEART FAILURE (HCC): Status: ACTIVE | Noted: 2024-10-21

## 2024-10-21 LAB
ALBUMIN SERPL-MCNC: 3.2 G/DL (ref 3.4–5)
ALP SERPL-CCNC: 45 U/L (ref 40–129)
ALT SERPL-CCNC: 15 U/L (ref 10–40)
ANION GAP SERPL CALCULATED.3IONS-SCNC: 11 MMOL/L (ref 3–16)
ANTI-XA UNFRAC HEPARIN: <0.1 IU/ML (ref 0.3–0.7)
AST SERPL-CCNC: 17 U/L (ref 15–37)
BILIRUB DIRECT SERPL-MCNC: 0.2 MG/DL (ref 0–0.3)
BILIRUB INDIRECT SERPL-MCNC: 0.1 MG/DL (ref 0–1)
BILIRUB SERPL-MCNC: 0.3 MG/DL (ref 0–1)
BUN SERPL-MCNC: 25 MG/DL (ref 7–20)
CALCIUM SERPL-MCNC: 8.9 MG/DL (ref 8.3–10.6)
CHLORIDE SERPL-SCNC: 99 MMOL/L (ref 99–110)
CHOLEST SERPL-MCNC: 109 MG/DL (ref 0–199)
CO2 SERPL-SCNC: 26 MMOL/L (ref 21–32)
CREAT SERPL-MCNC: 1.2 MG/DL (ref 0.6–1.2)
EKG ATRIAL RATE: 77 BPM
EKG DIAGNOSIS: NORMAL
EKG P AXIS: 41 DEGREES
EKG P-R INTERVAL: 206 MS
EKG Q-T INTERVAL: 402 MS
EKG QRS DURATION: 116 MS
EKG QTC CALCULATION (BAZETT): 454 MS
EKG R AXIS: 7 DEGREES
EKG T AXIS: 137 DEGREES
EKG VENTRICULAR RATE: 77 BPM
FERRITIN SERPL IA-MCNC: 337 NG/ML (ref 15–150)
GFR SERPLBLD CREATININE-BSD FMLA CKD-EPI: 43 ML/MIN/{1.73_M2}
GLUCOSE BLD-MCNC: 106 MG/DL (ref 70–99)
GLUCOSE BLD-MCNC: 112 MG/DL (ref 70–99)
GLUCOSE BLD-MCNC: 156 MG/DL (ref 70–99)
GLUCOSE BLD-MCNC: 208 MG/DL (ref 70–99)
GLUCOSE SERPL-MCNC: 105 MG/DL (ref 70–99)
HDLC SERPL-MCNC: 44 MG/DL (ref 40–60)
IRON SATN MFR SERPL: 11 % (ref 15–50)
IRON SERPL-MCNC: 28 UG/DL (ref 37–145)
LDLC SERPL CALC-MCNC: 54 MG/DL
MAGNESIUM SERPL-MCNC: 2 MG/DL (ref 1.8–2.4)
PERFORMED ON: ABNORMAL
POTASSIUM SERPL-SCNC: 3.3 MMOL/L (ref 3.5–5.1)
PROT SERPL-MCNC: 5.6 G/DL (ref 6.4–8.2)
SODIUM SERPL-SCNC: 136 MMOL/L (ref 136–145)
TIBC SERPL-MCNC: 244 UG/DL (ref 260–445)
TRIGL SERPL-MCNC: 54 MG/DL (ref 0–150)
VLDLC SERPL CALC-MCNC: 11 MG/DL

## 2024-10-21 PROCEDURE — 80061 LIPID PANEL: CPT

## 2024-10-21 PROCEDURE — 2060000000 HC ICU INTERMEDIATE R&B

## 2024-10-21 PROCEDURE — 6370000000 HC RX 637 (ALT 250 FOR IP): Performed by: INTERNAL MEDICINE

## 2024-10-21 PROCEDURE — 6360000002 HC RX W HCPCS: Performed by: INTERNAL MEDICINE

## 2024-10-21 PROCEDURE — 85520 HEPARIN ASSAY: CPT

## 2024-10-21 PROCEDURE — 93010 ELECTROCARDIOGRAM REPORT: CPT | Performed by: STUDENT IN AN ORGANIZED HEALTH CARE EDUCATION/TRAINING PROGRAM

## 2024-10-21 PROCEDURE — 94761 N-INVAS EAR/PLS OXIMETRY MLT: CPT

## 2024-10-21 PROCEDURE — 80076 HEPATIC FUNCTION PANEL: CPT

## 2024-10-21 PROCEDURE — 6370000000 HC RX 637 (ALT 250 FOR IP): Performed by: STUDENT IN AN ORGANIZED HEALTH CARE EDUCATION/TRAINING PROGRAM

## 2024-10-21 PROCEDURE — 2700000000 HC OXYGEN THERAPY PER DAY

## 2024-10-21 PROCEDURE — 80048 BASIC METABOLIC PNL TOTAL CA: CPT

## 2024-10-21 PROCEDURE — 6370000000 HC RX 637 (ALT 250 FOR IP)

## 2024-10-21 PROCEDURE — 99233 SBSQ HOSP IP/OBS HIGH 50: CPT | Performed by: INTERNAL MEDICINE

## 2024-10-21 PROCEDURE — 99222 1ST HOSP IP/OBS MODERATE 55: CPT | Performed by: STUDENT IN AN ORGANIZED HEALTH CARE EDUCATION/TRAINING PROGRAM

## 2024-10-21 PROCEDURE — 6360000002 HC RX W HCPCS: Performed by: FAMILY MEDICINE

## 2024-10-21 PROCEDURE — 36415 COLL VENOUS BLD VENIPUNCTURE: CPT

## 2024-10-21 PROCEDURE — 83735 ASSAY OF MAGNESIUM: CPT

## 2024-10-21 RX ORDER — HEPARIN SODIUM 1000 [USP'U]/ML
80 INJECTION, SOLUTION INTRAVENOUS; SUBCUTANEOUS ONCE
Status: COMPLETED | OUTPATIENT
Start: 2024-10-21 | End: 2024-10-21

## 2024-10-21 RX ORDER — POTASSIUM CHLORIDE 1500 MG/1
40 TABLET, EXTENDED RELEASE ORAL PRN
Status: DISCONTINUED | OUTPATIENT
Start: 2024-10-21 | End: 2024-10-23 | Stop reason: HOSPADM

## 2024-10-21 RX ORDER — ASCORBIC ACID 500 MG
500 TABLET ORAL DAILY
COMMUNITY

## 2024-10-21 RX ORDER — HYDRALAZINE HYDROCHLORIDE 10 MG/1
10 TABLET, FILM COATED ORAL EVERY 8 HOURS SCHEDULED
Status: DISCONTINUED | OUTPATIENT
Start: 2024-10-21 | End: 2024-10-22

## 2024-10-21 RX ORDER — ISOSORBIDE DINITRATE 10 MG/1
10 TABLET ORAL 3 TIMES DAILY
Status: DISCONTINUED | OUTPATIENT
Start: 2024-10-21 | End: 2024-10-22

## 2024-10-21 RX ORDER — POTASSIUM CHLORIDE 7.45 MG/ML
10 INJECTION INTRAVENOUS PRN
Status: DISCONTINUED | OUTPATIENT
Start: 2024-10-21 | End: 2024-10-23 | Stop reason: HOSPADM

## 2024-10-21 RX ORDER — PRAMIPEXOLE DIHYDROCHLORIDE 1 MG/1
1 TABLET ORAL ONCE
Status: COMPLETED | OUTPATIENT
Start: 2024-10-22 | End: 2024-10-21

## 2024-10-21 RX ORDER — FUROSEMIDE 40 MG/1
40 TABLET ORAL DAILY
Status: DISCONTINUED | OUTPATIENT
Start: 2024-10-22 | End: 2024-10-22

## 2024-10-21 RX ORDER — GLUCAGON 1 MG/ML
1 KIT INJECTION PRN
Status: DISCONTINUED | OUTPATIENT
Start: 2024-10-21 | End: 2024-10-23 | Stop reason: HOSPADM

## 2024-10-21 RX ORDER — DEXTROSE MONOHYDRATE 100 MG/ML
INJECTION, SOLUTION INTRAVENOUS CONTINUOUS PRN
Status: DISCONTINUED | OUTPATIENT
Start: 2024-10-21 | End: 2024-10-23 | Stop reason: HOSPADM

## 2024-10-21 RX ORDER — INSULIN LISPRO 100 [IU]/ML
0-4 INJECTION, SOLUTION INTRAVENOUS; SUBCUTANEOUS
Status: DISCONTINUED | OUTPATIENT
Start: 2024-10-21 | End: 2024-10-23 | Stop reason: HOSPADM

## 2024-10-21 RX ORDER — UREA 10 %
500 LOTION (ML) TOPICAL DAILY
COMMUNITY

## 2024-10-21 RX ORDER — MAGNESIUM SULFATE IN WATER 40 MG/ML
2000 INJECTION, SOLUTION INTRAVENOUS PRN
Status: DISCONTINUED | OUTPATIENT
Start: 2024-10-21 | End: 2024-10-23 | Stop reason: HOSPADM

## 2024-10-21 RX ADMIN — PRAMIPEXOLE DIHYDROCHLORIDE 1 MG: 1 TABLET ORAL at 23:55

## 2024-10-21 RX ADMIN — HEPARIN SODIUM 6100 UNITS: 1000 INJECTION INTRAVENOUS; SUBCUTANEOUS at 09:16

## 2024-10-21 RX ADMIN — CARVEDILOL 6.25 MG: 6.25 TABLET, FILM COATED ORAL at 09:15

## 2024-10-21 RX ADMIN — INSULIN LISPRO 1 UNITS: 100 INJECTION, SOLUTION INTRAVENOUS; SUBCUTANEOUS at 19:54

## 2024-10-21 RX ADMIN — HYDRALAZINE HYDROCHLORIDE 10 MG: 10 TABLET ORAL at 13:28

## 2024-10-21 RX ADMIN — FUROSEMIDE 40 MG: 10 INJECTION, SOLUTION INTRAMUSCULAR; INTRAVENOUS at 09:16

## 2024-10-21 RX ADMIN — CLOPIDOGREL BISULFATE 75 MG: 75 TABLET ORAL at 09:15

## 2024-10-21 RX ADMIN — PRAVASTATIN SODIUM 20 MG: 10 TABLET ORAL at 09:15

## 2024-10-21 RX ADMIN — HEPARIN SODIUM 18 UNITS/KG/HR: 10000 INJECTION, SOLUTION INTRAVENOUS at 00:23

## 2024-10-21 RX ADMIN — CARVEDILOL 6.25 MG: 6.25 TABLET, FILM COATED ORAL at 18:30

## 2024-10-21 RX ADMIN — ISOSORBIDE DINITRATE 10 MG: 10 TABLET ORAL at 13:28

## 2024-10-21 RX ADMIN — ASPIRIN 81 MG: 81 TABLET, CHEWABLE ORAL at 09:15

## 2024-10-21 NOTE — FLOWSHEET NOTE
10/21/24 1810   Vitals   Temp 97.7 °F (36.5 °C)   Temp Source Oral   Pulse 91   Respirations 16   BP (!) 99/49   MAP (Calculated) 66   Oxygen Therapy   SpO2 92 %   O2 Device None (Room air)     BP down since afternoon medications, notified MD order to hold scheduled Imdur at this time. Patient A/ox4. Denies any needs,call light within reach.

## 2024-10-21 NOTE — PLAN OF CARE
Problem: ABCDS Injury Assessment  Goal: Absence of physical injury  Outcome: Progressing     Problem: Chronic Conditions and Co-morbidities  Goal: Patient's chronic conditions and co-morbidity symptoms are monitored and maintained or improved  Outcome: Progressing     Problem: Discharge Planning  Goal: Discharge to home or other facility with appropriate resources  Outcome: Progressing     Problem: Safety - Adult  Goal: Free from fall injury  Outcome: Progressing

## 2024-10-21 NOTE — H&P
Hospital Medicine History & Physical      Date of Admission: 10/20/2024    Date of Service:  Pt seen/examined on 10/20/2024    [x]Admitted to Inpatient with expected LOS greater than two midnights due to medical therapy.  []Placed in Observation status.    Chief Admission Complaint: Shortness of breath    Presenting Admission History:      89 y.o. female with extensive past medical history including type 2 diabetes, hypertension, cardiomyopathy who presented to the ER complaining of shortness of breath which have been going on for a few days and worsening.  Patient denies chest pain, fever or chills.  Workup in the ER included laboratory testing showing elevated BNP.  Chest x-ray shows findings consistent with CHF.    Assessment/Plan:      Current Principal Problem:  Heart failure, acute systolic (HCC)    Acute CHF exacerbation: Continue Lasix 40 mg IV twice daily.  Monitor ins and outs and daily weights. Place on telemetry.  Recent 2D echocardiogram done on March 2024 shows a reduced left ventricular ejection fraction of 35 to 40%.  Consult cardiology for further recommendations.    Elevated D-dimer: Due to patient's presentation of shortness of breath and elevated D-dimer, pulmonary embolism is in the differential diagnosis. Will order a VQ scan since a CTA will worsen already present diminished renal function.   Will place on IV heparin drip empirically while awaiting VQ scan.    Coronary artery disease: Continue aspirin and Plavix.    Hyperlipidemia: Continue statin.    Hypertensive disorder: Continue Coreg.      Discussed management and the need for Hospitalization of the patient w/ the Emergency Department Provider      EKG:  I have reviewed the EKG with the following interpretation: Normal sinus rhythm at 77 bpm, nonacute ST changes.    Physical Exam Performed:      /66   Pulse 76   Temp 97.7 °F (36.5 °C) (Oral)   Resp 16   Ht 1.778 m (5' 10\")   Wt 66 kg (145 lb 8 oz)   SpO2 92%   BMI 20.88

## 2024-10-21 NOTE — FLOWSHEET NOTE
10/21/24 0736   Vitals   Temp 98.1 °F (36.7 °C)   Temp Source Oral   Pulse 75   Heart Rate Source Monitor   Respirations 17   BP (!) 141/64   MAP (Calculated) 90   BP Location Right upper arm   BP Upper/Lower Upper   BP Method Automatic   Patient Position Semi fowlers   Oxygen Therapy   SpO2 92 %   O2 Device Nasal cannula   O2 Flow Rate (L/min) 1 L/min     Shift assessment completed-see flow sheet. Patient in bed awake,alert and oriented x4.  Vitals stable. Morning medications given per order. Patient denies any discomfort at this time.  Purwick intact, output noted.   No further needs expressed at this time,call light within reach.

## 2024-10-21 NOTE — ACP (ADVANCE CARE PLANNING)
Advance Care Planning     General Advance Care Planning (ACP) Conversation    Date of Conversation: 10/21/2024  Conducted with: Patient with Decision Making Capacity  Other persons present: None    Healthcare Decision Maker:   Primary Decision Maker: Dagmar Chow - Child - [phone]    Secondary Decision Maker: ClaudineLexy - Grandchild - [phone]       Content/Action Overview:  Has ACP document(s) on file - reflects the patient's care preferences  Reviewed DNR/DNI and patient elects Full Code (Attempt Resuscitation)        Length of Voluntary ACP Conversation in minutes:  <16 minutes (Non-Billable)    Josué Enamorado RN

## 2024-10-21 NOTE — PLAN OF CARE
Problem: ABCDS Injury Assessment  Goal: Absence of physical injury  10/21/2024 0012 by Suzanne Henry, RN  Outcome: Progressing     Problem: Chronic Conditions and Co-morbidities  Goal: Patient's chronic conditions and co-morbidity symptoms are monitored and maintained or improved  10/21/2024 1156 by Meagan Mann, RN  Outcome: Progressing  10/21/2024 0012 by Suzanne Henry, RN  Outcome: Progressing     Problem: Discharge Planning  Goal: Discharge to home or other facility with appropriate resources  10/21/2024 1156 by Meagan Mann, RN  Outcome: Progressing  10/21/2024 0012 by Suzanne Henry, RN  Outcome: Progressing     Problem: Safety - Adult  Goal: Free from fall injury  10/21/2024 0012 by Suzanne Henry, RN  Outcome: Progressing

## 2024-10-21 NOTE — CARE COORDINATION
Case Management Assessment  Initial Evaluation    Date/Time of Evaluation: 10/21/2024 9:59 AM  Assessment Completed by: Josué Enamorado RN    If patient is discharged prior to next notation, then this note serves as note for discharge by case management.    Patient Name: Whitney Meier                   YOB: 1935  Diagnosis: Hypoxia [R09.02]  Heart failure, acute systolic (HCC) [I50.21]  Acute congestive heart failure, unspecified heart failure type (HCC) [I50.9]                   Date / Time: 10/20/2024  2:50 PM    Patient Admission Status: Inpatient   Readmission Risk (Low < 19, Mod (19-27), High > 27): Readmission Risk Score: 22    Current PCP: Leilani Mantilla MD  PCP verified by CM? Yes    Chart Reviewed: Yes      History Provided by: Patient, Medical Record  Patient Orientation: Alert and Oriented    Patient Cognition: Alert    Hospitalization in the last 30 days (Readmission):  Yes    If yes, Readmission Assessment in CM Navigator will be completed.    Advance Directives:      Code Status: Full Code   Patient's Primary Decision Maker is: Named in Scanned ACP Document    Primary Decision Maker: ClaudineDagmar - Child - [phone]    Secondary Decision Maker: ClaudineLexy - Grandchild - [phone]    Discharge Planning:    Patient lives with: Children Type of Home: House  Primary Care Giver: Self  Patient Support Systems include: Children, Family Members   Current Financial resources: Medicare, Medicaid  Current community resources: None  Current services prior to admission: None            Current DME: Walker            Type of Home Care services:  None    ADLS  Prior functional level: Independent in ADLs/IADLs  Current functional level: Independent in ADLs/IADLs    PT AM-PAC:   /24  OT AM-PAC:   /24    Family can provide assistance at DC: Yes  Would you like Case Management to discuss the discharge plan with any other family members/significant others, and if so, who? Yes

## 2024-10-22 PROBLEM — I50.23 ACUTE ON CHRONIC HFREF (HEART FAILURE WITH REDUCED EJECTION FRACTION) (HCC): Status: ACTIVE | Noted: 2024-10-20

## 2024-10-22 LAB
ANION GAP SERPL CALCULATED.3IONS-SCNC: 12 MMOL/L (ref 3–16)
BASOPHILS # BLD: 0 K/UL (ref 0–0.2)
BASOPHILS NFR BLD: 0.4 %
BUN SERPL-MCNC: 29 MG/DL (ref 7–20)
CALCIUM SERPL-MCNC: 9.4 MG/DL (ref 8.3–10.6)
CHLORIDE SERPL-SCNC: 100 MMOL/L (ref 99–110)
CO2 SERPL-SCNC: 27 MMOL/L (ref 21–32)
CREAT SERPL-MCNC: 1.3 MG/DL (ref 0.6–1.2)
DEPRECATED RDW RBC AUTO: 13 % (ref 12.4–15.4)
EOSINOPHIL # BLD: 0.2 K/UL (ref 0–0.6)
EOSINOPHIL NFR BLD: 3.9 %
GFR SERPLBLD CREATININE-BSD FMLA CKD-EPI: 39 ML/MIN/{1.73_M2}
GLUCOSE BLD-MCNC: 117 MG/DL (ref 70–99)
GLUCOSE BLD-MCNC: 197 MG/DL (ref 70–99)
GLUCOSE BLD-MCNC: 216 MG/DL (ref 70–99)
GLUCOSE BLD-MCNC: 231 MG/DL (ref 70–99)
GLUCOSE SERPL-MCNC: 118 MG/DL (ref 70–99)
HCT VFR BLD AUTO: 28 % (ref 36–48)
HGB BLD-MCNC: 9.7 G/DL (ref 12–16)
LYMPHOCYTES # BLD: 0.9 K/UL (ref 1–5.1)
LYMPHOCYTES NFR BLD: 15.2 %
MAGNESIUM SERPL-MCNC: 2 MG/DL (ref 1.8–2.4)
MCH RBC QN AUTO: 33.6 PG (ref 26–34)
MCHC RBC AUTO-ENTMCNC: 34.6 G/DL (ref 31–36)
MCV RBC AUTO: 97.1 FL (ref 80–100)
MONOCYTES # BLD: 0.8 K/UL (ref 0–1.3)
MONOCYTES NFR BLD: 13.6 %
NEUTROPHILS # BLD: 4 K/UL (ref 1.7–7.7)
NEUTROPHILS NFR BLD: 66.9 %
PERFORMED ON: ABNORMAL
PLATELET # BLD AUTO: 252 K/UL (ref 135–450)
PMV BLD AUTO: 8.8 FL (ref 5–10.5)
POTASSIUM SERPL-SCNC: 3.9 MMOL/L (ref 3.5–5.1)
RBC # BLD AUTO: 2.89 M/UL (ref 4–5.2)
SODIUM SERPL-SCNC: 139 MMOL/L (ref 136–145)
WBC # BLD AUTO: 6 K/UL (ref 4–11)

## 2024-10-22 PROCEDURE — 6370000000 HC RX 637 (ALT 250 FOR IP)

## 2024-10-22 PROCEDURE — 83735 ASSAY OF MAGNESIUM: CPT

## 2024-10-22 PROCEDURE — 80048 BASIC METABOLIC PNL TOTAL CA: CPT

## 2024-10-22 PROCEDURE — 99232 SBSQ HOSP IP/OBS MODERATE 35: CPT | Performed by: INTERNAL MEDICINE

## 2024-10-22 PROCEDURE — 6370000000 HC RX 637 (ALT 250 FOR IP): Performed by: STUDENT IN AN ORGANIZED HEALTH CARE EDUCATION/TRAINING PROGRAM

## 2024-10-22 PROCEDURE — 6370000000 HC RX 637 (ALT 250 FOR IP): Performed by: INTERNAL MEDICINE

## 2024-10-22 PROCEDURE — 2060000000 HC ICU INTERMEDIATE R&B

## 2024-10-22 PROCEDURE — 2700000000 HC OXYGEN THERAPY PER DAY

## 2024-10-22 PROCEDURE — 94761 N-INVAS EAR/PLS OXIMETRY MLT: CPT

## 2024-10-22 PROCEDURE — 36415 COLL VENOUS BLD VENIPUNCTURE: CPT

## 2024-10-22 PROCEDURE — 99233 SBSQ HOSP IP/OBS HIGH 50: CPT | Performed by: NURSE PRACTITIONER

## 2024-10-22 PROCEDURE — 85025 COMPLETE CBC W/AUTO DIFF WBC: CPT

## 2024-10-22 PROCEDURE — 6360000002 HC RX W HCPCS

## 2024-10-22 RX ORDER — ENOXAPARIN SODIUM 100 MG/ML
40 INJECTION SUBCUTANEOUS DAILY
Status: DISCONTINUED | OUTPATIENT
Start: 2024-10-22 | End: 2024-10-23

## 2024-10-22 RX ORDER — FUROSEMIDE 20 MG/1
20 TABLET ORAL EVERY OTHER DAY
Status: DISCONTINUED | OUTPATIENT
Start: 2024-10-22 | End: 2024-10-23 | Stop reason: HOSPADM

## 2024-10-22 RX ADMIN — FUROSEMIDE 20 MG: 20 TABLET ORAL at 12:42

## 2024-10-22 RX ADMIN — CARVEDILOL 6.25 MG: 6.25 TABLET, FILM COATED ORAL at 18:50

## 2024-10-22 RX ADMIN — INSULIN LISPRO 1 UNITS: 100 INJECTION, SOLUTION INTRAVENOUS; SUBCUTANEOUS at 20:04

## 2024-10-22 RX ADMIN — CARVEDILOL 6.25 MG: 6.25 TABLET, FILM COATED ORAL at 09:29

## 2024-10-22 RX ADMIN — ISOSORBIDE DINITRATE 10 MG: 10 TABLET ORAL at 09:29

## 2024-10-22 RX ADMIN — CLOPIDOGREL BISULFATE 75 MG: 75 TABLET ORAL at 09:29

## 2024-10-22 RX ADMIN — INSULIN LISPRO 1 UNITS: 100 INJECTION, SOLUTION INTRAVENOUS; SUBCUTANEOUS at 12:42

## 2024-10-22 RX ADMIN — ASPIRIN 81 MG: 81 TABLET, CHEWABLE ORAL at 09:29

## 2024-10-22 RX ADMIN — SACUBITRIL AND VALSARTAN 0.5 TABLET: 24; 26 TABLET, FILM COATED ORAL at 20:03

## 2024-10-22 RX ADMIN — PRAVASTATIN SODIUM 20 MG: 10 TABLET ORAL at 09:29

## 2024-10-22 RX ADMIN — ENOXAPARIN SODIUM 40 MG: 100 INJECTION SUBCUTANEOUS at 12:43

## 2024-10-22 RX ADMIN — SACUBITRIL AND VALSARTAN 0.5 TABLET: 24; 26 TABLET, FILM COATED ORAL at 12:42

## 2024-10-22 RX ADMIN — FUROSEMIDE 40 MG: 40 TABLET ORAL at 09:29

## 2024-10-22 RX ADMIN — HYDRALAZINE HYDROCHLORIDE 10 MG: 10 TABLET ORAL at 05:15

## 2024-10-22 ASSESSMENT — ENCOUNTER SYMPTOMS
GASTROINTESTINAL NEGATIVE: 1
RESPIRATORY NEGATIVE: 1

## 2024-10-22 NOTE — PLAN OF CARE
Problem: ABCDS Injury Assessment  Goal: Absence of physical injury  10/22/2024 1640 by Loyda Sweet RN  Outcome: Progressing  10/22/2024 1602 by Loyda Sweet RN  Outcome: Progressing     Problem: Chronic Conditions and Co-morbidities  Goal: Patient's chronic conditions and co-morbidity symptoms are monitored and maintained or improved  10/22/2024 1640 by Loyda Sweet RN  Outcome: Progressing  10/22/2024 1602 by Loyda Sweet RN  Outcome: Progressing     Problem: Discharge Planning  Goal: Discharge to home or other facility with appropriate resources  10/22/2024 1640 by Loyda Sweet RN  Outcome: Progressing  10/22/2024 1602 by Loyda Sweet RN  Outcome: Progressing     Problem: Safety - Adult  Goal: Free from fall injury  10/22/2024 1640 by Loyda Sweet RN  Outcome: Progressing  10/22/2024 1602 by Loyda Sweet RN  Outcome: Progressing

## 2024-10-22 NOTE — PLAN OF CARE
Problem: ABCDS Injury Assessment  Goal: Absence of physical injury  Outcome: Progressing     Problem: Chronic Conditions and Co-morbidities  Goal: Patient's chronic conditions and co-morbidity symptoms are monitored and maintained or improved  10/21/2024 2138 by Suzanne Henry, RN  Outcome: Progressing  10/21/2024 1156 by Meagan Mann RN  Outcome: Progressing     Problem: Discharge Planning  Goal: Discharge to home or other facility with appropriate resources  10/21/2024 2138 by Suzanne Henry, RN  Outcome: Progressing  10/21/2024 1156 by Meagan Mann RN  Outcome: Progressing     Problem: Safety - Adult  Goal: Free from fall injury  Outcome: Progressing

## 2024-10-22 NOTE — CONSULTS
Pharmacy to Manage Heparin Infusion per Hospital Nomogram    Dx: Possible DVT/PE  Pt wt = 76.7 kg  Baseline aPTT = 26.2 at 2306.    Heparin (weight-based) Infusion: VTE/DVT/PE  Heparin 80 units/kg IVP bolus (max 10,000 units) followed by Heparin infusion at 18 units/kg/hr (recommended max initial rate: 2100 units/hr).  Recheck anti-Xa (unless aPTT being used) in 6 hours.  Goal anti-Xa 0.3-0.7 IU/mL  Goal aPTT =  seconds.  Abdoul Espana, PharmD  10/20/2024 11:54 PM  
[facility]   HEART FAILURE PROGRAM    Provided the Patient with Heart Failure education on: signs/symptoms to monitor, medications, daily weights, low sodium diet, 2000 ml fluid restriction, and activity. Reviewed HF Zones (green/yellow/red) and importance of reporting yellow symptoms on Magnet provided. Reinforced the importance of daily weights and to report weight gain of 3 lbs in one day and 5 lbs in one week to Provider. Provided patient with a paper copy weight log to keep track of daily weights.    Education:     EDUCATIONAL MATERIALS PROVIDED:    [x]  "[employer]: A Patient Education Guide Living with Heart Failure Booklet  [x]  Heart Failure Zones Self-Check Plan  [x]  Weight and Heart Failure Zone Log  [x]  AHA: HF and Your Ejection Fraction Explained  [x]  Cardiac Rehab Flyer  [x]  AHA: HF [place] [facility] Aids Patients at Home  [x]  Magnet: Signs of Heart Failure    PATIENT/CAREGIVER TEACHING:   Level of patient/caregiver understanding able to:   [x] Verbalize understanding   [] Demonstrate understanding       [] Teach back        [] Needs reinforcement     []  Other:      TEACHING TIME:  20 minutes       Recommendations:     [x]  Encourage to call Heart Failure Resource Line [phone] with any questions or concerns.  [x]  Encourage follow-up appointment compliance.   [x]  Emphasize daily weights: Instruct patient to call the MD if weight gain of 3 lbs in 1 day or 5 lbs in a week.   [x]  Review sodium restriction diet. Encourage patient to not add table salt and avoid foods high in sodium.   [x]  Educate further on fluid restriction of 48 oz - 64 oz with labeled pitcher during inpatient admission.  [x]  Continue to educate on signs & symptoms of Heart Failure.  []  Other:            Electronically signed by Electronically signed by Tyra Springer RN on 10/22/2024 at 12:49 PM   
statin   -Start hydralazine and Isordil today as blood pressure uncontrolled in setting of decompensated heart failure and RUBEN  -Will plan to transition off hydralazine and Isordil in the coming day or so to Entresto once renal functions back to normal.  -Can consider SGLT2 and MRA as renal function and blood pressure tolerates  -Continue heparin for total 48 hours.  Can stop tomorrow.  -Prior discussions with patient and her daughter given her cardiomyopathy to proceed with medical management for cardiomyopathy and history of NSTEMI.  Per our discussion this is the plan at this time.  -VQ scan to rule out PE per primary  -Okay for diet from my perspective.  Recommend low-sodium 2 g diet 2 L fluid restriction  -Will follow    Patient Active Problem List   Diagnosis    DM2 (diabetes mellitus, type 2) ([facility])    Acute blood loss anemia    Severe protein-calorie malnutrition ([facility])    Encephalopathy    HTN (hypertension), benign    Altered mental status    Acute renal failure (HCC)    Contusion of left hip    Acute UTI    Mild dehydration    Urinary incontinence    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)    Osteomyelitis of right foot    Chronic kidney disease    Generalized weakness    Stage 3b chronic kidney disease (HCC)    Chest pain    Arterial ischemic stroke, ICA, left, acute (HCC)    Carotid artery stenosis with cerebral infarction (HCC)    Dyslipidemia    NSTEMI (non-ST elevated myocardial infarction) ([facility])    Infection due to 2019 novel coronavirus    History of CVA (cerebrovascular accident)    Acute cerebrovascular accident (CVA) ([facility])    Elevated troponin level not due myocardial infarction    DM (diabetes mellitus), secondary, with complications (HCC)    Cardiomyopathy (HCC)    Nonrheumatic aortic valve insufficiency    SBO (small bowel obstruction) (HCC)    SIRS (systemic inflammatory response syndrome) (HCC)    Leukocytosis    Abdominal pain    Nausea and vomiting    Heart failure, acute

## 2024-10-23 ENCOUNTER — TELEPHONE (OUTPATIENT)
Dept: CARDIOLOGY CLINIC | Age: 89
End: 2024-10-23

## 2024-10-23 VITALS
TEMPERATURE: 98.2 F | RESPIRATION RATE: 20 BRPM | DIASTOLIC BLOOD PRESSURE: 53 MMHG | HEART RATE: 69 BPM | HEIGHT: 70 IN | WEIGHT: 145.6 LBS | SYSTOLIC BLOOD PRESSURE: 104 MMHG | BODY MASS INDEX: 20.84 KG/M2 | OXYGEN SATURATION: 93 %

## 2024-10-23 DIAGNOSIS — N18.32 STAGE 3B CHRONIC KIDNEY DISEASE (HCC): Primary | ICD-10-CM

## 2024-10-23 LAB
ANION GAP SERPL CALCULATED.3IONS-SCNC: 12 MMOL/L (ref 3–16)
BASOPHILS # BLD: 0 K/UL (ref 0–0.2)
BASOPHILS NFR BLD: 0.2 %
BUN SERPL-MCNC: 29 MG/DL (ref 7–20)
CALCIUM SERPL-MCNC: 9.2 MG/DL (ref 8.3–10.6)
CHLORIDE SERPL-SCNC: 96 MMOL/L (ref 99–110)
CO2 SERPL-SCNC: 30 MMOL/L (ref 21–32)
CREAT SERPL-MCNC: 1.5 MG/DL (ref 0.6–1.2)
DEPRECATED RDW RBC AUTO: 13 % (ref 12.4–15.4)
EOSINOPHIL # BLD: 0.2 K/UL (ref 0–0.6)
EOSINOPHIL NFR BLD: 3.2 %
GFR SERPLBLD CREATININE-BSD FMLA CKD-EPI: 33 ML/MIN/{1.73_M2}
GLUCOSE BLD-MCNC: 186 MG/DL (ref 70–99)
GLUCOSE BLD-MCNC: 196 MG/DL (ref 70–99)
GLUCOSE SERPL-MCNC: 165 MG/DL (ref 70–99)
HCT VFR BLD AUTO: 28.3 % (ref 36–48)
HGB BLD-MCNC: 9.7 G/DL (ref 12–16)
LYMPHOCYTES # BLD: 0.8 K/UL (ref 1–5.1)
LYMPHOCYTES NFR BLD: 11.2 %
MAGNESIUM SERPL-MCNC: 1.8 MG/DL (ref 1.8–2.4)
MCH RBC QN AUTO: 33.3 PG (ref 26–34)
MCHC RBC AUTO-ENTMCNC: 34.4 G/DL (ref 31–36)
MCV RBC AUTO: 96.7 FL (ref 80–100)
MONOCYTES # BLD: 0.9 K/UL (ref 0–1.3)
MONOCYTES NFR BLD: 12.1 %
NEUTROPHILS # BLD: 5.5 K/UL (ref 1.7–7.7)
NEUTROPHILS NFR BLD: 73.3 %
PERFORMED ON: ABNORMAL
PERFORMED ON: ABNORMAL
PLATELET # BLD AUTO: 267 K/UL (ref 135–450)
PMV BLD AUTO: 8.6 FL (ref 5–10.5)
POTASSIUM SERPL-SCNC: 3.6 MMOL/L (ref 3.5–5.1)
RBC # BLD AUTO: 2.92 M/UL (ref 4–5.2)
SODIUM SERPL-SCNC: 138 MMOL/L (ref 136–145)
WBC # BLD AUTO: 7.5 K/UL (ref 4–11)

## 2024-10-23 PROCEDURE — 97162 PT EVAL MOD COMPLEX 30 MIN: CPT

## 2024-10-23 PROCEDURE — 85025 COMPLETE CBC W/AUTO DIFF WBC: CPT

## 2024-10-23 PROCEDURE — 99232 SBSQ HOSP IP/OBS MODERATE 35: CPT | Performed by: NURSE PRACTITIONER

## 2024-10-23 PROCEDURE — 80048 BASIC METABOLIC PNL TOTAL CA: CPT

## 2024-10-23 PROCEDURE — 83735 ASSAY OF MAGNESIUM: CPT

## 2024-10-23 PROCEDURE — 36415 COLL VENOUS BLD VENIPUNCTURE: CPT

## 2024-10-23 PROCEDURE — 97116 GAIT TRAINING THERAPY: CPT

## 2024-10-23 PROCEDURE — 6370000000 HC RX 637 (ALT 250 FOR IP): Performed by: INTERNAL MEDICINE

## 2024-10-23 PROCEDURE — 97166 OT EVAL MOD COMPLEX 45 MIN: CPT

## 2024-10-23 PROCEDURE — 97530 THERAPEUTIC ACTIVITIES: CPT

## 2024-10-23 PROCEDURE — 97535 SELF CARE MNGMENT TRAINING: CPT

## 2024-10-23 PROCEDURE — 6370000000 HC RX 637 (ALT 250 FOR IP)

## 2024-10-23 RX ORDER — ENOXAPARIN SODIUM 100 MG/ML
30 INJECTION SUBCUTANEOUS DAILY
Status: DISCONTINUED | OUTPATIENT
Start: 2024-10-24 | End: 2024-10-23 | Stop reason: HOSPADM

## 2024-10-23 RX ORDER — FUROSEMIDE 20 MG/1
20 TABLET ORAL EVERY OTHER DAY
Qty: 30 TABLET | Refills: 0 | Status: SHIPPED | OUTPATIENT
Start: 2024-10-24

## 2024-10-23 RX ADMIN — CLOPIDOGREL BISULFATE 75 MG: 75 TABLET ORAL at 08:36

## 2024-10-23 RX ADMIN — CARVEDILOL 6.25 MG: 6.25 TABLET, FILM COATED ORAL at 08:36

## 2024-10-23 RX ADMIN — SACUBITRIL AND VALSARTAN 0.5 TABLET: 24; 26 TABLET, FILM COATED ORAL at 08:36

## 2024-10-23 RX ADMIN — PRAVASTATIN SODIUM 20 MG: 10 TABLET ORAL at 08:36

## 2024-10-23 RX ADMIN — ASPIRIN 81 MG: 81 TABLET, CHEWABLE ORAL at 08:36

## 2024-10-23 ASSESSMENT — ENCOUNTER SYMPTOMS
RESPIRATORY NEGATIVE: 1
GASTROINTESTINAL NEGATIVE: 1

## 2024-10-23 NOTE — CARE COORDINATION
Met with pt at bedside. Pt is agreeable to DC home with family today. Family will provide transport home. Pt declines HHC at this time. No additional DC or DME needs identified.     IMM 10/23    O2 94% RA

## 2024-10-23 NOTE — FLOWSHEET NOTE
10/23/24 0830   Vital Signs   Temp 98.2 °F (36.8 °C)   Temp Source Oral   Pulse 77   Heart Rate Source Monitor   Respirations 18   BP (!) 118/59   MAP (Calculated) 79   BP Location Right upper arm   BP Method Automatic   Patient Position Semi fowlers   Oxygen Therapy   SpO2 92 %   O2 Device None (Room air)     Patient assessment complete,plan of care discussed.Call light in reach

## 2024-10-23 NOTE — DISCHARGE INSTRUCTIONS
A lab has been ordered to be done on Friday 10/25/2024 per Cardiologist.  You do not need an appointment.  You do not need to be fasting.  Please, come to [facility] and have this done.  It's to recheck kidney functions.  BMP ordered.        Low salt diet  Daily weights if able  Lasix every other day for edema or sob   Resume entresto   F/w BMP in 1 week with cardiology or PCP          Heart Failure Resources:  Heart Failure Interactive Workbook:  Go to https://[employer].adaffix/publication/?i=275897 for a Free Heart Failure Interactive Workbook provided by The American [employer] Association. This interactive workbook will provide information on Healthier Living with Heart Failure. Please copy and paste link into search bar. Use your mouse to scroll through the pages.    HF [place] riki:   Heart Failure Free smart phone riki available for iPhone and Android download. Use your phone to track sodium intake, fluid intake, symptoms, and weight.     Low Sodium Diet / Recipes:  Go to www.[employer] website for “renal” diet which is Low Sodium! [employer] is a dialysis company, but this website offers free seasonal cookbooks. Each quarter, they will release 25-30 new recipes with a breakdown of calories, sodium, and glucose. You can also go to www.[employer]/recipes website for free recipes.     Discharge Instruction Video:  Scan the QR code below with your camera and click the canva.com link to open the video and watch educational information on Heart Failure and Medications from one of our nurses.   https://www.[employer]/design/DAFZnsH_JRk/0XdmmpwLVHEisREurC1ioc/edit    Home Exercise Program:   Identification of Green/Yellow/Red zones:  You should be able to identify when you feel good (green zone), if you have 1-2 symptoms of HF (yellow zone), or if you are in need of medical attention (red zone).  In your CHF education folder you were provided a “stop light tool” to outline this information.     We want to you to

## 2024-10-23 NOTE — PLAN OF CARE
Problem: ABCDS Injury Assessment  Goal: Absence of physical injury  10/23/2024 1020 by Barbara Huynh RN  Outcome: Adequate for Discharge  10/22/2024 2109 by Suzanne Henry RN  Outcome: Progressing     Problem: Chronic Conditions and Co-morbidities  Goal: Patient's chronic conditions and co-morbidity symptoms are monitored and maintained or improved  10/23/2024 1020 by Barbara Huynh RN  Outcome: Adequate for Discharge  10/22/2024 2109 by Suzanne Henry RN  Outcome: Progressing     Problem: Discharge Planning  Goal: Discharge to home or other facility with appropriate resources  10/23/2024 1020 by Barbara Huynh, RN  Outcome: Adequate for Discharge  10/22/2024 2109 by Suzanne Henry, RN  Outcome: Progressing     Problem: Safety - Adult  Goal: Free from fall injury  10/23/2024 1020 by Barbara Huynh, RN  Outcome: Adequate for Discharge  10/22/2024 2109 by Suzanne Henry, RN  Outcome: Progressing

## 2024-10-23 NOTE — DISCHARGE SUMMARY
Name:  Whitney Meier  Room:  /0322-01  MRN:    [id number]    IM Discharge Summary    Discharging Physician:  Calixto cordova MD    Admit: 10/20/2024    Discharge:      PCP      Leilani Mantilla MD    Diagnoses and hospital course  this Admission        Acute on Chronic HFrEF  - pt was recently discharged 10/16/24 for SBO and received IVF   - CXR shows pulmonary vascular congestion, bilateral pleural effusions, in keeping with CHF. - BNP 18,972 on admission   - last echo on 3/20/24 with EF of 35-40%, started diuresis with IV lasix 40 mg bid , hold Entresto  - net negative since admission 1.75 L  - no ACE-I, ARB, or SGLT-2 currently due to concern for low BP  - continue daily weights, low sodium diet, 2000 ml fluid restriction, and strict I/O  - cardiology consulted , added imdur and hydralazine but unable to tolerate with Low BP and dcd     - restarted home entresto at half the dose and decreased PO lasix to 20 mg every other day and doing ok      Acute hypoxia - resp failure ruled out  - 2/2 CHF AE  - O2 sats 86% on RA, pt not on O2 at baseline -  - improved and off o2     Elevated troponin   CAD w/hx of NSTEMI 12/2023  - on statin, BB, asa, and Plavix  - 220->204->192  - EKG as above   - pt denies CP  - cardiology consulted, plan for medical mgmt for now      Elevated d dimer   - d dimer 1.26 on admission   - VQ scan ordered given CKD -> unable to obtain 2/2 loss of IV access  - heparin gtt -> stopped as low concern for PE and hypoxia resolved with diuresis            CKD stage 3b   - appears similar compared to baseline Cr ~1.2-1.4  - monitor BMP      Hypokalemia - resolved   - replacement protocol      Hx of ischemic CVA of left ICA s/p TNK 12/2023  - on statin, asa, and Plavix     DM type 2  - SSI  - carb control diet  - monitor BG      Chronic anemia   - appears similar to baseline hgb ~9-11  - iron studies: ferritin 337, iron 28, TIBC 244, and iron 11% saturation  - monitor h/h, transfuse if hgb <7

## 2024-10-23 NOTE — PLAN OF CARE
Problem: ABCDS Injury Assessment  Goal: Absence of physical injury  10/22/2024 2109 by Suzanne Henry, RN  Outcome: Progressing  10/22/2024 1640 by Loyda Sweet RN  Outcome: Progressing  10/22/2024 1602 by Loyda Sweet RN  Outcome: Progressing     Problem: Chronic Conditions and Co-morbidities  Goal: Patient's chronic conditions and co-morbidity symptoms are monitored and maintained or improved  10/22/2024 2109 by Suzanne Henry RN  Outcome: Progressing  10/22/2024 1640 by Loyda Sweet RN  Outcome: Progressing  10/22/2024 1602 by Loyda Sweet RN  Outcome: Progressing     Problem: Discharge Planning  Goal: Discharge to home or other facility with appropriate resources  10/22/2024 2109 by Suzanne Henry, RN  Outcome: Progressing  10/22/2024 1640 by Loyda Sweet RN  Outcome: Progressing  10/22/2024 1602 by Loyda Sweet RN  Outcome: Progressing     Problem: Safety - Adult  Goal: Free from fall injury  10/22/2024 2109 by Suzanne Henry, RN  Outcome: Progressing  10/22/2024 1640 by Loyda Sweet RN  Outcome: Progressing  10/22/2024 1602 by Loyda Sweet RN  Outcome: Progressing

## 2024-10-24 LAB
BACTERIA BLD CULT ORG #2: NORMAL
BACTERIA BLD CULT: NORMAL

## 2024-10-25 NOTE — PROGRESS NOTES
Physician Progress Note      PATIENT:               POORNIMA NERI  CSN #:                  [id number]  :                       1935  ADMIT DATE:       10/20/2024 2:50 PM  DISCH DATE:        10/23/2024 3:18 PM  RESPONDING  PROVIDER #:        Calixto Sawyer MD          QUERY TEXT:    Pt admitted with acute CHF.  Noted documentation of \"Elevated troponin versus   NSTEMI type II\" per cardiology consultant note 10/21.  If possible, please   document in progress notes and discharge summary:    The medical record reflects the following:  Risk Factors: acute CHF exacerbation, HTN, CKD, CAD    Clinical Indicators: pt denies chest pain, + SOB  Per cardiology consult note 10/21-\"Elevated troponin versus NSTEMI type II\"  Troponin trend of admission-  [id number] (prior recent was 61-59 on   10/10/2024)  EKG 10/21-NSR, incomplete LBBB, cannot rule out ateroseptal infarct (cited on   or before ) T wave abnormality in inferior lead  Echo 3/24- EF 35-40%  Discharge summary - \"Elevated troponin\"    Treatment: serial labs, EKG, cardiology consult, telemetry, diuresis, GDMT   medical mgmt of CHF  Options provided:  -- Demand ischemia from acute CHF only, no MI  -- Type 2 NSTEMI with shortness of breath as anginal equivalent confirmed  -- Other - I will add my own diagnosis  -- Disagree - Not applicable / Not valid  -- Disagree - Clinically unable to determine / Unknown  -- Refer to Clinical Documentation Reviewer    PROVIDER RESPONSE TEXT:    Demand ischemia from acute CHF only, no MI    Query created by: Martha Ryan on 10/25/2024 9:55 AM      Electronically signed by:  Calixto Sawyer MD 10/25/2024 10:54 AM          
Admission assessment completed. VSS 2 liter NC, A/O x4. Patient does not appear in distress and denies any needs at this time. Call light in reach, will monitor,        Patient admitted to room 322 from er. Patient oriented to room, call light, bed rails, phone, lights and bathroom. Patient instructed about the schedule of the day including: vital sign frequency, lab draws, possible tests, frequency of MD and staff rounds, daily weights, I &O's and prescribed diet.  Telemetry box in place, patient aware of placement and reason. Bed locked, in lowest position, side rails up 2/4, call light within reach.        Recliner Assessment  Patient is able to demonstrate the ability to move from a reclining position to an upright position within the recliner.       4 Eyes Skin Assessment     NAME:  Whitney Meier  YOB: 1935  MEDICAL RECORD NUMBER:  [id number]    The patient is being assessed for  Admission    I agree that at least one RN has performed a thorough Head to Toe Skin Assessment on the patient. ALL assessment sites listed below have been assessed.      Areas assessed by both nurses:    Head, Face, Ears, Shoulders, Back, Chest, Arms, Elbows, Hands, Sacrum. Buttock, Coccyx, Ischium, Legs. Feet and Heels, and Under Medical Devices       Scattered bruising and abrasion. 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   Does the Patient have a Wound? No noted wound(s)       Shukri Prevention initiated by RN: No  Wound Care Orders initiated by RN: No    Pressure Injury (Stage 3,4, Unstageable, DTI, NWPT, and Complex wounds) if present, place Wound referral order by RN under : No    New Ostomies, if present place, Ostomy referral order under : No     Nurse 1 eSignature: Electronically signed by Suzanne Henry RN on 10/21/24 at 12:09 AM EDT    **SHARE this note so that the co-signing nurse can place an eSignature**    Nurse 2 eSignature:     
Called and spoke with daughter Dagmar plan of care reviewed and aware of patient discharge said she will be here this afternoon to pick patient up  
Cardiology Consult has been called to [facility] on 10/21. Spoke with Answering service. 5:37 AM    Suzanne Hnery RN  10/21/2024  
HEART FAILURE CARE PLAN:    Comorbidities Reviewed: Yes   Patient has a past medical history of Clostridium difficile infection, Dementia (HCC), Diabetes mellitus (HCC), History of blood transfusion, Hyperlipidemia, Hypertension, Infection, MDRO (multiple drug resistant organisms) resistance, MRSA infection, Osteomyelitis of spine, and Restless leg syndrome.     Weights Reviewed: Yes   Admission weight: 76.7 kg (169 lb)   Wt Readings from Last 3 Encounters:   10/22/24 66 kg (145 lb 9.6 oz)   10/16/24 66.4 kg (146 lb 4.8 oz)   08/21/24 63.6 kg (140 lb 3.2 oz)     Intake & Output Reviewed: Yes     Intake/Output Summary (Last 24 hours) at 10/22/2024 1246  Last data filed at 10/22/2024 0536  Gross per 24 hour   Intake 300 ml   Output 1050 ml   Net -750 ml       ECHOCARDIOGRAM Reviewed: Yes   Patient's Ejection Fraction (EF) is less than 40%     Medications Reviewed: Yes   SCHEDULED HOSPITAL MEDICATIONS:   furosemide  20 mg Oral Every Other Day    sacubitril-valsartan  0.5 tablet Oral BID    enoxaparin  40 mg SubCUTAneous Daily    hydrALAZINE  10 mg Oral 3 times per day    insulin lispro  0-4 Units SubCUTAneous 4x Daily AC & HS    aspirin  81 mg Oral Daily    carvedilol  6.25 mg Oral BID WC    clopidogrel  75 mg Oral Daily    pravastatin  20 mg Oral Daily    sodium chloride flush  5-40 mL IntraVENous 2 times per day     HOME MEDICATIONS:  Prior to Admission medications    Medication Sig Start Date End Date Taking? Authorizing Provider   vitamin D 25 MCG (1000 UT) CAPS Take by mouth   Yes Madalyn Salinas MD   vitamin C (ASCORBIC ACID) 500 MG tablet Take 1 tablet by mouth daily   Yes Madalyn Salinas MD   vitamin B-12 (CYANOCOBALAMIN) 500 MCG tablet Take 1 tablet by mouth daily   Yes Madalyn Salinas MD   clopidogrel (PLAVIX) 75 MG tablet TAKE 1 TABLET BY MOUTH EVERY DAY 8/21/24  Yes Gerard Youssef MD   carvedilol (COREG) 6.25 MG tablet Take 1 tablet by mouth 2 times daily (with meals) 8/21/24  Yes Mikael 
Handoff report given to CASIMIRO Payne. Care transferred.   
Handoff report given to CASIMIRO Rhodes. Care transferred.   
Handoff report given to CASIMIRO Scales. Care transferred.   
Handoff report given to Suzanne KIRKPATRICK.  Patient is in stable condition and has no needs at this time. Call light is in reach and bed is in the lowest position.  Care is transferred at this time.    
[facility]  Cardiology  Progress Note    Admission date:  10/20/2024    Reason for follow up visit: CHF    HPI/CC: Whitney Meier is a 89 y.o. female who presented 10/20/2024 for shortness of breath.  She has been treated for acute on chronic HFrEF (admitted a few weeks ago with SBO and received IV fluid resuscitation).  Rhythm overnight has been sinus.    Subjective: She feels well today and denies any chest pain, shortness of breath, palpitations, syncope, edema    Vitals:  Blood pressure (!) 104/53, pulse 69, temperature 98.2 °F (36.8 °C), temperature source Oral, resp. rate 20, height 1.778 m (5' 10\"), weight 66 kg (145 lb 9.6 oz), SpO2 93%, not currently breastfeeding.  Temp  Av.1 °F (36.7 °C)  Min: 97.9 °F (36.6 °C)  Max: 98.4 °F (36.9 °C)  Pulse  Av.1  Min: 67  Max: 81  BP  Min: 100/44  Max: 135/62  SpO2  Av.6 %  Min: 91 %  Max: 94 %    24 hour I/O    Intake/Output Summary (Last 24 hours) at 10/23/2024 1236  Last data filed at 10/23/2024 0458  Gross per 24 hour   Intake 300 ml   Output 550 ml   Net -250 ml     Current Facility-Administered Medications   Medication Dose Route Frequency Provider Last Rate Last Admin    furosemide (LASIX) tablet 20 mg  20 mg Oral Every Other Day Sarahy Wanrer, PA-C   20 mg at 10/22/24 1242    sacubitril-valsartan (ENTRESTO) 24-26 MG per tablet 0.5 tablet  0.5 tablet Oral BID Sarahy Warner, PA-C   0.5 tablet at 10/23/24 0836    enoxaparin (LOVENOX) injection 40 mg  40 mg SubCUTAneous Daily Sarahy Warner, PA-C   40 mg at 10/22/24 1243    potassium chloride (KLOR-CON M) extended release tablet 40 mEq  40 mEq Oral PRN Alana, Sarahy, PA-C        Or    potassium bicarb-citric acid (EFFER-K) effervescent tablet 40 mEq  40 mEq Oral PRN Alana, Sarahy, PA-C        Or    potassium chloride 10 mEq/100 mL IVPB (Peripheral Line)  10 mEq IntraVENous PRN Sarahy Warner PA-C        magnesium sulfate 2000 mg in 50 mL IVPB premix  2,000 mg IntraVENous PRN 
Inpatient Occupational Therapy Evaluation and Treatment    Unit: PCU  Date:  10/23/2024  Patient Name:    Whitney Meier  Admitting diagnosis:  Hypoxia [R09.02]  Heart failure, acute systolic (HCC) [I50.21]  Acute congestive heart failure, unspecified heart failure type (HCC) [I50.9]  Admit Date:  10/20/2024  Precautions/Restrictions/WB Status/ Lines/ Wounds/ Oxygen: Fall risk, Bed/chair alarm, Lines (external catheter), Telemetry, and Continuous pulse oximetry; 2000 mL fluid restriction    Pt seen for cotreatment this date due to patient safety, patient endurance, and limited functional status information    Treatment Time:  021-781  Treatment Number:  1  Timed Code Treatment Minutes: 40 minutes  Total Treatment Minutes:  50  minutes    Patient Goals for Therapy: to go home          Discharge Recommendations: Home with PRN assist, Home with 24/7 supervision, and Home OT  DME needs for discharge: Needs Met       Therapy recommendations for staff:   Assist of 1 for transfers with use of rolling walker (RW) and gait belt to/from bathroom  within room    History of Present Illness: Per Dr. Valderrama H&P 10/20/24:  \"89 y.o. female with extensive past medical history including type 2 diabetes, hypertension, cardiomyopathy who presented to the ER complaining of shortness of breath which have been going on for a few days and worsening.  Patient denies chest pain, fever or chills.  Workup in the ER included laboratory testing showing elevated BNP.  Chest x-ray shows findings consistent with CHF.\"     Preadmission Environment:   Lives With: Daughter (Son in law)  Type of Home: House  Home Layout: Two level, Able to Live on Main level with bedroom/bathroom  Home Access: Stairs to enter without rails  Entrance Stairs - Number of Steps: 3 HAZEL- wider steps  Bathroom Shower/Tub: Tub/shower (sponge bathes at baseline)  Bathroom Toilet: Standard  Bathroom Equipment: 3-in-1 commode  Home Equipment: Walker, rolling, 
O2 Sat at rest on room air is 96 %.  O2 Sat with activity on room air is 963 %.    
PM assessment completed. Scheduled medications given per MAR. VSS room air, A/O x4. Patient does not appear in distress and denies any needs at this time. Call light in reach, will monitor, bed alarm on.     While patient sleeping SPO2 80's, placed 2 liter NC on patient.     
PM assessment completed. Scheduled medications given per MAR. VSS; patient had fallen asleep SPO2 85%, placed 2 liter NC on patient, A/O x4. Patient does not appear in distress and denies any needs at this time. Call light in reach, will monitor, bed alarm on.     
Patient educated on discharge instructions as well as new medications use, dosage, administration and possible side effects.  Patient verified knowledge. IV removed without difficulty and dry dressing in place. Telemetry monitor removed and returned to [facility]. Pt left facility in stable condition to Home with all of their personal belongings.    
Pharmacy - Heparin  Anti-Xa level drawn at 0606 today = <0.1 IU/ml (Goal anti-Xa 0.3-0.7 IU/mL).  Current heparin drip rate = 18 units/kg/hr.  Per protocol, give heparin 6100 units IV bolus x 1 and increase heparin drip rate to 22 units/kg/hr.  Draw Anti-Xa level 6 hours after bolus dose given and drip rate increased.  Will adjust to goal anti-Xa 0.3-0.7 IU/mL. Pharmacy will continue to monitor and adjust as necessary.   
Progress Note    Admit Date:  10/20/2024    Patient was admitted for CHF AE, elevated troponin, and elevated d dimer.     Subjective:  Ms. Meier seen up In bed, feels ok , BP borderline with new meds - hydralazine and imdur held    On RA today   No cough or sob  Lost IV access    Objective:   Patient Vitals for the past 4 hrs:   BP Temp Temp src Pulse Resp SpO2   10/22/24 0929 (!) 125/52 -- -- 83 -- --   10/22/24 0750 121/62 98.6 °F (37 °C) Oral 84 17 94 %          Intake/Output Summary (Last 24 hours) at 10/22/2024 1117  Last data filed at 10/22/2024 0536  Gross per 24 hour   Intake 300 ml   Output 1050 ml   Net -750 ml       Physical Exam:    General:  elderly female Awake, alert and oriented. Appears to be not in any distress  Mucous Membranes:  Pink , anicteric  Neck: No JVD, no carotid bruit, no thyromegaly  Chest:  Clear to auscultation bilaterally, no added sounds  Cardiovascular:  RRR S1S2 heard, no murmurs or gallops  Abdomen:  Soft, undistended, non tender, no organomegaly, BS present  Extremities: No edema or cyanosis. Distal pulses well felt  Neurological : grossly normal      Medications:  hydrALAZINE, 10 mg, 3 times per day  isosorbide dinitrate, 10 mg, TID  insulin lispro, 0-4 Units, 4x Daily AC & HS  furosemide, 40 mg, Daily  aspirin, 81 mg, Daily  carvedilol, 6.25 mg, BID WC  clopidogrel, 75 mg, Daily  pravastatin, 20 mg, Daily  sodium chloride flush, 5-40 mL, 2 times per day      PRN Medications:  potassium chloride, 40 mEq, PRN   Or  potassium alternative oral replacement, 40 mEq, PRN   Or  potassium chloride, 10 mEq, PRN  magnesium sulfate, 2,000 mg, PRN  glucose, 4 tablet, PRN  dextrose bolus, 125 mL, PRN   Or  dextrose bolus, 250 mL, PRN  glucagon (rDNA), 1 mg, PRN  dextrose, , Continuous PRN  sodium chloride flush, 5-40 mL, PRN  sodium chloride, , PRN  ondansetron, 4 mg, Q8H PRN   Or  ondansetron, 4 mg, Q6H PRN  polyethylene glycol, 17 g, Daily PRN  acetaminophen, 650 mg, Q6H PRN   
Progress Note    Admit Date:  10/20/2024    Patient was admitted for CHF AE, elevated troponin, and elevated d dimer.     Subjective:-    Ms. Meier seen up In bed, feels ok , BP borderline with new meds - hydralazine and imdur started and  dcd    On RA today   No cough or sob  Lost IV access    Objective:   No data found.         Intake/Output Summary (Last 24 hours) at 10/23/2024 0719  Last data filed at 10/23/2024 0458  Gross per 24 hour   Intake 520 ml   Output 650 ml   Net -130 ml       Physical Exam:    General:  elderly female Awake, alert and oriented. Appears to be not in any distress  Mucous Membranes:  Pink , anicteric  Neck: No JVD, no carotid bruit, no thyromegaly  Chest:  Clear to auscultation bilaterally, no added sounds  Cardiovascular:  RRR S1S2 heard, no murmurs or gallops  Abdomen:  Soft, undistended, non tender, no organomegaly, BS present  Extremities: No edema or cyanosis. Distal pulses well felt  Neurological : grossly normal      Medications:  furosemide, 20 mg, Every Other Day  sacubitril-valsartan, 0.5 tablet, BID  enoxaparin, 40 mg, Daily  insulin lispro, 0-4 Units, 4x Daily AC & HS  aspirin, 81 mg, Daily  carvedilol, 6.25 mg, BID WC  clopidogrel, 75 mg, Daily  pravastatin, 20 mg, Daily  sodium chloride flush, 5-40 mL, 2 times per day      PRN Medications:  potassium chloride, 40 mEq, PRN   Or  potassium alternative oral replacement, 40 mEq, PRN   Or  potassium chloride, 10 mEq, PRN  magnesium sulfate, 2,000 mg, PRN  glucose, 4 tablet, PRN  dextrose bolus, 125 mL, PRN   Or  dextrose bolus, 250 mL, PRN  glucagon (rDNA), 1 mg, PRN  dextrose, , Continuous PRN  sodium chloride flush, 5-40 mL, PRN  sodium chloride, , PRN  ondansetron, 4 mg, Q8H PRN   Or  ondansetron, 4 mg, Q6H PRN  polyethylene glycol, 17 g, Daily PRN  acetaminophen, 650 mg, Q6H PRN   Or  acetaminophen, 650 mg, Q6H PRN          Data:  CBC:   Recent Labs     10/20/24  1520 10/22/24  0556 10/23/24  0513   WBC 7.9 6.0 7.5   HGB 
Report given to Suzanne KIRKPATRICK. Pt. Stable. Care transferred at this time.   
[facility]  Cardiology  Progress Note    Admission date:  10/20/2024    Reason for follow up visit: CHF    HPI/CC: Whitney Meier is a 89 y.o. female who presented 10/20/2024 for shortness of breath.  She has been treated for acute on chronic HFrEF (admitted a few weeks ago with SBO and received IV fluid resuscitation).  Rhythm overnight has been sinus.    Subjective: She feels well today and denies any chest pain, shortness of breath, palpitations, syncope, edema    Vitals:  Blood pressure (!) 99/50, pulse 77, temperature 98.2 °F (36.8 °C), temperature source Oral, resp. rate 16, height 1.778 m (5' 10\"), weight 66 kg (145 lb 9.6 oz), SpO2 93%, not currently breastfeeding.  Temp  Av.1 °F (36.7 °C)  Min: 97.7 °F (36.5 °C)  Max: 98.6 °F (37 °C)  Pulse  Av.7  Min: 77  Max: 91  BP  Min: 99/50  Max: 133/55  SpO2  Av.7 %  Min: 90 %  Max: 95 %    24 hour I/O    Intake/Output Summary (Last 24 hours) at 10/22/2024 1350  Last data filed at 10/22/2024 0536  Gross per 24 hour   Intake 120 ml   Output 650 ml   Net -530 ml     Current Facility-Administered Medications   Medication Dose Route Frequency Provider Last Rate Last Admin    furosemide (LASIX) tablet 20 mg  20 mg Oral Every Other Day Sarahy Warner PA-C   20 mg at 10/22/24 1242    sacubitril-valsartan (ENTRESTO) 24-26 MG per tablet 0.5 tablet  0.5 tablet Oral BID Sarahy Warner PA-C   0.5 tablet at 10/22/24 1242    enoxaparin (LOVENOX) injection 40 mg  40 mg SubCUTAneous Daily Sarahy Warner PA-C   40 mg at 10/22/24 1243    hydrALAZINE (APRESOLINE) tablet 10 mg  10 mg Oral 3 times per day Myke Abernathy DO   10 mg at 10/22/24 0515    potassium chloride (KLOR-CON M) extended release tablet 40 mEq  40 mEq Oral PRN Warner, Sarahy, PA-C        Or    potassium bicarb-citric acid (EFFER-K) effervescent tablet 40 mEq  40 mEq Oral PRN Warner, Sarahy, PA-C        Or    potassium chloride 10 mEq/100 mL IVPB (Peripheral Line)  10 mEq 
Shift assessment completed. Vital signs stable. Call light in reach and standard safety measures in place. Pt resting in bed; no needs or complaints at this time.    
Shift reassessment completed; no change in Pt status noted. Vital signs stable. No needs or complaints at this time.    
undistended, non tender, no organomegaly, BS present  Extremities: No edema or cyanosis. Distal pulses well felt  Neurological : grossly normal            Medications:  hydrALAZINE, 10 mg, 3 times per day  isosorbide dinitrate, 10 mg, TID  aspirin, 81 mg, Daily  carvedilol, 6.25 mg, BID WC  clopidogrel, 75 mg, Daily  pravastatin, 20 mg, Daily  furosemide, 40 mg, BID  sodium chloride flush, 5-40 mL, 2 times per day      PRN Medications:  sodium chloride flush, 5-40 mL, PRN  sodium chloride, , PRN  ondansetron, 4 mg, Q8H PRN   Or  ondansetron, 4 mg, Q6H PRN  polyethylene glycol, 17 g, Daily PRN  acetaminophen, 650 mg, Q6H PRN   Or  acetaminophen, 650 mg, Q6H PRN  heparin (porcine), 80 Units/kg, PRN  heparin (porcine), 40 Units/kg, PRN          Data:  CBC:   Recent Labs     10/20/24  1520   WBC 7.9   HGB 9.5*   HCT 28.0*   MCV 97.6        BMP:   Recent Labs     10/20/24  1520 10/21/24  0606    136   K 4.1 3.3*    99   CO2 29 26   BUN 27* 25*   CREATININE 1.3* 1.2     LIVER PROFILE:   Recent Labs     10/21/24  0606   AST 17   ALT 15   BILIDIR 0.2   BILITOT 0.3   ALKPHOS 45     PT/INR:   Recent Labs     10/20/24  2306   PROTIME 14.5   INR 1.11       CULTURES  Results       Procedure Component Value Units Date/Time    Culture, Blood 2 [[id number]] Collected: 10/20/24 1931    Order Status: Sent Specimen: Blood Updated: 10/1935    COVID-19 & Influenza Combo [[id number]] Collected: 10/20/24 1541    Order Status: Completed Specimen: Nasopharyngeal Swab Updated: 10/20/24 1609     SARS-CoV-2 RNA, RT PCR NOT DETECTED     Comment: Not Detected results do not preclude SARS-CoV-2 infection and  should not be used as the sole basis for patient management  decisions.  Results must be combined with clinical observations,  patient history, and epidemiological information.  Testing was performed using HAY[employer] CARRIE SARS-CoV-2 and Influenza A/B  nucleic acid assay. This test is a multiplex Real-Time 
Shrugs  []Bicep Curl  []Hands open/close                          []Sitting - Level 2: Seated Exercises   []Toe raise/heel raise  []Long Arc Quad  []Seated March  []Seated Clamshell  []Diaphragmatic Breathing with TA set and BUE ER and IR  []Shoulder Shrugs  []Bicep Curls  []Hands open/close                         []Standing - Level 3: Standing Exercises   []Sit to/from stand  []Standing march  []Standing side steps  []Standing bilateral heel raise  []Diaphragmatic breathing with TA set and BUE flex/ext  []Shoulder Shrugs  []Bicep Curls  []Hands open/close                        [x]Walking - Level 1: Educated patient in initiating walking when in the Green zone and to Start with 2-5 minutes daily and work up to 10 minutes per day. Walk at a slow, comfortable pace with your exertion level Very Light (TC 9) to Light (TC 11). You should be able to comfortably hold a conversation while walking.    4. Daily Weight check/Stepping on Scale  [] Goal Met. Pt educated in importance of checking body weight daily and:             []Demonstrates, and              []verbalizes safety in stepping up to weigh self and complete downward gaze/head nod to read scale on standard scale, then safely stepping off scale.      [x] Goal established but not yet met; will introduce or reinforce next session.  [] Goal N/A  [] Barriers to completion of Daily weight check:       []Pt will benefit from reinforcement of above education due to:  []Readiness to learn                               []Decreased cognition  []Language barrier                                  []Decreased vision/hearing  [x]First introduction to new information    []Requires intermittent cues  []Other:     Education provided via:  [x]Oral instruction  [x]Demonstration  [x]Written: Seated HEP, RPE Scale, and Green/Yellow/Red zones

## 2025-01-23 NOTE — PROGRESS NOTES
CARDIOLOGY FOLLOW  UP        Patient Name: Whitney Meier  Primary Care physician: Leilani Mantilla MD    Reason for Referral/Chief Complaint: Whitney Meier is a 89 y.o. patient who presents to cardiology clinic today for evaluation and treatment of cardiomyopathy.     History of Present Illness:   Whitney Meier 89 y.o. female with a medical history notable for Arterial ischemic stroke ICA/left, cardiomyopathy, carotid artery stenosis with cerebral infarction, Stage 3b CKD, Diabetes mellitus type 2, dyslipidemia, hypertension, cardiomyopathy, and coronary artery disease with NSTEMI.    Patient presented to [facility] ED on 12/17/23 with right arm weakness.  Code stroke was called.  TNK was administered.  She was then transferred to [facility].  Found to have an acute ischemic left internal capsule CVA.  Cardiology was consulted due to elevated troponin levels.  Echocardiogram showed new cardiomyopathy with EF 35% (55% May 2022), mild/mod AR, mild MR.   Medical management given anemia, CVA and advanced age.     Prior cardiac monitor worn showed predominately NSR, no significant arrhythmias.     Echocardiogram 3/20/24 showed EF 35-40%, mild AR, small circumferential pericardial effusion noted.    LOV 8/21/24 at which time patient endorsed no angina, signs or symptoms of heart failure or recurrent stroke symptoms.  Stable.    In the interim patient was admitted 10/2024 for shortness of breath.  proBNP over 18,000.  Troponin elevated at 200 with flat trend.  No ischemic symptoms.  Treated for acute on chronic HFrEF with IV Lasix.  Weight was 145 pounds at discharge.  She was discharged on Lasix 20 mg oral tablets with instructions to take every other day.    Today she presents in a wheelchair with her daughter Candy.  She does not use the wheelchair at home, only a walker.  Daughter states she has not had to take any Lasix.  She initially took for a few days after her discharge and then

## 2025-01-29 ENCOUNTER — OFFICE VISIT (OUTPATIENT)
Dept: CARDIOLOGY CLINIC | Age: 89
End: 2025-01-29
Payer: MEDICARE

## 2025-01-29 VITALS
OXYGEN SATURATION: 97 % | WEIGHT: 147 LBS | DIASTOLIC BLOOD PRESSURE: 42 MMHG | HEART RATE: 75 BPM | HEIGHT: 70 IN | SYSTOLIC BLOOD PRESSURE: 122 MMHG | BODY MASS INDEX: 21.05 KG/M2

## 2025-01-29 DIAGNOSIS — I25.2 HISTORY OF NON-ST ELEVATION MYOCARDIAL INFARCTION (NSTEMI): ICD-10-CM

## 2025-01-29 DIAGNOSIS — Z86.73 HISTORY OF CVA (CEREBROVASCULAR ACCIDENT): ICD-10-CM

## 2025-01-29 DIAGNOSIS — I35.1 NONRHEUMATIC AORTIC VALVE INSUFFICIENCY: ICD-10-CM

## 2025-01-29 DIAGNOSIS — I50.22 CHRONIC SYSTOLIC CONGESTIVE HEART FAILURE (HCC): Primary | ICD-10-CM

## 2025-01-29 DIAGNOSIS — I25.10 CORONARY ARTERY DISEASE INVOLVING NATIVE CORONARY ARTERY OF NATIVE HEART WITHOUT ANGINA PECTORIS: ICD-10-CM

## 2025-01-29 PROCEDURE — G8427 DOCREV CUR MEDS BY ELIG CLIN: HCPCS | Performed by: INTERNAL MEDICINE

## 2025-01-29 PROCEDURE — 1090F PRES/ABSN URINE INCON ASSESS: CPT | Performed by: INTERNAL MEDICINE

## 2025-01-29 PROCEDURE — 1123F ACP DISCUSS/DSCN MKR DOCD: CPT | Performed by: INTERNAL MEDICINE

## 2025-01-29 PROCEDURE — 99214 OFFICE O/P EST MOD 30 MIN: CPT | Performed by: INTERNAL MEDICINE

## 2025-01-29 PROCEDURE — 1036F TOBACCO NON-USER: CPT | Performed by: INTERNAL MEDICINE

## 2025-01-29 PROCEDURE — 1159F MED LIST DOCD IN RCRD: CPT | Performed by: INTERNAL MEDICINE

## 2025-01-29 PROCEDURE — G8420 CALC BMI NORM PARAMETERS: HCPCS | Performed by: INTERNAL MEDICINE

## 2025-01-29 RX ORDER — MIRTAZAPINE 15 MG/1
TABLET, ORALLY DISINTEGRATING ORAL
COMMUNITY
Start: 2025-01-03

## 2025-01-29 RX ORDER — FUROSEMIDE 20 MG/1
20 TABLET ORAL PRN
Qty: 30 TABLET | Refills: 0
Start: 2025-01-29

## 2025-02-24 ENCOUNTER — HOSPITAL ENCOUNTER (EMERGENCY)
Age: 89
Discharge: HOME | End: 2025-02-24
Attending: EMERGENCY MEDICINE
Payer: MEDICARE

## 2025-02-24 ENCOUNTER — APPOINTMENT (OUTPATIENT)
Dept: CT IMAGING | Age: 89
End: 2025-02-24
Payer: MEDICARE

## 2025-02-24 ENCOUNTER — APPOINTMENT (OUTPATIENT)
Dept: GENERAL RADIOLOGY | Age: 89
End: 2025-02-24
Payer: MEDICARE

## 2025-02-24 VITALS
HEART RATE: 62 BPM | TEMPERATURE: 97.8 F | WEIGHT: 139 LBS | OXYGEN SATURATION: 95 % | RESPIRATION RATE: 12 BRPM | DIASTOLIC BLOOD PRESSURE: 46 MMHG | SYSTOLIC BLOOD PRESSURE: 107 MMHG | BODY MASS INDEX: 19.94 KG/M2

## 2025-02-24 DIAGNOSIS — R41.82 ALTERED MENTAL STATUS, UNSPECIFIED ALTERED MENTAL STATUS TYPE: Primary | ICD-10-CM

## 2025-02-24 LAB
ALBUMIN SERPL-MCNC: 3.7 G/DL (ref 3.4–5)
ALBUMIN/GLOB SERPL: 1.5 {RATIO} (ref 1.1–2.2)
ALP SERPL-CCNC: 86 U/L (ref 40–129)
ALT SERPL-CCNC: 17 U/L (ref 10–40)
AMMONIA PLAS-SCNC: 18 UMOL/L (ref 11–51)
ANION GAP SERPL CALCULATED.3IONS-SCNC: 8 MMOL/L (ref 3–16)
AST SERPL-CCNC: 19 U/L (ref 15–37)
BASOPHILS # BLD: 0.2 K/UL (ref 0–0.2)
BASOPHILS NFR BLD: 2.9 %
BILIRUB SERPL-MCNC: <0.2 MG/DL (ref 0–1)
BILIRUB UR QL STRIP.AUTO: NEGATIVE
BUN SERPL-MCNC: 73 MG/DL (ref 7–20)
CALCIUM SERPL-MCNC: 8.8 MG/DL (ref 8.3–10.6)
CHLORIDE SERPL-SCNC: 104 MMOL/L (ref 99–110)
CLARITY UR: CLEAR
CO2 SERPL-SCNC: 28 MMOL/L (ref 21–32)
COLOR UR: YELLOW
CREAT SERPL-MCNC: 1.5 MG/DL (ref 0.6–1.2)
DEPRECATED RDW RBC AUTO: 13.6 % (ref 12.4–15.4)
EKG ATRIAL RATE: 76 BPM
EKG DIAGNOSIS: NORMAL
EKG P AXIS: 53 DEGREES
EKG P-R INTERVAL: 228 MS
EKG Q-T INTERVAL: 418 MS
EKG QRS DURATION: 130 MS
EKG QTC CALCULATION (BAZETT): 470 MS
EKG R AXIS: 13 DEGREES
EKG T AXIS: 130 DEGREES
EKG VENTRICULAR RATE: 76 BPM
EOSINOPHIL # BLD: 0.4 K/UL (ref 0–0.6)
EOSINOPHIL NFR BLD: 5.6 %
GFR SERPLBLD CREATININE-BSD FMLA CKD-EPI: 33 ML/MIN/{1.73_M2}
GLUCOSE SERPL-MCNC: 126 MG/DL (ref 70–99)
GLUCOSE UR STRIP.AUTO-MCNC: NEGATIVE MG/DL
HCT VFR BLD AUTO: 28.5 % (ref 36–48)
HGB BLD-MCNC: 9.6 G/DL (ref 12–16)
HGB UR QL STRIP.AUTO: NEGATIVE
KETONES UR STRIP.AUTO-MCNC: NEGATIVE MG/DL
LACTATE BLDV-SCNC: 0.6 MMOL/L (ref 0.4–2)
LEUKOCYTE ESTERASE UR QL STRIP.AUTO: NEGATIVE
LYMPHOCYTES # BLD: 0.5 K/UL (ref 1–5.1)
LYMPHOCYTES NFR BLD: 7.6 %
MCH RBC QN AUTO: 33 PG (ref 26–34)
MCHC RBC AUTO-ENTMCNC: 33.6 G/DL (ref 31–36)
MCV RBC AUTO: 98.4 FL (ref 80–100)
MONOCYTES # BLD: 0.5 K/UL (ref 0–1.3)
MONOCYTES NFR BLD: 7.6 %
NEUTROPHILS # BLD: 5.1 K/UL (ref 1.7–7.7)
NEUTROPHILS NFR BLD: 76.3 %
NITRITE UR QL STRIP.AUTO: NEGATIVE
PH UR STRIP.AUTO: 6 [PH] (ref 5–8)
PLATELET # BLD AUTO: 155 K/UL (ref 135–450)
PMV BLD AUTO: 9.5 FL (ref 5–10.5)
POTASSIUM SERPL-SCNC: 4.6 MMOL/L (ref 3.5–5.1)
PROT SERPL-MCNC: 6.2 G/DL (ref 6.4–8.2)
PROT UR STRIP.AUTO-MCNC: NEGATIVE MG/DL
RBC # BLD AUTO: 2.89 M/UL (ref 4–5.2)
SODIUM SERPL-SCNC: 140 MMOL/L (ref 136–145)
SP GR UR STRIP.AUTO: 1.01 (ref 1–1.03)
TSH SERPL DL<=0.005 MIU/L-ACNC: 3.08 UIU/ML (ref 0.27–4.2)
UA COMPLETE W REFLEX CULTURE PNL UR: NORMAL
UA DIPSTICK W REFLEX MICRO PNL UR: NORMAL
URN SPEC COLLECT METH UR: NORMAL
UROBILINOGEN UR STRIP-ACNC: 0.2 E.U./DL
WBC # BLD AUTO: 6.7 K/UL (ref 4–11)

## 2025-02-24 PROCEDURE — 71045 X-RAY EXAM CHEST 1 VIEW: CPT

## 2025-02-24 PROCEDURE — 99285 EMERGENCY DEPT VISIT HI MDM: CPT

## 2025-02-24 PROCEDURE — 93010 ELECTROCARDIOGRAM REPORT: CPT | Performed by: INTERNAL MEDICINE

## 2025-02-24 PROCEDURE — 83605 ASSAY OF LACTIC ACID: CPT

## 2025-02-24 PROCEDURE — 84443 ASSAY THYROID STIM HORMONE: CPT

## 2025-02-24 PROCEDURE — 82140 ASSAY OF AMMONIA: CPT

## 2025-02-24 PROCEDURE — 85025 COMPLETE CBC W/AUTO DIFF WBC: CPT

## 2025-02-24 PROCEDURE — 93005 ELECTROCARDIOGRAM TRACING: CPT | Performed by: EMERGENCY MEDICINE

## 2025-02-24 PROCEDURE — 70450 CT HEAD/BRAIN W/O DYE: CPT

## 2025-02-24 PROCEDURE — 36415 COLL VENOUS BLD VENIPUNCTURE: CPT

## 2025-02-24 PROCEDURE — 80053 COMPREHEN METABOLIC PANEL: CPT

## 2025-02-24 PROCEDURE — 81003 URINALYSIS AUTO W/O SCOPE: CPT

## 2025-02-24 ASSESSMENT — PAIN SCALES - GENERAL: PAINLEVEL_OUTOF10: 0

## 2025-02-24 ASSESSMENT — PAIN - FUNCTIONAL ASSESSMENT: PAIN_FUNCTIONAL_ASSESSMENT: 0-10

## 2025-02-24 NOTE — DISCHARGE INSTRUCTIONS
As discussed, the blood work and imaging looked normal.  Please follow-up with your primary care doctor for continued outpatient management and workup.

## 2025-02-24 NOTE — ED PROVIDER NOTES
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     EMERGENCY DEPARTMENT COURSE and DIFFERENTIAL DIAGNOSIS/MDM:     Vitals:    Vitals:    02/24/25 1432 02/24/25 1501 02/24/25 1632 02/24/25 1733   BP: (!) 150/51 (!) 142/75 (!) 128/56 (!) 107/46   Pulse: 73 82 77 62   Resp: 15 11 15 12   Temp:       TempSrc:       SpO2: 95%  98% 95%   Weight:           Patient was treated with and given the following medications:  Medications - No data to display          Is this patient to be included in the SEP-1 Core Measure due to severe sepsis or septic shock?   No   Exclusion criteria - the patient is NOT to be included for SEP-1 Core Measure due to:  Infection is not suspected    CC/HPI Summary, DDx, ED Course, and Reassessment:     89-year-old female presenting for evaluation of altered mental status, confusion over the past several days.  Patient is alert and oriented on arrival, no focal neurologic deficit has stable vital signs except for mild hypertension, obtain laboratory evaluation, altered mental status workup with chest x-ray, CT head,    The differential diagnosis associated with the patient's presentation includes, but is not limited to: Urinary tract infection, pyelonephritis, dehydration, acute kidney injury, intracranial injury, dehydration, altered mental status unspecified    Laboratory evaluation, urinalysis, chest x-ray, CT head is without acute process to explain patient's intermittent confusion.  I had a prolonged discussion with the patient's daughter on the phone.  Advised that laboratory evaluation and imaging workup was unremarkable.  Advised that hospitalization at this time is likely not indicated nor would be beneficial for the patient.  Advised that she needs a follow-up with her primary care doctor for

## 2025-04-18 ENCOUNTER — APPOINTMENT (OUTPATIENT)
Dept: CT IMAGING | Age: 89
DRG: 389 | End: 2025-04-18
Payer: MEDICARE

## 2025-04-18 ENCOUNTER — HOSPITAL ENCOUNTER (INPATIENT)
Age: 89
LOS: 1 days | Discharge: HOME | DRG: 389 | End: 2025-04-20
Attending: EMERGENCY MEDICINE | Admitting: INTERNAL MEDICINE
Payer: MEDICARE

## 2025-04-18 DIAGNOSIS — K56.609 SMALL BOWEL OBSTRUCTION (HCC): Primary | ICD-10-CM

## 2025-04-18 LAB
ALBUMIN SERPL-MCNC: 4 G/DL (ref 3.4–5)
ALBUMIN/GLOB SERPL: 1.5 {RATIO} (ref 1.1–2.2)
ALP SERPL-CCNC: 60 U/L (ref 40–129)
ALT SERPL-CCNC: 12 U/L (ref 10–40)
ANION GAP SERPL CALCULATED.3IONS-SCNC: 15 MMOL/L (ref 3–16)
AST SERPL-CCNC: 22 U/L (ref 15–37)
BASOPHILS # BLD: 0 K/UL (ref 0–0.2)
BASOPHILS NFR BLD: 0.2 %
BILIRUB SERPL-MCNC: 0.4 MG/DL (ref 0–1)
BUN SERPL-MCNC: 57 MG/DL (ref 7–20)
CALCIUM SERPL-MCNC: 9.2 MG/DL (ref 8.3–10.6)
CHLORIDE SERPL-SCNC: 102 MMOL/L (ref 99–110)
CO2 SERPL-SCNC: 23 MMOL/L (ref 21–32)
CREAT SERPL-MCNC: 1.6 MG/DL (ref 0.6–1.2)
DEPRECATED RDW RBC AUTO: 13.7 % (ref 12.4–15.4)
EOSINOPHIL # BLD: 0 K/UL (ref 0–0.6)
EOSINOPHIL NFR BLD: 0.4 %
GFR SERPLBLD CREATININE-BSD FMLA CKD-EPI: 31 ML/MIN/{1.73_M2}
GLUCOSE SERPL-MCNC: 208 MG/DL (ref 70–99)
HCT VFR BLD AUTO: 33.2 % (ref 36–48)
HGB BLD-MCNC: 11 G/DL (ref 12–16)
LACTATE BLDV-SCNC: 1.1 MMOL/L (ref 0.4–2)
LIPASE SERPL-CCNC: 31 U/L (ref 13–60)
LYMPHOCYTES # BLD: 0.3 K/UL (ref 1–5.1)
LYMPHOCYTES NFR BLD: 3.2 %
MCH RBC QN AUTO: 32.5 PG (ref 26–34)
MCHC RBC AUTO-ENTMCNC: 33.3 G/DL (ref 31–36)
MCV RBC AUTO: 97.6 FL (ref 80–100)
MONOCYTES # BLD: 0.8 K/UL (ref 0–1.3)
MONOCYTES NFR BLD: 7.4 %
NEUTROPHILS # BLD: 9.4 K/UL (ref 1.7–7.7)
NEUTROPHILS NFR BLD: 88.8 %
PLATELET # BLD AUTO: 204 K/UL (ref 135–450)
PMV BLD AUTO: 9 FL (ref 5–10.5)
POTASSIUM SERPL-SCNC: 4.4 MMOL/L (ref 3.5–5.1)
PROT SERPL-MCNC: 6.6 G/DL (ref 6.4–8.2)
RBC # BLD AUTO: 3.4 M/UL (ref 4–5.2)
SARS-COV-2 RDRP RESP QL NAA+PROBE: NOT DETECTED
SODIUM SERPL-SCNC: 140 MMOL/L (ref 136–145)
TROPONIN, HIGH SENSITIVITY: 65 NG/L (ref 0–14)
WBC # BLD AUTO: 10.6 K/UL (ref 4–11)

## 2025-04-18 PROCEDURE — 85025 COMPLETE CBC W/AUTO DIFF WBC: CPT

## 2025-04-18 PROCEDURE — 80053 COMPREHEN METABOLIC PANEL: CPT

## 2025-04-18 PROCEDURE — 99285 EMERGENCY DEPT VISIT HI MDM: CPT

## 2025-04-18 PROCEDURE — 93005 ELECTROCARDIOGRAM TRACING: CPT | Performed by: EMERGENCY MEDICINE

## 2025-04-18 PROCEDURE — 74176 CT ABD & PELVIS W/O CONTRAST: CPT

## 2025-04-18 PROCEDURE — 6360000002 HC RX W HCPCS: Performed by: EMERGENCY MEDICINE

## 2025-04-18 PROCEDURE — 83690 ASSAY OF LIPASE: CPT

## 2025-04-18 PROCEDURE — 36415 COLL VENOUS BLD VENIPUNCTURE: CPT

## 2025-04-18 PROCEDURE — 84484 ASSAY OF TROPONIN QUANT: CPT

## 2025-04-18 PROCEDURE — 96374 THER/PROPH/DIAG INJ IV PUSH: CPT

## 2025-04-18 PROCEDURE — 83605 ASSAY OF LACTIC ACID: CPT

## 2025-04-18 PROCEDURE — 87635 SARS-COV-2 COVID-19 AMP PRB: CPT

## 2025-04-18 PROCEDURE — 2580000003 HC RX 258: Performed by: EMERGENCY MEDICINE

## 2025-04-18 RX ORDER — ONDANSETRON 2 MG/ML
4 INJECTION INTRAMUSCULAR; INTRAVENOUS ONCE
Status: COMPLETED | OUTPATIENT
Start: 2025-04-18 | End: 2025-04-18

## 2025-04-18 RX ORDER — 0.9 % SODIUM CHLORIDE 0.9 %
1000 INTRAVENOUS SOLUTION INTRAVENOUS ONCE
Status: COMPLETED | OUTPATIENT
Start: 2025-04-18 | End: 2025-04-19

## 2025-04-18 RX ADMIN — ONDANSETRON 4 MG: 2 INJECTION, SOLUTION INTRAMUSCULAR; INTRAVENOUS at 23:02

## 2025-04-18 RX ADMIN — SODIUM CHLORIDE 1000 ML: 0.9 INJECTION, SOLUTION INTRAVENOUS at 23:02

## 2025-04-18 ASSESSMENT — PAIN - FUNCTIONAL ASSESSMENT: PAIN_FUNCTIONAL_ASSESSMENT: NONE - DENIES PAIN

## 2025-04-18 ASSESSMENT — LIFESTYLE VARIABLES
HOW MANY STANDARD DRINKS CONTAINING ALCOHOL DO YOU HAVE ON A TYPICAL DAY: PATIENT DOES NOT DRINK
HOW OFTEN DO YOU HAVE A DRINK CONTAINING ALCOHOL: NEVER

## 2025-04-19 ENCOUNTER — APPOINTMENT (OUTPATIENT)
Dept: GENERAL RADIOLOGY | Age: 89
DRG: 389 | End: 2025-04-19
Payer: MEDICARE

## 2025-04-19 PROBLEM — R79.89 ELEVATED TROPONIN: Status: ACTIVE | Noted: 2025-04-19

## 2025-04-19 PROBLEM — K56.609 SMALL BOWEL OBSTRUCTION (HCC): Status: ACTIVE | Noted: 2025-04-19

## 2025-04-19 PROBLEM — N18.32 CKD STAGE 3B, GFR 30-44 ML/MIN (HCC): Status: ACTIVE | Noted: 2025-04-19

## 2025-04-19 LAB
BILIRUB UR QL STRIP.AUTO: NEGATIVE
CLARITY UR: CLEAR
COLOR UR: YELLOW
EKG ATRIAL RATE: 89 BPM
EKG DIAGNOSIS: NORMAL
EKG P AXIS: 69 DEGREES
EKG P-R INTERVAL: 222 MS
EKG Q-T INTERVAL: 396 MS
EKG QRS DURATION: 130 MS
EKG QTC CALCULATION (BAZETT): 481 MS
EKG R AXIS: 65 DEGREES
EKG T AXIS: 157 DEGREES
EKG VENTRICULAR RATE: 89 BPM
GLUCOSE BLD-MCNC: 120 MG/DL (ref 70–99)
GLUCOSE BLD-MCNC: 129 MG/DL (ref 70–99)
GLUCOSE BLD-MCNC: 98 MG/DL (ref 70–99)
GLUCOSE UR STRIP.AUTO-MCNC: NEGATIVE MG/DL
HGB UR QL STRIP.AUTO: NEGATIVE
KETONES UR STRIP.AUTO-MCNC: NEGATIVE MG/DL
LEUKOCYTE ESTERASE UR QL STRIP.AUTO: NEGATIVE
NITRITE UR QL STRIP.AUTO: NEGATIVE
PERFORMED ON: ABNORMAL
PERFORMED ON: ABNORMAL
PERFORMED ON: NORMAL
PH UR STRIP.AUTO: 5.5 [PH] (ref 5–8)
PROT UR STRIP.AUTO-MCNC: NEGATIVE MG/DL
SP GR UR STRIP.AUTO: 1.02 (ref 1–1.03)
TROPONIN, HIGH SENSITIVITY: 66 NG/L (ref 0–14)
UA COMPLETE W REFLEX CULTURE PNL UR: NORMAL
UA DIPSTICK W REFLEX MICRO PNL UR: NORMAL
URN SPEC COLLECT METH UR: NORMAL
UROBILINOGEN UR STRIP-ACNC: 0.2 E.U./DL

## 2025-04-19 PROCEDURE — 99222 1ST HOSP IP/OBS MODERATE 55: CPT | Performed by: NURSE PRACTITIONER

## 2025-04-19 PROCEDURE — 1200000000 HC SEMI PRIVATE

## 2025-04-19 PROCEDURE — 2500000003 HC RX 250 WO HCPCS: Performed by: INTERNAL MEDICINE

## 2025-04-19 PROCEDURE — 81003 URINALYSIS AUTO W/O SCOPE: CPT

## 2025-04-19 PROCEDURE — 2580000003 HC RX 258: Performed by: INTERNAL MEDICINE

## 2025-04-19 PROCEDURE — 99222 1ST HOSP IP/OBS MODERATE 55: CPT | Performed by: SURGERY

## 2025-04-19 PROCEDURE — 6370000000 HC RX 637 (ALT 250 FOR IP): Performed by: NURSE PRACTITIONER

## 2025-04-19 PROCEDURE — 6360000002 HC RX W HCPCS: Performed by: INTERNAL MEDICINE

## 2025-04-19 PROCEDURE — 84484 ASSAY OF TROPONIN QUANT: CPT

## 2025-04-19 PROCEDURE — 97165 OT EVAL LOW COMPLEX 30 MIN: CPT

## 2025-04-19 PROCEDURE — 74019 RADEX ABDOMEN 2 VIEWS: CPT

## 2025-04-19 PROCEDURE — 36415 COLL VENOUS BLD VENIPUNCTURE: CPT

## 2025-04-19 PROCEDURE — 97530 THERAPEUTIC ACTIVITIES: CPT

## 2025-04-19 RX ORDER — SODIUM CHLORIDE 9 MG/ML
INJECTION, SOLUTION INTRAVENOUS CONTINUOUS
Status: ACTIVE | OUTPATIENT
Start: 2025-04-19 | End: 2025-04-19

## 2025-04-19 RX ORDER — NICOTINE 21 MG/24HR
1 PATCH, TRANSDERMAL 24 HOURS TRANSDERMAL DAILY PRN
Status: DISCONTINUED | OUTPATIENT
Start: 2025-04-19 | End: 2025-04-19

## 2025-04-19 RX ORDER — ONDANSETRON 2 MG/ML
4 INJECTION INTRAMUSCULAR; INTRAVENOUS EVERY 6 HOURS PRN
Status: DISCONTINUED | OUTPATIENT
Start: 2025-04-19 | End: 2025-04-20 | Stop reason: HOSPADM

## 2025-04-19 RX ORDER — SODIUM CHLORIDE 9 MG/ML
INJECTION, SOLUTION INTRAVENOUS PRN
Status: DISCONTINUED | OUTPATIENT
Start: 2025-04-19 | End: 2025-04-20 | Stop reason: HOSPADM

## 2025-04-19 RX ORDER — FERROUS SULFATE 325(65) MG
325 TABLET ORAL 2 TIMES DAILY
Status: DISCONTINUED | OUTPATIENT
Start: 2025-04-19 | End: 2025-04-20 | Stop reason: HOSPADM

## 2025-04-19 RX ORDER — PROMETHAZINE HYDROCHLORIDE 25 MG/1
12.5 TABLET ORAL EVERY 6 HOURS PRN
Status: DISCONTINUED | OUTPATIENT
Start: 2025-04-19 | End: 2025-04-20 | Stop reason: HOSPADM

## 2025-04-19 RX ORDER — CARVEDILOL 6.25 MG/1
6.25 TABLET ORAL 2 TIMES DAILY WITH MEALS
Status: DISCONTINUED | OUTPATIENT
Start: 2025-04-19 | End: 2025-04-20 | Stop reason: HOSPADM

## 2025-04-19 RX ORDER — ACETAMINOPHEN 650 MG/1
650 SUPPOSITORY RECTAL EVERY 6 HOURS PRN
Status: DISCONTINUED | OUTPATIENT
Start: 2025-04-19 | End: 2025-04-20 | Stop reason: HOSPADM

## 2025-04-19 RX ORDER — PRAMIPEXOLE DIHYDROCHLORIDE 1 MG/1
1 TABLET ORAL NIGHTLY PRN
Status: DISCONTINUED | OUTPATIENT
Start: 2025-04-19 | End: 2025-04-20 | Stop reason: HOSPADM

## 2025-04-19 RX ORDER — PRAVASTATIN SODIUM 10 MG
20 TABLET ORAL NIGHTLY
Status: DISCONTINUED | OUTPATIENT
Start: 2025-04-19 | End: 2025-04-20 | Stop reason: HOSPADM

## 2025-04-19 RX ORDER — HEPARIN SODIUM 5000 [USP'U]/ML
5000 INJECTION, SOLUTION INTRAVENOUS; SUBCUTANEOUS EVERY 8 HOURS SCHEDULED
Status: DISCONTINUED | OUTPATIENT
Start: 2025-04-19 | End: 2025-04-20 | Stop reason: HOSPADM

## 2025-04-19 RX ORDER — ASPIRIN 81 MG/1
81 TABLET, CHEWABLE ORAL DAILY
Status: DISCONTINUED | OUTPATIENT
Start: 2025-04-19 | End: 2025-04-20 | Stop reason: HOSPADM

## 2025-04-19 RX ORDER — CLOPIDOGREL BISULFATE 75 MG/1
75 TABLET ORAL DAILY
Status: DISCONTINUED | OUTPATIENT
Start: 2025-04-19 | End: 2025-04-20 | Stop reason: HOSPADM

## 2025-04-19 RX ORDER — SODIUM CHLORIDE 0.9 % (FLUSH) 0.9 %
10 SYRINGE (ML) INJECTION PRN
Status: DISCONTINUED | OUTPATIENT
Start: 2025-04-19 | End: 2025-04-20 | Stop reason: HOSPADM

## 2025-04-19 RX ORDER — ACETAMINOPHEN 325 MG/1
650 TABLET ORAL EVERY 6 HOURS PRN
Status: DISCONTINUED | OUTPATIENT
Start: 2025-04-19 | End: 2025-04-20 | Stop reason: HOSPADM

## 2025-04-19 RX ORDER — SODIUM CHLORIDE 0.9 % (FLUSH) 0.9 %
10 SYRINGE (ML) INJECTION EVERY 12 HOURS SCHEDULED
Status: DISCONTINUED | OUTPATIENT
Start: 2025-04-19 | End: 2025-04-20 | Stop reason: HOSPADM

## 2025-04-19 RX ORDER — MIRTAZAPINE 15 MG/1
15 TABLET, ORALLY DISINTEGRATING ORAL NIGHTLY
Status: DISCONTINUED | OUTPATIENT
Start: 2025-04-19 | End: 2025-04-20 | Stop reason: HOSPADM

## 2025-04-19 RX ADMIN — CARVEDILOL 6.25 MG: 6.25 TABLET, FILM COATED ORAL at 17:22

## 2025-04-19 RX ADMIN — SODIUM CHLORIDE: 0.9 INJECTION, SOLUTION INTRAVENOUS at 10:49

## 2025-04-19 RX ADMIN — MIRTAZAPINE 15 MG: 15 TABLET, ORALLY DISINTEGRATING ORAL at 20:44

## 2025-04-19 RX ADMIN — CLOPIDOGREL 75 MG: 75 TABLET, FILM COATED ORAL at 17:22

## 2025-04-19 RX ADMIN — SODIUM CHLORIDE, PRESERVATIVE FREE 10 ML: 5 INJECTION INTRAVENOUS at 10:51

## 2025-04-19 RX ADMIN — PRAVASTATIN SODIUM 20 MG: 10 TABLET ORAL at 20:43

## 2025-04-19 RX ADMIN — HEPARIN SODIUM 5000 UNITS: 5000 INJECTION INTRAVENOUS; SUBCUTANEOUS at 15:20

## 2025-04-19 RX ADMIN — HEPARIN SODIUM 5000 UNITS: 5000 INJECTION INTRAVENOUS; SUBCUTANEOUS at 10:51

## 2025-04-19 RX ADMIN — ASPIRIN 81 MG: 81 TABLET, CHEWABLE ORAL at 17:22

## 2025-04-19 RX ADMIN — SACUBITRIL AND VALSARTAN 0.5 TABLET: 24; 26 TABLET, FILM COATED ORAL at 20:43

## 2025-04-19 RX ADMIN — SODIUM CHLORIDE: 0.9 INJECTION, SOLUTION INTRAVENOUS at 06:17

## 2025-04-19 NOTE — ED PROVIDER NOTES
[facility]  EMERGENCY DEPARTMENT ENCOUNTER      Pt Name: Whitney Meier  MRN: [id number]  Birthdate 10/20/1935  Date of evaluation: 4/18/2025  Provider: Leilani Stark MD    CHIEF COMPLAINT       Chief Complaint   Patient presents with    Vomiting     Pt presents to ED via [facility] from home with complaints of nausea and vomiting that started today.  Pt denies any pain.         HISTORY OF PRESENT ILLNESS   (Location/Symptom, Timing/Onset, Context/Setting, Quality, Duration, Modifying Factors, Severity)  Note limiting factors.   Whitney Meier is a 89 y.o. female who presents to the emergency department with nausea and vomiting that started earlier today.  No chest pain or shortness of breath.  No abdominal pain.  No headache.  No fevers or cough.  No sore throat or runny nose.  Denies prior episodes      This patient is at risk for a communicable infection. Therefore, personal protection equipment consisting of a mask and gloves worn for the exam.     Nursing Notes were reviewed.    REVIEW OF SYSTEMS    (2-9 systems for level 4, 10 or more for level 5)     As per HPI    Except as noted above the remainder of the review of systems was reviewed and negative.       PAST MEDICAL HISTORY     Past Medical History:   Diagnosis Date    Clostridium difficile infection 06/06/2017    antigen +    Dementia (HCC)     Diabetes mellitus (HCC)     History of blood transfusion     Hyperlipidemia     Hypertension     Infection     foot    MDRO (multiple drug resistant organisms) resistance 04/09/2019    urine    MRSA infection 12/07/2017    foot    Osteomyelitis of spine (HCC) 2010    thoraculumbar spine    Restless leg syndrome          SURGICAL HISTORY       Past Surgical History:   Procedure Laterality Date    APPENDECTOMY      COLONOSCOPY  1997    polyps    COLONOSCOPY  7/15/15    normal colon    EYE SURGERY      right cataract    FOOT AMPUTATION Right 05/26/2019    PARTIAL RIGHT FOOT

## 2025-04-19 NOTE — CONSULTS
Department of General Surgery Consult    PATIENT NAME: Whitney Meier   YOB: 1935    ADMISSION DATE: 4/18/2025 10:40 PM      TODAY'S DATE: 4/19/2025    Reason for Consult:  SBO    Chief Complaint: abdominal pain, nausea, vomiting    Historian: patient    Requesting Practitioner:  Temo    HISTORY OF PRESENT ILLNESS:              The patient is a 89 y.o. female who presents with one day h/o nausea, vomiting at home.  No reported abdominal pain or distention.  States he last BM was yesterday, not sure if she has been passing gas.  Seen in ED at [facility] last night, had CT showing SBO w/ clear transition point.  (+) prior abdominal surgery (appy, hysterectomy).  Today feeling a bit better, no further nausea.    Past Medical History:        Diagnosis Date    Clostridium difficile infection 06/06/2017    antigen +    Dementia (HCC)     Diabetes mellitus (HCC)     History of blood transfusion     Hyperlipidemia     Hypertension     Infection     foot    MDRO (multiple drug resistant organisms) resistance 04/09/2019    urine    MRSA infection 12/07/2017    foot    Osteomyelitis of spine (HCC) 2010    thoraculumbar spine    Restless leg syndrome        Past Surgical History:        Procedure Laterality Date    APPENDECTOMY      COLONOSCOPY  1997    polyps    COLONOSCOPY  7/15/15    normal colon    EYE SURGERY      right cataract    FOOT AMPUTATION Right 05/26/2019    PARTIAL RIGHT FOOT AMPUTATION    FOOT AMPUTATION Right 5/26/2019    PARTIAL RIGHT FOOT AMPUTATION performed by Abdoul Pressley DPM at [facility] [place] OR    FOOT SURGERY Left 05/30/2017    PARTIAL LEFT FOOT AMPUTATION              FOOT SURGERY Right 12/11/2017    HYSTERECTOMY (CERVIX STATUS UNKNOWN)      OTHER SURGICAL HISTORY Left     Macular Grid Left eye argon    TOE AMPUTATION  2003    2nd toe 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    TOE AMPUTATION  2011    5th toe right foot    TONSILLECTOMY      UPPER GASTROINTESTINAL ENDOSCOPY  06/05/2017    Shayna  specified, incidental findings do not require dedicated  imaging follow-up.     IMPRESSION:  1. Similar appearance of small bowel distension with transition in the mid  abdomen concerning for obstructing process.  2. Punctate nonobstructing left renal stone      IMPRESSION/RECOMMENDATIONS:    SBO, transition point visible on CT, but clinically she is improving already   - cont bowel rest today   - check AXR   - await improvement/return of bowel function        Electronically signed by JOHNNY ABDULLAHI MD     [facility]

## 2025-04-19 NOTE — CARE COORDINATION
Case Management Assessment  Initial Evaluation    Date/Time of Evaluation: 4/19/2025 1:12 PM  Assessment Completed by: ROCHELLE Snyder    If patient is discharged prior to next notation, then this note serves as note for discharge by case management.    Patient Name: Whitney Meier                   YOB: 1935  Diagnosis: Small bowel obstruction (HCC) [K56.609]  SBO (small bowel obstruction) (HCC) [K56.609]                   Date / Time: 4/18/2025 10:40 PM    Patient Admission Status: Inpatient   Readmission Risk (Low < 19, Mod (19-27), High > 27): Readmission Risk Score: 19    Current PCP: Leilani Mantilla MD  PCP verified by CM? Yes    Chart Reviewed: Yes      History Provided by: Child/Family, Medical Record  Patient Orientation: Alert and Oriented    Patient Cognition: Alert    Hospitalization in the last 30 days (Readmission):  No    If yes, Readmission Assessment in CM Navigator will be completed.    Advance Directives:      Code Status: Full Code   Patient's Primary Decision Maker is: Named in Scanned ACP Document    Primary Decision Maker: Dagmar Chow - Child - [phone]    Secondary Decision Maker: Durga Chowssica - Grandchild - [phone]    Discharge Planning:    Patient lives with: Children, Family Members Type of Home: House  Primary Care Giver: Family (Daughter)  Patient Support Systems include: Children, Family Members   Current Financial resources: Medicaid, Medicare  Current community resources: ECF/Home Care (Daughter is caregiver through [employer].)  Current services prior to admission: Durable Medical Equipment, Private Duty Homecare (Daughter is caregiver through [employer].)            Current DME: Walker            Type of Home Care services:  Aide Services    ADLS  Prior functional level: Assistance with the following:, Cooking, Housework, Shopping, Mobility  Current functional level: Assistance with the following:, Bathing, Dressing, Toileting, Cooking, Housework, Shopping,  daughter stated they did not have any further needs.       The Plan for Transition of Care is related to the following treatment goals of Small bowel obstruction (HCC) [K56.609]  SBO (small bowel obstruction) (HCC) [K56.609]    IF APPLICABLE: The Patient and/or patient representative Whitney and her family were provided with a choice of provider and agrees with the discharge plan. Freedom of choice list with basic dialogue that supports the patient's individualized plan of care/goals and shares the quality data associated with the providers was provided to:     Patient Representative Name:       The Patient and/or Patient Representative Agree with the Discharge Plan?      ROCHELLE Snyder  Case Management Department  [phone]

## 2025-04-19 NOTE — ED NOTES
Daughter arrived at this time to give us medication list.  Meds updated in system.  Also notified family that we are just waiting on CT results to come back and then physician will be in to speak with her and the patient.

## 2025-04-19 NOTE — PLAN OF CARE
Problem: Chronic Conditions and Co-morbidities  Goal: Patient's chronic conditions and co-morbidity symptoms are monitored and maintained or improved  Outcome: Progressing     Problem: Discharge Planning  Goal: Discharge to home or other facility with appropriate resources  Outcome: Progressing     Problem: Skin/Tissue Integrity  Goal: Skin integrity remains intact  Description: 1.  Monitor for areas of redness and/or skin breakdown2.  Assess vascular access sites hourly3.  Every 4-6 hours minimum:  Change oxygen saturation probe site4.  Every 4-6 hours:  If on nasal continuous positive airway pressure, respiratory therapy assess nares and determine need for appliance change or resting period  Outcome: Progressing     Problem: ABCDS Injury Assessment  Goal: Absence of physical injury  Outcome: Progressing     Problem: Safety - Adult  Goal: Free from fall injury  Outcome: Progressing

## 2025-04-19 NOTE — H&P
Hospital Medicine History & Physical      PCP: Leilani Mantilla MD    Date of Admission: 4/18/2025    Date of Service: Pt seen/examined on 4/19/2025     Chief Complaint:    Chief Complaint   Patient presents with    Vomiting     Pt presents to ED via [facility] from home with complaints of nausea and vomiting that started today.  Pt denies any pain.         History Of Present Illness:      The patient is a 89 y.o. female with pmhx of systolic HF, CVA, HTN, DM, dementia  who presented to [facility] ED with complaint of nausea and vomiting.  Patient with history of dementia.  Unsure how many days this was going on for.  HPI obtained from patient and EMR.  She denies any pain now.  No further nausea, vomiting.  Denies cold symptoms.      Vitals stable. Labs with creatinine 1.6. troponin 65, 66. CT abdomen/pelvis with similar appearance of small bowel distention with transition in the mid abdomen, concerning for obstructing process, punctate non-obstructing left renal stones.  Admitted for further management/care.  General surgery consulted.     Past Medical History:        Diagnosis Date    Clostridium difficile infection 06/06/2017    antigen +    Dementia (HCC)     Diabetes mellitus (HCC)     History of blood transfusion     Hyperlipidemia     Hypertension     Infection     foot    MDRO (multiple drug resistant organisms) resistance 04/09/2019    urine    MRSA infection 12/07/2017    foot    Osteomyelitis of spine (HCC) 2010    thoraculumbar spine    Restless leg syndrome        Past Surgical History:        Procedure Laterality Date    APPENDECTOMY      COLONOSCOPY  1997    polyps    COLONOSCOPY  7/15/15    normal colon    EYE SURGERY      right cataract    FOOT AMPUTATION Right 05/26/2019    PARTIAL RIGHT FOOT AMPUTATION    FOOT AMPUTATION Right 5/26/2019    PARTIAL RIGHT FOOT AMPUTATION performed by Abdoul Perssley DPM at [facility] OR    FOOT SURGERY Left 05/30/2017    PARTIAL LEFT FOOT AMPUTATION              FOOT

## 2025-04-19 NOTE — ED NOTES
Have not placed nasogastric tube at this time.  Pt has not vomited once since arriving to ER and denies any pain at this time.  Pt sleeping with no signs of distress.  Spoke with Jennifer nursing supervisor of reasoning behind no NG placement yet.

## 2025-04-19 NOTE — PLAN OF CARE
Pending Transfer      89-year-old female with past medical history of appendectomy is being transferred from [facility] with Dx SBO evident on CT    Surgery consulted prior to admission  STEFANY  N.p.o.

## 2025-04-20 VITALS
HEART RATE: 65 BPM | BODY MASS INDEX: 22.9 KG/M2 | OXYGEN SATURATION: 94 % | HEIGHT: 70 IN | RESPIRATION RATE: 16 BRPM | SYSTOLIC BLOOD PRESSURE: 129 MMHG | DIASTOLIC BLOOD PRESSURE: 51 MMHG | WEIGHT: 160 LBS | TEMPERATURE: 97.7 F

## 2025-04-20 LAB
ALBUMIN SERPL-MCNC: 3.5 G/DL (ref 3.4–5)
ALBUMIN/GLOB SERPL: 1.8 {RATIO} (ref 1.1–2.2)
ALP SERPL-CCNC: 42 U/L (ref 40–129)
ALT SERPL-CCNC: 12 U/L (ref 10–40)
ANION GAP SERPL CALCULATED.3IONS-SCNC: 10 MMOL/L (ref 3–16)
AST SERPL-CCNC: 21 U/L (ref 15–37)
BASOPHILS # BLD: 0 K/UL (ref 0–0.2)
BASOPHILS NFR BLD: 0.3 %
BILIRUB SERPL-MCNC: <0.2 MG/DL (ref 0–1)
BUN SERPL-MCNC: 41 MG/DL (ref 7–20)
CALCIUM SERPL-MCNC: 8.9 MG/DL (ref 8.3–10.6)
CHLORIDE SERPL-SCNC: 112 MMOL/L (ref 99–110)
CO2 SERPL-SCNC: 22 MMOL/L (ref 21–32)
CREAT SERPL-MCNC: 1.3 MG/DL (ref 0.6–1.2)
DEPRECATED RDW RBC AUTO: 13.7 % (ref 12.4–15.4)
EOSINOPHIL # BLD: 0.3 K/UL (ref 0–0.6)
EOSINOPHIL NFR BLD: 6 %
GFR SERPLBLD CREATININE-BSD FMLA CKD-EPI: 39 ML/MIN/{1.73_M2}
GLUCOSE BLD-MCNC: 137 MG/DL (ref 70–99)
GLUCOSE BLD-MCNC: 90 MG/DL (ref 70–99)
GLUCOSE BLD-MCNC: 94 MG/DL (ref 70–99)
GLUCOSE SERPL-MCNC: 104 MG/DL (ref 70–99)
HCT VFR BLD AUTO: 30.1 % (ref 36–48)
HGB BLD-MCNC: 10.2 G/DL (ref 12–16)
LYMPHOCYTES # BLD: 0.8 K/UL (ref 1–5.1)
LYMPHOCYTES NFR BLD: 14.4 %
MCH RBC QN AUTO: 33.2 PG (ref 26–34)
MCHC RBC AUTO-ENTMCNC: 34.1 G/DL (ref 31–36)
MCV RBC AUTO: 97.3 FL (ref 80–100)
MONOCYTES # BLD: 0.4 K/UL (ref 0–1.3)
MONOCYTES NFR BLD: 7.8 %
NEUTROPHILS # BLD: 3.8 K/UL (ref 1.7–7.7)
NEUTROPHILS NFR BLD: 71.5 %
PERFORMED ON: ABNORMAL
PERFORMED ON: NORMAL
PERFORMED ON: NORMAL
PLATELET # BLD AUTO: 174 K/UL (ref 135–450)
PMV BLD AUTO: 8.9 FL (ref 5–10.5)
POTASSIUM SERPL-SCNC: 4.5 MMOL/L (ref 3.5–5.1)
PROT SERPL-MCNC: 5.5 G/DL (ref 6.4–8.2)
RBC # BLD AUTO: 3.09 M/UL (ref 4–5.2)
SODIUM SERPL-SCNC: 144 MMOL/L (ref 136–145)
TROPONIN, HIGH SENSITIVITY: 68 NG/L (ref 0–14)
WBC # BLD AUTO: 5.3 K/UL (ref 4–11)

## 2025-04-20 PROCEDURE — 99231 SBSQ HOSP IP/OBS SF/LOW 25: CPT | Performed by: SURGERY

## 2025-04-20 PROCEDURE — 6370000000 HC RX 637 (ALT 250 FOR IP): Performed by: NURSE PRACTITIONER

## 2025-04-20 PROCEDURE — 36415 COLL VENOUS BLD VENIPUNCTURE: CPT

## 2025-04-20 PROCEDURE — 6360000002 HC RX W HCPCS: Performed by: INTERNAL MEDICINE

## 2025-04-20 PROCEDURE — 84484 ASSAY OF TROPONIN QUANT: CPT

## 2025-04-20 PROCEDURE — 80053 COMPREHEN METABOLIC PANEL: CPT

## 2025-04-20 PROCEDURE — 85025 COMPLETE CBC W/AUTO DIFF WBC: CPT

## 2025-04-20 PROCEDURE — 2500000003 HC RX 250 WO HCPCS: Performed by: INTERNAL MEDICINE

## 2025-04-20 PROCEDURE — 99238 HOSP IP/OBS DSCHRG MGMT 30/<: CPT | Performed by: INTERNAL MEDICINE

## 2025-04-20 RX ORDER — FERROUS SULFATE 325(65) MG
325 TABLET ORAL EVERY OTHER DAY
Qty: 30 TABLET | Refills: 3 | Status: SHIPPED
Start: 2025-04-27

## 2025-04-20 RX ADMIN — ASPIRIN 81 MG: 81 TABLET, CHEWABLE ORAL at 10:16

## 2025-04-20 RX ADMIN — SODIUM CHLORIDE, PRESERVATIVE FREE 10 ML: 5 INJECTION INTRAVENOUS at 10:18

## 2025-04-20 RX ADMIN — FERROUS SULFATE TAB 325 MG (65 MG ELEMENTAL FE) 325 MG: 325 (65 FE) TAB at 10:16

## 2025-04-20 RX ADMIN — CLOPIDOGREL 75 MG: 75 TABLET, FILM COATED ORAL at 10:16

## 2025-04-20 RX ADMIN — HEPARIN SODIUM 5000 UNITS: 5000 INJECTION INTRAVENOUS; SUBCUTANEOUS at 05:48

## 2025-04-20 RX ADMIN — SACUBITRIL AND VALSARTAN 0.5 TABLET: 24; 26 TABLET, FILM COATED ORAL at 10:15

## 2025-04-20 NOTE — PLAN OF CARE
Problem: Chronic Conditions and Co-morbidities  Goal: Patient's chronic conditions and co-morbidity symptoms are monitored and maintained or improved  4/19/2025 2152 by Lexy Harvey RN  Outcome: Progressing  4/19/2025 1441 by Alanna Mulligan RN  Outcome: Progressing     Problem: Discharge Planning  Goal: Discharge to home or other facility with appropriate resources  4/19/2025 2152 by Lexy Harvey RN  Outcome: Progressing  4/19/2025 1441 by Alanna Mulligan RN  Outcome: Progressing     Problem: Respiratory - Adult  Goal: Achieves optimal ventilation and oxygenation  Outcome: Progressing     Problem: Cardiovascular - Adult  Goal: Absence of cardiac dysrhythmias or at baseline  Outcome: Progressing     Problem: Gastrointestinal - Adult  Goal: Minimal or absence of nausea and vomiting  Outcome: Progressing  Goal: Maintains or returns to baseline bowel function  Outcome: Progressing  Goal: Maintains adequate nutritional intake  Outcome: Progressing

## 2025-04-20 NOTE — FLOWSHEET NOTE
04/20/25 0217   Vital Signs   Temp 97.3 °F (36.3 °C)   Temp Source Oral   Pulse 76   Heart Rate Source Monitor   Respirations 18   BP (!) 169/52   MAP (Calculated) 91   BP Location Right upper arm   BP Method Automatic   Patient Position Semi fowlers   Pain Assessment   Pain Assessment None - Denies Pain   Opioid-Induced Sedation   POSS Score 1   Oxygen Therapy   SpO2 97 %   O2 Device None (Room air)     Pt resting in bed, no acute distress. Lexy Harvey, RN

## 2025-04-20 NOTE — FLOWSHEET NOTE
04/19/25 2041   Vital Signs   Temp 97.8 °F (36.6 °C)   Temp Source Oral   Pulse 71   Heart Rate Source Monitor   Respirations 18   /69   MAP (Calculated) 86   BP Location Left upper arm   Pain Assessment   Pain Assessment None - Denies Pain   Opioid-Induced Sedation   POSS Score 1   Oxygen Therapy   SpO2 97 %   O2 Device None (Room air)     Pt awake/alert and oriented times 4. Denies n/v and pain at this time. Evening meds given at this time. Lexy Harvey RN

## 2025-04-20 NOTE — DISCHARGE INSTR - DIET

## 2025-04-20 NOTE — DISCHARGE SUMMARY
Hospital Medicine Discharge Summary    Patient: Whitney Meier     Gender: female  : 10/20/1935   Age: 89 y.o.  MRN: [id number]    Admitting Physician: Bren Plascencia MD  Discharge Physician: Cierra Bautista DO    Code Status: Full Code     Admit Date: 2025   Discharge Date: 2025      Discharge Diagnoses:    Active Hospital Problems    Diagnosis Date Noted    Elevated troponin [R79.89] 2025    CKD stage 3b, GFR 30-44 ml/min (HCC) [N18.32] 2025    Small bowel obstruction (HCC) [K56.609] 2025    SBO (small bowel obstruction) ([facility]) [K56.609] 10/10/2024       Condition at Discharge: Stable    Hospital Course:     #Small bowel distension/possible obstruction   #Nausea/vomiting   #Hx of c diff  -imaging as above  -NPO  -IVF   -prn antiemetics and pain medication   -general surgery consulted   -no NG at this time. Bowel rest.   -abdominal x-ray with non-obstructive bowel gas pattern.   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   -on ASA, statin, plavix      #HTN   -on entresto, lasix, coreg     #HLD   -statin      #Dementia   -On remeron     Additional findings or notes to primary provider:  None at this time    Discharge Medications:   Current Discharge Medication List        Current Discharge Medication List        CONTINUE these medications which have CHANGED    Details   ferrous sulfate (IRON 325) 325 (65 Fe) MG tablet Take 1 tablet by mouth every other day  Qty: 30 tablet, Refills: 3           Current Discharge Medication List        CONTINUE these medications which  SC LAD  Heart:: Normal s1/s2, RRR, no murmurs, gallops, or rubs. No leg edema  Lungs:  Normal respiratory effort. Clear to auscultation bilaterally, no wheeze, rales or rhonchi.  Abdomen: Soft, non-tender, non-distended, bowel sounds present, no masses  Musculoskeletal:  No clubbing, no cyanosis, or edema  Skin: No lesion or masses  Neurologic:  Neurovascularly intact without any focal sensory/motor deficits. Cranial nerves: II-XII intact, grossly non-focal.  Psychiatric:  Alert and oriented, thought content appropriate    Labs: For convenience and continuity at follow-up the following most recent labs are provided:    Lab Results   Component Value Date/Time    WBC 5.3 04/20/2025 06:06 AM    HGB 10.2 04/20/2025 06:06 AM    HCT 30.1 04/20/2025 06:06 AM    MCV 97.3 04/20/2025 06:06 AM     04/20/2025 06:06 AM     04/20/2025 06:06 AM    K 4.5 04/20/2025 06:06 AM     04/20/2025 06:06 AM    CO2 22 04/20/2025 06:06 AM    BUN 41 04/20/2025 06:06 AM    CREATININE 1.3 04/20/2025 06:06 AM    CALCIUM 8.9 04/20/2025 06:06 AM    PHOS 2.6 05/28/2019 05:31 AM    ALKPHOS 42 04/20/2025 06:06 AM    ALT 12 04/20/2025 06:06 AM    AST 21 04/20/2025 06:06 AM    BILITOT <0.2 04/20/2025 06:06 AM    BILIDIR 0.2 10/21/2024 06:06 AM    TRIG 54 10/21/2024 06:06 AM     Lab Results   Component Value Date    INR 1.11 10/20/2024    INR 1.04 12/17/2023    INR 1.15 (H) 05/30/2022       Radiology:  XR ABDOMEN (2 VIEWS)  Result Date: 4/19/2025  EXAMINATION: TWO XRAY VIEWS OF THE ABDOMEN 4/19/2025 7:05 am COMPARISON: None. HISTORY: ORDERING SYSTEM PROVIDED HISTORY: f/u SBO TECHNOLOGIST PROVIDED HISTORY: Reason for exam:->f/u SBO Reason for Exam: f/u SBO FINDINGS: Bowel-gas pattern is nonobstructive. No free intraperitoneal air, portal venous gas pneumatosis. Unremarkable lower lung bases and lower mediastinum. No acute process in the osseous structures or soft tissues.     Nonobstructive bowel gas pattern.     CT ABDOMEN PELVIS WO

## 2025-04-20 NOTE — PLAN OF CARE
Problem: Chronic Conditions and Co-morbidities  Goal: Patient's chronic conditions and co-morbidity symptoms are monitored and maintained or improved  Outcome: Adequate for Discharge     Problem: Discharge Planning  Goal: Discharge to home or other facility with appropriate resources  Outcome: Adequate for Discharge     Problem: Skin/Tissue Integrity  Goal: Skin integrity remains intact  Description: 1.  Monitor for areas of redness and/or skin breakdown2.  Assess vascular access sites hourly3.  Every 4-6 hours minimum:  Change oxygen saturation probe site4.  Every 4-6 hours:  If on nasal continuous positive airway pressure, respiratory therapy assess nares and determine need for appliance change or resting period  Outcome: Adequate for Discharge     Problem: ABCDS Injury Assessment  Goal: Absence of physical injury  Outcome: Adequate for Discharge     Problem: Safety - Adult  Goal: Free from fall injury  Outcome: Adequate for Discharge     Problem: Neurosensory - Adult  Goal: Achieves stable or improved neurological status  Outcome: Adequate for Discharge  Goal: Achieves maximal functionality and self care  Outcome: Adequate for Discharge     Problem: Respiratory - Adult  Goal: Achieves optimal ventilation and oxygenation  Outcome: Adequate for Discharge     Problem: Cardiovascular - Adult  Goal: Absence of cardiac dysrhythmias or at baseline  Outcome: Adequate for Discharge     Problem: Skin/Tissue Integrity - Adult  Goal: Incisions, wounds, or drain sites healing without S/S of infection  Outcome: Adequate for Discharge     Problem: Gastrointestinal - Adult  Goal: Minimal or absence of nausea and vomiting  Outcome: Adequate for Discharge  Goal: Maintains or returns to baseline bowel function  Outcome: Adequate for Discharge  Goal: Maintains adequate nutritional intake  Outcome: Adequate for Discharge     Problem: Metabolic/Fluid and Electrolytes - Adult  Goal: Electrolytes maintained within normal

## 2025-04-20 NOTE — CARE COORDINATION
2:11 PM  RYAN observed DC order. Pt will be DC-ing home and will be transported by daughter. RYAN observed OT note who only ordered home with supervision.  No further CM needs.   Pt's O2 is 94% on RA.     RYAN informed RN pt is ready for DC from CM standpoint.    Electronically signed by ROCHELLE Snyder on 4/20/2025 at 2:13 PM

## 2025-04-29 NOTE — PROGRESS NOTES
[facility]    PATIENT NAME: Whitney Meier     TODAY'S DATE: 4/20/2025    CHIEF COMPLAINT: none    INTERVAL HISTORY/HPI:    Pt feeling well, eating light solid food without difficulty, had small loose BM this morning, and passing flatus.  Denies abdominal pain.     OBJECTIVE:  VITALS:  BP (!) 169/52   Pulse 76   Temp 97.3 °F (36.3 °C) (Oral)   Resp 18   Ht 1.778 m (5' 10\")   Wt 72.6 kg (160 lb)   SpO2 97%   BMI 22.96 kg/m²     INTAKE/OUTPUT:    I/O last 3 completed shifts:  In: 1821.9 [P.O.:720; I.V.:1101.9]  Out: -   No intake/output data recorded.    CONSTITUTIONAL:  awake and alert  LUNGS:     ABDOMEN:  , soft, non-distended, non-tender     Data:  CBC:   Recent Labs     04/18/25 2248 04/20/25  0606   WBC 10.6 5.3   HGB 11.0* 10.2*   HCT 33.2* 30.1*    174     BMP:    Recent Labs     04/18/25 2248 04/20/25  0606    144   K 4.4 4.5    112*   CO2 23 22   BUN 57* 41*   CREATININE 1.6* 1.3*   GLUCOSE 208* 104*     Hepatic:   Recent Labs     04/18/25 2248 04/20/25  0606   AST 22 21   ALT 12 12   BILITOT 0.4 <0.2   ALKPHOS 60 42     Mag:    No results for input(s): \"MG\" in the last 72 hours.   Phos:   No results for input(s): \"PHOS\" in the last 72 hours.   INR: No results for input(s): \"INR\" in the last 72 hours.    Radiology Review:         ASSESSMENT AND PLAN:  SBO, clinically resolved   - cont diet as tolerated   - ok for d/c home from Surgery perspective        Electronically signed by JOHNNY ABDULLAHI MD     
  Physician Progress Note      PATIENT:               POORNIMA NERI  CSN #:                  [id number]  :                       10/20/1935  ADMIT DATE:       2025 10:40 PM  DISCH DATE:        2025 7:06 PM  RESPONDING  PROVIDER #:        Cierra Bautista DO          QUERY TEXT:    Elevated cardiac troponin (cTc) levels are documented in the Discharge Summary   by Cierra Bautista DO at 2025. Please clarify the cause:    The clinical indicators include:  -\"Elevated troponin. 65-->66. no acute ischemic EKG changes. Chronic systolic   HF. EF as above last echo 35-40%. On coreg, entresto, lasix. Monitor intake   output/daily weights.  #CKD stage IIIb with elev Cr. Cr baseline   1.2-1.4. 1.6 on presentation. IVF. Continue to monitor \" (Discharge Summary by   Cierra Bautista DO at 2025)  -Troponin 65, 66, 68 (Epic Labs)  -Treatment: IVF, EKG, Cardiac labs, coreg, entresto  Options provided:  -- Elevated cardiac troponin levels only without corresponding diagnosis  -- Myocardial injury, non-ischemic (non-traumatic) related to chronic systolic   heart failure and chronic kidney disease  -- Other - I will add my own diagnosis  -- Disagree - Not applicable / Not valid  -- Disagree - Clinically unable to determine / Unknown  -- Refer to Clinical Documentation Reviewer    PROVIDER RESPONSE TEXT:    This patient has myocardial injury, non-ischemic (non-traumatic related to   chronic systolic heart failure and chronic kidney disease    Query created by: Leilani Connolly on 2025 10:10 AM      Electronically signed by:  Cierra Bautista DO 2025 8:09 AM          
  Physician Progress Note      PATIENT:               POORNIMA NERI  [facility] #:                  [id number]  :                       10/20/1935  ADMIT DATE:       2025 10:40 PM  DISCH DATE:        2025 7:06 PM  RESPONDING  PROVIDER #:        Cierra Bautista DO          QUERY TEXT:    Small bowel distension/possible obstruction is documented in the Discharge   Summary by Cierra Bautista DO at 2025. Please clarify the cause of the bowel   obstruction:    The clinical indicators include:  -\"CT showing SBO w/ clear transition point.  (+) prior abdominal surgery   (appy, hysterectomy). SBO, transition point visible on CT, but clinically she   is improving already. Cont bowel rest today. Check AXR. Await   improvement/return of bowel function.\" (General Surgery Consults by Farzad Dc MD at 2025)  -\"Small bowel distension/possible obstruction. Nausea/vomiting. Hx of c diff.   imaging as above. NPO. IVF. prn antiemetics and pain medication. general   surgery consulted. no NG at this time. Bowel rest. abdominal x-ray with   non-obstructive bowel gas pattern. Resolved, ok to dc, diet as tolerated.\"   (Discharge Summary by Cierra Bautista DO at 2025)  -Similar appearance of small bowel distension with transition in the mid  abdomen concerning for obstructing process. (CT abdomen/pelvis )  -Treatment: IVF, prn antiemetics and pain medication, general surgery consult,   bowel rest, CT abdomen/pelvis, XR abdomen  Options provided:  -- SBO related to previous appendectomy and hysterectomy  -- SBO unrelated to previous appendectomy and hysterectomy  -- Other - I will add my own diagnosis  -- Disagree - Not applicable / Not valid  -- Disagree - Clinically unable to determine / Unknown  -- Refer to Clinical Documentation Reviewer    PROVIDER RESPONSE TEXT:    This patient has SBO related to previous appendectomy and hysterectomy    Query created by: Leilani Connolly on 2025 4:47 
4 Eyes Skin Assessment     NAME:  Whitney Meier  YOB: 1935  MEDICAL RECORD NUMBER:  [id number]    The patient is being assessed for  Admission    I agree that at least one RN has performed a thorough Head to Toe Skin Assessment on the patient. ALL assessment sites listed below have been assessed.      Areas assessed by both nurses:    Head, Face, Ears, Shoulders, Back, Chest, Arms, Elbows, Hands, Sacrum. Buttock, Coccyx, Ischium, Legs. Feet and Heels, and Under Medical Devices         Does the Patient have a Wound? Yes wound(s) were present on assessment. LDA wound assessment was Initiated and completed by RN                     Shukri Prevention initiated by RN: Yes  Wound Care Orders initiated by RN: Yes    Pressure Injury (Stage 3,4, Unstageable, DTI, NWPT, and Complex wounds) if present, place Wound referral order by RN under : No    New Ostomies, if present place, Ostomy referral order under : No     Nurse 1 eSignature: Electronically signed by YONG ARNOLD RN on 4/19/25 at 11:13 AM EDT    **SHARE this note so that the co-signing nurse can place an eSignature**    Nurse 2 eSignature: Electronically signed by Hawa Hyde RN on 4/19/25 at 11:15 AM EDT   
Bedside Mobility Assessment Tool (BMAT):     Assessment Level 1- Sit and Shake    1. From a semi-reclined position, ask patient to sit up and rotate to a seated position at the side of the bed. Can use the bedrail.    2. Ask patient to reach out and grab your hand and shake making sure patient reaches across his/her midline.   Pass- Patient is able to come to a seated position, maintain core strength. Maintains seated balance while reaching across midline. Move on to Assessment Level 2.     Assessment Level 2- Stretch and Point   1. With patient in seated position at the side of the bed, have patient place both feet on the floor (or stool) with knees no higher than hips.    2. Ask patient to stretch one leg and straighten the knee, then bend the ankle/flex and point the toes. If appropriate, repeat with the other leg.   Pass- Patient is able to demonstrate appropriate quad strength on intended weight bearing limb(s). Move onto Assessment Level 3.     Assessment Level 3- Stand   1. Ask patient to elevate off the bed or chair (seated to standing) using an assistive device (cane, bedrail).    2. Patient should be able to raise buttocks off be and hold for a count of five. May repeat once.   Pass- Patient maintains standing stability for at least 5 seconds, proceed to assessment level 4.    Assessment Level 4- Walk   1. Ask patient to march in place at bedside.    2. Then ask patient to advance step and return each foot. Some medical conditions may render a patient from stepping backwards, use your best clinical judgement.   Fail- Patient not able to complete tasks OR requires use of assistive device. Patient is MOBILITY LEVEL 3.       Mobility Level- 3   
Inpatient Occupational Therapy Evaluation & Treatment    Unit: 2 [place]  Date:  4/19/2025  Patient Name:    Whitney Meier  Admitting diagnosis:  Small bowel obstruction (HCC) [K56.609]  SBO (small bowel obstruction) (HCC) [K56.609]  Admit Date:  4/18/2025  Precautions/Restrictions/WB Status/ Lines/ Wounds/ Oxygen: Fall risk, Bed/chair alarm, Lines (IV), Telemetry, and Telesitter    Treatment Time:  9172-7330  Treatment Number:  1  Timed Code Treatment Minutes: 24 minutes  Total Treatment Minutes:  34  minutes    Patient Goals for Therapy: \"to go home\"          Discharge Recommendations: Home with   24/7 supervision   DME needs for discharge: Needs Met       Therapy recommendations for staff:   Assist of 1 for ambulation with use of rolling walker (RW) and gait belt to/from chair  to/from bathroom  within room    History of Present Illness:   Per H&P of Jesusita Rosario, GERMAN - CNP  Chief complaint: Vomiting (via [place])   88 y/o f PMHx \"...systolic HF, CVA, HTN, DM, dementia  who presented to [facility] ED with complaint of nausea and vomiting.  Patient with history of dementia.  Unsure how many days this was going on for.  HPI obtained from patient and EMR.  She denies any pain now.  No further nausea, vomiting.  Denies cold symptoms.       Vitals stable. Labs with creatinine 1.6. troponin 65, 66. CT abdomen/pelvis with similar appearance of small bowel distention with transition in the mid abdomen, concerning for obstructing process, punctate non-obstructing left renal stones.  Admitted for further management/care.  General surgery consulted. \"    Preadmission Environment:   Lives With: Daughter (Son in law)  Type of Home: House  Home Layout: Two level, Able to Live on Main level with bedroom/bathroom  Home Access: one step + one step  Entrance Stairs - Number of Steps: 3 HAZEL- wider steps  Bathroom Shower/Tub: Tub/shower (sponge bathes at baseline)  Bathroom Toilet: Standard  Bathroom Equipment: 3-in-1 
Notified by tele-sitter  pt attempting to get out of bed, pt was able to be redirected back to bed and requested to ambulate to bathroom.   
Patient discharged to home in stable condition. No medications sent with patient. Discharge instructions reviewed.  
Pt in bed during admission assessment. Pt is alert and oriented to self, [facility] and states the year is 2025 but unsure of which month. Vitals stable. Pt requesting something to drink, educated pt about being NPO, provided oral swabs at this time. Pt accepting of scheduled medications. Denies pain and nausea at this time. Bed alarm in place. Call light in reach. No new concerns at this time.m     Bedside Mobility Assessment Tool (BMAT):     Assessment Level 1- Sit and Shake    1. From a semi-reclined position, ask patient to sit up and rotate to a seated position at the side of the bed. Can use the bedrail.    2. Ask patient to reach out and grab your hand and shake making sure patient reaches across his/her midline.   Pass- Patient is able to come to a seated position, maintain core strength. Maintains seated balance while reaching across midline. Move on to Assessment Level 2.     Assessment Level 2- Stretch and Point   1. With patient in seated position at the side of the bed, have patient place both feet on the floor (or stool) with knees no higher than hips.    2. Ask patient to stretch one leg and straighten the knee, then bend the ankle/flex and point the toes. If appropriate, repeat with the other leg.   Pass- Patient is able to demonstrate appropriate quad strength on intended weight bearing limb(s). Move onto Assessment Level 3.     Assessment Level 3- Stand   1. Ask patient to elevate off the bed or chair (seated to standing) using an assistive device (cane, bedrail).    2. Patient should be able to raise buttocks off be and hold for a count of five. May repeat once.   Pass- Patient maintains standing stability for at least 5 seconds, proceed to assessment level 4.    Assessment Level 4- Walk   1. Ask patient to march in place at bedside.    2. Then ask patient to advance step and return each foot. Some medical conditions may render a patient 
Pt was incontinent of urine as well as voided in bathroom. Lexy Harvey RN    
Secure message sent to MD Dc. Pt continues to deny nausea and has had no vomiting during shift. Pt requesting something to drink. This nurse spoke with MD dc over phone, verbal order given to start clear liquids at this time. Provided pt with diet within parameters.   
(1000 UT) CAPS Take by mouth   Yes Madalyn Salinas MD   vitamin C (ASCORBIC ACID) 500 MG tablet Take 1 tablet by mouth daily   Yes Madalyn Salinas MD   vitamin B-12 (CYANOCOBALAMIN) 500 MCG tablet Take 1 tablet by mouth daily   Yes Madalyn Salinas MD   clopidogrel (PLAVIX) 75 MG tablet TAKE 1 TABLET BY MOUTH EVERY DAY 8/21/24  Yes Gerard Youssef MD   carvedilol (COREG) 6.25 MG tablet Take 1 tablet by mouth 2 times daily (with meals) 8/21/24  Yes Gerard Youssef MD   pravastatin (PRAVACHOL) 20 MG tablet Take 1 tablet by mouth daily  Patient taking differently: Take 1 tablet by mouth at bedtime 8/21/24  Yes Gerard Youssef MD   aspirin 81 MG chewable tablet Take 1 tablet by mouth daily 12/19/23  Yes Jim Eldridge MD   pramipexole (MIRAPEX) 1 MG tablet Take 1 tablet by mouth nightly as needed (RLS) 12/19/23  Yes Jim Eldridge MD   Probiotic Product (PROBIOTIC-10 PO) Take by mouth   Yes Madalyn Salinas MD   linagliptin (TRADJENTA) 5 MG tablet Take 1 tablet by mouth every morning   Yes Madalyn Salinas MD   ferrous sulfate 325 (65 Fe) MG tablet Take 1 tablet by mouth in the morning and at bedtime   Yes Madalyn Salinas MD   Multiple Vitamins-Minerals (MULTIVITAMIN PO) Take by mouth daily   Yes Madalyn Salinas MD      Diet Reviewed: Yes   ADULT DIET; Clear Liquid    Goal of Care Reviewed: Yes   Patient and/or Family's stated Goal of Care this Admission:   , Reduce shortness of breath, increase activity tolerance, better understand heart failure and disease management, be more comfortable, and reduce lower extremity edema prior to discharge.     Electronically signed by Lexy Harvey RN on 4/19/2025 at 10:25 PM

## 2025-05-19 PROBLEM — R79.89 ELEVATED TROPONIN: Status: RESOLVED | Noted: 2025-04-19 | Resolved: 2025-05-19

## 2025-07-24 NOTE — PROGRESS NOTES
Patient follow-up on screening labs   Glucose 167 (H) 70 - 99 mg/dl    Performed on ACCU-CHEK        Therapy progress:  PT  Position Activity Restriction  Other position/activity restrictions: up as tolerated  Objective     Sit to Stand: Minimal Assistance (From EOB, standard toilet, wheelchair, car to the RW)  Stand to Sit: Minimal Assistance (To wheelchair)  Bed to Chair: Minimal assistance (With the use of the RW)  Device: Rolling Walker  Assistance: Minimal assistance  Distance: 150ft+20ft  OT  PT Equipment Recommendations  Equipment Needed: No     Assessment        SLP          Body mass index is 18.99 kg/m².    Assessment and Plan:  Acute left internal capsule stroke, thromboembolic  - Status post TNK on 12/17  - Aspirin low-dose  - Lipitor 80 mg  - Plavix 75 mg for 21 days PT, OT, SLP eval and treat     Diabetes, type II  - Continue alogliptin (substituted for linagliptin)  - Sliding scale insulin, blood glucose checks     Cardiomyopathy with significant systolic impairment  - Cardiology saw the patient, started on low-dose Entresto  - Coreg 12.5 mg   -decrease to 6.25mg BID for hypotension, with hold parameters.  - Patient would likely benefit from Jardiance (GFR greater than 30), can be initiated as outpatient  - On room air, continue to monitor labs and blood pressure toleration of Entresto   -hypotension with Entresto, halved dose, and placed hold parameters.    Hypotension-improving  -Maps in the 40s and 50s yesterday  - Likely secondary to medications  - Medication dosages decreased, hold parameters placed  - Continue to monitor blood pressure     Possible CKD  - No prior labs to compare  - GFR 30s and 40s since presentation  - Continue to monitor     Normocytic anemia  - Patient started on ferrous sulfate  -TSAT is 24%, unlikely iron deficiency anemia  - Continue to monitor CBC        Impairments: Decreased functional mobility, Decreased ADLs     Bladder - high risk retention - Monitor PVRs, SC prn >300cc     Bowel - high risk constipation

## 2025-08-11 RX ORDER — CARVEDILOL 6.25 MG/1
6.25 TABLET ORAL 2 TIMES DAILY WITH MEALS
Qty: 180 TABLET | Refills: 1 | Status: SHIPPED | OUTPATIENT
Start: 2025-08-11

## 2025-08-16 ENCOUNTER — HOSPITAL ENCOUNTER (EMERGENCY)
Age: 89
Discharge: HOME | End: 2025-08-17
Attending: EMERGENCY MEDICINE
Payer: MEDICARE

## 2025-08-16 ENCOUNTER — APPOINTMENT (OUTPATIENT)
Dept: GENERAL RADIOLOGY | Age: 89
End: 2025-08-16
Payer: MEDICARE

## 2025-08-16 VITALS
OXYGEN SATURATION: 98 % | BODY MASS INDEX: 22.9 KG/M2 | TEMPERATURE: 98.3 F | DIASTOLIC BLOOD PRESSURE: 55 MMHG | SYSTOLIC BLOOD PRESSURE: 137 MMHG | HEART RATE: 74 BPM | WEIGHT: 160 LBS | RESPIRATION RATE: 18 BRPM | HEIGHT: 70 IN

## 2025-08-16 DIAGNOSIS — S91.209A AVULSION OF TOENAIL OF RIGHT FOOT: Primary | ICD-10-CM

## 2025-08-16 LAB
GLUCOSE BLD-MCNC: 211 MG/DL (ref 70–99)
PERFORMED ON: ABNORMAL

## 2025-08-16 PROCEDURE — 99284 EMERGENCY DEPT VISIT MOD MDM: CPT

## 2025-08-16 PROCEDURE — 6360000002 HC RX W HCPCS: Performed by: EMERGENCY MEDICINE

## 2025-08-16 PROCEDURE — 90715 TDAP VACCINE 7 YRS/> IM: CPT | Performed by: EMERGENCY MEDICINE

## 2025-08-16 PROCEDURE — 73660 X-RAY EXAM OF TOE(S): CPT

## 2025-08-16 PROCEDURE — 90471 IMMUNIZATION ADMIN: CPT | Performed by: EMERGENCY MEDICINE

## 2025-08-16 RX ADMIN — TETANUS TOXOID, REDUCED DIPHTHERIA TOXOID AND ACELLULAR PERTUSSIS VACCINE, ADSORBED 0.5 ML: 5; 2.5; 8; 8; 2.5 SUSPENSION INTRAMUSCULAR at 22:35

## 2025-08-16 ASSESSMENT — PAIN - FUNCTIONAL ASSESSMENT: PAIN_FUNCTIONAL_ASSESSMENT: 0-10

## 2025-08-16 ASSESSMENT — PAIN SCALES - GENERAL: PAINLEVEL_OUTOF10: 0

## 2025-08-17 ASSESSMENT — PAIN - FUNCTIONAL ASSESSMENT: PAIN_FUNCTIONAL_ASSESSMENT: 0-10

## (undated) DEVICE — HANDPIECE SET WITH HIGH FLOW TIP AND SUCTION TUBE: Brand: INTERPULSE

## (undated) DEVICE — APPLICATOR PREP 26ML 0.7% IOD POVACRYLEX 74% ISO ALC ST

## (undated) DEVICE — SYRINGE MED 10ML LUERLOCK TIP W/O SFTY DISP

## (undated) DEVICE — BANDAGE ROLL,100% COTTON, 8 PLY, LARGE: Brand: KERLIX

## (undated) DEVICE — PACK ORTH LO EXT VI

## (undated) DEVICE — GLOVE ORANGE PI 7 1/2   MSG9075

## (undated) DEVICE — 3M™ COBAN™ NL STERILE NON-LATEX SELF-ADHERENT WRAP, 2084S, 4 IN X 5 YD (10 CM X 4,5 M), 18 ROLLS/CASE: Brand: 3M™ COBAN™

## (undated) DEVICE — ELECTRODE PT RET AD L9FT HI MOIST COND ADH HYDRGEL CORDED

## (undated) DEVICE — GOWN SIRUS NONREIN XL W/TWL: Brand: MEDLINE INDUSTRIES, INC.

## (undated) DEVICE — Z INACTIVE USE 2660664 SOLUTION IRRIG 3000ML 0.9% SOD CHL USP UROMATIC PLAS CONT

## (undated) DEVICE — GAUZE,SPONGE,4"X4",8PLY,STRL,LF,10/TRAY: Brand: MEDLINE

## (undated) DEVICE — BANDAGE GZ W45INXL4 1 10YD FLUF RL 6 PLY DERMACEA

## (undated) DEVICE — X-RAY CASSETTE COVER: Brand: CONVERTORS

## (undated) DEVICE — CURITY ABDOMINAL PAD: Brand: CURITY

## (undated) DEVICE — SOLUTION IV IRRIG 500ML 0.9% SODIUM CHL 2F7123

## (undated) DEVICE — ESMARK: Brand: DEROYAL

## (undated) DEVICE — NEEDLE HYPO 25GA L1.5IN BLU POLYPR HUB S STL REG BVL STR

## (undated) DEVICE — SHOE, POST-OPERATIVE: Brand: DEROYAL

## (undated) DEVICE — MAJOR SET UP PK

## (undated) DEVICE — TIP SUCT DIA12FR W STYL CTRL VENT DISPOSABLE FRAZ

## (undated) DEVICE — COTTON UNDERCAST PADDING,CRIMPED FINISH: Brand: WEBRIL

## (undated) DEVICE — OCCLUSIVE GAUZE STRIP,3% BISMUTH TRIBROMOPHENATE IN PETROLATUM BLEND: Brand: XEROFORM